# Patient Record
Sex: FEMALE | Race: WHITE | Employment: OTHER | URBAN - METROPOLITAN AREA
[De-identification: names, ages, dates, MRNs, and addresses within clinical notes are randomized per-mention and may not be internally consistent; named-entity substitution may affect disease eponyms.]

---

## 2017-04-01 RX ORDER — BLOOD SUGAR DIAGNOSTIC
STRIP MISCELLANEOUS
Qty: 100 STRIP | Refills: 11 | Status: SHIPPED | OUTPATIENT
Start: 2017-04-01 | End: 2017-11-13 | Stop reason: SDUPTHER

## 2017-04-27 ENCOUNTER — OFFICE VISIT (OUTPATIENT)
Dept: FAMILY MEDICINE CLINIC | Age: 77
End: 2017-04-27

## 2017-04-27 VITALS
SYSTOLIC BLOOD PRESSURE: 146 MMHG | WEIGHT: 169 LBS | TEMPERATURE: 97.7 F | BODY MASS INDEX: 31.1 KG/M2 | HEART RATE: 68 BPM | DIASTOLIC BLOOD PRESSURE: 62 MMHG | RESPIRATION RATE: 18 BRPM | HEIGHT: 62 IN

## 2017-04-27 DIAGNOSIS — Z23 ENCOUNTER FOR IMMUNIZATION: ICD-10-CM

## 2017-04-27 DIAGNOSIS — E11.9 TYPE 2 DIABETES MELLITUS WITHOUT COMPLICATION, WITHOUT LONG-TERM CURRENT USE OF INSULIN (HCC): ICD-10-CM

## 2017-04-27 DIAGNOSIS — Z00.00 MEDICARE ANNUAL WELLNESS VISIT, SUBSEQUENT: ICD-10-CM

## 2017-04-27 DIAGNOSIS — Z00.00 ROUTINE GENERAL MEDICAL EXAMINATION AT A HEALTH CARE FACILITY: ICD-10-CM

## 2017-04-27 DIAGNOSIS — Z13.39 SCREENING FOR ALCOHOLISM: ICD-10-CM

## 2017-04-27 DIAGNOSIS — Z12.31 ENCOUNTER FOR SCREENING MAMMOGRAM FOR MALIGNANT NEOPLASM OF BREAST: Primary | ICD-10-CM

## 2017-04-27 DIAGNOSIS — Z12.11 COLON CANCER SCREENING: ICD-10-CM

## 2017-04-27 PROBLEM — Z90.710 H/O HYSTERECTOMY FOR BENIGN DISEASE: Status: ACTIVE | Noted: 2017-04-27

## 2017-04-27 NOTE — PATIENT INSTRUCTIONS

## 2017-04-27 NOTE — PROGRESS NOTES
This is a Subsequent Medicare Annual Wellness Visit providing Personalized Prevention Plan Services (PPPS) (Performed 12 months after initial AWV and PPPS )    I have reviewed the patient's medical history in detail and updated the computerized patient record. History       Nicole PATRICIA d/w pt to have annual fit test, last colonoscopy 2 yrs ago. Benign polyps per family. Needs another in 3 yrs from now. Declines fit test today. Last HBA1C was 7.1 in 12/2016. She has log of FBS, does occasionally, . DW family goal is 90-130s. No need to repeat HBA1C today. Current labs. Has colporrhaphy 2/2017 for \"NALLELY, vaginal prolapse. Had hyster, still has cervix for pelvic support, with new type of mesh support. Pt declines mammogram order today, she will obtain order next visit. Declines pneumovax, states she is not due till June or July this year.              Past Medical History:   Diagnosis Date    Arthritis     Shoulders, knees    Bell's palsy 1980    Residual remains on right side    Breast cancer ([address] 5/1995    Cholelithiasis 6/23/2011    CKD (chronic kidney disease), stage III 6/6/2012    COPD (chronic obstructive pulmonary disease) ([address] 12/28/2011    Dyslipidemia 6/23/2011    Elevated triglycerides with high cholesterol 8/28/2015    Esophageal reflux 6/23/2011    Essential hypertension 6/23/2011    GERD (gastroesophageal reflux disease)     Hiatal hernia 6/23/2011    History of duodenal ulcer 6/23/2011    HTN (hypertension) 6/23/2011    Hyperlipidemia 1/7/2013    Left ventricular hypertrophy 6/23/2011    Nicotine dependence 9/2/2015    Obstructive chronic bronchitis with acute bronchitis ([address] 6/23/2011    Osteopenia     Osteoporosis 6/23/2011    Other ill-defined conditions     uti found 10/9 on antibiot    Other ill-defined conditions     rightsided eye and cheek numb from surg    Pregnancy 6/23/2011    Steatosis of liver 10/9/2011    Tobacco abuse 9/26/2011    Tubular adenoma of colon 1/24/2016 1/4/16    Type 2 diabetes mellitus without complications ([facility] 3/14/3555    Type II or unspecified type diabetes mellitus without mention of complication, not stated as uncontrolled 6/23/2011      Past Surgical History:   Procedure Laterality Date    ENDOSCOPY, COLON, DIAGNOSTIC  10/09    HX CHOLECYSTECTOMY  10/2011    laparoscopic    HX HEENT      bells palsy surgery,tongue numb    HX MASTECTOMY Right 5/1995    HX OTHER SURGICAL      colonoscopy    HX TONSIL AND ADENOIDECTOMY  1969    HX TONSILLECTOMY  1969    [place] MASTECTOMY BRA  1995    R Breast    REMOVAL GALLBLADDER       Current Outpatient Prescriptions   Medication Sig Dispense Refill    rosuvastatin (CRESTOR) 20 mg tablet Take 1 Tab by mouth nightly. 30 Tab 8    amLODIPine (NORVASC) 5 mg tablet TAKE ONE (1) TABLET(S) DAILY 30 Tab 11    metoprolol succinate (TOPROL-XL) 200 mg XL tablet TAKE ONE TABLET DAILY 30 Tab 5    losartan (COZAAR) 100 mg tablet TAKE ONE TABLET ONCE DAILY 30 Tab 5    omeprazole (PRILOSEC) 40 mg capsule TAKE ONE CAPSULE BY MOUTH D AILY 30 Cap 11    omega-3 fatty acids-vitamin e (FISH OIL) 1,000 mg cap Take 2 Caps by mouth daily (after breakfast).  calcium-vitamin D (CALCIUM 500+D) 500 mg(1,250mg) -200 unit per tablet Take 1 Tab by mouth daily (after breakfast).  aspirin 81 mg tablet Take 81 mg by mouth daily (after breakfast).  multivitamin (ONE A DAY) tablet Take 1 Tab by mouth daily (after breakfast).  ACCU-CHEK GEM PLUS TEST STRP strip USE TO CHECK BLOOD SUGAR DA NELLA 100 Strip 11    FLUZONE HIGH-DOSE 2016-17, PF, syrg injection VACCINATION ADMINISTERED BY PHARMACIST  0    pioglitazone (ACTOS) 15 mg tablet TAKE ONE TABLET ONCE DAILY PRIOR TO BREAKFAST 30 Tab 5    PREMARIN 0.625 mg/gram vaginal cream Insert  into vagina as needed. Use as directed  11    red yeast rice 600 mg Tab Take 600 mg by mouth daily (after breakfast).        Allergies   Allergen Reactions  Nsaids (Non-Steroidal Anti-Inflammatory Drug) Anaphylaxis and Hives    Fenofibrate Other (comments)     Leg cramps    Metformin Diarrhea     Family History   Problem Relation Age of Onset    Asthma Mother     Cancer Mother      Pancreatic CA    Stroke Father     Other Father      Artherosclerosis    Cancer Sister      Rectum    Lung Disease Sister     Diabetes Sister     Coronary Artery Disease Sister     Heart Attack Brother     Diabetes Maternal Grandmother     Diabetes Paternal Grandmother     Cancer Paternal Grandfather     Diabetes Sister     Coronary Artery Disease Sister     Diabetes Brother     Alcohol abuse Brother     Other Brother      killed in car accident     Social History   Substance Use Topics    Smoking status: Current Every Day Smoker     Packs/day: 0.50     Years: 65.00     Types: Cigarettes    Smokeless tobacco: Never Used    Alcohol use Yes      Comment: Rarely-holidays     Patient Active Problem List   Diagnosis Code    Bell's palsy G51.0    Dyslipidemia E78.5    Cholelithiasis K80.20    Breast cancer ([facility] C50.919    History of duodenal ulcer Z87.19    Esophageal reflux K21.9    Hiatal hernia K44.9    Essential hypertension I10    Type 2 diabetes mellitus without complications ([facility] S73.3    Pregnancy Z33.1    Left ventricular hypertrophy I51.7    Tobacco abuse Z72.0    Steatosis of liver K76.0    COPD (chronic obstructive pulmonary disease) ([facility] J44.9    Osteopenia M85.80    CKD (chronic kidney disease), stage III N18.3    Elevated triglycerides with high cholesterol E78.2    Nicotine dependence F17.200    IDALIA (obstructive sleep apnea) G47.33    Tubular adenoma of colon D12.6       Depression Risk Factor Screening:     PHQ 2 / 9, over the last two weeks 4/27/2017   Little interest or pleasure in doing things Not at all   Feeling down, depressed or hopeless Not at all   Total Score PHQ 2 0     Alcohol Risk Factor Screening:   None used      Functional Ability and Level of Safety:     Hearing Loss   Moderate. Has hearing aid. Activities of Daily Living   Self-care. Requires assistance with: no ADLs    Fall Risk     Fall Risk Assessment, last 12 mths 4/27/2017   Able to walk? Yes   Fall in past 12 months? No     Abuse Screen   Patient is not abused    Review of Systems   A comprehensive review of systems was negative except for that written in the HPI. Physical Examination     Evaluation of Cognitive Function:  Mood/affect:  happy  Appearance: age appropriate  Family member/caregiver input: daughter present. Gen: Oriented to person, place and time and well-developed, well-nourished and in no distress. HEENT:    Head: normocephalic and atraumatic. Eyes:  EOM are normal. Pupils equal and round. Ears:  TMs and canals normal, no erythema, bulging or retraction  Mouth:  No posterior pharyngeal erythema or exudates. No tonsillar hypertrophy. Neck:  Normal range of motion. Neck supple. Cardiovascular: normal rate, regular rhythm and normal heart sounds. Pulmonary/Chest:  Effort normal and breath sounds normal.  No respiratory distress. No wheezes, no rales. Abdominal: soft, normal  bowel sounds. Musculoskeletal:  No edema, no tenderness. No calf tenderness or edema. Neurological:  Alert, oriented to person, place and time. Skin: skin is warm and dry. Patient Care Team:  Maximilian Diaz MD as PCP - General (Family Practice)  Pilar Kennedy MD (Ophthalmology)  Richi Hood MD (Gastroenterology)  Isa Corrigan MD as Physician (Cardiology)  Jr Avina MD as Surgeon (General Surgery)    Advice/Referrals/Counseling   Education and counseling provided:  Are appropriate based on today's review and evaluation      Assessment/Plan   Follow-up Disposition:  Return in about 3 months (around 7/27/2017) for follow up DM2. .    1. Medicare annual wellness visit, subsequent       2.  Encounter for screening mammogram for malignant neoplasm of breast       3. Encounter for immunization       4. Colon cancer screening       5. Type 2 diabetes mellitus without complication, without long-term current use of insulin ([facility]       6. Routine general medical examination at a health care facility       7. Screening for alcoholism     I  have discussed the diagnosis with the patient and the intended treatment plan as seen in the above orders. Patient has provided input and agrees with goals. The patient has received an after-visit summary and questions were answered concerning future plans. I have discussed medication side effects and warnings with the patient as well.

## 2017-04-27 NOTE — MR AVS SNAPSHOT
Visit Information Date & Time Provider Department Dept. Phone Encounter #  
 4/27/2017 11:00 AM Oren ColvinLovely [address] [id number] Follow-up Instructions Return in about 3 months (around 7/27/2017) for follow up DM2. Your Appointments 4/27/2017 11:00 AM  
Medicare Physical with Oren Colvin MD  
[address] [facility]) Appt Note: Wellness visit and 4 Month DM check; Wellness visit and 4 Month DM check; Wellness visit and 4 Month DM check [address]  
[phone]  
  
   
 [address] [address] Upcoming Health Maintenance Date Due Pneumococcal 65+ High/Highest Risk (2 of 2 - PPSV23) 8/24/2016 MEDICARE YEARLY EXAM 10/14/2016 EYE EXAM RETINAL OR DILATED Q1 10/30/2016 FOOT EXAM Q1 2/10/2017 HEMOGLOBIN A1C Q6M 6/12/2017 MICROALBUMIN Q1 9/12/2017 LIPID PANEL Q1 9/12/2017 GLAUCOMA SCREENING Q2Y 10/30/2017 COLONOSCOPY 1/4/2021 DTaP/Tdap/Td series (2 - Td) 12/12/2026 Allergies as of 4/27/2017  Review Complete On: 4/27/2017 By: Oren Colvin MD  
  
 Severity Noted Reaction Type Reactions Nsaids (Non-steroidal Anti-inflammatory Drug) High 06/23/2011   Systemic Anaphylaxis, Hives Fenofibrate Medium 08/28/2015   Side Effect Other (comments) Leg cramps Metformin  09/12/2016    Diarrhea Current Immunizations  Reviewed on 12/12/2016 Name Date Influenza High Dose Vaccine PF 10/16/2016, 8/28/2015 Influenza Vaccine 10/14/2014, 10/1/2013 Pneumococcal Conjugate (PCV-13) 6/29/2016 Not reviewed this visit You Were Diagnosed With   
  
 Codes Comments Encounter for screening mammogram for malignant neoplasm of breast    -  Primary ICD-10-CM: Z12.31 
ICD-9-CM: V76.12 Medicare annual wellness visit, subsequent     ICD-10-CM: Z00.00 ICD-9-CM: V70.0 Encounter for immunization     ICD-10-CM: E93 ICD-9-CM: V03.89 Colon cancer screening     ICD-10-CM: Z12.11 ICD-9-CM: V76.51 Type 2 diabetes mellitus without complication, without long-term current use of insulin (HCC)     ICD-10-CM: E11.9 ICD-9-CM: 250.00 Routine general medical examination at a health care facility     ICD-10-CM: Z00.00 ICD-9-CM: V70.0 Screening for alcoholism     ICD-10-CM: Z13.89 ICD-9-CM: V79.1 Vitals BP Pulse Temp Resp Height(growth percentile) Weight(growth percentile) 146/62 (BP 1 Location: Right arm, BP Patient Position: Sitting) 68 97.7 °F (36.5 °C) (Oral) 18 5' 2\" (1.575 m) 169 lb (76.7 kg) BMI OB Status Smoking Status 30.91 kg/m2 Postmenopausal Current Every Day Smoker Vitals History BMI and BSA Data Body Mass Index Body Surface Area 30.91 kg/m 2 1.83 m 2 Preferred Pharmacy Pharmacy Name Phone [facility], [address] [place] [address] Bouchra Faster [phone] Your Updated Medication List  
  
   
This list is accurate as of: 4/27/17  9:35 AM.  Always use your most recent med list.  
  
  
  
  
 ACCU-CHEK GEM PLUS TEST STRP strip Generic drug:  glucose blood VI test strips USE TO CHECK BLOOD SUGAR DA NELLA  
  
 amLODIPine 5 mg tablet Commonly known as:  Phillip Cabrera TAKE ONE (1) TABLET(S) DAILY  
  
 aspirin 81 mg tablet Take 81 mg by mouth daily (after breakfast). CALCIUM 500+D 500 mg(1,250mg) -200 unit per tablet Generic drug:  calcium-vitamin D Take 1 Tab by mouth daily (after breakfast). FISH OIL 1,000 mg Cap Generic drug:  omega-3 fatty acids-vitamin e Take 2 Caps by mouth daily (after breakfast). FLUZONE HIGH-DOSE 2016-17 (PF) Syrg injection Generic drug:  influenza vaccine 2016-17 (65 yr+)(PF)  
VACCINATION ADMINISTERED BY PHARMACIST  
  
 losartan 100 mg tablet Commonly known as:  COZAAR  
TAKE ONE TABLET ONCE DAILY metoprolol succinate 200 mg XL tablet Commonly known as:  TOPROL-XL  
TAKE ONE TABLET DAILY  
  
 multivitamin tablet Commonly known as:  ONE A DAY Take 1 Tab by mouth daily (after breakfast). omeprazole 40 mg capsule Commonly known as:  PRILOSEC  
TAKE ONE CAPSULE BY MOUTH D AILY  
  
 rosuvastatin 20 mg tablet Commonly known as:  CRESTOR Take 1 Tab by mouth nightly. Follow-up Instructions Return in about 3 months (around 7/27/2017) for follow up DM2. Patient Instructions A Healthy Lifestyle: Care Instructions Your Care Instructions A healthy lifestyle can help you feel good, stay at a healthy weight, and have plenty of energy for both work and play. A healthy lifestyle is something you can share with your whole family. A healthy lifestyle also can lower your risk for serious health problems, such as high blood pressure, heart disease, and diabetes. You can follow a few steps listed below to improve your health and the health of your family. Follow-up care is a key part of your treatment and safety. Be sure to make and go to all appointments, and call your doctor if you are having problems. Its also a good idea to know your test results and keep a list of the medicines you take. How can you care for yourself at home? · Do not eat too much sugar, fat, or fast foods. You can still have dessert and treats now and then. The goal is moderation. · Start small to improve your eating habits. Pay attention to portion sizes, drink less juice and soda pop, and eat more fruits and vegetables. ¨ Eat a healthy amount of food. A 3-ounce serving of meat, for example, is about the size of a deck of cards. Fill the rest of your plate with vegetables and whole grains. ¨ Limit the amount of soda and sports drinks you have every day. Drink more water when you are thirsty. ¨ Eat at least 5 servings of fruits and vegetables every day.  It may seem like a lot, but it is not hard to reach this goal. A serving or helping is 1 piece of fruit, 1 cup of vegetables, or 2 cups of leafy, raw vegetables. Have an apple or some carrot sticks as an afternoon snack instead of a candy bar. Try to have fruits and/or vegetables at every meal. 
· Make exercise part of your daily routine. You may want to start with simple activities, such as walking, bicycling, or slow swimming. Try to be active 30 to 60 minutes every day. You do not need to do all 30 to 60 minutes all at once. For example, you can exercise 3 times a day for 10 or 20 minutes. Moderate exercise is safe for most people, but it is always a good idea to talk to your doctor before starting an exercise program. 
· Keep moving. Proctor Milks the lawn, work in the garden, or yetu. Take the stairs instead of the elevator at work. · If you smoke, quit. People who smoke have an increased risk for heart attack, stroke, cancer, and other lung illnesses. Quitting is hard, but there are ways to boost your chance of quitting tobacco for good. ¨ Use nicotine gum, patches, or lozenges. ¨ Ask your doctor about stop-smoking programs and medicines. ¨ Keep trying. In addition to reducing your risk of diseases in the future, you will notice some benefits soon after you stop using tobacco. If you have shortness of breath or asthma symptoms, they will likely get better within a few weeks after you quit. · Limit how much alcohol you drink. Moderate amounts of alcohol (up to 2 drinks a day for men, 1 drink a day for women) are okay. But drinking too much can lead to liver problems, high blood pressure, and other health problems. Family health If you have a family, there are many things you can do together to improve your health. · Eat meals together as a family as often as possible. · Eat healthy foods. This includes fruits, vegetables, lean meats and dairy, and whole grains. · Include your family in your fitness plan. Most people think of activities such as jogging or tennis as the way to fitness, but there are many ways you and your family can be more active. Anything that makes you breathe hard and gets your heart pumping is exercise. Here are some tips: 
¨ Walk to do errands or to take your child to school or the bus. ¨ Go for a family bike ride after dinner instead of watching TV. Where can you learn more? Go to http://tima-crystal.info/. Enter L211 in the search box to learn more about \"A Healthy Lifestyle: Care Instructions. \" Current as of: July 26, 2016 Content Version: 11.2 © 3534-4525 [employer]. Care instructions adapted under license by [employer] (which disclaims liability or warranty for this information). If you have questions about a medical condition or this instruction, always ask your healthcare professional. [address] any warranty or liability for your use of this information. Introducing \[facility]\"" & HEALTH SERVICES! Dear Nadir Singletary: Thank you for requesting a [facility] account. Our records indicate that you already have an active [facility] account. You can access your account anytime at [email or web address] Did you know that you can access your hospital and ER discharge instructions at any time in [facility]? You can also review all of your test results from your hospital stay or ER visit. Additional Information If you have questions, please visit the Frequently Asked Questions section of the [facility] website at [email or web address] Remember, [facility] is NOT to be used for urgent needs. For medical emergencies, dial 911. Now available from your iPhone and Android! Please provide this summary of care documentation to your next provider. Your primary care clinician is listed as Ana M Cope.  If you have any questions after today's visit, please call [phone].

## 2017-05-27 RX ORDER — OMEPRAZOLE 40 MG/1
CAPSULE, DELAYED RELEASE ORAL
Qty: 30 CAP | Refills: 11 | Status: SHIPPED | OUTPATIENT
Start: 2017-05-27 | End: 2018-05-31 | Stop reason: SDUPTHER

## 2017-06-27 NOTE — TELEPHONE ENCOUNTER
Pt requesting refill on her generic actos be sent to [address] [place] [address]. Pt has 1 pill left and wants to  tomorrow if possible.

## 2017-06-28 RX ORDER — PIOGLITAZONEHYDROCHLORIDE 15 MG/1
TABLET ORAL
Qty: 30 TAB | Refills: 5 | Status: SHIPPED | OUTPATIENT
Start: 2017-06-28 | End: 2017-11-27 | Stop reason: SDUPTHER

## 2017-07-01 RX ORDER — LOSARTAN POTASSIUM 100 MG/1
TABLET ORAL
Qty: 90 TAB | Refills: 2 | Status: SHIPPED | OUTPATIENT
Start: 2017-07-01 | End: 2018-04-02 | Stop reason: SDUPTHER

## 2017-08-24 ENCOUNTER — HOSPITAL ENCOUNTER (OUTPATIENT)
Dept: VASCULAR SURGERY | Age: 77
Discharge: HOME | End: 2017-08-24
Attending: FAMILY MEDICINE
Payer: MEDICARE

## 2017-08-24 ENCOUNTER — OFFICE VISIT (OUTPATIENT)
Dept: FAMILY MEDICINE CLINIC | Age: 77
End: 2017-08-24

## 2017-08-24 VITALS
BODY MASS INDEX: 31.69 KG/M2 | TEMPERATURE: 98.2 F | WEIGHT: 172.2 LBS | RESPIRATION RATE: 20 BRPM | HEART RATE: 60 BPM | DIASTOLIC BLOOD PRESSURE: 67 MMHG | SYSTOLIC BLOOD PRESSURE: 167 MMHG | HEIGHT: 62 IN

## 2017-08-24 DIAGNOSIS — I82.401: ICD-10-CM

## 2017-08-24 DIAGNOSIS — I10 ESSENTIAL HYPERTENSION: ICD-10-CM

## 2017-08-24 DIAGNOSIS — E11.9 TYPE 2 DIABETES MELLITUS WITHOUT COMPLICATION, WITHOUT LONG-TERM CURRENT USE OF INSULIN (HCC): Primary | ICD-10-CM

## 2017-08-24 PROCEDURE — 93970 EXTREMITY STUDY: CPT

## 2017-08-24 RX ORDER — CEPHALEXIN 250 MG/1
250 CAPSULE ORAL 4 TIMES DAILY
Qty: 40 CAP | Refills: 0 | Status: SHIPPED | OUTPATIENT
Start: 2017-08-24 | End: 2017-10-19

## 2017-08-24 RX ORDER — CEPHALEXIN 250 MG/1
250 CAPSULE ORAL 4 TIMES DAILY
Qty: 40 CAP | Refills: 0 | Status: SHIPPED | OUTPATIENT
Start: 2017-08-24 | End: 2017-08-24 | Stop reason: SDUPTHER

## 2017-08-24 NOTE — PROGRESS NOTES
Chief Complaint   Patient presents with    Diabetes     follow up    Leg Pain     left leg pain-4/10       1. Have you been to the ER, urgent care clinic since your last visit? No. Hospitalized since your last visit? No    2. Have you seen or consulted any other health care providers outside of the [address] [place] [address] since your last visit? Include any pap smears or colon screening.  No     Health Maintenance Due   Topic Date Due    Pneumococcal Vaccine (2 of 2 - PPSV23) 08/24/2016    Eye Exam  10/30/2016    Diabetic Foot Care  02/10/2017    Hemoglobin A1C    06/12/2017    Flu Vaccine  08/01/2017    Albumin Urine Test  09/12/2017    Cholesterol Test   09/12/2017

## 2017-08-24 NOTE — PROGRESS NOTES
HISTORY OF PRESENT ILLNESS  Hernan Ibarra is a 78 y.o. female. Diabetes   The history is provided by the patient (her FBS have been ). This is a chronic problem. The current episode started more than 1 week ago. The problem occurs constantly. The problem has been gradually improving. Pertinent negatives include no chest pain, no abdominal pain, no headaches and no shortness of breath. Nothing aggravates the symptoms. The symptoms are relieved by medications. Treatments tried: Actos. Leg Pain   The history is provided by the patient (left lower). This is a new problem. Episode onset: about a months ago. The problem occurs daily. The problem has been gradually worsening. Pertinent negatives include no chest pain, no abdominal pain, no headaches and no shortness of breath. Relieved by: partially with elevation. She has tried nothing for the symptoms. Review of Systems   Constitutional: Negative for weight loss. No weight gain   Eyes: Negative for blurred vision. Respiratory: Negative for shortness of breath. Cardiovascular: Positive for leg swelling. Negative for chest pain. Gastrointestinal: Negative for abdominal pain. Genitourinary:        No polyuria   Neurological: Negative for dizziness, sensory change, speech change, focal weakness and headaches. Endo/Heme/Allergies: Negative for polydipsia. Lab Results   Component Value Date/Time    Hemoglobin A1c 7.1 12/12/2016 09:35 AM    Hemoglobin A1c (POC) 6.2 02/09/2015 09:33 AM         Visit Vitals    /67 (BP 1 Location: Left arm, BP Patient Position: Sitting)    Pulse 60    Temp 98.2 °F (36.8 °C) (Oral)    Resp 20    Ht 5' 2\" (1.575 m)    Wt 172 lb 3.2 oz (78.1 kg)    BMI 31.5 kg/m2     BP Readings from Last 3 Encounters:   08/24/17 167/67   04/27/17 146/62   12/12/16 143/70     Physical Exam   Constitutional: She is oriented to person, place, and time. She appears well-developed and well-nourished. No distress. Cardiovascular: Normal rate, regular rhythm, normal heart sounds and intact distal pulses. Exam reveals no gallop and no friction rub. No murmur heard. Pulmonary/Chest: Effort normal and breath sounds normal. No respiratory distress. She has no wheezes. She has no rales. Musculoskeletal: She exhibits edema. 1+ in the RLE. Negative Homans, negative cords. Neurological: She is alert and oriented to person, place, and time. Normal monofilament exam   Skin: Skin is warm and dry. She is not diaphoretic. Feet and nails in good condition. Distal RLE warm and red. Nursing note and vitals reviewed. ASSESSMENT and PLAN    ICD-10-CM ICD-9-CM    1. Type 2 diabetes mellitus without complication, without long-term current use of insulin ([facility]) E11.9 250.00 HEMOGLOBIN A1C WITH EAG      HM DIABETES FOOT EXAM   2. Essential hypertension L69 120.6 METABOLIC PANEL, BASIC   3. Suspected DVT (deep vein thrombosis), right ([facility]) I82.401 453.40 DUPLEX LOWER EXT VENOUS BILAT      cephALEXin (KEFLEX) 250 mg capsule      DISCONTINUED: cephALEXin (KEFLEX) 250 mg capsule        Diabetes likely over controlled, await A1C result  Blood pressure elevated  Need to rule out DVT  Labs per orders. Stat dopplers  Will cover for infection with Keflex  Foot exam  was performed. .  Sensory and motor testing was assessed . Pedal pulse(s) was assessed. Follow-up Disposition:  Return in about 4 days (around 8/28/2017) for check leg, BP. Reviewed plan of care. Patient has provided input and agrees with goals.

## 2017-08-24 NOTE — PROCEDURES
[address]  *** FINAL REPORT ***    Name: Martinez Bullock  MRN: [id number]    Outpatient  : 17 May 1940  HIS Order #: [id number]  [address] [facility] Visit #: [id number]  Date: 24 Aug 2017    TYPE OF TEST: Peripheral Venous Testing    REASON FOR TEST  Pain in limb, Limb swelling    Right Leg:-  Deep venous thrombosis:           No  Superficial venous thrombosis:    No  Deep venous insufficiency:        No  Superficial venous insufficiency: No    Left Leg:-  Deep venous thrombosis:           No  Superficial venous thrombosis:    No  Deep venous insufficiency:        No  Superficial venous insufficiency: No      INTERPRETATION/FINDINGS  PROCEDURE:  BILATERAL LE VENOUS DUPLEX. Evaluation of lower extremity veins with ultrasound (B-mode imaging,  pulsed Doppler, color Doppler). Includes the common femoral, deep  femoral, femoral, popliteal, posterior tibial, peroneal, and great  saphenous veins. FINDINGS:  Argelia Bunting scale and color flow duplex images of the veins in  both lower extremities demonstrate normal compressibility, spontaneous   and augmented flow profiles, and absence of filling defects  throughout the deep and superficial veins in both lower extremities. CONCLUSION:  Bilateral lower extremity venous duplex negative for deep   venous thrombosis or thrombophlebitis. Enlarged lymph noes noted in  th egorin bilaterally, measuring 2.62 x 0.84 cm on the right and 3.87  x 0.96 cm on the left. ADDITIONAL COMMENTS    I have personally reviewed the data relevant to the interpretation of  this  study. TECHNOLOGIST: Tram Patton RDCS  Signed: 2017 04:26 PM    PHYSICIAN: Destiney Tucker.  Wsely Conn MD  Signed: 2017 11:34 AM

## 2017-08-24 NOTE — MR AVS SNAPSHOT
Visit Information Date & Time Provider Department Dept. Phone Encounter #  
 8/24/2017  8:45 AM Myles SimonLovely 34 [id number] Follow-up Instructions Return in about 4 days (around 8/28/2017) for check leg, BP. Upcoming Health Maintenance Date Due Pneumococcal 65+ High/Highest Risk (2 of 2 - PPSV23) 8/24/2016 EYE EXAM RETINAL OR DILATED Q1 10/30/2016 FOOT EXAM Q1 2/10/2017 HEMOGLOBIN A1C Q6M 6/12/2017 INFLUENZA AGE 9 TO ADULT 8/1/2017 MICROALBUMIN Q1 9/12/2017 LIPID PANEL Q1 9/12/2017 GLAUCOMA SCREENING Q2Y 10/30/2017 MEDICARE YEARLY EXAM 4/28/2018 COLONOSCOPY 1/4/2021 DTaP/Tdap/Td series (2 - Td) 12/12/2026 Allergies as of 8/24/2017  Review Complete On: 8/24/2017 By: Myles Simon MD  
  
 Severity Noted Reaction Type Reactions Nsaids (Non-steroidal Anti-inflammatory Drug) High 06/23/2011   Systemic Anaphylaxis, Hives Fenofibrate Medium 08/28/2015   Side Effect Other (comments) Leg cramps Metformin  09/12/2016    Diarrhea Current Immunizations  Reviewed on 8/24/2017 Name Date Influenza High Dose Vaccine PF 10/16/2016, 8/28/2015 Influenza Vaccine 10/14/2014, 10/1/2013 Pneumococcal Conjugate (PCV-13) 6/29/2016 Reviewed by Chloe Brooks LPN on 6/32/7257 at  8:59 AM  
 Reviewed by Chloe Brooks LPN on 3/94/4392 at  8:59 AM  
You Were Diagnosed With   
  
 Codes Comments Type 2 diabetes mellitus without complication, without long-term current use of insulin (HCC)    -  Primary ICD-10-CM: E11.9 ICD-9-CM: 250.00 Essential hypertension     ICD-10-CM: I10 
ICD-9-CM: 401.9 Suspected DVT (deep vein thrombosis), right (HCC)     ICD-10-CM: I82.401 ICD-9-CM: 453.40 Vitals  BP Pulse Temp Resp Height(growth percentile) Weight(growth percentile)  
 167/67 (BP 1 Location: Left arm, BP Patient Position: Sitting) 60 98.2 °F (36.8 °C) (Oral) 20 5' 2\" (1.575 m) 172 lb 3.2 oz (78.1 kg) BMI OB Status Smoking Status 31.5 kg/m2 Postmenopausal Current Every Day Smoker BMI and BSA Data Body Mass Index Body Surface Area 31.5 kg/m 2 1.85 m 2 Preferred Pharmacy Pharmacy Name Phone Viktor [facility], [address] [phone] Your Updated Medication List  
  
   
This list is accurate as of: 8/24/17  9:34 AM.  Always use your most recent med list.  
  
  
  
  
 ACCU-CHEK GEM PLUS TEST STRP strip Generic drug:  glucose blood VI test strips USE TO CHECK BLOOD SUGAR DA NELLA  
  
 amLODIPine 5 mg tablet Commonly known as:  Rafaela Ape TAKE ONE (1) TABLET(S) DAILY  
  
 aspirin 81 mg tablet Take 81 mg by mouth daily (after breakfast). CALCIUM 500+D 500 mg(1,250mg) -200 unit per tablet Generic drug:  calcium-vitamin D Take 1 Tab by mouth daily (after breakfast). cephALEXin 250 mg capsule Commonly known as:  Fani Rumple Take 1 Cap by mouth four (4) times daily. FISH OIL 1,000 mg Cap Generic drug:  omega-3 fatty acids-vitamin e Take 2 Caps by mouth daily (after breakfast). FLUZONE HIGH-DOSE 2016-17 (PF) Syrg injection Generic drug:  influenza vaccine 2016-17 (65 yr+)(PF)  
VACCINATION ADMINISTERED BY PHARMACIST  
  
 losartan 100 mg tablet Commonly known as:  COZAAR  
TAKE ONE TABLET ONCE DAILY  
  
 metoprolol succinate 200 mg XL tablet Commonly known as:  TOPROL-XL  
TAKE ONE TABLET DAILY  
  
 multivitamin tablet Commonly known as:  ONE A DAY Take 1 Tab by mouth daily (after breakfast). omeprazole 40 mg capsule Commonly known as:  PRILOSEC  
TAKE ONE CAPSULE BY MOUTH DAILY (GENERIC FOR PRILOSEC)  
  
 pioglitazone 15 mg tablet Commonly known as:  ACTOS  
TAKE ONE TABLET BY MOUTH DAILY BEFORE BREAKFAST  
  
 rosuvastatin 20 mg tablet Commonly known as:  CRESTOR  
 Take 1 Tab by mouth nightly. Prescriptions Sent to Pharmacy Refills  
 cephALEXin (KEFLEX) 250 mg capsule 0 Sig: Take 1 Cap by mouth four (4) times daily. Class: Normal  
 Pharmacy: [facility] [address], [address]  #: [phone] Route: Oral  
  
We Performed the Following HEMOGLOBIN A1C WITH EAG [27352 CPT(R)] METABOLIC PANEL, BASIC [33086 CPT(R)] Follow-up Instructions Return in about 4 days (around 8/28/2017) for check leg, BP. To-Do List   
 08/24/2017 Imaging:  DUPLEX LOWER EXT VENOUS BILAT   
  
 08/24/2017 4:00 PM  
  Appointment with VAS ROOM 1 [facility] at [facility] ([phone]) No Prep  GENERAL INSTRUCTIONS 1. Bring any [facility] films/reports pertaining to the area being studied with you on the day of appointment. 2. A written order with a valid diagnosis and Physicians signature is required for all scheduled tests. 3. Check in at registration 30 minutes before your appointment time unless you were instructed to do otherwise. Introducing [facility]! Dear Hilton Pappas: Thank you for requesting a [employer] account. Our records indicate that you already have an active [employer] account. You can access your account anytime at [email or web address] Did you know that you can access your hospital and ER discharge instructions at any time in [employer]? You can also review all of your test results from your hospital stay or ER visit. Additional Information If you have questions, please visit the Frequently Asked Questions section of the [employer] website at [email or web address] Remember, [employer] is NOT to be used for urgent needs. For medical emergencies, dial 911. Now available from your iPhone and Android! Please provide this summary of care documentation to your next provider. Your primary care clinician is listed as Tawana Hernandez. If you have any questions after today's visit, please call [phone].

## 2017-08-25 ENCOUNTER — TELEPHONE (OUTPATIENT)
Dept: FAMILY MEDICINE CLINIC | Age: 77
End: 2017-08-25

## 2017-08-25 DIAGNOSIS — R59.9 ADENOPATHY: Primary | ICD-10-CM

## 2017-08-25 LAB
BUN SERPL-MCNC: 22 MG/DL (ref 8–27)
BUN/CREAT SERPL: 22 (ref 12–28)
CALCIUM SERPL-MCNC: 10.3 MG/DL (ref 8.7–10.3)
CHLORIDE SERPL-SCNC: 99 MMOL/L (ref 96–106)
CO2 SERPL-SCNC: 27 MMOL/L (ref 18–29)
CREAT SERPL-MCNC: 0.98 MG/DL (ref 0.57–1)
EST. AVERAGE GLUCOSE BLD GHB EST-MCNC: 151 MG/DL
GLUCOSE SERPL-MCNC: 115 MG/DL (ref 65–99)
HBA1C MFR BLD: 6.9 % (ref 4.8–5.6)
INTERPRETATION: NORMAL
Lab: NORMAL
POTASSIUM SERPL-SCNC: 5.1 MMOL/L (ref 3.5–5.2)
SODIUM SERPL-SCNC: 140 MMOL/L (ref 134–144)

## 2017-08-25 NOTE — TELEPHONE ENCOUNTER
Please call patient and her daughter and and find out how she is doing. Let them know she does not have a clot, but has lymph nodes in the groin on both sides.   I would like for her to see Dr. Delfina Cleveland again and have printed a referral request.

## 2017-08-28 ENCOUNTER — OFFICE VISIT (OUTPATIENT)
Dept: SURGERY | Age: 77
End: 2017-08-28

## 2017-08-28 VITALS
DIASTOLIC BLOOD PRESSURE: 60 MMHG | RESPIRATION RATE: 18 BRPM | HEART RATE: 66 BPM | HEIGHT: 62 IN | OXYGEN SATURATION: 97 % | BODY MASS INDEX: 31.93 KG/M2 | TEMPERATURE: 97.8 F | WEIGHT: 173.5 LBS | SYSTOLIC BLOOD PRESSURE: 144 MMHG

## 2017-08-28 DIAGNOSIS — M60.9 MYOSITIS OF LEFT LOWER EXTREMITY, UNSPECIFIED MYOSITIS TYPE: ICD-10-CM

## 2017-08-28 DIAGNOSIS — M79.89 PAIN AND SWELLING OF LEFT LOWER EXTREMITY: Primary | ICD-10-CM

## 2017-08-28 DIAGNOSIS — M79.605 PAIN AND SWELLING OF LEFT LOWER EXTREMITY: Primary | ICD-10-CM

## 2017-08-28 DIAGNOSIS — R59.0 LYMPHADENOPATHY, INGUINAL: ICD-10-CM

## 2017-08-28 RX ORDER — AMITRIPTYLINE HYDROCHLORIDE 25 MG/1
25 TABLET, FILM COATED ORAL
Qty: 30 TAB | Refills: 0 | Status: SHIPPED | OUTPATIENT
Start: 2017-08-28 | End: 2017-09-27

## 2017-08-28 NOTE — PROGRESS NOTES
1. Have you been to the ER, urgent care clinic since your last visit? Hospitalized since your last visit? No    2. Have you seen or consulted any other health care providers outside of the [address] [place] [address] since your last visit? Include any pap smears or colon screening.  No

## 2017-08-29 NOTE — PROGRESS NOTES
Subjective:     Heladio Maldonado is a 78 y.o.  female who is being seen for leg pain and enlarged lymph nodes. Patient is well know to me from an excision of a calcified area of myositis from the left thigh a year ago. Patient comes in complains of bilateral lower leg burning pain including her feet. She state she has some left knee swelling as well. She was sent by her PCP for a duplex to rule out a DVT which was negative.   She is referred because there were bilateral enlarged nodes on the ultrasound    Past Medical History:   Diagnosis Date    Arthritis     Shoulders, knees    Bell's palsy 1980    Residual remains on right side    Breast cancer ([address] 5/1995    Cholelithiasis 6/23/2011    CKD (chronic kidney disease), stage III 6/6/2012    COPD (chronic obstructive pulmonary disease) ([address] 12/28/2011    Dyslipidemia 6/23/2011    Elevated triglycerides with high cholesterol 8/28/2015    Esophageal reflux 6/23/2011    Essential hypertension 6/23/2011    GERD (gastroesophageal reflux disease)     Hiatal hernia 6/23/2011    History of duodenal ulcer 6/23/2011    HTN (hypertension) 6/23/2011    Hyperlipidemia 1/7/2013    Left ventricular hypertrophy 6/23/2011    Nicotine dependence 9/2/2015    Obstructive chronic bronchitis with acute bronchitis ([address] 6/23/2011    Osteopenia     Osteoporosis 6/23/2011    Other ill-defined conditions     uti found 10/9 on antibiot    Other ill-defined conditions     rightsided eye and cheek numb from surg    Pregnancy 6/23/2011    Steatosis of liver 10/9/2011    Tobacco abuse 9/26/2011    Tubular adenoma of colon 1/24/2016 1/4/16    Type 2 diabetes mellitus without complications ([address] 0/92/8471    Type II or unspecified type diabetes mellitus without mention of complication, not stated as uncontrolled 6/23/2011      Past Surgical History:   Procedure Laterality Date    ENDOSCOPY, COLON, DIAGNOSTIC  10/09    HX CHOLECYSTECTOMY  10/2011 laparoscopic    HX HEENT      bells palsy surgery,tongue numb    HX MASTECTOMY Right 5/1995    HX OTHER SURGICAL      colonoscopy    HX TONSIL AND ADENOIDECTOMY  1969    HX TONSILLECTOMY  1969    LA MASTECTOMY BRA  1995    R Breast    REMOVAL GALLBLADDER       Family History   Problem Relation Age of Onset    Asthma Mother     Cancer Mother      Pancreatic CA    Stroke Father     Other Father      Artherosclerosis    Cancer Sister      Rectum    Lung Disease Sister     Diabetes Sister     Coronary Artery Disease Sister     Heart Attack Brother     Diabetes Maternal Grandmother     Diabetes Paternal Grandmother     Cancer Paternal Grandfather     Diabetes Sister     Coronary Artery Disease Sister     Diabetes Brother     Alcohol abuse Brother     Other Brother      killed in car accident      Social History   Substance Use Topics    Smoking status: Current Every Day Smoker     Packs/day: 0.50     Years: 65.00     Types: Cigarettes    Smokeless tobacco: Never Used    Alcohol use Yes      Comment: Rarely-holidays       Current Outpatient Prescriptions   Medication Sig Dispense Refill    amitriptyline (ELAVIL) 25 mg tablet Take 1 Tab by mouth nightly for 30 days. 30 Tab 0    cephALEXin (KEFLEX) 250 mg capsule Take 1 Cap by mouth four (4) times daily. 40 Cap 0    losartan (COZAAR) 100 mg tablet TAKE ONE TABLET ONCE DAILY 90 Tab 2    pioglitazone (ACTOS) 15 mg tablet TAKE ONE TABLET BY MOUTH DAILY BEFORE BREAKFAST 30 Tab 5    omeprazole (PRILOSEC) 40 mg capsule TAKE ONE CAPSULE BY MOUTH DAILY (GENERIC FOR PRILOSEC) 30 Cap 11    ACCU-CHEK GEM PLUS TEST STRP strip USE TO CHECK BLOOD SUGAR DA NELLA 100 Strip 11    FLUZONE HIGH-DOSE 2016-17, PF, syrg injection VACCINATION ADMINISTERED BY PHARMACIST  0    rosuvastatin (CRESTOR) 20 mg tablet Take 1 Tab by mouth nightly.  30 Tab 8    amLODIPine (NORVASC) 5 mg tablet TAKE ONE (1) TABLET(S) DAILY 30 Tab 11    metoprolol succinate (TOPROL-XL) 200 mg XL tablet TAKE ONE TABLET DAILY 30 Tab 5    omega-3 fatty acids-vitamin e (FISH OIL) 1,000 mg cap Take 2 Caps by mouth daily (after breakfast).  calcium-vitamin D (CALCIUM 500+D) 500 mg(1,250mg) -200 unit per tablet Take 1 Tab by mouth daily (after breakfast).  aspirin 81 mg tablet Take 81 mg by mouth daily (after breakfast).  multivitamin (ONE A DAY) tablet Take 1 Tab by mouth daily (after breakfast). Allergies   Allergen Reactions    Nsaids (Non-Steroidal Anti-Inflammatory Drug) Anaphylaxis and Hives    Fenofibrate Other (comments)     Leg cramps    Metformin Diarrhea        Review of Systems:  A comprehensive review of systems was negative except for that written in the History of Present Illness. Objective: Intake and Output:    [unfilled]  [unfilled]    Physical Exam:   Visit Vitals    /60 (BP 1 Location: Left arm, BP Patient Position: Sitting)    Pulse 66    Temp 97.8 °F (36.6 °C) (Oral)    Resp 18    Ht 5' 2\" (1.575 m)    Wt 173 lb 8 oz (78.7 kg)    SpO2 97%    BMI 31.73 kg/m2     General appearance: alert, cooperative, no distress, appears stated age  Head: Normocephalic, without obvious abnormality, atraumatic  Eyes: conjunctivae/corneas clear. PERRL, EOM's intact. Fundi benign  Skin: Skin color, texture, turgor normal. No rashes or lesions  Lymph nodes: no palpable adenopathy on either side  Extremities: no swelling of the left leg. Leg is tender without discrete lesion    Hgb A1C 7.2 (8/24/2017)      Assessment/plan:     1) lymphadenopathy -  Seen on duplex. Not palpable. Will get CT to assess. Likely reactive to her chronic myositis and not a malignancy    2) burning in lower lege and feet -  Much more likely diabetic neuropathy then any general surgery cause.   Will add amitriptyline    Signed By: Binu Dickey MD     August 29, 2017

## 2017-08-30 ENCOUNTER — HOSPITAL ENCOUNTER (OUTPATIENT)
Dept: CT IMAGING | Age: 77
Discharge: HOME | End: 2017-08-30
Attending: SURGERY
Payer: MEDICARE

## 2017-08-30 DIAGNOSIS — M79.605 PAIN AND SWELLING OF LEFT LOWER EXTREMITY: ICD-10-CM

## 2017-08-30 DIAGNOSIS — R59.0 LYMPHADENOPATHY, INGUINAL: ICD-10-CM

## 2017-08-30 DIAGNOSIS — M79.89 PAIN AND SWELLING OF LEFT LOWER EXTREMITY: ICD-10-CM

## 2017-08-30 DIAGNOSIS — M60.9 MYOSITIS OF LEFT LOWER EXTREMITY, UNSPECIFIED MYOSITIS TYPE: ICD-10-CM

## 2017-08-30 PROCEDURE — 72193 CT PELVIS W/DYE: CPT

## 2017-08-30 PROCEDURE — 74011636320 HC RX REV CODE- 636/320: Performed by: RADIOLOGY

## 2017-08-30 PROCEDURE — 73701 CT LOWER EXTREMITY W/DYE: CPT

## 2017-08-30 RX ADMIN — IOPAMIDOL 100 ML: 755 INJECTION, SOLUTION INTRAVENOUS at 11:56

## 2017-09-01 ENCOUNTER — TELEPHONE (OUTPATIENT)
Dept: SURGERY | Age: 77
End: 2017-09-01

## 2017-09-01 ENCOUNTER — TELEPHONE (OUTPATIENT)
Dept: FAMILY MEDICINE CLINIC | Age: 77
End: 2017-09-01

## 2017-09-01 ENCOUNTER — OFFICE VISIT (OUTPATIENT)
Dept: SURGERY | Age: 77
End: 2017-09-01

## 2017-09-01 DIAGNOSIS — M79.605 PAIN OF LEFT LOWER EXTREMITY: Primary | ICD-10-CM

## 2017-09-01 DIAGNOSIS — I82.402 SUSPECTED DVT (DEEP VEIN THROMBOSIS), LEFT: Primary | ICD-10-CM

## 2017-09-01 RX ORDER — METOPROLOL SUCCINATE 200 MG/1
TABLET, EXTENDED RELEASE ORAL
Qty: 30 TAB | Refills: 11 | Status: SHIPPED | OUTPATIENT
Start: 2017-09-01 | End: 2018-09-01 | Stop reason: SDUPTHER

## 2017-09-01 NOTE — TELEPHONE ENCOUNTER
Please call her daughter and let her know she needs to follow up with me. I was supposed to see her Monday. A repeat doppler has been ordered. She can do it next week if she wants to.

## 2017-09-01 NOTE — TELEPHONE ENCOUNTER
Pt's daughter called to advise pt is now completely out of metoprolol and pharmacy requested refill 2 days ago. Please send to pharmacy.  Enm

## 2017-09-01 NOTE — PROGRESS NOTES
Called patient's daughter Farida Toney and explained the CT shows no enlarged nodes, no masses, no sign of infection. Explained there is nothing a general surgeon can due for her mothers bilateral feet burning and left knee subjective swelling. Recommend follow up with PCP for symptomatic treatment.

## 2017-09-01 NOTE — TELEPHONE ENCOUNTER
Pt's daughter called stating pt is still having severe left leg pain, although advised no blood clot, pt does have swollen lymph nodes in both legs, in groin area, but no masses were detected and insurance would not approve CT of right leg for further evaluation. Pt's daughter says Dr. Milo Lloyd advised to contact PCP but wants to know if Dr. Tod Cruz needs to see pt again or if she needs to be referred to a different type of specialist since Dr. Tod Cruz had also evaluated pt for same symptoms. Please call [phone].  Ldm

## 2017-09-07 ENCOUNTER — HOSPITAL ENCOUNTER (OUTPATIENT)
Dept: GENERAL RADIOLOGY | Age: 77
Discharge: HOME | End: 2017-09-07
Payer: MEDICARE

## 2017-09-07 ENCOUNTER — OFFICE VISIT (OUTPATIENT)
Dept: FAMILY MEDICINE CLINIC | Age: 77
End: 2017-09-07

## 2017-09-07 VITALS
TEMPERATURE: 97.7 F | WEIGHT: 170.4 LBS | HEART RATE: 66 BPM | SYSTOLIC BLOOD PRESSURE: 165 MMHG | RESPIRATION RATE: 16 BRPM | HEIGHT: 62 IN | BODY MASS INDEX: 31.36 KG/M2 | DIASTOLIC BLOOD PRESSURE: 71 MMHG

## 2017-09-07 DIAGNOSIS — M79.605 LEFT LEG PAIN: ICD-10-CM

## 2017-09-07 DIAGNOSIS — M25.562 LEFT KNEE PAIN, UNSPECIFIED CHRONICITY: ICD-10-CM

## 2017-09-07 DIAGNOSIS — M79.89 LEFT LEG SWELLING: Primary | ICD-10-CM

## 2017-09-07 DIAGNOSIS — I10 ESSENTIAL HYPERTENSION: ICD-10-CM

## 2017-09-07 PROCEDURE — 73562 X-RAY EXAM OF KNEE 3: CPT

## 2017-09-07 NOTE — PROGRESS NOTES
Chief Complaint   Patient presents with    Leg Swelling     LLE follow up    Leg Pain     LLE follow up     1. Have you been to the ER, urgent care clinic since your last visit? No. Hospitalized since your last visit? No    2. Have you seen or consulted any other health care providers outside of the [facility] since your last visit? Include any pap smears or colon screening.  No.

## 2017-09-07 NOTE — PROGRESS NOTES
HISTORY OF PRESENT ILLNESS  Ady Jasso is a 78 y.o. female. Leg Swelling   The history is provided by the patient (left). This is a new problem. The current episode started more than 1 week ago. The problem occurs constantly. The problem has been gradually improving. Associated symptoms comments: Pain in the left distal leg and left knee. Nothing aggravates the symptoms. The symptoms are relieved by medications and rest. Treatments tried: Keflex. The treatment provided mild relief. Review of Systems   Constitutional: Negative for chills and fever. Cardiovascular: Positive for leg swelling. Musculoskeletal: Positive for joint pain. No knee locking or giving way   Skin:        LLE redness or warmth       Visit Vitals    /71 (BP 1 Location: Left arm, BP Patient Position: Sitting)    Pulse 66    Temp 97.7 °F (36.5 °C) (Oral)    Resp 16    Ht 5' 2\" (1.575 m)    Wt 170 lb 6.4 oz (77.3 kg)    BMI 31.17 kg/m2     BP Readings from Last 3 Encounters:   09/07/17 165/71   08/28/17 144/60   08/24/17 167/67     Physical Exam   Constitutional: She is oriented to person, place, and time. She appears well-developed and well-nourished. No distress. Musculoskeletal: She exhibits edema. Left knee: She exhibits decreased range of motion, swelling and bony tenderness. She exhibits no deformity, no erythema, no LCL laxity, normal meniscus and no MCL laxity. Tenderness found. Medial joint line tenderness noted. Trace edema over the distal left lower extremity   Neurological: She is alert and oriented to person, place, and time. Skin: She is not diaphoretic. Distal left leg red and warm       ASSESSMENT and PLAN    ICD-10-CM ICD-9-CM    1. Left leg swelling M79.89 729.81    2. Left leg pain M79.605 729.5    3. Left knee pain, unspecified chronicity M25.562 719.46 CANCELED: XR KNEE LT MIN 4 V   4.  Essential hypertension I10 401.9         Leg swelling and pain, improved, likely osteoarthritis in the knee which could be contributing to this  Elevated blood pressure today, patient says they are better at home  Knee x-ray  Tylenol, heat prn  Regular low impact exercise  She has a repeat doppler ordered, recommended that she have this done    Follow-up Disposition:  Return in about 6 weeks (around 10/19/2017) for blood pressures - bring home blood pressures. Reviewed plan of care. Patient has provided input and agrees with goals.

## 2017-09-07 NOTE — MR AVS SNAPSHOT
Visit Information Date & Time Provider Department Dept. Phone Encounter #  
 9/7/2017  9:30 AM Lovely Telles 34 [id number] Follow-up Instructions Return in about 6 weeks (around 10/19/2017) for blood pressures - bring home blood pressures. Upcoming Health Maintenance Date Due Pneumococcal 65+ High/Highest Risk (2 of 2 - PPSV23) 8/24/2016 EYE EXAM RETINAL OR DILATED Q1 10/30/2016 INFLUENZA AGE 9 TO ADULT 8/1/2017 MICROALBUMIN Q1 9/12/2017 LIPID PANEL Q1 9/12/2017 GLAUCOMA SCREENING Q2Y 10/30/2017 HEMOGLOBIN A1C Q6M 2/24/2018 MEDICARE YEARLY EXAM 4/28/2018 FOOT EXAM Q1 8/24/2018 COLONOSCOPY 1/4/2021 DTaP/Tdap/Td series (2 - Td) 12/12/2026 Allergies as of 9/7/2017  Review Complete On: 9/7/2017 By: Bell Mccurdy MD  
  
 Severity Noted Reaction Type Reactions Nsaids (Non-steroidal Anti-inflammatory Drug) High 06/23/2011   Systemic Anaphylaxis, Hives Fenofibrate Medium 08/28/2015   Side Effect Other (comments) Leg cramps Metformin  09/12/2016    Diarrhea Current Immunizations  Reviewed on 8/24/2017 Name Date Influenza High Dose Vaccine PF 10/16/2016, 8/28/2015 Influenza Vaccine 10/14/2014, 10/1/2013 Pneumococcal Conjugate (PCV-13) 6/29/2016 Not reviewed this visit You Were Diagnosed With   
  
 Codes Comments Left leg swelling    -  Primary ICD-10-CM: M79.89 ICD-9-CM: 729.81 Left leg pain     ICD-10-CM: M79.605 ICD-9-CM: 729.5 Left knee pain, unspecified chronicity     ICD-10-CM: Z16.982 ICD-9-CM: 719.46 Essential hypertension     ICD-10-CM: I10 
ICD-9-CM: 401.9 Vitals BP Pulse Temp Resp Height(growth percentile) Weight(growth percentile) 165/71 (BP 1 Location: Left arm, BP Patient Position: Sitting) 66 97.7 °F (36.5 °C) (Oral) 16 5' 2\" (1.575 m) 170 lb 6.4 oz (77.3 kg) BMI OB Status Smoking Status 31.17 kg/m2 Postmenopausal Current Every Day Smoker BMI and BSA Data Body Mass Index Body Surface Area  
 31.17 kg/m 2 1.84 m 2 Preferred Pharmacy Pharmacy Name Phone Kavin Mendoza [facility], [address] [phone] Your Updated Medication List  
  
   
This list is accurate as of: 9/7/17 10:41 AM.  Always use your most recent med list.  
  
  
  
  
 ACCU-CHEK GEM PLUS TEST STRP strip Generic drug:  glucose blood VI test strips USE TO CHECK BLOOD SUGAR DA NELLA  
  
 amitriptyline 25 mg tablet Commonly known as:  ELAVIL Take 1 Tab by mouth nightly for 30 days. amLODIPine 5 mg tablet Commonly known as:  [place] TAKE ONE (1) TABLET(S) DAILY  
  
 aspirin 81 mg tablet Take 81 mg by mouth daily (after breakfast). CALCIUM 500+D 500 mg(1,250mg) -200 unit per tablet Generic drug:  calcium-vitamin D Take 1 Tab by mouth daily (after breakfast). cephALEXin 250 mg capsule Commonly known as:  Junius Alpers Take 1 Cap by mouth four (4) times daily. FISH OIL 1,000 mg Cap Generic drug:  omega-3 fatty acids-vitamin e Take 2 Caps by mouth daily (after breakfast). FLUZONE HIGH-DOSE 2016-17 (PF) Syrg injection Generic drug:  influenza vaccine 2016-17 (65 yr+)(PF)  
VACCINATION ADMINISTERED BY PHARMACIST  
  
 losartan 100 mg tablet Commonly known as:  COZAAR  
TAKE ONE TABLET ONCE DAILY  
  
 metoprolol succinate 200 mg XL tablet Commonly known as:  TOPROL-XL  
TAKE ONE TABLET BY MOUTH DAILY  
  
 multivitamin tablet Commonly known as:  ONE A DAY Take 1 Tab by mouth daily (after breakfast). omeprazole 40 mg capsule Commonly known as:  PRILOSEC  
TAKE ONE CAPSULE BY MOUTH DAILY (GENERIC FOR PRILOSEC)  
  
 pioglitazone 15 mg tablet Commonly known as:  ACTOS  
TAKE ONE TABLET BY MOUTH DAILY BEFORE BREAKFAST  
  
 rosuvastatin 20 mg tablet Commonly known as:  CRESTOR  
 Take 1 Tab by mouth nightly. Follow-up Instructions Return in about 6 weeks (around 10/19/2017) for blood pressures - bring home blood pressures. To-Do List   
 09/07/2017 Imaging:  XR KNEE LT MIN 4 V Introducing [facility]! Dear Sawyer Germain: Thank you for requesting a [employer] account. Our records indicate that you already have an active [employer] account. You can access your account anytime at [email or web address] Did you know that you can access your hospital and ER discharge instructions at any time in [employer]? You can also review all of your test results from your hospital stay or ER visit. Additional Information If you have questions, please visit the Frequently Asked Questions section of the [employer] website at [email or web address] Remember, [employer] is NOT to be used for urgent needs. For medical emergencies, dial 911. Now available from your iPhone and Android! Please provide this summary of care documentation to your next provider. Your primary care clinician is listed as Corwin Driscoll. If you have any questions after today's visit, please call [phone].

## 2017-09-09 ENCOUNTER — HOSPITAL ENCOUNTER (OUTPATIENT)
Dept: VASCULAR SURGERY | Age: 77
Discharge: HOME | End: 2017-09-09
Attending: FAMILY MEDICINE
Payer: MEDICARE

## 2017-09-09 DIAGNOSIS — I82.402 SUSPECTED DVT (DEEP VEIN THROMBOSIS), LEFT: ICD-10-CM

## 2017-09-09 PROCEDURE — 93970 EXTREMITY STUDY: CPT

## 2017-09-09 NOTE — PROCEDURES
[address]  *** FINAL REPORT ***    Name: Sharda Cuenca  MRN: [id number]    Outpatient  : 17 May 1940  HIS Order #: [id number]  [address] [facility] Visit #: [id number]  Date: 09 Sep 2017    TYPE OF TEST: Peripheral Venous Testing    REASON FOR TEST  Limb swelling    Right Leg:-  Deep venous thrombosis:           No  Superficial venous thrombosis:    No  Deep venous insufficiency:        No  Superficial venous insufficiency: No    Left Leg:-  Deep venous thrombosis:           No  Superficial venous thrombosis:    No  Deep venous insufficiency:        No  Superficial venous insufficiency: No      INTERPRETATION/FINDINGS  PROCEDURE:  BILATERAL LE VENOUS DUPLEX. Evaluation of lower extremity veins with ultrasound (B-mode imaging,  pulsed Doppler, color Doppler). Includes the common femoral, deep  femoral, femoral, popliteal, posterior tibial, peroneal, and great  saphenous veins. FINDINGS:  Dionna Connie scale and color flow duplex images of the veins in  both lower extremities demonstrate normal compressibility, spontaneous   and augmented flow profiles, and absence of filling defects  throughout the deep and superficial veins in both lower extremities. CONCLUSION:  Bilateral lower extremity venous duplex negative for deep   venous thrombosis or thrombophlebitis. NOTE:  Prominent lyphnodes  noted bilateral groin. ADDITIONAL COMMENTS    I have personally reviewed the data relevant to the interpretation of  this  study. TECHNOLOGIST: SHANELLE Riggins  Signed: 2017 01:07 PM    PHYSICIAN: Christiano Patel MD  Signed: 2017 09:12 AM

## 2017-09-11 ENCOUNTER — TELEPHONE (OUTPATIENT)
Dept: FAMILY MEDICINE CLINIC | Age: 77
End: 2017-09-11

## 2017-09-12 ENCOUNTER — TELEPHONE (OUTPATIENT)
Dept: FAMILY MEDICINE CLINIC | Age: 77
End: 2017-09-12

## 2017-09-12 NOTE — TELEPHONE ENCOUNTER
Called and spoke with pt's daughter and she has been advised and states understanding of pt's results.

## 2017-09-13 RX ORDER — AMLODIPINE BESYLATE 5 MG/1
TABLET ORAL
Qty: 30 TAB | Refills: 1 | Status: SHIPPED | OUTPATIENT
Start: 2017-09-13 | End: 2017-11-12 | Stop reason: SDUPTHER

## 2017-10-16 DIAGNOSIS — E78.2 ELEVATED TRIGLYCERIDES WITH HIGH CHOLESTEROL: ICD-10-CM

## 2017-10-16 RX ORDER — ROSUVASTATIN CALCIUM 20 MG/1
20 TABLET, COATED ORAL
Qty: 90 TAB | Refills: 1 | Status: CANCELLED | OUTPATIENT
Start: 2017-10-16

## 2017-10-19 ENCOUNTER — OFFICE VISIT (OUTPATIENT)
Dept: FAMILY MEDICINE CLINIC | Age: 77
End: 2017-10-19

## 2017-10-19 VITALS
WEIGHT: 172 LBS | TEMPERATURE: 98.2 F | SYSTOLIC BLOOD PRESSURE: 145 MMHG | RESPIRATION RATE: 18 BRPM | DIASTOLIC BLOOD PRESSURE: 66 MMHG | HEIGHT: 62 IN | HEART RATE: 66 BPM | BODY MASS INDEX: 31.65 KG/M2

## 2017-10-19 DIAGNOSIS — I10 ESSENTIAL HYPERTENSION: Primary | ICD-10-CM

## 2017-10-19 DIAGNOSIS — Z23 ENCOUNTER FOR IMMUNIZATION: ICD-10-CM

## 2017-10-19 DIAGNOSIS — E11.9 TYPE 2 DIABETES MELLITUS WITHOUT COMPLICATION, WITHOUT LONG-TERM CURRENT USE OF INSULIN (HCC): ICD-10-CM

## 2017-10-19 NOTE — MR AVS SNAPSHOT
Visit Information Date & Time Provider Department Dept. Phone Encounter #  
 10/19/2017  9:00 AM Lovely Dumont 34 [id number] Follow-up Instructions Return in about 2 months (around 12/19/2017) for diabetes. Upcoming Health Maintenance Date Due Pneumococcal 65+ High/Highest Risk (2 of 2 - PPSV23) 8/24/2016 MICROALBUMIN Q1 9/12/2017 LIPID PANEL Q1 9/12/2017 HEMOGLOBIN A1C Q6M 2/24/2018 MEDICARE YEARLY EXAM 4/28/2018 EYE EXAM RETINAL OR DILATED Q1 8/8/2018 FOOT EXAM Q1 8/24/2018 GLAUCOMA SCREENING Q2Y 8/8/2019 COLONOSCOPY 1/4/2021 DTaP/Tdap/Td series (2 - Td) 12/12/2026 Allergies as of 10/19/2017  Review Complete On: 10/19/2017 By: Venus Gates MD  
  
 Severity Noted Reaction Type Reactions Nsaids (Non-steroidal Anti-inflammatory Drug) High 06/23/2011   Systemic Anaphylaxis, Hives Fenofibrate Medium 08/28/2015   Side Effect Other (comments) Leg cramps Metformin  09/12/2016    Diarrhea Current Immunizations  Reviewed on 8/24/2017 Name Date Influenza High Dose Vaccine PF  Incomplete, 10/16/2016, 8/28/2015 Influenza Vaccine 10/14/2014, 10/1/2013 Pneumococcal Conjugate (PCV-13) 6/29/2016 Not reviewed this visit You Were Diagnosed With   
  
 Codes Comments Essential hypertension    -  Primary ICD-10-CM: I10 
ICD-9-CM: 401.9 Type 2 diabetes mellitus without complication, without long-term current use of insulin (HCC)     ICD-10-CM: E11.9 ICD-9-CM: 250.00 Encounter for immunization     ICD-10-CM: Z61 ICD-9-CM: V03.89 Vitals BP Pulse Temp Resp Height(growth percentile) Weight(growth percentile) 145/66 66 98.2 °F (36.8 °C) (Oral) 18 5' 2\" (1.575 m) 172 lb (78 kg) BMI OB Status Smoking Status 31.46 kg/m2 Postmenopausal Current Every Day Smoker BMI and BSA Data  Body Mass Index Body Surface Area  
 31.46 kg/m 2 1.85 m 2  
  
  
 Preferred Pharmacy Pharmacy Name Phone [facility], [address] [phone] Your Updated Medication List  
  
   
This list is accurate as of: 10/19/17  9:55 AM.  Always use your most recent med list.  
  
  
  
  
 ACCU-CHEK GEM PLUS TEST STRP strip Generic drug:  glucose blood VI test strips USE TO CHECK BLOOD SUGAR DA NELLA  
  
 amLODIPine 5 mg tablet Commonly known as:  Marco Luciano TAKE ONE TABLET BY MOUTH DAILY  
  
 aspirin 81 mg tablet Take 81 mg by mouth daily (after breakfast). CALCIUM 500+D 500 mg(1,250mg) -200 unit per tablet Generic drug:  calcium-vitamin D Take 1 Tab by mouth daily (after breakfast). FISH OIL 1,000 mg Cap Generic drug:  omega-3 fatty acids-vitamin e Take 2 Caps by mouth daily (after breakfast). FLUZONE HIGH-DOSE  (PF) Syrg injection Generic drug:  influenza vaccine  (65 yr+)(PF)  
VACCINATION ADMINISTERED BY PHARMACIST  
  
 losartan 100 mg tablet Commonly known as:  COZAAR  
TAKE ONE TABLET ONCE DAILY  
  
 metoprolol succinate 200 mg XL tablet Commonly known as:  TOPROL-XL  
TAKE ONE TABLET BY MOUTH DAILY  
  
 multivitamin tablet Commonly known as:  ONE A DAY Take 1 Tab by mouth daily (after breakfast). omeprazole 40 mg capsule Commonly known as:  PRILOSEC  
TAKE ONE CAPSULE BY MOUTH DAILY (GENERIC FOR PRILOSEC)  
  
 pioglitazone 15 mg tablet Commonly known as:  ACTOS  
TAKE ONE TABLET BY MOUTH DAILY BEFORE BREAKFAST pneumococcal 23-valent 25 mcg/0.5 mL injection Commonly known as:  PNEUMOVAX 23  
0.5 mL by IntraMUSCular route once for 1 dose. rosuvastatin 20 mg tablet Commonly known as:  CRESTOR Take 1 Tab by mouth nightly. Prescriptions Sent to Pharmacy Refills  
 pneumococcal 23-valent (PNEUMOVAX 23) 25 mcg/0.5 mL injection 0 Si.5 mL by IntraMUSCular route once for 1 dose. Class: Normal  
 Pharmacy: [facility] [address], [address] Ph #: [phone] Route: IntraMUSCular We Performed the Following ADMIN INFLUENZA VIRUS VAC [ [facility]] INFLUENZA VIRUS VACCINE, HIGH DOSE SEASONAL, PRESERVATIVE FREE [63589 CPT(R)] MICROALBUMIN, UR, RAND W/ MICROALBUMIN/CREA RATIO [id number] CPT(R)] Follow-up Instructions Return in about 2 months (around 12/19/2017) for diabetes. Patient Instructions Vaccine Information Statement Influenza (Flu) Vaccine (Inactivated or Recombinant): What you need to know Many Vaccine Information Statements are available in Polish and other languages. See www.immunize.org/vis Hojas de Información Sobre Vacunas están disponibles en Español y en muchos otros idiomas. Visite www.immunize.org/vis 1. Why get vaccinated? Influenza (flu) is a contagious disease that spreads around the [facility] every year, usually between October and May. Flu is caused by influenza viruses, and is spread mainly by coughing, sneezing, and close contact. Anyone can get flu. Flu strikes suddenly and can last several days. Symptoms vary by age, but can include: 
 fever/chills  sore throat  muscle aches  fatigue  cough  headache  runny or stuffy nose Flu can also lead to pneumonia and blood infections, and cause diarrhea and seizures in children. If you have a medical condition, such as heart or lung disease, flu can make it worse. Flu is more dangerous for some people. Infants and young children, people 72years of age and older, pregnant women, and people with certain health conditions or a weakened immune system are at greatest risk. Each year thousands of people in the [facility] die from flu, and many more are hospitalized.   
 
Flu vaccine can: 
 keep you from getting flu, 
 make flu less severe if you do get it, and 
  keep you from spreading flu to your family and other people. 2. Inactivated and recombinant flu vaccines A dose of flu vaccine is recommended every flu season. Children 6 months through 6years of age may need two doses during the same flu season. Everyone else needs only one dose each flu season. Some inactivated flu vaccines contain a very small amount of a mercury-based preservative called thimerosal. Studies have not shown thimerosal in vaccines to be harmful, but flu vaccines that do not contain thimerosal are available. There is no live flu virus in flu shots. They cannot cause the flu. There are many flu viruses, and they are always changing. Each year a new flu vaccine is made to protect against three or four viruses that are likely to cause disease in the upcoming flu season. But even when the vaccine doesnt exactly match these viruses, it may still provide some protection Flu vaccine cannot prevent: 
 flu that is caused by a virus not covered by the vaccine, or 
 illnesses that look like flu but are not. It takes about 2 weeks for protection to develop after vaccination, and protection lasts through the flu season. 3. Some people should not get this vaccine Tell the person who is giving you the vaccine:  If you have any severe, life-threatening allergies. If you ever had a life-threatening allergic reaction after a dose of flu vaccine, or have a severe allergy to any part of this vaccine, you may be advised not to get vaccinated. Most, but not all, types of flu vaccine contain a small amount of egg protein.  If you ever had Guillain-Barré Syndrome (also called GBS). Some people with a history of GBS should not get this vaccine. This should be discussed with your doctor.  If you are not feeling well. It is usually okay to get flu vaccine when you have a mild illness, but you might be asked to come back when you feel better. 4. Risks of a vaccine reaction With any medicine, including vaccines, there is a chance of reactions. These are usually mild and go away on their own, but serious reactions are also possible. Most people who get a flu shot do not have any problems with it. Minor problems following a flu shot include:  
 soreness, redness, or swelling where the shot was given  hoarseness  sore, red or itchy eyes  cough  fever  aches  headache  itching  fatigue If these problems occur, they usually begin soon after the shot and last 1 or 2 days. More serious problems following a flu shot can include the following:  There may be a small increased risk of Guillain-Barré Syndrome (GBS) after inactivated flu vaccine. This risk has been estimated at 1 or 2 additional cases per million people vaccinated. This is much lower than the risk of severe complications from flu, which can be prevented by flu vaccine.  Young children who get the flu shot along with pneumococcal vaccine (PCV13) and/or DTaP vaccine at the same time might be slightly more likely to have a seizure caused by fever. Ask your doctor for more information. Tell your doctor if a child who is getting flu vaccine has ever had a seizure. Problems that could happen after any injected vaccine:  People sometimes faint after a medical procedure, including vaccination. Sitting or lying down for about 15 minutes can help prevent fainting, and injuries caused by a fall. Tell your doctor if you feel dizzy, or have vision changes or ringing in the ears.  Some people get severe pain in the shoulder and have difficulty moving the arm where a shot was given. This happens very rarely.  Any medication can cause a severe allergic reaction. Such reactions from a vaccine are very rare, estimated at about 1 in a million doses, and would happen within a few minutes to a few hours after the vaccination. As with any medicine, there is a very remote chance of a vaccine causing a serious injury or death. The safety of vaccines is always being monitored. For more information, visit: www.cdc.gov/vaccinesafety/ 
 
5. What if there is a serious reaction? What should I look for?  Look for anything that concerns you, such as signs of a severe allergic reaction, very high fever, or unusual behavior. Signs of a severe allergic reaction can include hives, swelling of the face and throat, difficulty breathing, a fast heartbeat, dizziness, and weakness  usually within a few minutes to a few hours after the vaccination. What should I do?  If you think it is a severe allergic reaction or other emergency that cant wait, call 9-1-1 and get the person to the nearest hospital. Otherwise, call your doctor.  Reactions should be reported to the Vaccine Adverse Event Reporting System (VAERS). Your doctor should file this report, or you can do it yourself through  the VAERS web site at www.vaers. [facility].gov, or by calling 5-497.466.3492. VAERS does not give medical advice. 6. The National Vaccine Injury Compensation Program 
 
The [facility] Vaccine Injury Compensation Program (VICP) is a federal program that was created to compensate people who may have been injured by certain vaccines. Persons who believe they may have been injured by a vaccine can learn about the program and about filing a claim by calling 1-878.829.2675 or visiting the [employer] website at www.[facility].gov/vaccinecompensation. There is a time limit to file a claim for compensation. 7. How can I learn more?  Ask your healthcare provider. He or she can give you the vaccine package insert or suggest other sources of information.  Call your local or state health department.  Contact the Centers for Disease Control and Prevention (CDC): 
- Call 9-520.361.2068 (1-800-CDC-INFO) or 
- Visit CDCs website at www.cdc.gov/flu Vaccine Information Statement Inactivated Influenza Vaccine 8/7/2015 
42 ADRY Guallpa [id number] Department of [facility] and [place] Centers for Disease Control and Prevention Office Use Only Introducing [facility] SERVICES! Dear Stephanie Infante: Thank you for requesting a "[employer]" account. Our records indicate that you already have an active "[employer]" account. You can access your account anytime at [email or web address] Did you know that you can access your hospital and ER discharge instructions at any time in "[employer]"? You can also review all of your test results from your hospital stay or ER visit. Additional Information If you have questions, please visit the Frequently Asked Questions section of the "[employer]" website at [email or web address] Remember, "[employer]" is NOT to be used for urgent needs. For medical emergencies, dial 911. Now available from your iPhone and Android! Please provide this summary of care documentation to your next provider. Your primary care clinician is listed as Krystle Hanson. If you have any questions after today's visit, please call [phone].

## 2017-10-19 NOTE — PROGRESS NOTES
1. Have you been to the ER, urgent care clinic since your last visit? No  Hospitalized since your last visit? No    2. Have you seen or consulted any other health care providers outside of the [address] [place] [address] since your last visit? Include any pap smears or colon screening.  No    Health Maintenance Due   Topic Date Due    Pneumococcal Vaccine (2 of 2 - PPSV23) 08/24/2016    Flu Vaccine  08/01/2017    Albumin Urine Test  09/12/2017    Cholesterol Test   09/12/2017     Chief Complaint   Patient presents with    Blood Pressure Check     6 wk f/u

## 2017-10-19 NOTE — PROGRESS NOTES
Given verbal order by Dr. Okeefe Barrier to order & give Influenza vaccine, it was given without any difficulty.

## 2017-10-19 NOTE — PROGRESS NOTES
HISTORY OF PRESENT ILLNESS  Ayana Garcia is a 78 y.o. female. Blood Pressure Check   The history is provided by the patient (she brings in her home blood pressures today and they have been 115-154/58-77. All SBPs under 150 except twice. ). This is a chronic problem. The current episode started more than 1 week ago. The problem occurs constantly. The problem has been gradually improving. Pertinent negatives include no chest pain, no abdominal pain, no headaches and no shortness of breath. Nothing aggravates the symptoms. The symptoms are relieved by medications. Treatments tried: Norvasc, losartan, Toprol XL. The treatment provided significant relief. Review of Systems   Constitutional: Negative for weight loss. No weight gain   Eyes: Negative for blurred vision. Respiratory: Negative for shortness of breath. Cardiovascular: Positive for leg swelling. Negative for chest pain. Gastrointestinal: Negative for abdominal pain. Neurological: Negative for dizziness, sensory change, speech change, focal weakness and headaches. Visit Vitals    /66    Pulse 66    Temp 98.2 °F (36.8 °C) (Oral)    Resp 18    Ht 5' 2\" (1.575 m)    Wt 172 lb (78 kg)    BMI 31.46 kg/m2     BP Readings from Last 3 Encounters:   10/19/17 145/66   09/07/17 165/71   08/28/17 144/60     Physical Exam   Constitutional: She is oriented to person, place, and time. She appears well-developed and well-nourished. No distress. Cardiovascular: Normal rate, regular rhythm and normal heart sounds. Exam reveals no gallop and no friction rub. No murmur heard. Pulmonary/Chest: Effort normal and breath sounds normal. No respiratory distress. She has no wheezes. She has no rales. Musculoskeletal: She exhibits edema. Trace edema in the LLE, none on the right   Neurological: She is alert and oriented to person, place, and time. Skin: Skin is warm and dry. She is not diaphoretic.    Nursing note and vitals reviewed. ASSESSMENT and PLAN    ICD-10-CM ICD-9-CM    1. Essential hypertension I10 401.9    2. Type 2 diabetes mellitus without complication, without long-term current use of insulin (HCC) E11.9 250.00 MICROALBUMIN, UR, RAND W/ MICROALBUMIN/CREA RATIO   3. Encounter for immunization Z23 V03.89 pneumococcal 23-valent (PNEUMOVAX 23) 25 mcg/0.5 mL injection      INFLUENZA VIRUS VACCINE, HIGH DOSE SEASONAL, PRESERVATIVE FREE      ADMIN INFLUENZA VIRUS VAC        Blood pressure adequately controlled when considering her age  Due for microalbumin  Labs per orders. Continue current plans. Pneumovax at pharmacy  Flu shot today    Follow-up Disposition:  Return in about 2 months (around 12/19/2017) for diabetes. Reviewed plan of care. Patient has provided input and agrees with goals.

## 2017-10-19 NOTE — PATIENT INSTRUCTIONS
Vaccine Information Statement    Influenza (Flu) Vaccine (Inactivated or Recombinant): What you need to know    Many Vaccine Information Statements are available in Kiswahili and other languages. See www.immunize.org/vis  Hojas de Información Sobre Vacunas están disponibles en Español y en muchos otros idiomas. Visite www.immunize.org/vis    1. Why get vaccinated? Influenza (flu) is a contagious disease that spreads around the [place] every year, usually between October and May. Flu is caused by influenza viruses, and is spread mainly by coughing, sneezing, and close contact. Anyone can get flu. Flu strikes suddenly and can last several days. Symptoms vary by age, but can include:   fever/chills   sore throat   muscle aches   fatigue   cough   headache    runny or stuffy nose    Flu can also lead to pneumonia and blood infections, and cause diarrhea and seizures in children. If you have a medical condition, such as heart or lung disease, flu can make it worse. Flu is more dangerous for some people. Infants and young children, people 72years of age and older, pregnant women, and people with certain health conditions or a weakened immune system are at greatest risk. Each year thousands of people in the [facility] die from flu, and many more are hospitalized. Flu vaccine can:   keep you from getting flu,   make flu less severe if you do get it, and   keep you from spreading flu to your family and other people. 2. Inactivated and recombinant flu vaccines    A dose of flu vaccine is recommended every flu season. Children 6 months through 6years of age may need two doses during the same flu season. Everyone else needs only one dose each flu season.        Some inactivated flu vaccines contain a very small amount of a mercury-based preservative called thimerosal. Studies have not shown thimerosal in vaccines to be harmful, but flu vaccines that do not contain thimerosal are available. There is no live flu virus in flu shots. They cannot cause the flu. There are many flu viruses, and they are always changing. Each year a new flu vaccine is made to protect against three or four viruses that are likely to cause disease in the upcoming flu season. But even when the vaccine doesnt exactly match these viruses, it may still provide some protection    Flu vaccine cannot prevent:   flu that is caused by a virus not covered by the vaccine, or   illnesses that look like flu but are not. It takes about 2 weeks for protection to develop after vaccination, and protection lasts through the flu season. 3. Some people should not get this vaccine    Tell the person who is giving you the vaccine:     If you have any severe, life-threatening allergies. If you ever had a life-threatening allergic reaction after a dose of flu vaccine, or have a severe allergy to any part of this vaccine, you may be advised not to get vaccinated. Most, but not all, types of flu vaccine contain a small amount of egg protein.  If you ever had Guillain-Barré Syndrome (also called GBS). Some people with a history of GBS should not get this vaccine. This should be discussed with your doctor.  If you are not feeling well. It is usually okay to get flu vaccine when you have a mild illness, but you might be asked to come back when you feel better. 4. Risks of a vaccine reaction    With any medicine, including vaccines, there is a chance of reactions. These are usually mild and go away on their own, but serious reactions are also possible. Most people who get a flu shot do not have any problems with it.      Minor problems following a flu shot include:    soreness, redness, or swelling where the shot was given     hoarseness   sore, red or itchy eyes   cough   fever   aches   headache   itching   fatigue  If these problems occur, they usually begin soon after the shot and last 1 or 2 days. More serious problems following a flu shot can include the following:     There may be a small increased risk of Guillain-Barré Syndrome (GBS) after inactivated flu vaccine. This risk has been estimated at 1 or 2 additional cases per million people vaccinated. This is much lower than the risk of severe complications from flu, which can be prevented by flu vaccine.  Young children who get the flu shot along with pneumococcal vaccine (PCV13) and/or DTaP vaccine at the same time might be slightly more likely to have a seizure caused by fever. Ask your doctor for more information. Tell your doctor if a child who is getting flu vaccine has ever had a seizure. Problems that could happen after any injected vaccine:      People sometimes faint after a medical procedure, including vaccination. Sitting or lying down for about 15 minutes can help prevent fainting, and injuries caused by a fall. Tell your doctor if you feel dizzy, or have vision changes or ringing in the ears.  Some people get severe pain in the shoulder and have difficulty moving the arm where a shot was given. This happens very rarely.  Any medication can cause a severe allergic reaction. Such reactions from a vaccine are very rare, estimated at about 1 in a million doses, and would happen within a few minutes to a few hours after the vaccination. As with any medicine, there is a very remote chance of a vaccine causing a serious injury or death. The safety of vaccines is always being monitored. For more information, visit: www.cdc.gov/vaccinesafety/    5. What if there is a serious reaction? What should I look for?  Look for anything that concerns you, such as signs of a severe allergic reaction, very high fever, or unusual behavior.     Signs of a severe allergic reaction can include hives, swelling of the face and throat, difficulty breathing, a fast heartbeat, dizziness, and weakness  usually within a few minutes to a few hours after the vaccination. What should I do?  If you think it is a severe allergic reaction or other emergency that cant wait, call 9-1-1 and get the person to the nearest hospital. Otherwise, call your doctor.  Reactions should be reported to the Vaccine Adverse Event Reporting System (VAERS). Your doctor should file this report, or you can do it yourself through  the VAERS web site at www.vaers. [facility].gov, or by calling 8-145.148.9769. VAERS does not give medical advice. 6. The National Vaccine Injury Compensation Program    The [facility] Vaccine Injury Compensation Program (VICP) is a federal program that was created to compensate people who may have been injured by certain vaccines. Persons who believe they may have been injured by a vaccine can learn about the program and about filing a claim by calling 4-621.326.1435 or visiting the [employer] website at www.[facility].gov/vaccinecompensation. There is a time limit to file a claim for compensation. 7. How can I learn more?  Ask your healthcare provider. He or she can give you the vaccine package insert or suggest other sources of information.  Call your local or state health department.  Contact the Centers for Disease Control and Prevention (CDC):  - Call 0-729.949.6192 (1-800-CDC-INFO) or  - Visit CDCs website at www.cdc.gov/flu    Vaccine Information Statement   Inactivated Influenza Vaccine   8/7/2015  42 ADRY Barber [id number]    Department of Health and Human Services  Centers for Disease Control and Prevention    Office Use Only

## 2017-10-20 LAB
ALBUMIN/CREAT UR: 497.3 MG/G CREAT (ref 0–30)
CREAT UR-MCNC: 37.3 MG/DL
MICROALBUMIN UR-MCNC: 185.5 UG/ML

## 2017-11-13 NOTE — TELEPHONE ENCOUNTER
Refill request from [address] [place] [address] for pt's diabetic test strips and lancets with instructions to please include diagnosis codes on each prescription 11/13/17.  Heidi

## 2017-11-15 RX ORDER — AMLODIPINE BESYLATE 5 MG/1
TABLET ORAL
Qty: 30 TAB | Refills: 11 | Status: SHIPPED | OUTPATIENT
Start: 2017-11-15 | End: 2018-10-30 | Stop reason: SDUPTHER

## 2017-11-15 RX ORDER — LANCETS
EACH MISCELLANEOUS
Qty: 100 EACH | Refills: 3 | Status: ON HOLD | OUTPATIENT
Start: 2017-11-15 | End: 2020-06-30

## 2017-11-29 RX ORDER — PIOGLITAZONEHYDROCHLORIDE 15 MG/1
TABLET ORAL
Qty: 30 TAB | Refills: 5 | Status: SHIPPED | OUTPATIENT
Start: 2017-11-29 | End: 2018-04-21

## 2017-12-11 ENCOUNTER — TELEPHONE (OUTPATIENT)
Dept: FAMILY MEDICINE CLINIC | Age: 77
End: 2017-12-11

## 2017-12-11 NOTE — TELEPHONE ENCOUNTER
Pt called stating she lost lab orders given by Dr. Paula Denver awhile ago, is going to the lab tomorrow and wants to  lab orders at our office around 9 am.  Please advise when orders are ready for  at [phone].  Ldm

## 2017-12-11 NOTE — TELEPHONE ENCOUNTER
Patient informed,  that Dr. Iwona Lee last ordered labs on her on 10/19/17, and she had the lab work already done at [facility] was resulted. She has appointment with Dr. Iwona Lee on 12/18/17, and she will discuss with her then if she needs any lab work done.

## 2017-12-18 ENCOUNTER — OFFICE VISIT (OUTPATIENT)
Dept: FAMILY MEDICINE CLINIC | Age: 77
End: 2017-12-18

## 2017-12-18 VITALS
RESPIRATION RATE: 18 BRPM | TEMPERATURE: 98.2 F | SYSTOLIC BLOOD PRESSURE: 152 MMHG | HEIGHT: 62 IN | BODY MASS INDEX: 30 KG/M2 | HEART RATE: 65 BPM | DIASTOLIC BLOOD PRESSURE: 61 MMHG | WEIGHT: 163 LBS

## 2017-12-18 DIAGNOSIS — E11.9 TYPE 2 DIABETES MELLITUS WITHOUT COMPLICATION, WITHOUT LONG-TERM CURRENT USE OF INSULIN (HCC): Primary | ICD-10-CM

## 2017-12-18 DIAGNOSIS — E78.5 DYSLIPIDEMIA: ICD-10-CM

## 2017-12-18 NOTE — PROGRESS NOTES
HISTORY OF PRESENT ILLNESS  Matthew Monahan is a 78 y.o. female. Diabetes   The history is provided by the patient (her FBS have been around 113, 98, up to 120). This is a chronic problem. The current episode started more than 1 week ago. The problem occurs constantly. Pertinent negatives include no chest pain, no abdominal pain, no headaches and no shortness of breath. Nothing aggravates the symptoms. The symptoms are relieved by medications. Treatments tried: Actos. Review of Systems   Constitutional: Positive for weight loss. Eyes: Negative for blurred vision. Respiratory: Negative for shortness of breath. Cardiovascular: Positive for leg swelling. Negative for chest pain. Gastrointestinal: Negative for abdominal pain. Genitourinary:        No polyuria   Neurological: Negative for dizziness, sensory change, speech change, focal weakness and headaches. Endo/Heme/Allergies: Negative for polydipsia. Lab Results   Component Value Date/Time    Hemoglobin A1c 6.9 08/24/2017 09:39 AM    Hemoglobin A1c (POC) 6.2 02/09/2015 09:33 AM         Visit Vitals    /61    Pulse 65    Temp 98.2 °F (36.8 °C) (Oral)    Resp 18    Ht 5' 2\" (1.575 m)    Wt 163 lb (73.9 kg)    BMI 29.81 kg/m2     BP Readings from Last 3 Encounters:   12/18/17 152/61   10/19/17 145/66   09/07/17 165/71     Physical Exam   Constitutional: She is oriented to person, place, and time. She appears well-developed and well-nourished. No distress. Cardiovascular: Normal rate, regular rhythm and normal heart sounds. Exam reveals no gallop and no friction rub. No murmur heard. Pulmonary/Chest: Effort normal and breath sounds normal. No respiratory distress. She has no wheezes. She has no rales. Musculoskeletal: She exhibits edema. Trace LE edema   Neurological: She is alert and oriented to person, place, and time. Skin: Skin is warm and dry. She is not diaphoretic.    Nursing note and vitals reviewed. ASSESSMENT and PLAN    ICD-10-CM ICD-9-CM    1. Type 2 diabetes mellitus without complication, without long-term current use of insulin (HCC) E11.9 250.00 HEMOGLOBIN A1C WITH EAG   2. Dyslipidemia E78.5 272.4 NMR LIPOPROFILE      HEPATIC FUNCTION PANEL        Blood sugars seem to be controlled  Due for lipids  Labs per orders. Continue current plans. Follow-up Disposition:  Return in about 4 months (around 4/18/2018) for diabetes. Reviewed plan of care. Patient has provided input and agrees with goals.

## 2017-12-18 NOTE — PROGRESS NOTES
Chief Complaint   Patient presents with    Diabetes     2 mo f/u     1. Have you been to the ER, urgent care clinic since your last visit? No   Hospitalized since your last visit? No    2. Have you seen or consulted any other health care providers outside of the [address] [place] [address] since your last visit? Include any pap smears or colon screening.  No

## 2017-12-18 NOTE — MR AVS SNAPSHOT
Visit Information Date & Time Provider Department Dept. Phone Encounter #  
 12/18/2017  9:30 AM Lovely Zepeda 34 [id number] Upcoming Health Maintenance Date Due Pneumococcal 65+ High/Highest Risk (2 of 2 - PPSV23) 8/24/2016 LIPID PANEL Q1 9/12/2017 HEMOGLOBIN A1C Q6M 2/24/2018 MEDICARE YEARLY EXAM 4/28/2018 EYE EXAM RETINAL OR DILATED Q1 8/8/2018 FOOT EXAM Q1 8/24/2018 MICROALBUMIN Q1 10/19/2018 GLAUCOMA SCREENING Q2Y 8/8/2019 COLONOSCOPY 1/4/2021 DTaP/Tdap/Td series (2 - Td) 12/12/2026 Allergies as of 12/18/2017  Review Complete On: 12/18/2017 By: Sawyer Macario MD  
  
 Severity Noted Reaction Type Reactions Nsaids (Non-steroidal Anti-inflammatory Drug) High 06/23/2011   Systemic Anaphylaxis, Hives Fenofibrate Medium 08/28/2015   Side Effect Other (comments) Leg cramps Metformin  09/12/2016    Diarrhea Current Immunizations  Reviewed on 10/19/2017 Name Date Influenza High Dose Vaccine PF 10/19/2017  9:51 AM, 10/16/2016, 8/28/2015 Influenza Vaccine 10/14/2014, 10/1/2013 Pneumococcal Conjugate (PCV-13) 6/29/2016 Not reviewed this visit You Were Diagnosed With   
  
 Codes Comments Type 2 diabetes mellitus without complication, without long-term current use of insulin (HCC)    -  Primary ICD-10-CM: E11.9 ICD-9-CM: 250.00 Dyslipidemia     ICD-10-CM: E78.5 ICD-9-CM: 272.4 Vitals BP Pulse Temp Resp Height(growth percentile) Weight(growth percentile) 152/61 65 98.2 °F (36.8 °C) (Oral) 18 5' 2\" (1.575 m) 163 lb (73.9 kg) BMI OB Status Smoking Status 29.81 kg/m2 Postmenopausal Current Every Day Smoker Vitals History BMI and BSA Data Body Mass Index Body Surface Area  
 29.81 kg/m 2 1.8 m 2 Preferred Pharmacy Pharmacy Name Phone  [facility] [address], [address] [facility] Aaliyah Pencil [phone] Your Updated Medication List  
  
   
This list is accurate as of: 12/18/17 10:40 AM.  Always use your most recent med list. amLODIPine 5 mg tablet Commonly known as:  Gladsi Darting TAKE ONE TABLET BY MOUTH DAILY  
  
 aspirin 81 mg tablet Take 81 mg by mouth daily (after breakfast). CALCIUM 500+D 500 mg(1,250mg) -200 unit per tablet Generic drug:  calcium-vitamin D Take 1 Tab by mouth daily (after breakfast). FISH OIL 1,000 mg Cap Generic drug:  omega-3 fatty acids-vitamin e Take 2 Caps by mouth daily (after breakfast). FLUZONE HIGH-DOSE 2016-17 (PF) Syrg injection Generic drug:  influenza vaccine 2016-17 (65 yr+)(PF)  
VACCINATION ADMINISTERED BY PHARMACIST  
  
 glucose blood VI test strips strip Commonly known as:  ACCU-CHEK GEM PLUS TEST STRP  
USE TO CHECK BLOOD SUGAR DAILY OR AS DIRECTED Lancets Misc Commonly known as:  ACCU-CHEK SOFTCLIX LANCETS  
USE TO CHECK BLOOD SUGAR DAILY OR AS DIRECTED  
  
 losartan 100 mg tablet Commonly known as:  COZAAR  
TAKE ONE TABLET ONCE DAILY  
  
 metoprolol succinate 200 mg XL tablet Commonly known as:  TOPROL-XL  
TAKE ONE TABLET BY MOUTH DAILY  
  
 multivitamin tablet Commonly known as:  ONE A DAY Take 1 Tab by mouth daily (after breakfast). omeprazole 40 mg capsule Commonly known as:  PRILOSEC  
TAKE ONE CAPSULE BY MOUTH DAILY (GENERIC FOR PRILOSEC)  
  
 pioglitazone 15 mg tablet Commonly known as:  ACTOS  
TAKE ONE TABLET BY MOUTH DAILY BEFORE BREAKFAST  
  
 rosuvastatin 20 mg tablet Commonly known as:  CRESTOR Take 1 Tab by mouth nightly. We Performed the Following HEMOGLOBIN A1C WITH EAG [70223 CPT(R)] HEPATIC FUNCTION PANEL [92361 CPT(R)] NMR LIPOPROFILE U9798302 CPT(R)] Introducing [address]! Dear Sarah Arvizu: Thank you for requesting a Private Outlet account.   Our records indicate that you already have an active LaunchHear account. You can access your account anytime at [email or web address] Did you know that you can access your hospital and ER discharge instructions at any time in LaunchHear? You can also review all of your test results from your hospital stay or ER visit. Additional Information If you have questions, please visit the Frequently Asked Questions section of the LaunchHear website at [email or web address] Remember, LaunchHear is NOT to be used for urgent needs. For medical emergencies, dial 911. Now available from your iPhone and Android! Please provide this summary of care documentation to your next provider. Your primary care clinician is listed as Charlette Campo. If you have any questions after today's visit, please call [phone].

## 2017-12-19 DIAGNOSIS — E78.2 ELEVATED TRIGLYCERIDES WITH HIGH CHOLESTEROL: ICD-10-CM

## 2017-12-19 LAB
ALBUMIN SERPL-MCNC: 4.2 G/DL (ref 3.5–4.8)
ALP SERPL-CCNC: 39 IU/L (ref 39–117)
ALT SERPL-CCNC: 21 IU/L (ref 0–32)
AST SERPL-CCNC: 25 IU/L (ref 0–40)
BILIRUB DIRECT SERPL-MCNC: 0.12 MG/DL (ref 0–0.4)
BILIRUB SERPL-MCNC: 0.4 MG/DL (ref 0–1.2)
CHOLEST SERPL-MCNC: 204 MG/DL (ref 100–199)
EST. AVERAGE GLUCOSE BLD GHB EST-MCNC: 143 MG/DL
HBA1C MFR BLD: 6.6 % (ref 4.8–5.6)
HDL SERPL-SCNC: 34.8 UMOL/L
HDLC SERPL-MCNC: 45 MG/DL
INTERPRETATION, 910389: NORMAL
LDL SERPL QN: 20.9 NM
LDL SERPL-SCNC: 1268 NMOL/L
LDL SMALL SERPL-SCNC: 681 NMOL/L
LDLC SERPL CALC-MCNC: 103 MG/DL (ref 0–99)
LP-IR SCORE SERPL: 69
Lab: NORMAL
PROT SERPL-MCNC: 7.1 G/DL (ref 6–8.5)
TRIGL SERPL-MCNC: 278 MG/DL (ref 0–149)

## 2017-12-20 RX ORDER — ROSUVASTATIN CALCIUM 20 MG/1
20 TABLET, COATED ORAL
Qty: 90 TAB | Refills: 3 | Status: SHIPPED | OUTPATIENT
Start: 2017-12-20 | End: 2018-01-06 | Stop reason: SDUPTHER

## 2018-01-06 DIAGNOSIS — E78.2 ELEVATED TRIGLYCERIDES WITH HIGH CHOLESTEROL: ICD-10-CM

## 2018-01-06 RX ORDER — ROSUVASTATIN CALCIUM 40 MG/1
40 TABLET, COATED ORAL
Qty: 30 TAB | Refills: 3 | Status: SHIPPED | OUTPATIENT
Start: 2018-01-06 | End: 2018-12-31 | Stop reason: SDUPTHER

## 2018-02-06 ENCOUNTER — TELEPHONE (OUTPATIENT)
Dept: FAMILY MEDICINE CLINIC | Age: 78
End: 2018-02-06

## 2018-02-06 NOTE — TELEPHONE ENCOUNTER
Patient called requesting an appointment for today. Thinks she has a stye on her eye. No appointments were available and now she is wondering if Dr Johnny Fields could suggest or prescribe something for it?   Please call [phone]

## 2018-02-07 NOTE — TELEPHONE ENCOUNTER
Called pt, and offered and appointment to come in and be seen today, however, pt declined stating she is baby sitting. Pt states she purchased a medication OTC and will apply warm compresses, however, does not want to schedule an appointment for this week at this time.

## 2018-03-20 LAB
ALBUMIN SERPL-MCNC: 3.9 G/DL (ref 3.5–4.8)
ALP SERPL-CCNC: 36 IU/L (ref 39–117)
ALT SERPL-CCNC: 17 IU/L (ref 0–32)
AST SERPL-CCNC: 20 IU/L (ref 0–40)
BILIRUB DIRECT SERPL-MCNC: 0.09 MG/DL (ref 0–0.4)
BILIRUB SERPL-MCNC: 0.2 MG/DL (ref 0–1.2)
CHOLEST SERPL-MCNC: 167 MG/DL (ref 100–199)
HDL SERPL QN: 8.5 NM
HDL SERPL-SCNC: 30.5 UMOL/L
HDLC SERPL-MCNC: 38 MG/DL
HLD.LARGE SERPL-SCNC: 2.1 UMOL/L
INTERPRETATION, 910389: NORMAL
LDL SERPL QN: 20.9 NM
LDL SERPL QN: 20.9 NM
LDL SERPL-SCNC: 879 NMOL/L
LDL SMALL SERPL-SCNC: 370 NMOL/L
LDL SMALL SERPL-SCNC: 370 NMOL/L
LDLC SERPL CALC-MCNC: 80 MG/DL (ref 0–99)
LP-IR SCORE SERPL: 69
PROT SERPL-MCNC: 6.5 G/DL (ref 6–8.5)
TRIGL SERPL-MCNC: 246 MG/DL (ref 0–149)
VLDL LARGE SERPL-SCNC: 4.1 NMOL/L
VLDL SERPL QN: 44.4 NM

## 2018-04-04 RX ORDER — LOSARTAN POTASSIUM 100 MG/1
TABLET ORAL
Qty: 90 TAB | Refills: 0 | Status: SHIPPED | OUTPATIENT
Start: 2018-04-04 | End: 2018-07-02 | Stop reason: SDUPTHER

## 2018-04-06 DIAGNOSIS — E78.2 ELEVATED TRIGLYCERIDES WITH HIGH CHOLESTEROL: ICD-10-CM

## 2018-04-16 ENCOUNTER — OFFICE VISIT (OUTPATIENT)
Dept: FAMILY MEDICINE CLINIC | Age: 78
End: 2018-04-16

## 2018-04-16 VITALS
HEART RATE: 68 BPM | HEIGHT: 62 IN | BODY MASS INDEX: 30 KG/M2 | RESPIRATION RATE: 20 BRPM | WEIGHT: 163 LBS | DIASTOLIC BLOOD PRESSURE: 59 MMHG | TEMPERATURE: 97 F | SYSTOLIC BLOOD PRESSURE: 153 MMHG

## 2018-04-16 DIAGNOSIS — J44.9 CHRONIC OBSTRUCTIVE PULMONARY DISEASE, UNSPECIFIED COPD TYPE (HCC): ICD-10-CM

## 2018-04-16 DIAGNOSIS — Z72.0 TOBACCO ABUSE: ICD-10-CM

## 2018-04-16 DIAGNOSIS — I10 ESSENTIAL HYPERTENSION: ICD-10-CM

## 2018-04-16 DIAGNOSIS — L72.3 SEBACEOUS CYST: ICD-10-CM

## 2018-04-16 DIAGNOSIS — E11.9 TYPE 2 DIABETES MELLITUS WITHOUT COMPLICATION, WITHOUT LONG-TERM CURRENT USE OF INSULIN (HCC): Primary | ICD-10-CM

## 2018-04-16 NOTE — MR AVS SNAPSHOT
[address] [facility] 
[phone] Patient: Emeka Boland MRN: [id number] LKI:[birth year] Visit Information Date & Time Provider Department Dept. Phone Encounter #  
 4/16/2018  9:30 AM Lovely Sevilla 34 [id number] Follow-up Instructions Return in about 6 weeks (around 5/28/2018) for review PFTs. Upcoming Health Maintenance Date Due Pneumococcal 65+ High/Highest Risk (2 of 2 - PPSV23) 8/24/2016 MEDICARE YEARLY EXAM 4/28/2018 HEMOGLOBIN A1C Q6M 6/18/2018 EYE EXAM RETINAL OR DILATED Q1 8/8/2018 FOOT EXAM Q1 8/24/2018 MICROALBUMIN Q1 10/19/2018 LIPID PANEL Q1 3/19/2019 GLAUCOMA SCREENING Q2Y 8/8/2019 COLONOSCOPY 1/4/2021 DTaP/Tdap/Td series (2 - Td) 12/12/2026 Allergies as of 4/16/2018  Review Complete On: 4/16/2018 By: Bartolome Root MD  
  
 Severity Noted Reaction Type Reactions Nsaids (Non-steroidal Anti-inflammatory Drug) High 06/23/2011   Systemic Anaphylaxis, Hives Fenofibrate Medium 08/28/2015   Side Effect Other (comments) Leg cramps Metformin  09/12/2016    Diarrhea Current Immunizations  Reviewed on 10/19/2017 Name Date Influenza High Dose Vaccine PF 10/19/2017  9:51 AM, 10/16/2016, 8/28/2015 Influenza Vaccine 10/14/2014, 10/1/2013 Pneumococcal Conjugate (PCV-13) 6/29/2016 Not reviewed this visit You Were Diagnosed With   
  
 Codes Comments Type 2 diabetes mellitus without complication, without long-term current use of insulin (HCC)    -  Primary ICD-10-CM: E11.9 ICD-9-CM: 250.00 Sebaceous cyst     ICD-10-CM: L72.3 ICD-9-CM: 706.2 Essential hypertension     ICD-10-CM: I10 
ICD-9-CM: 401.9 Chronic obstructive pulmonary disease, unspecified COPD type ([facility]     ICD-10-CM: J44.9 ICD-9-CM: 082 Tobacco abuse     ICD-10-CM: Z72.0 ICD-9-CM: 305.1 Vitals BP Pulse Temp Resp Height(growth percentile) Weight(growth percentile) 153/59 68 97 °F (36.1 °C) (Oral) 20 5' 2\" (1.575 m) 163 lb (73.9 kg) BMI OB Status Smoking Status 29.81 kg/m2 Postmenopausal Current Every Day Smoker Vitals History BMI and BSA Data Body Mass Index Body Surface Area  
 29.81 kg/m 2 1.8 m 2 Preferred Pharmacy Pharmacy Name Phone Luciano Model [facility] [address], [address] [phone] Your Updated Medication List  
  
   
This list is accurate as of 4/16/18 10:12 AM.  Always use your most recent med list. amLODIPine 5 mg tablet Commonly known as:  Hank German TAKE ONE TABLET BY MOUTH DAILY  
  
 aspirin 81 mg tablet Take 81 mg by mouth daily (after breakfast). CALCIUM 500+D 500 mg(1,250mg) -200 unit per tablet Generic drug:  calcium-vitamin D Take 1 Tab by mouth daily (after breakfast). FISH OIL 1,000 mg Cap Generic drug:  omega-3 fatty acids-vitamin e Take 2 Caps by mouth daily (after breakfast). FLUZONE HIGH-DOSE 2016-17 (PF) Syrg injection Generic drug:  influenza vaccine 2016-17 (65 yr+)(PF)  
VACCINATION ADMINISTERED BY PHARMACIST  
  
 glucose blood VI test strips strip Commonly known as:  ACCU-CHEK GEM PLUS TEST STRP  
USE TO CHECK BLOOD SUGAR DAILY OR AS DIRECTED Lancets Misc Commonly known as:  ACCU-CHEK SOFTCLIX LANCETS  
USE TO CHECK BLOOD SUGAR DAILY OR AS DIRECTED  
  
 losartan 100 mg tablet Commonly known as:  COZAAR  
TAKE ONE TABLET BY MOUTH DAILY  
  
 metoprolol succinate 200 mg XL tablet Commonly known as:  TOPROL-XL  
TAKE ONE TABLET BY MOUTH DAILY  
  
 multivitamin tablet Commonly known as:  ONE A DAY Take 1 Tab by mouth daily (after breakfast). omeprazole 40 mg capsule Commonly known as:  PRILOSEC  
TAKE ONE CAPSULE BY MOUTH DAILY (GENERIC FOR PRILOSEC)  
  
 pioglitazone 15 mg tablet Commonly known as:  ACTOS  
TAKE ONE TABLET BY MOUTH DAILY BEFORE BREAKFAST  
  
 rosuvastatin 40 mg tablet Commonly known as:  CRESTOR Take 1 Tab by mouth nightly. We Performed the Following HEMOGLOBIN A1C WITH EAG [90891 CPT(R)] REFERRAL TO PLASTIC SURGERY [REF89 Custom] Comments:  
 Enlarging sebaceous cyst, left cheek Follow-up Instructions Return in about 6 weeks (around 5/28/2018) for review PFTs. To-Do List   
 Around 04/18/2018 PFT:  PULMONARY FUNCTION TEST Referral Information Referral ID Referred By Referred To  
  
 [id number] [address] [place], [address] [facility] Visits Status Start Date End Date 1 New Request 4/16/18 4/16/19 If your referral has a status of pending review or denied, additional information will be sent to support the outcome of this decision. Introducing [facility] & HEALTH SERVICES! Dear Terrell Marino: Thank you for requesting a Sherpaa account. Our records indicate that you already have an active Sherpaa account. You can access your account anytime at [email or web address] Digital/Olympia Media Group Did you know that you can access your hospital and ER discharge instructions at any time in Sherpaa? You can also review all of your test results from your hospital stay or ER visit. Additional Information If you have questions, please visit the Frequently Asked Questions section of the Sherpaa website at [email or web address] Remember, Sherpaa is NOT to be used for urgent needs. For medical emergencies, dial 911. Now available from your iPhone and Android! Please provide this summary of care documentation to your next provider. Your primary care clinician is listed as Aliya Quach. If you have any questions after today's visit, please call [phone].

## 2018-04-16 NOTE — PROGRESS NOTES
Chief Complaint   Patient presents with    Diabetes     follow up     Cyst     left side of face      Health Maintenance   Topic Date Due    Pneumococcal 65+ High/Highest Risk (2 of 2 - PPSV23) 08/24/2016    MEDICARE YEARLY EXAM  04/28/2018    HEMOGLOBIN A1C Q6M  06/18/2018    EYE EXAM RETINAL OR DILATED Q1  08/08/2018    FOOT EXAM Q1  08/24/2018    MICROALBUMIN Q1  10/19/2018    LIPID PANEL Q1  03/19/2019    GLAUCOMA SCREENING Q2Y  08/08/2019    COLONOSCOPY  01/04/2021    DTaP/Tdap/Td series (2 - Td) 12/12/2026    Bone Densitometry (Dexa) Screening  Completed    ZOSTER VACCINE AGE 60>  Completed    Influenza Age 5 to Adult  Completed

## 2018-04-16 NOTE — PROGRESS NOTES
HISTORY OF PRESENT ILLNESS  Sumit Obrien is a 78 y.o. female. HPI Comments: Sumit Obrien is here for diabetic follow up. Their  FBS have been around 103, 110. This is a chronic problem which has not changed. Denies chest pain, abdominal pain, headaches, or shortness of breath. It is worsened by nothing, and improved by Actos, which is working well. She has had a cyst on her left check for about 3 months. It is getting bigger. No signs of infection. Review of Systems   Constitutional: Positive for weight loss. Eyes: Negative for blurred vision. Respiratory: Negative for shortness of breath. Cardiovascular: Positive for leg swelling. Negative for chest pain. Gastrointestinal: Negative for abdominal pain. Genitourinary: Negative for frequency. Neurological: Negative for dizziness, sensory change, speech change and focal weakness. Endo/Heme/Allergies: Negative for polydipsia. Lab Results   Component Value Date/Time    Hemoglobin A1c 6.6 (H) 12/18/2017 10:49 AM    Hemoglobin A1c (POC) 6.2 02/09/2015 09:33 AM         Visit Vitals    /59    Pulse 68    Temp 97 °F (36.1 °C) (Oral)    Resp 20    Ht 5' 2\" (1.575 m)    Wt 163 lb (73.9 kg)    BMI 29.81 kg/m2     BP Readings from Last 3 Encounters:   04/16/18 153/59   12/18/17 152/61   10/19/17 145/66     Physical Exam   Constitutional: She is oriented to person, place, and time. She appears well-developed and well-nourished. No distress. Cardiovascular: Normal rate, regular rhythm and normal heart sounds. Exam reveals no gallop and no friction rub. No murmur heard. Pulmonary/Chest: Effort normal and breath sounds normal. No respiratory distress. She has no wheezes. She has no rales. Musculoskeletal: She exhibits no edema. Neurological: She is alert and oriented to person, place, and time. Skin: Skin is warm and dry. She is not diaphoretic. Nursing note and vitals reviewed.       ASSESSMENT and PLAN ICD-10-CM ICD-9-CM    1. Type 2 diabetes mellitus without complication, without long-term current use of insulin ([facility]) E11.9 250.00 HEMOGLOBIN A1C WITH EAG   2. Sebaceous cyst L72.3 706.2 REFERRAL TO PLASTIC SURGERY   3. Essential hypertension I10 401.9    4. Chronic obstructive pulmonary disease, unspecified COPD type ([facility]) J44.9 496 PULMONARY FUNCTION TEST   5. Tobacco abuse Z72.0 305.1         Diabetes seems to be controlled  Enlarging, facial, sebaceous cyst  Blood pressure elevated today  COPD, continues to smoke, not interested in quitting  Labs per orders. Plastic surgery referral  pulmonary function tests  Strongly advised to quit smoking    Follow-up Disposition:  Return in about 6 weeks (around 5/28/2018) for review PFTs. Reviewed plan of care. Patient has provided input and agrees with goals.

## 2018-04-17 LAB
EST. AVERAGE GLUCOSE BLD GHB EST-MCNC: 143 MG/DL
HBA1C MFR BLD: 6.6 % (ref 4.8–5.6)

## 2018-04-21 ENCOUNTER — TELEPHONE (OUTPATIENT)
Dept: FAMILY MEDICINE CLINIC | Age: 78
End: 2018-04-21

## 2018-04-21 NOTE — LETTER
4/25/2018 10:30 AM 
 
Ms. Yuni Navarro [phone] Dear Ms. Brenda Dent, We have been unable to reach you by phone to notify you of your test results. Please call our office at [phone] and ask to speak with my nurse in order to explain these results to you and advise you of any recommendations.  
 
 
 
Sincerely, 
 
 
Byron Klein MD

## 2018-04-22 NOTE — TELEPHONE ENCOUNTER
Please call patient and let her know her A1C is too low and I would like for her to stop her Actos. I will repeat this in 4 months to see how she is doing off of it.

## 2018-04-26 NOTE — TELEPHONE ENCOUNTER
Pt's daughter, Mamta Bruno (listed on pt's HIPPA) returned call, was advised of Dr. Rodrigues Gather instructions, will inform pt to discontinue actos and repeat Hgb A1c in 4 months.  Heidi

## 2018-05-04 ENCOUNTER — HOSPITAL ENCOUNTER (OUTPATIENT)
Dept: PULMONOLOGY | Age: 78
Discharge: HOME | End: 2018-05-04
Attending: FAMILY MEDICINE
Payer: MEDICARE

## 2018-05-04 VITALS — HEART RATE: 70 BPM

## 2018-05-04 DIAGNOSIS — J44.9 CHRONIC OBSTRUCTIVE PULMONARY DISEASE, UNSPECIFIED COPD TYPE (HCC): ICD-10-CM

## 2018-05-04 PROCEDURE — 74011000250 HC RX REV CODE- 250

## 2018-05-04 PROCEDURE — 94060 EVALUATION OF WHEEZING: CPT

## 2018-05-04 RX ORDER — ALBUTEROL SULFATE 0.83 MG/ML
SOLUTION RESPIRATORY (INHALATION)
Status: COMPLETED
Start: 2018-05-04 | End: 2018-05-04

## 2018-05-04 RX ORDER — ALBUTEROL SULFATE 0.83 MG/ML
2.5 SOLUTION RESPIRATORY (INHALATION)
Status: DISPENSED | OUTPATIENT
Start: 2018-05-04 | End: 2018-05-04

## 2018-05-04 RX ADMIN — ALBUTEROL SULFATE 2.5 MG: 2.5 SOLUTION RESPIRATORY (INHALATION) at 11:32

## 2018-05-09 NOTE — PROCEDURES
Shamir Cohen [facility] [place]  PULMONARY FUNCTION    Bernardo   MR#: [id number]  : 1940  ACCOUNT #: [de-identified]   DATE OF SERVICE: 2018    FEV1 to FVC ratio 56 with no change with bronchodilators and an FEV1 of 114%. The patient meeting criteria for obstructive disease; however, is mild obstructive disease based on the SUV value that is more than 100% of predicted. Flow volume loop consistent with obstructive disease.       Tj Quinonez MD       CCB / AN  D: 2018 12:39     T: 2018 13:44  JOB #: [id number]

## 2018-06-02 RX ORDER — OMEPRAZOLE 40 MG/1
CAPSULE, DELAYED RELEASE ORAL
Qty: 30 CAP | Refills: 10 | Status: SHIPPED | OUTPATIENT
Start: 2018-06-02 | End: 2019-04-29 | Stop reason: SDUPTHER

## 2018-06-05 ENCOUNTER — OFFICE VISIT (OUTPATIENT)
Dept: FAMILY MEDICINE CLINIC | Age: 78
End: 2018-06-05

## 2018-06-05 VITALS
OXYGEN SATURATION: 95 % | HEART RATE: 62 BPM | RESPIRATION RATE: 18 BRPM | HEIGHT: 62 IN | DIASTOLIC BLOOD PRESSURE: 55 MMHG | WEIGHT: 170 LBS | BODY MASS INDEX: 31.28 KG/M2 | TEMPERATURE: 97.8 F | SYSTOLIC BLOOD PRESSURE: 145 MMHG

## 2018-06-05 DIAGNOSIS — Z23 ENCOUNTER FOR IMMUNIZATION: ICD-10-CM

## 2018-06-05 DIAGNOSIS — Z72.0 TOBACCO ABUSE: ICD-10-CM

## 2018-06-05 DIAGNOSIS — J44.9 CHRONIC OBSTRUCTIVE PULMONARY DISEASE, UNSPECIFIED COPD TYPE (HCC): Primary | ICD-10-CM

## 2018-06-05 NOTE — MR AVS SNAPSHOT
[address] 
[phone] Patient: Kemal Michelle MRN: [id number] DILEEP:[birth year]58[birth year] Visit Information Date & Time Provider Department Dept. Phone Encounter #  
 6/5/2018  9:30 AM Gus Altman, [address] [id number] Follow-up Instructions Return in about 3 months (around 9/5/2018) for diabetes. Upcoming Health Maintenance Date Due Pneumococcal 65+ High/Highest Risk (2 of 2 - PPSV23) 8/24/2016 MEDICARE YEARLY EXAM 4/28/2018 Influenza Age 5 to Adult 8/1/2018 EYE EXAM RETINAL OR DILATED Q1 8/8/2018 FOOT EXAM Q1 8/24/2018 HEMOGLOBIN A1C Q6M 10/16/2018 MICROALBUMIN Q1 10/19/2018 LIPID PANEL Q1 3/19/2019 GLAUCOMA SCREENING Q2Y 8/8/2019 COLONOSCOPY 1/4/2021 DTaP/Tdap/Td series (2 - Td) 12/12/2026 Allergies as of 6/5/2018  Review Complete On: 6/5/2018 By: Gus Altman MD  
  
 Severity Noted Reaction Type Reactions Nsaids (Non-steroidal Anti-inflammatory Drug) High 06/23/2011   Systemic Anaphylaxis, Hives Fenofibrate Medium 08/28/2015   Side Effect Other (comments) Leg cramps Metformin  09/12/2016    Diarrhea Current Immunizations  Reviewed on 10/19/2017 Name Date Influenza High Dose Vaccine PF 10/19/2017  9:51 AM, 10/16/2016, 8/28/2015 Influenza Vaccine 10/14/2014, 10/1/2013 Pneumococcal Conjugate (PCV-13) 6/29/2016 Not reviewed this visit You Were Diagnosed With   
  
 Codes Comments Chronic obstructive pulmonary disease, unspecified COPD type ([facility]    -  Primary ICD-10-CM: J44.9 ICD-9-CM: 896 Tobacco abuse     ICD-10-CM: Z72.0 ICD-9-CM: 305.1 Encounter for immunization     ICD-10-CM: F75 ICD-9-CM: V03.89 Vitals BP Pulse Temp Resp Height(growth percentile) Weight(growth percentile) 145/55 62 97.8 °F (36.6 °C) (Oral) 18 5' 2\" (1.575 m) 170 lb (77.1 kg) SpO2 PF BMI OB Status Smoking Status 95% 260 L/min 31.09 kg/m2 Postmenopausal Current Every Day Smoker Vitals History BMI and BSA Data Body Mass Index Body Surface Area 31.09 kg/m 2 1.84 m 2 Preferred Pharmacy Pharmacy Name Phone Judie Mendoza [address], [address] [phone] Your Updated Medication List  
  
   
This list is accurate as of 6/5/18 10:36 AM.  Always use your most recent med list. amLODIPine 5 mg tablet Commonly known as:  Titus Farrar TAKE ONE TABLET BY MOUTH DAILY  
  
 aspirin 81 mg tablet Take 81 mg by mouth daily (after breakfast). CALCIUM 500+D 500 mg(1,250mg) -200 unit per tablet Generic drug:  calcium-vitamin D Take 1 Tab by mouth daily (after breakfast). FISH OIL 1,000 mg Cap Generic drug:  omega-3 fatty acids-vitamin e Take 2 Caps by mouth daily (after breakfast). FLUZONE HIGH-DOSE 2016-17 (PF) Syrg injection Generic drug:  influenza vaccine 2016-17 (65 yr+)(PF)  
VACCINATION ADMINISTERED BY PHARMACIST  
  
 glucose blood VI test strips strip Commonly known as:  ACCU-CHEK GEM PLUS TEST STRP  
USE TO CHECK BLOOD SUGAR DAILY OR AS DIRECTED Lancets Misc Commonly known as:  ACCU-CHEK SOFTCLIX LANCETS  
USE TO CHECK BLOOD SUGAR DAILY OR AS DIRECTED  
  
 losartan 100 mg tablet Commonly known as:  COZAAR  
TAKE ONE TABLET BY MOUTH DAILY  
  
 metoprolol succinate 200 mg XL tablet Commonly known as:  TOPROL-XL  
TAKE ONE TABLET BY MOUTH DAILY  
  
 multivitamin tablet Commonly known as:  ONE A DAY Take 1 Tab by mouth daily (after breakfast). omeprazole 40 mg capsule Commonly known as:  PRILOSEC  
TAKE ONE CAPSULE BY MOUTH DAILY pneumococcal 23-valent 25 mcg/0.5 mL injection Commonly known as:  PNEUMOVAX 23  
0.5 mL by IntraMUSCular route once for 1 dose. rosuvastatin 40 mg tablet Commonly known as:  CRESTOR  
 Take 1 Tab by mouth nightly. Prescriptions Sent to Pharmacy Refills  
 pneumococcal 23-valent (PNEUMOVAX 23) 25 mcg/0.5 mL injection 0 Si.5 mL by IntraMUSCular route once for 1 dose. Class: Normal  
 Pharmacy: Nancy[facility] Primrose [address], [address]  #: [phone] Route: IntraMUSCular Follow-up Instructions Return in about 3 months (around 2018) for diabetes. Introducing [facility] & HEALTH SERVICES! Dear Michele Dominguez: Thank you for requesting a Teamer.net account. Our records indicate that you already have an active [email or web address] account. You can access your account anytime at [email or web address] Did you know that you can access your hospital and ER discharge instructions at any time in Teamer.net? You can also review all of your test results from your hospital stay or ER visit. Additional Information If you have questions, please visit the Frequently Asked Questions section of the [email or web address] website at [email or web address] Remember, Teamer.net is NOT to be used for urgent needs. For medical emergencies, dial 911. Now available from your iPhone and Android! Please provide this summary of care documentation to your next provider. Your primary care clinician is listed as Adeline Emmanuel. If you have any questions after today's visit, please call [phone].

## 2018-06-05 NOTE — PROGRESS NOTES
Chief Complaint   Patient presents with    Other     6 wk f/u to review PFT's     1. Have you been to the ER, urgent care clinic since your last visit? No Hospitalized since your last visit? No     2. Have you seen or consulted any other health care providers outside of the [facility] since your last visit? Include any pap smears or colon screening.  No

## 2018-06-05 NOTE — PROGRESS NOTES
Subjective:  Robert Reed is 70y.o. year old female who presents today for follow up on her pulmonary function tests. They read:    FEV1 to FVC ratio 56 with no change with bronchodilators and an FEV1 of 114%. The patient meeting criteria for obstructive disease; however, is mild obstructive disease based on the SUV value that is more than 100% of predicted. Flow volume loop consistent with obstructive disease. She continues to smoke and is not interested in quitting, but will think about it. Denies wheezing or shortness of breath, however, she has a daily cough. She has 1/4 mile dyspnea on exertion. Denies PND or orthopnea.       Patient Active Problem List   Diagnosis Code    Bell's palsy G51.0    Dyslipidemia E78.5    Cholelithiasis K80.20    History of breast cancer Z85.3    History of duodenal ulcer Z87.19    Esophageal reflux K21.9    Hiatal hernia K44.9    Essential hypertension I10    Type 2 diabetes mellitus without complications ([address] L14.1    Left ventricular hypertrophy I51.7    Tobacco abuse Z72.0    Steatosis of liver K76.0    COPD (chronic obstructive pulmonary disease) ([address] J44.9    Osteopenia M85.80    CKD (chronic kidney disease), stage III N18.3    Elevated triglycerides with high cholesterol E78.2    Nicotine dependence F17.200    IDALIA (obstructive sleep apnea) G47.33    Tubular adenoma of colon D12.6    H/O hysterectomy for benign disease Z90.710     Past Medical History:   Diagnosis Date    Arthritis     Shoulders, knees    Bell's palsy 1980    Residual remains on right side    Breast cancer ([address] 5/1995    Cholelithiasis 6/23/2011    CKD (chronic kidney disease), stage III 6/6/2012    COPD (chronic obstructive pulmonary disease) ([address] 12/28/2011    Dyslipidemia 6/23/2011    Elevated triglycerides with high cholesterol 8/28/2015    Esophageal reflux 6/23/2011    Essential hypertension 6/23/2011    GERD (gastroesophageal reflux disease)     Hiatal hernia 6/23/2011    History of breast cancer 6/23/2011    Rt Breast    History of duodenal ulcer 6/23/2011    HTN (hypertension) 6/23/2011    Hyperlipidemia 1/7/2013    Left ventricular hypertrophy 6/23/2011    Nicotine dependence 9/2/2015    Obstructive chronic bronchitis with acute bronchitis ([facility] 6/23/2011    Osteopenia     Osteoporosis 6/23/2011    Other ill-defined conditions(799.89)     uti found 10/9 on antibiot    Other ill-defined conditions(799.89)     rightsided eye and cheek numb from surg    Pregnancy 6/23/2011    Steatosis of liver 10/9/2011    Tobacco abuse 9/26/2011    Tubular adenoma of colon 1/24/2016 1/4/16    Type 2 diabetes mellitus without complications ([facility] 6/24/6089    Type II or unspecified type diabetes mellitus without mention of complication, not stated as uncontrolled 6/23/2011     Past Surgical History:   Procedure Laterality Date    ENDOSCOPY, COLON, DIAGNOSTIC  10/09    HX CHOLECYSTECTOMY  10/2011    laparoscopic    HX HEENT      bells palsy surgery,tongue numb    HX MASTECTOMY Right 5/1995    HX OTHER SURGICAL      colonoscopy    HX TONSIL AND ADENOIDECTOMY  1969    HX TONSILLECTOMY  1969    SD MASTECTOMY BRA  1995    R Breast    REMOVAL GALLBLADDER        Family History   Problem Relation Age of Onset    Asthma Mother     Cancer Mother      Pancreatic CA    Stroke Father     Other Father      Artherosclerosis    Cancer Sister      Rectum    Lung Disease Sister     Diabetes Sister     Coronary Artery Disease Sister     Heart Attack Brother     Diabetes Maternal Grandmother     Diabetes Paternal Grandmother     Cancer Paternal Grandfather     Diabetes Sister     Coronary Artery Disease Sister     Diabetes Brother     Alcohol abuse Brother     Other Brother      killed in car accident     Social History   Substance Use Topics    Smoking status: Current Every Day Smoker     Packs/day: 0.50     Years: 65.00     Types: Cigarettes    Smokeless tobacco: Never Used    Alcohol use Yes      Comment: Rarely-holidays     Allergies   Allergen Reactions    Nsaids (Non-Steroidal Anti-Inflammatory Drug) Anaphylaxis and Hives    Fenofibrate Other (comments)     Leg cramps    Metformin Diarrhea     Current Outpatient Prescriptions   Medication Sig Dispense Refill    omeprazole (PRILOSEC) 40 mg capsule TAKE ONE CAPSULE BY MOUTH DAILY 30 Cap 10    losartan (COZAAR) 100 mg tablet TAKE ONE TABLET BY MOUTH DAILY 90 Tab 0    rosuvastatin (CRESTOR) 40 mg tablet Take 1 Tab by mouth nightly. 30 Tab 3    amLODIPine (NORVASC) 5 mg tablet TAKE ONE TABLET BY MOUTH DAILY 30 Tab 11    metoprolol succinate (TOPROL-XL) 200 mg XL tablet TAKE ONE TABLET BY MOUTH DAILY 30 Tab 11    omega-3 fatty acids-vitamin e (FISH OIL) 1,000 mg cap Take 2 Caps by mouth daily (after breakfast).  calcium-vitamin D (CALCIUM 500+D) 500 mg(1,250mg) -200 unit per tablet Take 1 Tab by mouth daily (after breakfast).  aspirin 81 mg tablet Take 81 mg by mouth daily (after breakfast).  multivitamin (ONE A DAY) tablet Take 1 Tab by mouth daily (after breakfast).       glucose blood VI test strips (ACCU-CHEK GEM PLUS TEST STRP) strip USE TO CHECK BLOOD SUGAR DAILY OR AS DIRECTED 100 Strip 3    Lancets (ACCU-CHEK SOFTCLIX LANCETS) misc USE TO CHECK BLOOD SUGAR DAILY OR AS DIRECTED 100 Each 3    FLUZONE HIGH-DOSE 2016-17, PF, syrg injection VACCINATION ADMINISTERED BY PHARMACIST  0        Objective:  Visit Vitals    /55    Pulse 62    Temp 97.8 °F (36.6 °C) (Oral)    Resp 18    Ht 5' 2\" (1.575 m)    Wt 170 lb (77.1 kg)    SpO2 95%     L/min    BMI 31.09 kg/m2     General appearance - alert, well appearing, and in no distress and oriented to person, place, and time  Chest - clear to auscultation, no wheezes, rales or rhonchi, symmetric air entry   Heart - normal rate, regular rhythm, normal S1, S2, no murmurs, rubs, clicks or gallops   Ext-no pedal edema noted      Assessment/Plan:    ICD-10-CM ICD-9-CM    1. Chronic obstructive pulmonary disease, unspecified COPD type ([facility] J44.9 496 pneumococcal 23-valent (PNEUMOVAX 23) 25 mcg/0.5 mL injection   2. Tobacco abuse Z72.0 305.1    3. Encounter for immunization Z23 V03.89 pneumococcal 23-valent (PNEUMOVAX 23) 25 mcg/0.5 mL injection       Strongly advised to stop smoking  Pneumovax at pharmacy    Follow-up Disposition:  Return in about 3 months (around 9/5/2018) for diabetes. Reviewed plan of care. Patient has provided input and agrees with goals.

## 2018-07-05 RX ORDER — LOSARTAN POTASSIUM 100 MG/1
TABLET ORAL
Qty: 90 TAB | Refills: 3 | Status: SHIPPED | OUTPATIENT
Start: 2018-07-05 | End: 2019-06-28 | Stop reason: SDUPTHER

## 2018-08-29 ENCOUNTER — OFFICE VISIT (OUTPATIENT)
Dept: FAMILY MEDICINE CLINIC | Age: 78
End: 2018-08-29

## 2018-08-29 VITALS
HEIGHT: 62 IN | WEIGHT: 170 LBS | DIASTOLIC BLOOD PRESSURE: 59 MMHG | HEART RATE: 74 BPM | SYSTOLIC BLOOD PRESSURE: 146 MMHG | RESPIRATION RATE: 18 BRPM | BODY MASS INDEX: 31.28 KG/M2 | TEMPERATURE: 98.5 F

## 2018-08-29 DIAGNOSIS — E11.9 TYPE 2 DIABETES MELLITUS WITHOUT COMPLICATION, WITHOUT LONG-TERM CURRENT USE OF INSULIN (HCC): ICD-10-CM

## 2018-08-29 DIAGNOSIS — Z13.31 SCREENING FOR DEPRESSION: ICD-10-CM

## 2018-08-29 DIAGNOSIS — Z12.31 ENCOUNTER FOR SCREENING MAMMOGRAM FOR MALIGNANT NEOPLASM OF BREAST: ICD-10-CM

## 2018-08-29 DIAGNOSIS — Z71.89 ACP (ADVANCE CARE PLANNING): ICD-10-CM

## 2018-08-29 DIAGNOSIS — Z00.00 MEDICARE ANNUAL WELLNESS VISIT, SUBSEQUENT: Primary | ICD-10-CM

## 2018-08-29 DIAGNOSIS — Z85.3 HISTORY OF BREAST CANCER: ICD-10-CM

## 2018-08-29 NOTE — PROGRESS NOTES
This is the Subsequent Medicare Annual Wellness Exam, performed 12 months or more after the Initial AWV or the last Subsequent AWV I have reviewed the patient's medical history in detail and updated the computerized patient record. History Past Medical History:  
Diagnosis Date  Arthritis Shoulders, knees  Bell's palsy 1980 Residual remains on right side  Breast cancer ([facility] 5/1995  Cholelithiasis 6/23/2011  CKD (chronic kidney disease), stage III 6/6/2012  COPD (chronic obstructive pulmonary disease) ([address] 12/28/2011  Dyslipidemia 6/23/2011  Elevated triglycerides with high cholesterol 8/28/2015  Esophageal reflux 6/23/2011  Essential hypertension 6/23/2011  GERD (gastroesophageal reflux disease)  Hiatal hernia 6/23/2011  History of breast cancer 6/23/2011 Rt Breast  
 History of duodenal ulcer 6/23/2011  
 HTN (hypertension) 6/23/2011  Hyperlipidemia 1/7/2013  Left ventricular hypertrophy 6/23/2011  Nicotine dependence 9/2/2015  Obstructive chronic bronchitis with acute bronchitis ([address] 6/23/2011  Osteopenia  Osteoporosis 6/23/2011  Other ill-defined conditions(799.89)   
 uti found 10/9 on antibiot  Other ill-defined conditions(799.89) rightsided eye and cheek numb from surg  Pregnancy 6/23/2011  Steatosis of liver 10/9/2011  Tobacco abuse 9/26/2011  Tubular adenoma of colon 1/24/2016 1/4/16  Type 2 diabetes mellitus without complications ([address] 9/08/4869  Type II or unspecified type diabetes mellitus without mention of complication, not stated as uncontrolled 6/23/2011 Past Surgical History:  
Procedure Laterality Date  ENDOSCOPY, COLON, DIAGNOSTIC  10/09  HX CHOLECYSTECTOMY  10/2011  
 laparoscopic  HX HEENT    
 bells palsy surgery,tongue numb  HX MASTECTOMY Right 5/1995  HX OTHER SURGICAL    
 colonoscopy [address] [facility] R Breast  
 REMOVAL GALLBLADDER Current Outpatient Prescriptions Medication Sig Dispense Refill  losartan (COZAAR) 100 mg tablet TAKE ONE TABLET BY MOUTH DAILY 90 Tab 3  
 omeprazole (PRILOSEC) 40 mg capsule TAKE ONE CAPSULE BY MOUTH DAILY 30 Cap 10  
 rosuvastatin (CRESTOR) 40 mg tablet Take 1 Tab by mouth nightly. 30 Tab 3  
 amLODIPine (NORVASC) 5 mg tablet TAKE ONE TABLET BY MOUTH DAILY 30 Tab 11  
 metoprolol succinate (TOPROL-XL) 200 mg XL tablet TAKE ONE TABLET BY MOUTH DAILY 30 Tab 11  
 omega-3 fatty acids-vitamin e (FISH OIL) 1,000 mg cap Take 2 Caps by mouth daily (after breakfast).  calcium-vitamin D (CALCIUM 500+D) 500 mg(1,250mg) -200 unit per tablet Take 1 Tab by mouth daily (after breakfast).  aspirin 81 mg tablet Take 81 mg by mouth daily (after breakfast).  multivitamin (ONE A DAY) tablet Take 1 Tab by mouth daily (after breakfast).  glucose blood VI test strips (ACCU-CHEK GEM PLUS TEST STRP) strip USE TO CHECK BLOOD SUGAR DAILY OR AS DIRECTED 100 Strip 3  Lancets (ACCU-CHEK SOFTCLIX LANCETS) misc USE TO CHECK BLOOD SUGAR DAILY OR AS DIRECTED 100 Each 3  
 FLUZONE HIGH-DOSE 2016-17, PF, syrg injection VACCINATION ADMINISTERED BY PHARMACIST  0 Allergies Allergen Reactions  Nsaids (Non-Steroidal Anti-Inflammatory Drug) Anaphylaxis and Hives  Fenofibrate Other (comments) Leg cramps  Metformin Diarrhea Family History Problem Relation Age of Onset  Asthma Mother  Cancer Mother Pancreatic CA  Stroke Father  Other Father Artherosclerosis  Cancer Sister Rectum  Lung Disease Sister  Diabetes Sister  Coronary Artery Disease Sister  Heart Attack Brother  Diabetes Maternal Grandmother  Diabetes Paternal Grandmother  Cancer Paternal Grandfather  Diabetes Sister  Coronary Artery Disease Sister  Diabetes Brother  Alcohol abuse Brother  Other Brother   
  killed in car accident Social History Substance Use Topics  Smoking status: Current Every Day Smoker Packs/day: 0.50 Years: 65.00 Types: Cigarettes  Smokeless tobacco: Never Used  Alcohol use Yes Comment: Rarely-holidays Patient Active Problem List  
Diagnosis Code  Bell's palsy G51.0  Dyslipidemia E78.5  Cholelithiasis K80.20  
 History of breast cancer Z85.3  History of duodenal ulcer Z87.19  
 Esophageal reflux K21.9  Hiatal hernia K44.9  Essential hypertension I10  Type 2 diabetes mellitus without complications (HCC) W39.6  Left ventricular hypertrophy I51.7  Tobacco abuse Z72.0  Steatosis of liver K76.0  
 COPD (chronic obstructive pulmonary disease) (HCC) J44.9  Osteopenia M85.80  CKD (chronic kidney disease), stage III N18.3  Elevated triglycerides with high cholesterol E78.2  Nicotine dependence F17.200  IDALIA (obstructive sleep apnea) G47.33  
 Tubular adenoma of colon D12.6  
 H/O hysterectomy for benign disease Z90.710 Depression Risk Factor Screening: PHQ over the last two weeks 8/29/2018 Little interest or pleasure in doing things Not at all Feeling down, depressed, irritable, or hopeless Not at all Total Score PHQ 2 0 Alcohol Risk Factor Screening: You do not drink alcohol or very rarely. Functional Ability and Level of Safety:  
Hearing Loss The patient wears hearing aids. Activities of Daily Living The home contains: no safety equipment. Patient does total self care Fall Risk Fall Risk Assessment, last 12 mths 8/29/2018 Able to walk? Yes Fall in past 12 months? No  
 
 
Abuse Screen Patient is not abused Cognitive Screening Evaluation of Cognitive Function: 
Has your family/caregiver stated any concerns about your memory: no 
Normal, Clock Drawing test 
 
Patient Care Team  
Patient Care Team: 
García Ashley MD as PCP - [facility]) Becka Baum MD (Ophthalmology) Visit Vitals  /59  Pulse 74  Temp 98.5 °F (36.9 °C) (Oral)  Resp 18  Ht 5' 2\" (1.575 m)  Wt 170 lb (77.1 kg)  BMI 31.09 kg/m2 General appearance - alert, well appearing, and in no distress Chest - clear to auscultation, no wheezes, rales or rhonchi, symmetric air entry Heart - normal rate, regular rhythm, normal S1, S2, no murmurs, rubs, clicks or gallops Ext-no pedal edema noted 
 feet: warm, good capillary refill, nail exam normal nails without lesions, no trophic changes or ulcerative lesions, normal DP and PT pulses and normal monofilament exam 
 
 
Assessment/Plan Education and counseling provided: 
Are appropriate based on today's review and evaluation Advance Care Plan or Surrogate Decision Maker documented in medical record. Diagnoses and all orders for this visit: 
 
1. Medicare annual wellness visit, subsequent 2. Screening for depression -     Depression Screen Annual 
 
3. ACP (advance care planning) 4. History of breast cancer -     [facility] MAMMO BI SCREENING INCL CAD; Future 5. Encounter for screening mammogram for malignant neoplasm of breast  
-     [facility] MAMMO BI SCREENING INCL CAD; Future 6. Type 2 diabetes mellitus without complication, without long-term current use of insulin ([facility]) 
-      DIABETES FOOT EXAM 
 
 
 
Health Maintenance Due Topic Date Due  Pneumococcal 65+ Low/Medium Risk (2 of 2 - PPSV23) 06/29/2017  MEDICARE YEARLY EXAM  04/28/2018  Influenza Age 5 to Adult  08/01/2018  
 EYE EXAM RETINAL OR DILATED Q1  08/08/2018  
 FOOT EXAM Q1  08/24/2018 Follow-up Disposition: 
Return in about 2 months (around 10/29/2018) for diabetes. Reviewed plan of care. Patient has provided input and agrees with goals.

## 2018-08-29 NOTE — PROGRESS NOTES
Chief Complaint Patient presents with Bibiana Mathis Annual Wellness Visit 1. Have you been to the ER, urgent care clinic since your last visit? No Hospitalized since your last visit? No  
 
2. Have you seen or consulted any other health care providers outside of the [facility] since your last visit? Include any pap smears or colon screening.  No

## 2018-08-29 NOTE — MR AVS SNAPSHOT
[address] 
[phone] Patient: Zana Winkler MRN: [id number] MCH:[id number]/73/[id number] Visit Information Date & Time Provider Department Dept. Phone Encounter #  
 8/29/2018 11:15 AM Lovely Bellamy 34 [id number] Upcoming Health Maintenance Date Due Pneumococcal 65+ Low/Medium Risk (2 of 2 - PPSV23) 6/29/2017 MEDICARE YEARLY EXAM 4/28/2018 Influenza Age 5 to Adult 8/1/2018 EYE EXAM RETINAL OR DILATED Q1 8/8/2018 FOOT EXAM Q1 8/24/2018 HEMOGLOBIN A1C Q6M 10/16/2018 MICROALBUMIN Q1 10/19/2018 LIPID PANEL Q1 3/19/2019 GLAUCOMA SCREENING Q2Y 8/8/2019 COLONOSCOPY 1/4/2021 DTaP/Tdap/Td series (2 - Td) 12/12/2026 Allergies as of 8/29/2018  Review Complete On: 8/29/2018 By: Rodger Rehman MD  
  
 Severity Noted Reaction Type Reactions Nsaids (Non-steroidal Anti-inflammatory Drug) High 06/23/2011   Systemic Anaphylaxis, Hives Fenofibrate Medium 08/28/2015   Side Effect Other (comments) Leg cramps Metformin  09/12/2016    Diarrhea Current Immunizations  Reviewed on 8/29/2018 Name Date Influenza High Dose Vaccine PF 10/19/2017  9:51 AM, 10/16/2016, 8/28/2015 Influenza Vaccine 10/14/2014, 10/1/2013 Pneumococcal Conjugate (PCV-13) 6/29/2016 Pneumococcal Vaccine (Unspecified Type) 10/21/2017 Reviewed by Pramod Queen LPN on 4/75/6807 at 42:47 AM  
You Were Diagnosed With   
  
 Codes Comments Medicare annual wellness visit, subsequent    -  Primary ICD-10-CM: Z00.00 ICD-9-CM: V70.0 Type 2 diabetes mellitus without complication, without long-term current use of insulin (HCC)     ICD-10-CM: E11.9 ICD-9-CM: 250.00 Screening for depression     ICD-10-CM: Z13.89 ICD-9-CM: V79.0 ACP (advance care planning)     ICD-10-CM: Z71.89 ICD-9-CM: V65.49 History of breast cancer     ICD-10-CM: Z85.3 ICD-9-CM: V10.3 Encounter for screening mammogram for malignant neoplasm of breast     ICD-10-CM: Z12.31 
ICD-9-CM: V76.12 Vitals BP Pulse Temp Resp Height(growth percentile) Weight(growth percentile) 146/59 74 98.5 °F (36.9 °C) (Oral) 18 5' 2\" (1.575 m) 170 lb (77.1 kg) BMI OB Status Smoking Status 31.09 kg/m2 Postmenopausal Current Every Day Smoker Vitals History BMI and BSA Data Body Mass Index Body Surface Area 31.09 kg/m 2 1.84 m 2 [phone] [phone] Your Updated Medication List  
  
   
This list is accurate as of 8/29/18 12:18 PM.  Always use your most recent med list. amLODIPine 5 mg tablet Commonly known as:  [place] TAKE ONE TABLET BY MOUTH DAILY  
  
 aspirin 81 mg tablet Take 81 mg by mouth daily (after breakfast). CALCIUM 500+D 500 mg(1,250mg) -200 unit per tablet Generic drug:  calcium-vitamin D Take 1 Tab by mouth daily (after breakfast). FISH OIL 1,000 mg Cap Generic drug:  omega-3 fatty acids-vitamin e Take 2 Caps by mouth daily (after breakfast). FLUZONE HIGH-DOSE 2016-17 (PF) Syrg injection Generic drug:  influenza vaccine 2016-17 (65 yr+)(PF)  
VACCINATION ADMINISTERED BY PHARMACIST  
  
 glucose blood VI test strips strip Commonly known as:  ACCU-CHEK GEM PLUS TEST STRP  
USE TO CHECK BLOOD SUGAR DAILY OR AS DIRECTED Lancets Misc Commonly known as:  ACCU-CHEK SOFTCLIX LANCETS  
USE TO CHECK BLOOD SUGAR DAILY OR AS DIRECTED  
  
 losartan 100 mg tablet Commonly known as:  COZAAR  
TAKE ONE TABLET BY MOUTH DAILY  
  
 metoprolol succinate 200 mg XL tablet Commonly known as:  TOPROL-XL  
TAKE ONE TABLET BY MOUTH DAILY  
  
 multivitamin tablet Commonly known as:  ONE A DAY Take 1 Tab by mouth daily (after breakfast). omeprazole 40 mg capsule Commonly known as:  PRILOSEC  
TAKE ONE CAPSULE BY MOUTH DAILY  
  
 rosuvastatin 40 mg tablet Commonly known as:  CRESTOR Take 1 Tab by mouth nightly. We Performed the Following Rob 68 [[id number]]  DIABETES FOOT EXAM [7 Custom] To-Do List   
 Around 09/04/2018 Imaging:  [facility] MAMMO BI SCREENING INCL CAD Patient Instructions Medicare Wellness Visit, Female The best way to live healthy is to have a lifestyle where you eat a well-balanced diet, exercise regularly, limit alcohol use, and quit all forms of tobacco/nicotine, if applicable. Regular preventive services are another way to keep healthy. Preventive services (vaccines, screening tests, monitoring & exams) can help personalize your care plan, which helps you manage your own care. Screening tests can find health problems at the earliest stages, when they are easiest to treat. [facility] follows the current, evidence-based guidelines published by the [facility] Catrachito Rich ([facility]) when recommending preventive services for our patients. Because we follow these guidelines, sometimes recommendations change over time as research supports it. (For example, mammograms used to be recommended annually. Even though Medicare will still pay for an annual mammogram, the newer guidelines recommend a mammogram every two years for women of average risk.) Of course, you and your doctor may decide to screen more often for some diseases, based on your risk and your health status. Preventive services for you include: - Medicare offers their members a free annual wellness visit, which is time for you and your primary care provider to discuss and plan for your preventive service needs.  Take advantage of this benefit every year! 
-All adults over the age of 72 should receive the recommended pneumonia vaccines. Current USPSTF guidelines recommend a series of two vaccines for the best pneumonia protection.  
-All adults should have a flu vaccine yearly and a tetanus vaccine every 10 years. All adults age 61 and older should receive a shingles vaccine once in their lifetime.   
-A bone mass density test is recommended when a woman turns 65 to screen for osteoporosis. This test is only recommended one time, as a screening. Some providers will use this same test as a disease monitoring tool if you already have osteoporosis. -All adults age 38-68 who are overweight should have a diabetes screening test once every three years.  
-Other screening tests and preventive services for persons with diabetes include: an eye exam to screen for diabetic retinopathy, a kidney function test, a foot exam, and stricter control over your cholesterol.  
-Cardiovascular screening for adults with routine risk involves an electrocardiogram (ECG) at intervals determined by your doctor.  
-Colorectal cancer screenings should be done for adults age 54-65 with no increased risk factors for colorectal cancer. There are a number of acceptable methods of screening for this type of cancer. Each test has its own benefits and drawbacks. Discuss with your doctor what is most appropriate for you during your annual wellness visit. The different tests include: colonoscopy (considered the best screening method), a fecal occult blood test, a fecal DNA test, and sigmoidoscopy. -Breast cancer screenings are recommended every other year for women of normal risk, age 54-69. 
-Cervical cancer screenings for women over age 72 are only recommended with certain risk factors.  
-All adults born between [facility] should be screened once for Hepatitis C. Here is a list of your current Health Maintenance items (your personalized list of preventive services) with a due date: 
Health Maintenance Due Topic Date Due  
  Pneumococcal Vaccine (2 of 2 - PPSV23) 06/29/2017 [facility] Annual Well Visit  04/28/2018  Flu Vaccine  08/01/2018 [facility] Eye Exam  08/08/2018 [address] [place] [address] Diabetic Foot Care  08/24/2018 [facility] SERVICES! Dear Neal Evans: Thank you for requesting a Me[employer] account. Our records indicate that you already have an active Meu[employer]ame account. You can access your account anytime at [email or web address] Did you know that you can access your hospital and ER discharge instructions at any time in Meuugame? You can also review all of your test results from your hospital stay or ER visit. Additional Information If you have questions, please visit the Frequently Asked Questions section of the Meu[employer]ame website at [email or web address] Remember, Meuugame is NOT to be used for urgent needs. For medical emergencies, dial 911. Now available from your iPhone and Android! Please provide this summary of care documentation to your next provider. Your primary care clinician is listed as Charlette Campo. If you have any questions after today's visit, please call [phone].

## 2018-08-29 NOTE — PATIENT INSTRUCTIONS
Medicare Wellness Visit, Female The best way to live healthy is to have a lifestyle where you eat a well-balanced diet, exercise regularly, limit alcohol use, and quit all forms of tobacco/nicotine, if applicable. Regular preventive services are another way to keep healthy. Preventive services (vaccines, screening tests, monitoring & exams) can help personalize your care plan, which helps you manage your own care. Screening tests can find health problems at the earliest stages, when they are easiest to treat. [facility] follows the current, evidence-based guidelines published by the [facility] Rich ([facility]) when recommending preventive services for our patients. Because we follow these guidelines, sometimes recommendations change over time as research supports it. (For example, mammograms used to be recommended annually. Even though Medicare will still pay for an annual mammogram, the newer guidelines recommend a mammogram every two years for women of average risk.) Of course, you and your doctor may decide to screen more often for some diseases, based on your risk and your health status. Preventive services for you include: - Medicare offers their members a free annual wellness visit, which is time for you and your primary care provider to discuss and plan for your preventive service needs. Take advantage of this benefit every year! 
-All adults over the age of 72 should receive the recommended pneumonia vaccines. Current USPSTF guidelines recommend a series of two vaccines for the best pneumonia protection.  
-All adults should have a flu vaccine yearly and a tetanus vaccine every 10 years. All adults age 61 and older should receive a shingles vaccine once in their lifetime.   
-A bone mass density test is recommended when a woman turns 65 to screen for osteoporosis. This test is only recommended one time, as a screening. Some providers will use this same test as a disease monitoring tool if you already have osteoporosis. -All adults age 38-68 who are overweight should have a diabetes screening test once every three years.  
-Other screening tests and preventive services for persons with diabetes include: an eye exam to screen for diabetic retinopathy, a kidney function test, a foot exam, and stricter control over your cholesterol.  
-Cardiovascular screening for adults with routine risk involves an electrocardiogram (ECG) at intervals determined by your doctor.  
-Colorectal cancer screenings should be done for adults age 54-65 with no increased risk factors for colorectal cancer. There are a number of acceptable methods of screening for this type of cancer. Each test has its own benefits and drawbacks. Discuss with your doctor what is most appropriate for you during your annual wellness visit. The different tests include: colonoscopy (considered the best screening method), a fecal occult blood test, a fecal DNA test, and sigmoidoscopy. -Breast cancer screenings are recommended every other year for women of normal risk, age 54-69. 
-Cervical cancer screenings for women over age 72 are only recommended with certain risk factors.  
-All adults born between [facility] should be screened once for Hepatitis C. Here is a list of your current Health Maintenance items (your personalized list of preventive services) with a due date: 
Health Maintenance Due Topic Date Due  Pneumococcal Vaccine (2 of 2 - PPSV23) 06/29/2017 [employer] Annual Well Visit  04/28/2018  Flu Vaccine  08/01/2018 [employer] Eye Exam  08/08/2018 [employer] Diabetic Foot Care  08/24/2018

## 2018-09-03 RX ORDER — METOPROLOL SUCCINATE 200 MG/1
TABLET, EXTENDED RELEASE ORAL
Qty: 30 TAB | Refills: 11 | Status: SHIPPED | OUTPATIENT
Start: 2018-09-03 | End: 2019-08-27 | Stop reason: SDUPTHER

## 2018-10-30 ENCOUNTER — OFFICE VISIT (OUTPATIENT)
Dept: FAMILY MEDICINE CLINIC | Age: 78
End: 2018-10-30

## 2018-10-30 VITALS
SYSTOLIC BLOOD PRESSURE: 147 MMHG | HEIGHT: 62 IN | DIASTOLIC BLOOD PRESSURE: 65 MMHG | HEART RATE: 68 BPM | TEMPERATURE: 97.6 F | WEIGHT: 268 LBS | RESPIRATION RATE: 18 BRPM | BODY MASS INDEX: 49.32 KG/M2

## 2018-10-30 DIAGNOSIS — I10 ESSENTIAL HYPERTENSION: ICD-10-CM

## 2018-10-30 DIAGNOSIS — N18.30 CKD (CHRONIC KIDNEY DISEASE), STAGE III (HCC): ICD-10-CM

## 2018-10-30 DIAGNOSIS — E11.9 TYPE 2 DIABETES MELLITUS WITHOUT COMPLICATION, WITHOUT LONG-TERM CURRENT USE OF INSULIN (HCC): Primary | ICD-10-CM

## 2018-10-30 DIAGNOSIS — Z72.0 TOBACCO ABUSE: ICD-10-CM

## 2018-10-30 RX ORDER — AMLODIPINE BESYLATE 5 MG/1
TABLET ORAL
Qty: 30 TAB | Refills: 11 | Status: SHIPPED | OUTPATIENT
Start: 2018-10-30 | End: 2018-10-31 | Stop reason: SDUPTHER

## 2018-10-30 NOTE — LETTER
11/18/2018 7:26 PM 
 
Ms. Isaias Bobo [phone] Dear Isaias Bobo: 
 
Please find your most recent results below. Resulted Orders METABOLIC PANEL, BASIC Result Value Ref Range Glucose 126 (H) 65 - 99 mg/dL BUN 24 8 - 27 mg/dL Creatinine 1.20 (H) 0.57 - 1.00 mg/dL GFR est non-AA 44 (L) >59 mL/min/1.73 GFR est AA 51 (L) >59 mL/min/1.73  
 BUN/Creatinine ratio 20 12 - 28 Sodium 141 134 - 144 mmol/L Potassium 4.9 3.5 - 5.2 mmol/L Chloride 100 96 - 106 mmol/L  
 CO2 26 20 - 29 mmol/L Calcium 10.0 8.7 - 10.3 mg/dL Narrative Performed at:  [address]  [id number] : Jeannine Kulkarni MD, Phone:  [phone] HEMOGLOBIN A1C WITH EAG Result Value Ref Range Hemoglobin A1c 7.3 (H) 4.8 - 5.6 % Comment:  
            Prediabetes: 5.7 - 6.4 Diabetes: >6.4 Glycemic control for adults with diabetes: <7.0 Estimated average glucose 163 mg/dL Narrative Performed at:  [address]  [id number] : Jeannine Kulkarni MD, Phone:  [phone] MICROALBUMIN, UR, RAND W/ MICROALB/CREAT RATIO Result Value Ref Range Creatinine, urine 135.2 Not Estab. mg/dL Microalbumin, urine 320.6 Not Estab. ug/mL Microalb/Creat ratio (ug/mg creat.) 237.1 (H) 0.0 - 30.0 mg/g creat Comment:  
                        Normal:                0.0 -  30.0 Albuminuria:          31.0 - 300.0 Clinical albuminuria:       >300.0 Narrative Performed at:  [address]  [id number] : Jeannine Kulkarni MD, Phone:  [phone] CKD REPORT Result Value Ref Range Interpretation Note Comment:  
   Supplemental report is available. Narrative Performed at:  [facility] [id number] [place] [address]  [id number] : Ross Mishra MD, Phone:  [phone] DIABETES PATIENT EDUCATION Result Value Ref Range PDF Image Not applicable Narrative Performed at:  [address] [place] [address]  [id number] : Ross Mishra MD, Phone:  [phone] RECOMMENDATIONS: 
Your kidney function (GFR) has dropped again. Please avoid NSAIDS (things like Motrin, ibuprofen, Advil, Aleve, naprosyn, BC's, Goodies, etc.)  If you are taking a daily aspirin, please continue this. Also, if you should ever need IV contrast, please let them know you have kidney issues. You may takeTylenol or acetaminophen for pain. I would like for you to have this lab repeated in 3 months and have enclosed a lab slip. Your microalbumin (a type of protein found in the urine in early kidney disease) is high. Please keep your diabetes and blood pressure under good control to prevent this from becoming a problem. Take care! Please call me if you have any questions: [phone] Sincerely, 
 
 
Zheng Rees MD

## 2018-10-30 NOTE — PROGRESS NOTES
Chief Complaint   Patient presents with    Diabetes     2 mo f/u     1. Have you been to the ER, urgent care clinic since your last visit? Hospitalized since your last visit? No     2. Have you seen or consulted any other health care providers outside of the [address] [place] [address] since your last visit? Include any pap smears or colon screening.  No

## 2018-10-30 NOTE — PROGRESS NOTES
HISTORY OF PRESENT ILLNESS  Carlos Kumar is a 70 y.o. female. Carlos Kumar is here for diabetic follow up. Their  FBS have been around 117, 120. This is a chronic problem which has not changed. Denies chest pain, abdominal pain, headaches, or shortness of breath. It is worsened by nothing, and improved by lifestyle modifications, which are working well. Review of Systems   Constitutional: Negative for weight loss. No weight gain   Eyes: Negative for blurred vision. Respiratory: Negative for shortness of breath. Cardiovascular: Positive for leg swelling. Negative for chest pain. Gastrointestinal: Negative for abdominal pain. Genitourinary:        No polyuria   Neurological: Negative for dizziness, sensory change, speech change and focal weakness. Endo/Heme/Allergies: Negative for polydipsia. Lab Results   Component Value Date/Time    Hemoglobin A1c 6.6 (H) 04/16/2018 10:23 AM    Hemoglobin A1c (POC) 6.2 02/09/2015 09:33 AM         Visit Vitals  /65   Pulse 68   Temp 97.6 °F (36.4 °C) (Oral)   Resp 18   Ht 5' 2\" (1.575 m)   Wt 268 lb (121.6 kg)   BMI 49.02 kg/m²     BP Readings from Last 3 Encounters:   10/30/18 147/65   08/29/18 146/59   06/05/18 145/55     Physical Exam   Constitutional: She is oriented to person, place, and time. She appears well-developed and well-nourished. No distress. Cardiovascular: Normal rate, regular rhythm and normal heart sounds. Exam reveals no gallop and no friction rub. No murmur heard. Pulmonary/Chest: Effort normal and breath sounds normal. No respiratory distress. She has no wheezes. She has no rales. Musculoskeletal: She exhibits edema. Trace LLE edema, none on the right   Neurological: She is alert and oriented to person, place, and time. Skin: Skin is warm and dry. She is not diaphoretic. Nursing note and vitals reviewed. ASSESSMENT and PLAN    ICD-10-CM ICD-9-CM    1.  Type 2 diabetes mellitus without complication, without long-term current use of insulin ([facility]) X13.7 858.74 METABOLIC PANEL, BASIC      HEMOGLOBIN A1C WITH EAG      MICROALBUMIN, UR, RAND W/ MICROALB/CREAT RATIO   2. Essential hypertension E37 888.0 METABOLIC PANEL, BASIC      amLODIPine (NORVASC) 5 mg tablet   3. CKD (chronic kidney disease), stage III ([facility]) X81.0 836.1 METABOLIC PANEL, BASIC   4. Tobacco abuse Z72.0 305.1         Diabetes seems to be controlled  Blood pressure adequately controlled, DBP low, so cannot increase medication  Not interested in stopping smoking  Labs per orders. Refills per orders  Counseled to stop smoking    Follow-up Disposition:  Return in about 4 months (around 2/28/2019) for diabetes. Reviewed plan of care. Patient has provided input and agrees with goals.

## 2018-10-31 DIAGNOSIS — I10 ESSENTIAL HYPERTENSION: ICD-10-CM

## 2018-10-31 LAB
ALBUMIN/CREAT UR: 237.1 MG/G CREAT (ref 0–30)
BUN SERPL-MCNC: 24 MG/DL (ref 8–27)
BUN/CREAT SERPL: 20 (ref 12–28)
CALCIUM SERPL-MCNC: 10 MG/DL (ref 8.7–10.3)
CHLORIDE SERPL-SCNC: 100 MMOL/L (ref 96–106)
CO2 SERPL-SCNC: 26 MMOL/L (ref 20–29)
CREAT SERPL-MCNC: 1.2 MG/DL (ref 0.57–1)
CREAT UR-MCNC: 135.2 MG/DL
EST. AVERAGE GLUCOSE BLD GHB EST-MCNC: 163 MG/DL
GLUCOSE SERPL-MCNC: 126 MG/DL (ref 65–99)
HBA1C MFR BLD: 7.3 % (ref 4.8–5.6)
INTERPRETATION: NORMAL
Lab: NORMAL
MICROALBUMIN UR-MCNC: 320.6 UG/ML
POTASSIUM SERPL-SCNC: 4.9 MMOL/L (ref 3.5–5.2)
SODIUM SERPL-SCNC: 141 MMOL/L (ref 134–144)

## 2018-11-02 RX ORDER — AMLODIPINE BESYLATE 5 MG/1
TABLET ORAL
Qty: 30 TAB | Refills: 11 | Status: SHIPPED | OUTPATIENT
Start: 2018-11-02 | End: 2019-11-26 | Stop reason: SDUPTHER

## 2018-11-18 DIAGNOSIS — R94.4 DECREASED GFR: Primary | ICD-10-CM

## 2018-12-03 DIAGNOSIS — E78.2 ELEVATED TRIGLYCERIDES WITH HIGH CHOLESTEROL: ICD-10-CM

## 2018-12-05 RX ORDER — ROSUVASTATIN CALCIUM 20 MG/1
TABLET, COATED ORAL
Qty: 90 TAB | Refills: 0 | OUTPATIENT
Start: 2018-12-05

## 2018-12-30 DIAGNOSIS — E78.2 ELEVATED TRIGLYCERIDES WITH HIGH CHOLESTEROL: ICD-10-CM

## 2018-12-31 RX ORDER — ROSUVASTATIN CALCIUM 20 MG/1
20 TABLET, COATED ORAL
Qty: 90 TAB | Refills: 3 | Status: SHIPPED | OUTPATIENT
Start: 2018-12-31 | End: 2019-12-27

## 2018-12-31 RX ORDER — ROSUVASTATIN CALCIUM 20 MG/1
TABLET, COATED ORAL
Qty: 90 TAB | Refills: 2 | OUTPATIENT
Start: 2018-12-31

## 2018-12-31 NOTE — TELEPHONE ENCOUNTER
Please find out from patient if she is taking Crestor 40 or 20mg? Its seems she's overdue for refill but last I see is 40 and request I got was for 20. We will be happy to fill, just want to make sure correct med is sent!

## 2018-12-31 NOTE — TELEPHONE ENCOUNTER
Called pt, and left a voice message, asking that she call the office back in regards to her medications.

## 2019-01-08 NOTE — TELEPHONE ENCOUNTER
Done.
[employer] checking on status of pended order for 90 refill for pt's generic Crestor 20 mg. Insurance requires 90 day refills. Pt seen for follow up appointment yesterday by Dr. Christina Coleman.  Heidi
done

## 2019-02-18 DIAGNOSIS — R94.4 DECREASED GFR: ICD-10-CM

## 2019-02-26 ENCOUNTER — OFFICE VISIT (OUTPATIENT)
Dept: FAMILY MEDICINE CLINIC | Age: 79
End: 2019-02-26

## 2019-02-26 VITALS
BODY MASS INDEX: 30 KG/M2 | SYSTOLIC BLOOD PRESSURE: 159 MMHG | TEMPERATURE: 97.5 F | RESPIRATION RATE: 18 BRPM | DIASTOLIC BLOOD PRESSURE: 65 MMHG | WEIGHT: 163 LBS | HEART RATE: 63 BPM | HEIGHT: 62 IN

## 2019-02-26 DIAGNOSIS — E11.9 TYPE 2 DIABETES MELLITUS WITHOUT COMPLICATION, WITHOUT LONG-TERM CURRENT USE OF INSULIN (HCC): Primary | ICD-10-CM

## 2019-02-26 DIAGNOSIS — E78.5 DYSLIPIDEMIA: ICD-10-CM

## 2019-02-26 DIAGNOSIS — I10 ESSENTIAL HYPERTENSION: ICD-10-CM

## 2019-02-26 NOTE — PROGRESS NOTES
Chief Complaint   Patient presents with    Diabetes     4 mo f/u     1. Have you been to the ER, urgent care clinic since your last visit? Hospitalized since your last visit? No     2. Have you seen or consulted any other health care providers outside of the [address] [place] [address] since your last visit? Include any pap smears or colon screening. Yes podiatrist    Pt undecided re: shingles vaccine.

## 2019-02-26 NOTE — PROGRESS NOTES
HISTORY OF PRESENT ILLNESS  Beatriz Deluna is a 70 y.o. female. Beatriz Deluna is here for diabetic and HTN follow up. Their  FBS have been in the low 100's, up to 110. This is a chronic problem which has not changed. Denies chest pain, abdominal pain, headaches. She has chronic, stable dyspnea. It is worsened by nothing, and improved by lifestyle modifications, which are working well. Review of Systems   Constitutional: Positive for weight loss. No weight gain   Eyes: Negative for blurred vision. Respiratory: Positive for shortness of breath. Cardiovascular: Positive for leg swelling. Negative for chest pain. Gastrointestinal: Negative for abdominal pain. Genitourinary:        No polyuria   Neurological: Negative for dizziness, sensory change, speech change and focal weakness. Endo/Heme/Allergies: Negative for polydipsia. Lab Results   Component Value Date/Time    Hemoglobin A1c 7.3 (H) 10/30/2018 10:41 AM    Hemoglobin A1c (POC) 6.2 02/09/2015 09:33 AM         Visit Vitals  /65   Pulse 63   Temp 97.5 °F (36.4 °C) (Oral)   Resp 18   Ht 5' 2\" (1.575 m)   Wt 163 lb (73.9 kg)   BMI 29.81 kg/m²     BP Readings from Last 3 Encounters:   02/26/19 159/65   10/30/18 147/65   08/29/18 146/59     Physical Exam   Constitutional: She is oriented to person, place, and time. She appears well-developed and well-nourished. No distress. Cardiovascular: Normal rate, regular rhythm, normal heart sounds and intact distal pulses. Exam reveals no gallop and no friction rub. No murmur heard. Pulmonary/Chest: Effort normal and breath sounds normal. No respiratory distress. She has no wheezes. She has no rales. Musculoskeletal: She exhibits edema. 1+ LLE edema, none on the left   Neurological: She is alert and oriented to person, place, and time. Normal monofilament exam   Skin: Skin is warm and dry. She is not diaphoretic. Feet and nails in good condition.    Nursing note and vitals reviewed. ASSESSMENT and PLAN    ICD-10-CM ICD-9-CM    1. Type 2 diabetes mellitus without complication, without long-term current use of insulin ([facility]) E11.9 250.00 HEMOGLOBIN A1C WITH EAG   2. Essential hypertension I10 401.9    3. Dyslipidemia E78.5 272.4 NMR LIPOPROFILE      HEPATIC FUNCTION PANEL        Diabetes seems to be controlled  SBP elevated, but DBP low, cannot adjust medications  Due for lipids  Labs per orders. Continue current plans. Follow-up Disposition:  Return in about 4 months (around 6/26/2019) for diabetes, blood pressure. Reviewed plan of care. Patient has provided input and agrees with goals.

## 2019-02-28 LAB
ALBUMIN SERPL-MCNC: 4.1 G/DL (ref 3.5–4.8)
ALP SERPL-CCNC: 40 IU/L (ref 39–117)
ALT SERPL-CCNC: 21 IU/L (ref 0–32)
AST SERPL-CCNC: 26 IU/L (ref 0–40)
BILIRUB DIRECT SERPL-MCNC: 0.12 MG/DL (ref 0–0.4)
BILIRUB SERPL-MCNC: 0.3 MG/DL (ref 0–1.2)
CHOLEST SERPL-MCNC: 158 MG/DL (ref 100–199)
EST. AVERAGE GLUCOSE BLD GHB EST-MCNC: 157 MG/DL
HBA1C MFR BLD: 7.1 % (ref 4.8–5.6)
HDL SERPL-SCNC: 32.1 UMOL/L
HDLC SERPL-MCNC: 41 MG/DL
INTERPRETATION, 910389: NORMAL
LDL SERPL QN: 20.5 NM
LDL SERPL-SCNC: 800 NMOL/L
LDL SMALL SERPL-SCNC: 364 NMOL/L
LDLC SERPL CALC-MCNC: 73 MG/DL (ref 0–99)
LP-IR SCORE SERPL: 65
Lab: NORMAL
PROT SERPL-MCNC: 7 G/DL (ref 6–8.5)
TRIGL SERPL-MCNC: 220 MG/DL (ref 0–149)

## 2019-04-30 RX ORDER — OMEPRAZOLE 40 MG/1
CAPSULE, DELAYED RELEASE ORAL
Qty: 30 CAP | Refills: 11 | Status: SHIPPED | OUTPATIENT
Start: 2019-04-30 | End: 2020-05-08

## 2019-06-23 NOTE — PROGRESS NOTES
HISTORY OF PRESENT ILLNESS  Dave Dunn is a 70 y.o. female. Dave Dunn is here with her daughter for diabetic follow up. Their  FBS have been around 105, 102, 110. This is a chronic problem which has not changed. Denies chest pain, abdominal pain, or shortness of breath. Increasing sinus headaches. It is worsened by nothing, and improved by lifestyle modifications, which are working well. Review of Systems   Constitutional: Negative for weight loss. No weight gain   Eyes: Negative for blurred vision. Respiratory: Negative for shortness of breath. Cardiovascular: Negative for chest pain and leg swelling. Gastrointestinal: Negative for abdominal pain. Genitourinary:        No polyuria   Neurological: Positive for dizziness. Negative for sensory change, speech change and focal weakness. Occasion dizziness lasting a few seconds. Endo/Heme/Allergies: Negative for polydipsia. Lab Results   Component Value Date/Time    Hemoglobin A1c 7.1 (H) 02/26/2019 09:51 AM    Hemoglobin A1c (POC) 6.2 02/09/2015 09:33 AM         Visit Vitals  /58   Pulse (!) 49   Temp 96.9 °F (36.1 °C) (Oral)   Resp 18   Ht 5' 2\" (1.575 m)   Wt 159 lb (72.1 kg)   BMI 29.08 kg/m²     Physical Exam   Constitutional: She is oriented to person, place, and time. She appears well-developed and well-nourished. No distress. Cardiovascular: Normal rate, regular rhythm and normal heart sounds. Exam reveals no gallop and no friction rub. No murmur heard. Pulmonary/Chest: Effort normal and breath sounds normal. No respiratory distress. She has no wheezes. She has no rales. Musculoskeletal: She exhibits no edema. Neurological: She is alert and oriented to person, place, and time. Skin: Skin is warm and dry. She is not diaphoretic. Nursing note and vitals reviewed. ASSESSMENT and PLAN    ICD-10-CM ICD-9-CM    1.  Type 2 diabetes mellitus without complication, without long-term current use of insulin ([facility]) V36.4 128.61 METABOLIC PANEL, BASIC      HEMOGLOBIN A1C WITH EAG   2. Essential hypertension R71 041.0 METABOLIC PANEL, BASIC   3. CKD (chronic kidney disease), stage III ([facility]) K36.8 630.4 METABOLIC PANEL, BASIC   4. Encounter for immunization Z23 V03.89 varicella-zoster recombinant, PF, (SHINGRIX, PF,) 50 mcg/0.5 mL susr injection        Diabetes controlled  Elevated SBP, low DBP, cannot increase medication  Labs per orders. Continue current plans. Headaches discussed with patient and her daughter; her daughter feels they are due to eye strain and she has an eye appointment in August - she is going to call and see if she can be seen soon, otherwise they will follow up with me right away  Shingrix at pharmacy  Her daughter will send me her mother's home blood pressures    Follow-up and Dispositions    · Return in about 4 months (around 10/25/2019) for diabetes, bring home blood pressures. Reviewed plan of care. Patient has provided input and agrees with goals.

## 2019-06-25 ENCOUNTER — OFFICE VISIT (OUTPATIENT)
Dept: FAMILY MEDICINE CLINIC | Age: 79
End: 2019-06-25

## 2019-06-25 VITALS
DIASTOLIC BLOOD PRESSURE: 58 MMHG | BODY MASS INDEX: 29.26 KG/M2 | RESPIRATION RATE: 18 BRPM | SYSTOLIC BLOOD PRESSURE: 148 MMHG | HEART RATE: 49 BPM | WEIGHT: 159 LBS | TEMPERATURE: 96.9 F | HEIGHT: 62 IN

## 2019-06-25 DIAGNOSIS — Z23 ENCOUNTER FOR IMMUNIZATION: ICD-10-CM

## 2019-06-25 DIAGNOSIS — N18.30 CKD (CHRONIC KIDNEY DISEASE), STAGE III (HCC): ICD-10-CM

## 2019-06-25 DIAGNOSIS — I10 ESSENTIAL HYPERTENSION: ICD-10-CM

## 2019-06-25 DIAGNOSIS — E11.9 TYPE 2 DIABETES MELLITUS WITHOUT COMPLICATION, WITHOUT LONG-TERM CURRENT USE OF INSULIN (HCC): Primary | ICD-10-CM

## 2019-06-25 NOTE — PROGRESS NOTES
Chief Complaint   Patient presents with    Diabetes     4 mo f/u     1. Have you been to the ER, urgent care clinic since your last visit? Hospitalized since your last visit? No     2. Have you seen or consulted any other health care providers outside of the [address] [place] [address] since your last visit? Include any pap smears or colon screening.  No

## 2019-06-26 LAB
BUN SERPL-MCNC: 22 MG/DL (ref 8–27)
BUN/CREAT SERPL: 21 (ref 12–28)
CALCIUM SERPL-MCNC: 10.2 MG/DL (ref 8.7–10.3)
CHLORIDE SERPL-SCNC: 102 MMOL/L (ref 96–106)
CO2 SERPL-SCNC: 25 MMOL/L (ref 20–29)
CREAT SERPL-MCNC: 1.07 MG/DL (ref 0.57–1)
EST. AVERAGE GLUCOSE BLD GHB EST-MCNC: 154 MG/DL
GLUCOSE SERPL-MCNC: 123 MG/DL (ref 65–99)
HBA1C MFR BLD: 7 % (ref 4.8–5.6)
INTERPRETATION: NORMAL
Lab: NORMAL
POTASSIUM SERPL-SCNC: 5.5 MMOL/L (ref 3.5–5.2)
SODIUM SERPL-SCNC: 141 MMOL/L (ref 134–144)

## 2019-06-29 RX ORDER — LOSARTAN POTASSIUM 100 MG/1
TABLET ORAL
Qty: 90 TAB | Refills: 3 | Status: SHIPPED | OUTPATIENT
Start: 2019-06-29 | End: 2020-06-25

## 2019-07-07 DIAGNOSIS — E87.5 HYPERKALEMIA: Primary | ICD-10-CM

## 2019-07-23 LAB — POTASSIUM SERPL-SCNC: 4.7 MMOL/L (ref 3.5–5.2)

## 2019-08-29 RX ORDER — METOPROLOL SUCCINATE 200 MG/1
TABLET, EXTENDED RELEASE ORAL
Qty: 30 TAB | Refills: 11 | Status: SHIPPED | OUTPATIENT
Start: 2019-08-29 | End: 2020-06-25

## 2019-10-23 ENCOUNTER — PATIENT OUTREACH (OUTPATIENT)
Dept: FAMILY MEDICINE CLINIC | Age: 79
End: 2019-10-23

## 2019-10-23 NOTE — PROGRESS NOTES
Ambulatory Care Management Note      Date/Time:  10/23/2019 1:56 PM    This patient was received as a referral from [address] [place] [address] reviewed with the provider   Diabetes/ High cholesterol             Ambulatory  contacted patient/family  for discussion and case managements of Diabetes and  High Cholesterol  Summary of patients top problems:   Diabetes-patient last A1C 7.0 in June, patient currently not taking any diabetic medication  High Cholesterol- patient last lipid levels were abnormal, patient currently taking crestor  Patient's challenges to self management identified:   lack of knowledge about disease      Medication Management:  good adherence, poor understanding    Advance Care Planning:   Does patient have an Advance Directive:  reviewed and current    [employer], Referrals, and Durable Medical Equipment: NA    PCP/Specialist follow up:   Future Appointments   Date Time Provider Yari Morrow   10/29/2019  8:00 AM Juwan West MD [address] [place] [address]      Goals        Patient Stated     To achieve and maintain patients'  cholesterol level (pt-stated)      10/23/19- Spoke with patient on diet plans and exercise regimen     How to lower triglycerides    Lose Some Weight  Limit Your Sugar Intake  Follow a Low-Carb Diet. Eat More Fiber  Exercise Regularly  Avoid Trans Fats  Eat Fatty Fish Twice Weekly  Increase Your Intake of Unsaturated Fats           Other     Understand Diabetic action plan. (Ie. when to seek medical care,  what to do with high and low blood glucose, how and when to adjust diabetes treatment. 10/23/19-     Spoke with patient about plan of care for diabetes. Goal:  Self management goals of living with diabetes. 1.  Understands effects of food on blood sugar   2. Knowledge of medication action, side effects, missed dose. 3.  Reduce risks of diabetes complications.    4.  Understands effects of exercise on blood sugar.        1. Reviewed exercise and its relationship to lowering blood sugar (provide patient       with pedometer if needed)     2. Reviewed proper foot care (cleansing, proper fitting shoes, checking feet for              breaks in skin, clipping toenails, etc.) and the importance of yearly foot   exams. 3.  Reviewed with patient the importance of notifying provider for changes in   visual  status and yearly eye exam.     4.  Instructed patient on doing daily blood sugar checks . 5. Instructed patient to record food intake at least 3 days a week and bring into         the office for review. 6.  Educated patient on medication action and side effects. 7.  Instructed patient on the importance of follow up appointments with PCP and           specialty appointments. Patient/family verbalized understanding of all information discussed. Patient/family  has this Nurse Navigators contact information for any further questions, concerns, or needs.

## 2019-10-29 ENCOUNTER — OFFICE VISIT (OUTPATIENT)
Dept: FAMILY MEDICINE CLINIC | Age: 79
End: 2019-10-29

## 2019-10-29 VITALS
RESPIRATION RATE: 18 BRPM | HEART RATE: 70 BPM | SYSTOLIC BLOOD PRESSURE: 166 MMHG | DIASTOLIC BLOOD PRESSURE: 67 MMHG | HEIGHT: 62 IN | OXYGEN SATURATION: 99 % | BODY MASS INDEX: 29.44 KG/M2 | TEMPERATURE: 98 F | WEIGHT: 160 LBS

## 2019-10-29 DIAGNOSIS — K21.9 GASTROESOPHAGEAL REFLUX DISEASE, ESOPHAGITIS PRESENCE NOT SPECIFIED: ICD-10-CM

## 2019-10-29 DIAGNOSIS — E11.9 TYPE 2 DIABETES MELLITUS WITHOUT COMPLICATION, WITHOUT LONG-TERM CURRENT USE OF INSULIN (HCC): Primary | ICD-10-CM

## 2019-10-29 DIAGNOSIS — J44.9 CHRONIC OBSTRUCTIVE PULMONARY DISEASE, UNSPECIFIED COPD TYPE (HCC): ICD-10-CM

## 2019-10-29 DIAGNOSIS — I10 ESSENTIAL HYPERTENSION: ICD-10-CM

## 2019-10-29 DIAGNOSIS — Z72.0 TOBACCO ABUSE: ICD-10-CM

## 2019-10-29 DIAGNOSIS — N18.30 CKD (CHRONIC KIDNEY DISEASE), STAGE III (HCC): ICD-10-CM

## 2019-10-29 DIAGNOSIS — E11.9 TYPE 2 DIABETES MELLITUS WITHOUT COMPLICATION, WITHOUT LONG-TERM CURRENT USE OF INSULIN (HCC): ICD-10-CM

## 2019-10-29 DIAGNOSIS — Z01.818 PRE-OP EXAM: Primary | ICD-10-CM

## 2019-10-29 NOTE — PROGRESS NOTES
Preoperative Evaluation    Date of Exam: 10/29/2019    Eliceo Cantu is a 70 y.o. female ([phone]) who presents for preoperative evaluation. She will be having surgery for bilateral lid ptosis 11/6/19. Latex Allergy: no    Problem List:     Patient Active Problem List    Diagnosis Date Noted    ACP (advance care planning) 08/29/2018    H/O hysterectomy for benign disease 04/27/2017    Tubular adenoma of colon 01/24/2016    IDALIA (obstructive sleep apnea) 09/02/2015    Elevated triglycerides with high cholesterol 08/28/2015    CKD (chronic kidney disease), stage III ([address] 06/06/2012    Osteopenia     COPD (chronic obstructive pulmonary disease) ([address] 12/28/2011    Steatosis of liver 10/09/2011    Tobacco abuse 09/26/2011    Bell's palsy 06/23/2011    Cholelithiasis 06/23/2011    History of breast cancer 06/23/2011    History of duodenal ulcer 06/23/2011    Esophageal reflux 06/23/2011    Hiatal hernia 06/23/2011    Essential hypertension 06/23/2011    Type 2 diabetes mellitus without complications ([address] 98/14/1947    Left ventricular hypertrophy 06/23/2011     Medical History:     Past Medical History:   Diagnosis Date    ACP (advance care planning) 8/29/2018    Advance Care Plan or Surrogate Decision Maker documented in medical record.     Arthritis     Shoulders, knees    Bell's palsy 1980    Residual remains on right side    Breast cancer ([address] 5/1995    Cholelithiasis 6/23/2011    CKD (chronic kidney disease), stage III ([address] 6/6/2012    COPD (chronic obstructive pulmonary disease) ([address] 12/28/2011    Dyslipidemia 6/23/2011    Elevated triglycerides with high cholesterol 8/28/2015    Esophageal reflux 6/23/2011    Essential hypertension 6/23/2011    GERD (gastroesophageal reflux disease)     Hiatal hernia 6/23/2011    History of breast cancer 6/23/2011    Rt Breast    History of duodenal ulcer 6/23/2011    HTN (hypertension) 6/23/2011    Hyperlipidemia 1/7/2013    Left ventricular hypertrophy 6/23/2011    Nicotine dependence 9/2/2015    Obstructive chronic bronchitis with acute bronchitis ([facility] 6/23/2011    Osteopenia     Osteoporosis 6/23/2011    Other ill-defined conditions(799.89)     uti found 10/9 on antibiot    Other ill-defined conditions(799.89)     rightsided eye and cheek numb from surg    Pregnancy 6/23/2011    Steatosis of liver 10/9/2011    Tobacco abuse 9/26/2011    Tubular adenoma of colon 1/24/2016 1/4/16    Type 2 diabetes mellitus without complications ([facility] 6/90/7034    Type II or unspecified type diabetes mellitus without mention of complication, not stated as uncontrolled 6/23/2011     Allergies: Allergies   Allergen Reactions    Nsaids (Non-Steroidal Anti-Inflammatory Drug) Anaphylaxis and Hives    Fenofibrate Other (comments)     Leg cramps    Metformin Diarrhea      Medications:     Current Outpatient Medications   Medication Sig    metoprolol succinate (TOPROL-XL) 200 mg XL tablet TAKE ONE TABLET BY MOUTH DAILY    losartan (COZAAR) 100 mg tablet TAKE ONE TABLET BY MOUTH DAILY    glucose blood VI test strips (ACCU-CHEK GEM PLUS TEST STRP) strip USE TO TEST BLOOD SUGAR ONCE DAILY. Diagnoses code:E11.9    omeprazole (PRILOSEC) 40 mg capsule TAKE ONE CAPSULE BY MOUTH DAILY    rosuvastatin (CRESTOR) 20 mg tablet Take 1 Tab by mouth nightly.  amLODIPine (NORVASC) 5 mg tablet TAKE ONE TABLET BY MOUTH DAILY    Lancets (ACCU-CHEK SOFTCLIX LANCETS) misc USE TO CHECK BLOOD SUGAR DAILY OR AS DIRECTED    omega-3 fatty acids-vitamin e (FISH OIL) 1,000 mg cap Take 2 Caps by mouth daily (after breakfast).  calcium-vitamin D (CALCIUM 500+D) 500 mg(1,250mg) -200 unit per tablet Take 1 Tab by mouth daily (after breakfast).  aspirin 81 mg tablet Take 81 mg by mouth daily (after breakfast).  multivitamin (ONE A DAY) tablet Take 1 Tab by mouth daily (after breakfast).     FLUZONE HIGH-DOSE 2016-17, PF, syrg injection VACCINATION ADMINISTERED BY PHARMACIST     No current facility-administered medications for this visit. Surgical History:     Past Surgical History:   Procedure Laterality Date    ENDOSCOPY, COLON, DIAGNOSTIC  10/09    HX CHOLECYSTECTOMY  10/2011    laparoscopic    HX HEENT      bells palsy surgery,tongue numb    HX MASTECTOMY Right 5/1995    HX OTHER SURGICAL      colonoscopy    HX TONSIL AND ADENOIDECTOMY  1969    HX TONSILLECTOMY  1969    [place] MASTECTOMY BRA  1995    R Breast    REMOVAL GALLBLADDER       Social History:     Social History     Socioeconomic History    Marital status:      Spouse name: Not on file    Number of children: Not on file    Years of education: Not on file    Highest education level: Not on file   Tobacco Use    Smoking status: Current Every Day Smoker     Packs/day: 0.50     Years: 65.00     Pack years: 32.50     Types: Cigarettes    Smokeless tobacco: Never Used   Substance and Sexual Activity    Alcohol use: Yes     Comment: Rarely-holidays    Drug use: No    Sexual activity: Never       Anesthesia Complications: None  History of abnormal bleeding : None  History of Blood Transfusions: no  Health Care Directive or Living Will: yes    Objective:     ROS:   Feeling well. No dyspnea or chest pain on exertion. No abdominal pain, change in bowel habits, black or bloody stools. No urinary tract symptoms. No neurological complaints. OBJECTIVE:   The patient appears well, alert, oriented x 3, in no distress. Visit Vitals  /67   Pulse 70   Temp 98 °F (36.7 °C) (Oral)   Resp 18   Ht 5' 2\" (1.575 m)   Wt 160 lb (72.6 kg)   SpO2 99%    L/min   BMI 29.26 kg/m²     HEENT:Neck supple. No adenopathy or thyromegaly. Bilateral lid ptosis. Chest: Lungs are clear, good air entry, no wheezes, rhonchi or rales. Cardiovascular: S1 and S2 normal, no murmurs, regular rate and rhythm. Extremities: show no edema.       DIAGNOSTICS:   EKG - NSR with RSR' in V1, otherwise no STT wave changes, normal intervals and axis, no hypertrophy     Lab Results   Component Value Date/Time    Sodium 141 10/29/2019 09:15 AM    Potassium 4.9 10/29/2019 09:15 AM    Chloride 100 10/29/2019 09:15 AM    CO2 25 10/29/2019 09:15 AM    Anion gap 12 07/25/2016 02:32 PM    Glucose 133 (H) 10/29/2019 09:15 AM    BUN 23 10/29/2019 09:15 AM    Creatinine 1.19 (H) 10/29/2019 09:15 AM    BUN/Creatinine ratio 19 10/29/2019 09:15 AM    GFR est AA 51 (L) 10/29/2019 09:15 AM    GFR est non-AA 44 (L) 10/29/2019 09:15 AM    Calcium 10.0 10/29/2019 09:15 AM             IMPRESSION:     ICD-10-CM ICD-9-CM    1. Pre-op exam Z01.818 V72.84 AMB POC EKG ROUTINE W/ 12 LEADS, INTER & REP   2. Type 2 diabetes mellitus without complication, without long-term current use of insulin ([facility]) E11.9 250.00    3. Essential hypertension I10 401.9    4. CKD (chronic kidney disease), stage III ([facility]) N18.3 585.3    5. Chronic obstructive pulmonary disease, unspecified COPD type ([facility] J44.9 496    6. Gastroesophageal reflux disease, esophagitis presence not specified K21.9 530.81    7. Tobacco abuse Z72.0 305.1         Stable chronic medical problems    Follow-up and Dispositions    · Return in about 4 weeks (around 11/26/2019) for diabetes, blood pressure. No contraindications to planned surgery      Reviewed plan of care. Patient has provided input and agrees with goals.     Coleman Martinez MD   10/29/2019

## 2019-10-30 LAB
BUN SERPL-MCNC: 23 MG/DL (ref 8–27)
BUN/CREAT SERPL: 19 (ref 12–28)
CALCIUM SERPL-MCNC: 10 MG/DL (ref 8.7–10.3)
CHLORIDE SERPL-SCNC: 100 MMOL/L (ref 96–106)
CO2 SERPL-SCNC: 25 MMOL/L (ref 20–29)
CREAT SERPL-MCNC: 1.19 MG/DL (ref 0.57–1)
EST. AVERAGE GLUCOSE BLD GHB EST-MCNC: 148 MG/DL
GLUCOSE SERPL-MCNC: 133 MG/DL (ref 65–99)
HBA1C MFR BLD: 6.8 % (ref 4.8–5.6)
INTERPRETATION: NORMAL
Lab: NORMAL
POTASSIUM SERPL-SCNC: 4.9 MMOL/L (ref 3.5–5.2)
SODIUM SERPL-SCNC: 141 MMOL/L (ref 134–144)

## 2019-11-07 ENCOUNTER — PATIENT OUTREACH (OUTPATIENT)
Dept: FAMILY MEDICINE CLINIC | Age: 79
End: 2019-11-07

## 2019-11-07 NOTE — PROGRESS NOTES
Ambulatory  contacted patient/family  for discussion and case managements of Diabetes and  High Cholesterol. No answer, message left requesting return call.

## 2019-11-21 ENCOUNTER — PATIENT OUTREACH (OUTPATIENT)
Dept: FAMILY MEDICINE CLINIC | Age: 79
End: 2019-11-21

## 2019-11-21 NOTE — PROGRESS NOTES
Ambulatory  contacted patient/family  for discussion and case managements of Diabetes and  High Cholesterol. Goals        Patient Stated    [facility] To achieve and maintain patients'  cholesterol level (pt-stated)      10/23/19- Spoke with patient on diet plans and exercise regimen     How to lower triglycerides    Lose Some Weight  Limit Your Sugar Intake  Follow a Low-Carb Diet. Eat More Fiber  Exercise Regularly  Avoid Trans Fats  Eat Fatty Fish Twice Weekly  Increase Your Intake of Unsaturated Fats      11/21/19- reviewed cholesterol diet and exercise regimen with patient. How to lower triglycerides    Reviewed diet and exercise regimen. Patient will follow exercise regimen appropriate to him/her  Patient will continue to take all medication as prescribed   Patient will follow up with physician about Dx as recommended            Other     Understand Diabetic action plan. (Ie. when to seek medical care,  what to do with high and low blood glucose, how and when to adjust diabetes treatment. 10/23/19-     Spoke with patient about plan of care for diabetes. Goal:  Self management goals of living with diabetes. 1.  Understands effects of food on blood sugar   2. Knowledge of medication action, side effects, missed dose. 3.  Reduce risks of diabetes complications. 4.  Understands effects of exercise on blood sugar. 1. Reviewed exercise and its relationship to lowering blood sugar (provide patient       with pedometer if needed)     2. Reviewed proper foot care (cleansing, proper fitting shoes, checking feet for              breaks in skin, clipping toenails, etc.) and the importance of yearly foot   exams. 3.  Reviewed with patient the importance of notifying provider for changes in   visual  status and yearly eye exam.     4.  Instructed patient on doing daily blood sugar checks . 5.   Instructed patient to record food intake at least 3 days a week and bring into the office for review. 6.  Educated patient on medication action and side effects. 7.  Instructed patient on the importance of follow up appointments with PCP and           specialty appointments. 11/21/19-Reviewed plan of care and diet for diabetes with patient. Per PCP ok with A1C at this time due to patient age, as long as it does not go over 8.0.

## 2019-11-25 DIAGNOSIS — I10 ESSENTIAL HYPERTENSION: ICD-10-CM

## 2019-11-26 ENCOUNTER — OFFICE VISIT (OUTPATIENT)
Dept: FAMILY MEDICINE CLINIC | Age: 79
End: 2019-11-26

## 2019-11-26 VITALS
DIASTOLIC BLOOD PRESSURE: 75 MMHG | SYSTOLIC BLOOD PRESSURE: 165 MMHG | HEIGHT: 62 IN | RESPIRATION RATE: 18 BRPM | WEIGHT: 159 LBS | HEART RATE: 65 BPM | BODY MASS INDEX: 29.26 KG/M2 | TEMPERATURE: 96.1 F

## 2019-11-26 DIAGNOSIS — Z13.31 SCREENING FOR DEPRESSION: ICD-10-CM

## 2019-11-26 DIAGNOSIS — Z13.39 SCREENING FOR ALCOHOLISM: ICD-10-CM

## 2019-11-26 DIAGNOSIS — I10 ESSENTIAL HYPERTENSION: ICD-10-CM

## 2019-11-26 DIAGNOSIS — Z00.00 MEDICARE ANNUAL WELLNESS VISIT, SUBSEQUENT: ICD-10-CM

## 2019-11-26 RX ORDER — AMLODIPINE BESYLATE 5 MG/1
TABLET ORAL
Qty: 30 TAB | Refills: 10 | OUTPATIENT
Start: 2019-11-26

## 2019-11-26 RX ORDER — AMLODIPINE BESYLATE 10 MG/1
10 TABLET ORAL DAILY
Qty: 30 TAB | Refills: 1 | Status: SHIPPED | OUTPATIENT
Start: 2019-11-26 | End: 2020-02-04

## 2019-11-26 NOTE — PROGRESS NOTES
Chief Complaint   Patient presents with   Lucille Strickland Annual Wellness Visit     1. Have you been to the ER, urgent care clinic since your last visit? Hospitalized since your last visit? No     2. Have you seen or consulted any other health care providers outside of the [address] [place] [address] since your last visit? Include any pap smears or colon screening.  No

## 2019-11-26 NOTE — PATIENT INSTRUCTIONS
Medicare Wellness Visit, Female The best way to live healthy is to have a lifestyle where you eat a well-balanced diet, exercise regularly, limit alcohol use, and quit all forms of tobacco/nicotine, if applicable. Regular preventive services are another way to keep healthy. Preventive services (vaccines, screening tests, monitoring & exams) can help personalize your care plan, which helps you manage your own care. Screening tests can find health problems at the earliest stages, when they are easiest to treat. [facility] follows the current, evidence-based guidelines published by the [facility] ([facility]) when recommending preventive services for our patients. Because we follow these guidelines, sometimes recommendations change over time as research supports it. (For example, mammograms used to be recommended annually. Even though Medicare will still pay for an annual mammogram, the newer guidelines recommend a mammogram every two years for women of average risk). Of course, you and your doctor may decide to screen more often for some diseases, based on your risk and your co-morbidities (chronic disease you are already diagnosed with). Preventive services for you include: - Medicare offers their members a free annual wellness visit, which is time for you and your primary care provider to discuss and plan for your preventive service needs. Take advantage of this benefit every year! 
-All adults over the age of 72 should receive the recommended pneumonia vaccines. Current USPSTF guidelines recommend a series of two vaccines for the best pneumonia protection.  
-All adults should have a flu vaccine yearly and a tetanus vaccine every 10 years.  
-All adults age 48 and older should receive the shingles vaccines (series of two vaccines). -All adults age 38-68 who are overweight should have a diabetes screening test once every three years. -All adults born between [birth year] and 1965 should be screened once for Hepatitis C. 
-Other screening tests and preventive services for persons with diabetes include: an eye exam to screen for diabetic retinopathy, a kidney function test, a foot exam, and stricter control over your cholesterol.  
-Cardiovascular screening for adults with routine risk involves an electrocardiogram (ECG) at intervals determined by your doctor.  
-Colorectal cancer screenings should be done for adults age 54-65 with no increased risk factors for colorectal cancer. There are a number of acceptable methods of screening for this type of cancer. Each test has its own benefits and drawbacks. Discuss with your doctor what is most appropriate for you during your annual wellness visit. The different tests include: colonoscopy (considered the best screening method), a fecal occult blood test, a fecal DNA test, and sigmoidoscopy. 
 
-A bone mass density test is recommended when a woman turns 65 to screen for osteoporosis. This test is only recommended one time, as a screening. Some providers will use this same test as a disease monitoring tool if you already have osteoporosis. -Breast cancer screenings are recommended every other year for women of normal risk, age 54-69. 
-Cervical cancer screenings for women over age 72 are only recommended with certain risk factors. Here is a list of your current Health Maintenance items (your personalized list of preventive services) with a due date: 
Health Maintenance Due Topic Date Due  Shingles Vaccine (1 of 2) 05/17/1992 [address] [place] [address] Diabetic Foot Care  08/29/2019 [address] [place] [address] Annual Well Visit  08/30/2019  Albumin Urine Test  10/30/2019

## 2019-11-26 NOTE — PROGRESS NOTES
This is the Subsequent Medicare Annual Wellness Exam, performed 12 months or more after the Initial AWV or the last Subsequent AWV    I have reviewed the patient's medical history in detail and updated the computerized patient record. She has stopped smoking! History     Patient Active Problem List   Diagnosis Code    Bell's palsy G51.0    Cholelithiasis K80.20    History of breast cancer Z85.3    History of duodenal ulcer Z87.19    Esophageal reflux K21.9    Hiatal hernia K44.9    Essential hypertension I10    Type 2 diabetes mellitus without complications ([address] X99.1    Left ventricular hypertrophy I51.7    History of tobacco abuse Z87.891    Steatosis of liver K76.0    COPD (chronic obstructive pulmonary disease) ([address] J44.9    Osteopenia M85.80    CKD (chronic kidney disease), stage III ([address] N18.3    Elevated triglycerides with high cholesterol E78.2    IDALIA (obstructive sleep apnea) G47.33    Tubular adenoma of colon D12.6    H/O hysterectomy for benign disease Z90.710    ACP (advance care planning) Z71.89     Past Medical History:   Diagnosis Date    ACP (advance care planning) 8/29/2018    Advance Care Plan or Surrogate Decision Maker documented in medical record.     Arthritis     Shoulders, knees    Bell's palsy 1980    Residual remains on right side    Breast cancer ([address] 5/1995    Cholelithiasis 6/23/2011    CKD (chronic kidney disease), stage III ([address] 6/6/2012    COPD (chronic obstructive pulmonary disease) ([address] 12/28/2011    Dyslipidemia 6/23/2011    Elevated triglycerides with high cholesterol 8/28/2015    Esophageal reflux 6/23/2011    Essential hypertension 6/23/2011    GERD (gastroesophageal reflux disease)     Hiatal hernia 6/23/2011    History of breast cancer 6/23/2011    Rt Breast    History of duodenal ulcer 6/23/2011    History of tobacco abuse 9/26/2011    HTN (hypertension) 6/23/2011    Hyperlipidemia 1/7/2013    Left ventricular hypertrophy 6/23/2011    Nicotine dependence 9/2/2015    Obstructive chronic bronchitis with acute [facility]itis [facility] 6/23/2011    Osteopenia     Osteoporosis 6/23/2011    Other ill-defined conditions(799.89)     rightsided eye and cheek numb from surg    Pregnancy 6/23/2011    Steatosis of liver 10/9/2011    Tubular adenoma of colon 1/24/2016 1/4/16    Type 2 diabetes mellitus without complications ([facility] 9/48/0588    Type II or unspecified type diabetes mellitus without mention of complication, not stated as uncontrolled 6/23/2011      Past Surgical History:   Procedure Laterality Date    ENDOSCOPY, COLON, DIAGNOSTIC  10/09    HX CHOLECYSTECTOMY  10/2011    laparoscopic    HX HEENT      bells palsy surgery,tongue numb    HX MASTECTOMY Right 5/1995    HX OTHER SURGICAL      colonoscopy    HX TONSIL AND ADENOIDECTOMY  1969    HX TONSILLECTOMY  1969    [place] MASTECTOMY BRA  1995    R Breast    REMOVAL GALLBLADDER       Current Outpatient Medications   Medication Sig Dispense Refill    metoprolol succinate (TOPROL-XL) 200 mg XL tablet TAKE ONE TABLET BY MOUTH DAILY 30 Tab 11    losartan (COZAAR) 100 mg tablet TAKE ONE TABLET BY MOUTH DAILY 90 Tab 3    omeprazole (PRILOSEC) 40 mg capsule TAKE ONE CAPSULE BY MOUTH DAILY 30 Cap 11    rosuvastatin (CRESTOR) 20 mg tablet Take 1 Tab by mouth nightly. 90 Tab 3    amLODIPine (NORVASC) 5 mg tablet TAKE ONE TABLET BY MOUTH DAILY 30 Tab 11    omega-3 fatty acids-vitamin e (FISH OIL) 1,000 mg cap Take 2 Caps by mouth daily (after breakfast).  calcium-vitamin D (CALCIUM 500+D) 500 mg(1,250mg) -200 unit per tablet Take 1 Tab by mouth daily (after breakfast).  aspirin 81 mg tablet Take 81 mg by mouth daily (after breakfast).  multivitamin (ONE A DAY) tablet Take 1 Tab by mouth daily (after breakfast).  glucose blood VI test strips (ACCU-CHEK GEM PLUS TEST STRP) strip USE TO TEST BLOOD SUGAR ONCE DAILY.  Diagnoses code:E11.9 100 Strip 3    Lancets (ACCU-CHEK SOFTCLIX LANCETS) misc USE TO CHECK BLOOD SUGAR DAILY OR AS DIRECTED 100 Each 3    FLUZONE HIGH-DOSE -, PF, syrg injection VACCINATION ADMINISTERED BY PHARMACIST  0     Allergies   Allergen Reactions    Nsaids (Non-Steroidal Anti-Inflammatory Drug) Anaphylaxis and Hives    Fenofibrate Other (comments)     Leg cramps    Metformin Diarrhea       Family History   Problem Relation Age of Onset    Asthma Mother     Cancer Mother         Pancreatic CA    Stroke Father     Other Father         Artherosclerosis    Cancer Sister         Rectum    Lung Disease Sister     Diabetes Sister     Coronary Artery Disease Sister     Heart Attack Brother     Diabetes Maternal Grandmother     Diabetes Paternal Grandmother     Cancer Paternal Grandfather     Diabetes Sister     Coronary Artery Disease Sister     Diabetes Brother     Alcohol abuse Brother     Other Brother         killed in car accident     Social History     Tobacco Use    Smoking status: Former Smoker     Packs/day: 0.50     Years: 65.00     Pack years: 32.50     Types: Cigarettes     Last attempt to quit: 2019     Years since quittin.0    Smokeless tobacco: Current User   Substance Use Topics    Alcohol use: Yes     Comment: Rarely-holidays       Depression Risk Factor Screening:     3 most recent PHQ Screens 2019   Little interest or pleasure in doing things Not at all   Feeling down, depressed, irritable, or hopeless Not at all   Total Score PHQ 2 0       Alcohol Risk Factor Screening:   AUDIT Screen Score: AUDIT Score: 2      Document Brief Intervention (corresponds directly with the 5 A's, Ask, Advise, Assess, Assist, and Arrange):  NA    At- Risk Patients (Score 7-15 for women; 8-15 for men)  Discuss concern patient is drinking at unhealthy levels known to increase risk of alcohol-related health problems. Is Patient ready to commit to change?  NA    If No:   Encourage reflection   Discuss short term and long term health risks of consuming alcohol   Barriers to change   Reaffirm willingness to help / Educational materials provided  If Yes:   Set goal  [employer] provided    Harmful use or Dependence (Score 16 or greater)   Discuss short term and long term health risks of consuming alcohol   Recommendations   Negotiate drinking goal   Recommend addiction specialist/center   Arrange follow-up appointments. Functional Ability and Level of Safety:   Hearing: The patient wears hearing aids. Activities of Daily Living:  ADL Assessment 11/26/2019   Feeding yourself No Help Needed   Getting from bed to chair No Help Needed   Getting dressed No Help Needed   Bathing or showering No Help Needed   Walk across the room (includes cane/walker) No Help Needed   Using the telphone No Help Needed   Taking your medications No Help Needed   Preparing meals No Help Needed   Managing money (expenses/bills) No Help Needed   Moderately strenuous housework (laundry) No Help Needed   Shopping for personal items (toiletries/medicines) No Help Needed   Shopping for groceries No Help Needed   Driving No Help Needed   Climbing a flight of stairs No Help Needed   Getting to places beyond walking distances No Help Needed         Ambulation: with mild difficulty    Fall Risk:  Fall Risk Assessment, last 12 mths 11/26/2019   Able to walk? Yes   Fall in past 12 months? No   Fall with injury? -   Number of falls in past 12 months -   Fall Risk Score -       Abuse Screen:  Abuse Screening Questionnaire 11/26/2019   Do you ever feel afraid of your partner? N   Are you in a relationship with someone who physically or mentally threatens you? N   Is it safe for you to go home?  Y       Cognitive Screening   Has your family/caregiver stated any concerns about your memory: no  Cognitive Screening: Normal - MMSE (Mini Mental Status Exam)    Patient Care Team   Patient Care Team:  Jesús Myrick MD as PCP - General (Family Practice)  Citlali Ruggiero MD as PCP - [facility] Empaneled Provider  Bruno Estrella MD (Ophthalmology)  Cathy Zeng LPN as Ambulatory Care Manager      Visit Vitals  /75   Pulse 65   Temp 96.1 °F (35.6 °C) (Oral)   Resp 18   Ht 5' 2\" (1.575 m)   Wt 159 lb (72.1 kg)   BMI 29.08 kg/m²     BP Readings from Last 3 Encounters:   11/26/19 165/75   10/29/19 166/67   06/25/19 148/58     General appearance - alert, well appearing, and in no distress  Heart - normal rate, regular rhythm, normal S1, S2, no murmurs, rubs, clicks or gallops   Chest - clear to auscultation, no wheezes, rales or rhonchi, symmetric air entry   Ext-1+ LE on the left, trace on the right      Assessment/Plan   Education and counseling provided:  Are appropriate based on today's review and evaluation  Advance Care Plan or Surrogate Decision Maker documented in medical record. Diagnoses and all orders for this visit:    1. Medicare annual wellness visit, subsequent    2. Screening for depression  -     DEPRESSION SCREEN ANNUAL    3. Screening for alcoholism  -     CO ANNUAL ALCOHOL SCREEN 15 MIN        Health Maintenance Due   Topic Date Due    Shingrix Vaccine Age 50> (1 of 2) 05/17/1992    FOOT EXAM Q1  08/29/2019    MEDICARE YEARLY EXAM  08/30/2019    MICROALBUMIN Q1  10/30/2019         Blood pressure elevated, will increase Norvasc    Follow-up and Dispositions    · Return in about 6 weeks (around 1/7/2020) for blood pressure. Reviewed plan of care. Patient has provided input and agrees with goals.

## 2019-12-06 NOTE — TELEPHONE ENCOUNTER
----- Message from Renae Hernández DO sent at 12/6/2019  7:32 AM CST -----  Labs are nomral  Will have her see GI and see what their thoughts are.     Pt's Migel Guerrero (listed as all access on [employer]) returned call, states she will call pt to confirm which dose of crestor she's on and call back to advise.  Heidi

## 2019-12-26 RX ORDER — AMLODIPINE BESYLATE 5 MG/1
TABLET ORAL
Qty: 30 TAB | Refills: 1 | OUTPATIENT
Start: 2019-12-26

## 2020-01-25 RX ORDER — AMLODIPINE BESYLATE 5 MG/1
TABLET ORAL
Qty: 90 TAB | Refills: 0 | OUTPATIENT
Start: 2020-01-25

## 2020-01-29 NOTE — PROGRESS NOTES
HISTORY OF PRESENT ILLNESS  Vadim Nguyen is a 70 y.o. female. Vadim Nguyen is here to follow up on their HTN. This is a chronic problem. The problem occurs constantly and isstable . The symptoms are relieved by Norvasc, metoprolol, losartan which is/are working moderately. She also needs follow up on her CKD and needs a diabetic foot exam.        Review of Systems   Constitutional: Negative for weight loss. No weight gain   Eyes: Negative for blurred vision. Respiratory: Negative for shortness of breath. Cardiovascular: Positive for leg swelling. Negative for chest pain. Genitourinary:        No polyuria   Neurological: Negative for dizziness, sensory change, speech change, focal weakness and headaches. Endo/Heme/Allergies: Negative for polydipsia. Visit Vitals  /68   Pulse 67   Temp 97.7 °F (36.5 °C) (Oral)   Resp 18   Ht 5' 2\" (1.575 m)   Wt 162 lb (73.5 kg)   SpO2 99%   BMI 29.63 kg/m²     BP Readings from Last 3 Encounters:   11/26/19 165/75   10/29/19 166/67   06/25/19 148/58     Physical Exam  Vitals signs and nursing note reviewed. Constitutional:       General: She is not in acute distress. Appearance: She is well-developed. She is not diaphoretic. Cardiovascular:      Rate and Rhythm: Normal rate and regular rhythm. Heart sounds: Normal heart sounds. No murmur. No friction rub. No gallop. Pulmonary:      Effort: Pulmonary effort is normal. No respiratory distress. Breath sounds: Normal breath sounds. No wheezing or rales. Skin:     General: Skin is warm and dry. Comments: Feet and nails in good condition. Neurological:      Mental Status: She is alert and oriented to person, place, and time. Comments: Normal monofilament exam         ASSESSMENT and PLAN    ICD-10-CM ICD-9-CM    1. Essential hypertension D52 363.2 METABOLIC PANEL, BASIC   2. CKD (chronic kidney disease), stage III ([facility]) P61.6 949.7 METABOLIC PANEL, BASIC   3.  Type 2 diabetes mellitus without complication, without long-term current use of insulin ([facility]) U42.9 808.25 METABOLIC PANEL, BASIC      MICROALBUMIN, UR, RAND W/ MICROALB/CREAT RATIO       DIABETES FOOT EXAM      HEMOGLOBIN A1C WITH EAG   4. Encounter for immunization Z23 V03.89 varicella-zoster recombinant, PF, (SHINGRIX, PF,) 50 mcg/0.5 mL susr injection        SBP elevated, DBP on the low side, do not want to change BP meds  Labs per orders. Continue current plans. Shingrix at pharmacy  Foot exam  was performed. .  Sensory and motor testing was assessed . Pedal pulse(s) was assessed. Follow-up and Dispositions    · Return in about 3 months (around 4/30/2020) for diabetes. Reviewed plan of care. Patient has provided input and agrees with goals.

## 2020-01-31 ENCOUNTER — OFFICE VISIT (OUTPATIENT)
Dept: FAMILY MEDICINE CLINIC | Age: 80
End: 2020-01-31

## 2020-01-31 VITALS
HEART RATE: 67 BPM | WEIGHT: 162 LBS | SYSTOLIC BLOOD PRESSURE: 160 MMHG | HEIGHT: 62 IN | BODY MASS INDEX: 29.81 KG/M2 | TEMPERATURE: 97.7 F | DIASTOLIC BLOOD PRESSURE: 68 MMHG | RESPIRATION RATE: 18 BRPM | OXYGEN SATURATION: 99 %

## 2020-01-31 DIAGNOSIS — Z23 ENCOUNTER FOR IMMUNIZATION: ICD-10-CM

## 2020-01-31 DIAGNOSIS — N18.30 CKD (CHRONIC KIDNEY DISEASE), STAGE III (HCC): ICD-10-CM

## 2020-01-31 DIAGNOSIS — E11.9 TYPE 2 DIABETES MELLITUS WITHOUT COMPLICATION, WITHOUT LONG-TERM CURRENT USE OF INSULIN (HCC): ICD-10-CM

## 2020-01-31 DIAGNOSIS — I10 ESSENTIAL HYPERTENSION: Primary | ICD-10-CM

## 2020-01-31 NOTE — PROGRESS NOTES
Chief Complaint   Patient presents with    Hypertension     f/u    Sinus Pain     nasal drainage, cough, x 4 days      1. Have you been to the ER, urgent care clinic since your last visit? Hospitalized since your last visit? No     2. Have you seen or consulted any other health care providers outside of the [address] [place] [address] since your last visit? Include any pap smears or colon screening.  No

## 2020-02-01 LAB
ALBUMIN/CREAT UR: 495 MG/G CREAT (ref 0–29)
BUN SERPL-MCNC: 26 MG/DL (ref 8–27)
BUN/CREAT SERPL: 20 (ref 12–28)
CALCIUM SERPL-MCNC: 10.3 MG/DL (ref 8.7–10.3)
CHLORIDE SERPL-SCNC: 99 MMOL/L (ref 96–106)
CO2 SERPL-SCNC: 25 MMOL/L (ref 20–29)
CREAT SERPL-MCNC: 1.27 MG/DL (ref 0.57–1)
CREAT UR-MCNC: 134.5 MG/DL
EST. AVERAGE GLUCOSE BLD GHB EST-MCNC: 160 MG/DL
GLUCOSE SERPL-MCNC: 129 MG/DL (ref 65–99)
HBA1C MFR BLD: 7.2 % (ref 4.8–5.6)
INTERPRETATION: NORMAL
Lab: NORMAL
MICROALBUMIN UR-MCNC: 666.4 UG/ML
POTASSIUM SERPL-SCNC: 5.1 MMOL/L (ref 3.5–5.2)
SODIUM SERPL-SCNC: 141 MMOL/L (ref 134–144)

## 2020-02-03 DIAGNOSIS — I10 ESSENTIAL HYPERTENSION: ICD-10-CM

## 2020-02-04 RX ORDER — AMLODIPINE BESYLATE 10 MG/1
TABLET ORAL
Qty: 90 TAB | Refills: 0 | Status: SHIPPED | OUTPATIENT
Start: 2020-02-04 | End: 2020-05-08

## 2020-03-25 DIAGNOSIS — E78.2 ELEVATED TRIGLYCERIDES WITH HIGH CHOLESTEROL: ICD-10-CM

## 2020-03-25 RX ORDER — OMEPRAZOLE 40 MG/1
CAPSULE, DELAYED RELEASE ORAL
Qty: 60 CAP | Refills: 10 | OUTPATIENT
Start: 2020-03-25

## 2020-03-25 RX ORDER — ROSUVASTATIN CALCIUM 20 MG/1
TABLET, COATED ORAL
Qty: 90 TAB | Refills: 3 | Status: SHIPPED | OUTPATIENT
Start: 2020-03-25 | End: 2021-04-06

## 2020-04-24 ENCOUNTER — VIRTUAL VISIT (OUTPATIENT)
Dept: FAMILY MEDICINE CLINIC | Age: 80
End: 2020-04-24

## 2020-04-24 VITALS
HEART RATE: 66 BPM | TEMPERATURE: 96.4 F | DIASTOLIC BLOOD PRESSURE: 69 MMHG | BODY MASS INDEX: 30.36 KG/M2 | WEIGHT: 165 LBS | SYSTOLIC BLOOD PRESSURE: 130 MMHG | RESPIRATION RATE: 20 BRPM | HEIGHT: 62 IN

## 2020-04-24 DIAGNOSIS — E78.2 ELEVATED TRIGLYCERIDES WITH HIGH CHOLESTEROL: ICD-10-CM

## 2020-04-24 DIAGNOSIS — I10 ESSENTIAL HYPERTENSION: ICD-10-CM

## 2020-04-24 DIAGNOSIS — E11.9 TYPE 2 DIABETES MELLITUS WITHOUT COMPLICATION, WITHOUT LONG-TERM CURRENT USE OF INSULIN (HCC): Primary | ICD-10-CM

## 2020-04-24 NOTE — PROGRESS NOTES
Maggi Narvaez is a 70 y.o. female who was seen by synchronous (real-time) audio-video technology on 4/24/2020. Assessment & Plan:   Diagnoses and all orders for this visit:    1. Type 2 diabetes mellitus without complication, without long-term current use of insulin (HCC)  -     METABOLIC PANEL, BASIC  -     HEMOGLOBIN A1C WITH EAG    2. Essential hypertension  -     METABOLIC PANEL, BASIC    3. Elevated triglycerides with high cholesterol  -     LIPID PANEL  -     HEPATIC FUNCTION PANEL        Diabetes seems to be controlled  Blood pressure controlled  Labs per orders. Continue current plans. Follow-up and Dispositions    · Return in about 4 months (around 8/24/2020) for diabetes. Reviewed plan of care. Patient has provided input and agrees with goals. CPT Codes 64501-21037 for Established Patients may apply to this Telehealth Visit      Subjective:   Maggi Narvaez was seen for Diabetes (follow up )      Maggi Narvaez is here for diabetic follow up. Their FBS have been around 108 to the 120's. This is a chronic problem which has worsened. It is improved by lifestyle modifications, which is/are working well. She also needs follow up on her HTN and elevated lipids. Prior to Admission medications    Medication Sig Start Date End Date Taking? Authorizing Provider   rosuvastatin (CRESTOR) 20 mg tablet TAKE ONE TABLET BY MOUTH EVERY EVENING 3/25/20  Yes Tatyana Dubon MD   amLODIPine (NORVASC) 10 mg tablet TAKE ONE TABLET BY MOUTH DAILY 2/4/20  Yes Tatyana Dubon MD   metoprolol succinate (TOPROL-XL) 200 mg XL tablet TAKE ONE TABLET BY MOUTH DAILY 8/29/19  Yes Tatyana Dubon MD   losartan (COZAAR) 100 mg tablet TAKE ONE TABLET BY MOUTH DAILY 6/29/19  Yes Tatyana Dubon MD   glucose blood VI test strips (ACCU-CHEK GEM PLUS TEST STRP) strip USE TO TEST BLOOD SUGAR ONCE DAILY.  Diagnoses code:E11.9 5/17/19  Yes Tatyana Dubon MD   omeprazole (PRILOSEC) 40 mg capsule TAKE ONE CAPSULE BY MOUTH DAILY 4/30/19  Yes Karan Ellsworth MD   Lancets (ACCU-CHEK SOFTCLIX LANCETS) misc USE TO CHECK BLOOD SUGAR DAILY OR AS DIRECTED 11/15/17  Yes Karan Ellsworth MD   FLUZONE HIGH-DOSE 1944-76, PF, syrg injection VACCINATION ADMINISTERED BY PHARMACIST 10/16/16  Yes Provider, Historical   omega-3 fatty acids-vitamin e (FISH OIL) 1,000 mg cap Take 2 Caps by mouth daily (after breakfast). Yes Provider, Historical   calcium-vitamin D (CALCIUM 500+D) 500 mg(1,250mg) -200 unit per tablet Take 1 Tab by mouth daily (after breakfast). Yes Provider, Historical   aspirin 81 mg tablet Take 81 mg by mouth daily (after breakfast). Yes Provider, Historical   multivitamin (ONE A DAY) tablet Take 1 Tab by mouth daily (after breakfast). Yes Provider, Historical     Allergies   Allergen Reactions    Nsaids (Non-Steroidal Anti-Inflammatory Drug) Anaphylaxis and Hives    Fenofibrate Other (comments)     Leg cramps    Metformin Diarrhea         Review of Systems   Constitutional: Negative for weight loss. Weight gain   Eyes: Negative for blurred vision. Respiratory: Positive for shortness of breath. Cardiovascular: Negative for chest pain and leg swelling. Gastrointestinal: Negative for abdominal pain. Genitourinary:        No polyuria   Neurological: Negative for dizziness, sensory change, speech change, focal weakness and headaches. Endo/Heme/Allergies: Negative for polydipsia. Objective:     Visit Vitals  /69 Comment: pt has taken medications   Pulse 66   Temp 96.4 °F (35.8 °C) (Oral)   Resp 20   Ht 5' 2\" (1.575 m)   Wt 165 lb (74.8 kg)   BMI 30.18 kg/m²       Physical Exam  Constitutional:       General: She is not in acute distress. Appearance: Normal appearance. Neurological:      Mental Status: She is alert and oriented to person, place, and time.          Due to this being a TeleHealth evaluation, many elements of the physical examination are unable to be assessed. We discussed the expected course, resolution and complications of the diagnosis(es) in detail. Medication risks, benefits, costs, interactions, and alternatives were discussed as indicated. I advised her to contact the office if her condition worsens, changes or fails to improve as anticipated. She expressed understanding with the diagnosis(es) and plan. Pursuant to the emergency declaration under the [address] [place] [address] waiver authority and the [employer] Act, this Virtual  Visit was conducted, with patient's consent, to reduce the patient's risk of exposure to COVID-19 and provide continuity of care for an established patient. Services were provided through a video synchronous discussion virtually to substitute for in-person clinic visit.     Divya Moe MD

## 2020-04-24 NOTE — PROGRESS NOTES
Chief Complaint   Patient presents with    Diabetes     follow up      Pt is having virtual appointment at home in [place], [place]. Pt's blood sugars are ranging between 108-128 fasting in the morning.

## 2020-05-07 DIAGNOSIS — I10 ESSENTIAL HYPERTENSION: ICD-10-CM

## 2020-05-08 RX ORDER — OMEPRAZOLE 40 MG/1
CAPSULE, DELAYED RELEASE ORAL
Qty: 90 CAP | Refills: 3 | Status: SHIPPED | OUTPATIENT
Start: 2020-05-08 | End: 2021-03-05

## 2020-05-08 RX ORDER — AMLODIPINE BESYLATE 10 MG/1
TABLET ORAL
Qty: 90 TAB | Refills: 4 | Status: SHIPPED | OUTPATIENT
Start: 2020-05-08 | End: 2020-11-18 | Stop reason: SDUPTHER

## 2020-06-23 LAB
ALBUMIN SERPL-MCNC: 4 G/DL (ref 3.7–4.7)
ALP SERPL-CCNC: 31 IU/L (ref 39–117)
ALT SERPL-CCNC: 22 IU/L (ref 0–32)
AST SERPL-CCNC: 26 IU/L (ref 0–40)
BILIRUB DIRECT SERPL-MCNC: 0.09 MG/DL (ref 0–0.4)
BILIRUB SERPL-MCNC: 0.4 MG/DL (ref 0–1.2)
BUN SERPL-MCNC: 22 MG/DL (ref 8–27)
BUN/CREAT SERPL: 18 (ref 12–28)
CALCIUM SERPL-MCNC: 9.7 MG/DL (ref 8.7–10.3)
CHLORIDE SERPL-SCNC: 104 MMOL/L (ref 96–106)
CHOLEST SERPL-MCNC: 149 MG/DL (ref 100–199)
CO2 SERPL-SCNC: 20 MMOL/L (ref 20–29)
CREAT SERPL-MCNC: 1.22 MG/DL (ref 0.57–1)
EST. AVERAGE GLUCOSE BLD GHB EST-MCNC: 163 MG/DL
GLUCOSE SERPL-MCNC: 121 MG/DL (ref 65–99)
HBA1C MFR BLD: 7.3 % (ref 4.8–5.6)
HDLC SERPL-MCNC: 43 MG/DL
INTERPRETATION, 910389: NORMAL
INTERPRETATION: NORMAL
LDLC SERPL CALC-MCNC: 58 MG/DL (ref 0–99)
Lab: NORMAL
PDF IMAGE, 910387: NORMAL
POTASSIUM SERPL-SCNC: 5 MMOL/L (ref 3.5–5.2)
PROT SERPL-MCNC: 6.6 G/DL (ref 6–8.5)
SODIUM SERPL-SCNC: 137 MMOL/L (ref 134–144)
TRIGL SERPL-MCNC: 238 MG/DL (ref 0–149)
VLDLC SERPL CALC-MCNC: 48 MG/DL (ref 5–40)

## 2020-06-25 RX ORDER — METOPROLOL SUCCINATE 200 MG/1
TABLET, EXTENDED RELEASE ORAL
Qty: 90 TAB | Refills: 3 | Status: SHIPPED | OUTPATIENT
Start: 2020-06-25 | End: 2020-11-18 | Stop reason: SDUPTHER

## 2020-06-25 RX ORDER — LOSARTAN POTASSIUM 100 MG/1
TABLET ORAL
Qty: 90 TAB | Refills: 3 | Status: SHIPPED | OUTPATIENT
Start: 2020-06-25 | End: 2020-11-18 | Stop reason: SDUPTHER

## 2020-06-29 ENCOUNTER — VIRTUAL VISIT (OUTPATIENT)
Dept: FAMILY MEDICINE CLINIC | Age: 80
End: 2020-06-29

## 2020-06-29 ENCOUNTER — HOSPITAL ENCOUNTER (INPATIENT)
Age: 80
LOS: 2 days | Discharge: HOME | DRG: 190 | End: 2020-07-01
Attending: STUDENT IN AN ORGANIZED HEALTH CARE EDUCATION/TRAINING PROGRAM | Admitting: FAMILY MEDICINE
Payer: MEDICARE

## 2020-06-29 ENCOUNTER — APPOINTMENT (OUTPATIENT)
Dept: CT IMAGING | Age: 80
DRG: 190 | End: 2020-06-29
Attending: STUDENT IN AN ORGANIZED HEALTH CARE EDUCATION/TRAINING PROGRAM
Payer: MEDICARE

## 2020-06-29 ENCOUNTER — APPOINTMENT (OUTPATIENT)
Dept: VASCULAR SURGERY | Age: 80
DRG: 190 | End: 2020-06-29
Attending: STUDENT IN AN ORGANIZED HEALTH CARE EDUCATION/TRAINING PROGRAM
Payer: MEDICARE

## 2020-06-29 ENCOUNTER — APPOINTMENT (OUTPATIENT)
Dept: GENERAL RADIOLOGY | Age: 80
DRG: 190 | End: 2020-06-29
Attending: STUDENT IN AN ORGANIZED HEALTH CARE EDUCATION/TRAINING PROGRAM
Payer: MEDICARE

## 2020-06-29 DIAGNOSIS — M79.89 LEG SWELLING: Primary | ICD-10-CM

## 2020-06-29 DIAGNOSIS — R60.0 EDEMA OF LEFT LOWER EXTREMITY: ICD-10-CM

## 2020-06-29 DIAGNOSIS — J81.0 ACUTE PULMONARY EDEMA (HCC): Primary | ICD-10-CM

## 2020-06-29 DIAGNOSIS — M79.605 PAIN OF LEFT LOWER EXTREMITY: ICD-10-CM

## 2020-06-29 DIAGNOSIS — J44.1 ACUTE EXACERBATION OF CHRONIC OBSTRUCTIVE PULMONARY DISEASE (COPD) (HCC): ICD-10-CM

## 2020-06-29 PROBLEM — R06.02 SHORTNESS OF BREATH: Status: ACTIVE | Noted: 2020-06-29

## 2020-06-29 LAB
ALBUMIN SERPL-MCNC: 3.3 G/DL (ref 3.5–5)
ALBUMIN/GLOB SERPL: 0.8 {RATIO} (ref 1.1–2.2)
ALP SERPL-CCNC: 35 U/L (ref 45–117)
ALT SERPL-CCNC: 27 U/L (ref 12–78)
ANION GAP SERPL CALC-SCNC: 8 MMOL/L (ref 5–15)
AST SERPL-CCNC: 23 U/L (ref 15–37)
BASOPHILS # BLD: 0.1 K/UL (ref 0–0.1)
BASOPHILS NFR BLD: 1 % (ref 0–1)
BILIRUB SERPL-MCNC: 0.2 MG/DL (ref 0.2–1)
BNP SERPL-MCNC: 271 PG/ML
BUN SERPL-MCNC: 26 MG/DL (ref 6–20)
BUN/CREAT SERPL: 18 (ref 12–20)
CALCIUM SERPL-MCNC: 9.3 MG/DL (ref 8.5–10.1)
CHLORIDE SERPL-SCNC: 108 MMOL/L (ref 97–108)
CO2 SERPL-SCNC: 26 MMOL/L (ref 21–32)
COMMENT, HOLDF: NORMAL
CREAT SERPL-MCNC: 1.42 MG/DL (ref 0.55–1.02)
CRP SERPL-MCNC: 1.3 MG/DL (ref 0–0.6)
D DIMER PPP FEU-MCNC: 0.97 MG/L FEU (ref 0–0.65)
DIFFERENTIAL METHOD BLD: ABNORMAL
EOSINOPHIL # BLD: 0.2 K/UL (ref 0–0.4)
EOSINOPHIL NFR BLD: 2 % (ref 0–7)
ERYTHROCYTE [DISTWIDTH] IN BLOOD BY AUTOMATED COUNT: 13.2 % (ref 11.5–14.5)
GLOBULIN SER CALC-MCNC: 4.1 G/DL (ref 2–4)
GLUCOSE SERPL-MCNC: 99 MG/DL (ref 65–100)
HCT VFR BLD AUTO: 38.3 % (ref 35–47)
HGB BLD-MCNC: 12.3 G/DL (ref 11.5–16)
IMM GRANULOCYTES # BLD AUTO: 0.1 K/UL (ref 0–0.04)
IMM GRANULOCYTES NFR BLD AUTO: 1 % (ref 0–0.5)
INR PPP: 1 (ref 0.9–1.1)
LDH SERPL L TO P-CCNC: 228 U/L (ref 81–246)
LYMPHOCYTES # BLD: 4.4 K/UL (ref 0.8–3.5)
LYMPHOCYTES NFR BLD: 34 % (ref 12–49)
MAGNESIUM SERPL-MCNC: 1.7 MG/DL (ref 1.6–2.4)
MCH RBC QN AUTO: 29.8 PG (ref 26–34)
MCHC RBC AUTO-ENTMCNC: 32.1 G/DL (ref 30–36.5)
MCV RBC AUTO: 92.7 FL (ref 80–99)
MONOCYTES # BLD: 1.2 K/UL (ref 0–1)
MONOCYTES NFR BLD: 9 % (ref 5–13)
NEUTS SEG # BLD: 7.3 K/UL (ref 1.8–8)
NEUTS SEG NFR BLD: 53 % (ref 32–75)
NRBC # BLD: 0 K/UL (ref 0–0.01)
NRBC BLD-RTO: 0 PER 100 WBC
PLATELET # BLD AUTO: 287 K/UL (ref 150–400)
PMV BLD AUTO: 9.2 FL (ref 8.9–12.9)
POTASSIUM SERPL-SCNC: 4.6 MMOL/L (ref 3.5–5.1)
PROT SERPL-MCNC: 7.4 G/DL (ref 6.4–8.2)
PROTHROMBIN TIME: 10.5 SEC (ref 9–11.1)
RBC # BLD AUTO: 4.13 M/UL (ref 3.8–5.2)
SAMPLES BEING HELD,HOLD: NORMAL
SODIUM SERPL-SCNC: 142 MMOL/L (ref 136–145)
TROPONIN I SERPL-MCNC: <0.05 NG/ML
WBC # BLD AUTO: 13.2 K/UL (ref 3.6–11)

## 2020-06-29 PROCEDURE — 99285 EMERGENCY DEPT VISIT HI MDM: CPT

## 2020-06-29 PROCEDURE — 83520 IMMUNOASSAY QUANT NOS NONAB: CPT

## 2020-06-29 PROCEDURE — 83615 LACTATE (LD) (LDH) ENZYME: CPT

## 2020-06-29 PROCEDURE — 96375 TX/PRO/DX INJ NEW DRUG ADDON: CPT

## 2020-06-29 PROCEDURE — 85025 COMPLETE CBC W/AUTO DIFF WBC: CPT

## 2020-06-29 PROCEDURE — 93005 ELECTROCARDIOGRAM TRACING: CPT

## 2020-06-29 PROCEDURE — 87040 BLOOD CULTURE FOR BACTERIA: CPT

## 2020-06-29 PROCEDURE — 65660000000 HC RM CCU STEPDOWN

## 2020-06-29 PROCEDURE — 84484 ASSAY OF TROPONIN QUANT: CPT

## 2020-06-29 PROCEDURE — 74011250637 HC RX REV CODE- 250/637: Performed by: STUDENT IN AN ORGANIZED HEALTH CARE EDUCATION/TRAINING PROGRAM

## 2020-06-29 PROCEDURE — 74011250637 HC RX REV CODE- 250/637: Performed by: FAMILY MEDICINE

## 2020-06-29 PROCEDURE — 83735 ASSAY OF MAGNESIUM: CPT

## 2020-06-29 PROCEDURE — 85610 PROTHROMBIN TIME: CPT

## 2020-06-29 PROCEDURE — 83880 ASSAY OF NATRIURETIC PEPTIDE: CPT

## 2020-06-29 PROCEDURE — 82728 ASSAY OF FERRITIN: CPT

## 2020-06-29 PROCEDURE — 93971 EXTREMITY STUDY: CPT

## 2020-06-29 PROCEDURE — 86140 C-REACTIVE PROTEIN: CPT

## 2020-06-29 PROCEDURE — 71045 X-RAY EXAM CHEST 1 VIEW: CPT

## 2020-06-29 PROCEDURE — 36415 COLL VENOUS BLD VENIPUNCTURE: CPT

## 2020-06-29 PROCEDURE — 96374 THER/PROPH/DIAG INJ IV PUSH: CPT

## 2020-06-29 PROCEDURE — 87635 SARS-COV-2 COVID-19 AMP PRB: CPT

## 2020-06-29 PROCEDURE — 74011250636 HC RX REV CODE- 250/636: Performed by: FAMILY MEDICINE

## 2020-06-29 PROCEDURE — 74011250636 HC RX REV CODE- 250/636: Performed by: STUDENT IN AN ORGANIZED HEALTH CARE EDUCATION/TRAINING PROGRAM

## 2020-06-29 PROCEDURE — 80053 COMPREHEN METABOLIC PANEL: CPT

## 2020-06-29 PROCEDURE — 85379 FIBRIN DEGRADATION QUANT: CPT

## 2020-06-29 RX ORDER — METOPROLOL SUCCINATE 50 MG/1
200 TABLET, EXTENDED RELEASE ORAL
Status: DISCONTINUED | OUTPATIENT
Start: 2020-06-29 | End: 2020-06-29

## 2020-06-29 RX ORDER — SODIUM CHLORIDE 0.9 % (FLUSH) 0.9 %
5-40 SYRINGE (ML) INJECTION EVERY 8 HOURS
Status: DISCONTINUED | OUTPATIENT
Start: 2020-06-29 | End: 2020-07-01 | Stop reason: HOSPADM

## 2020-06-29 RX ORDER — HEPARIN SODIUM 5000 [USP'U]/ML
5000 INJECTION, SOLUTION INTRAVENOUS; SUBCUTANEOUS EVERY 8 HOURS
Status: DISCONTINUED | OUTPATIENT
Start: 2020-06-29 | End: 2020-07-01 | Stop reason: HOSPADM

## 2020-06-29 RX ORDER — AMLODIPINE BESYLATE 5 MG/1
10 TABLET ORAL
Status: DISCONTINUED | OUTPATIENT
Start: 2020-06-29 | End: 2020-06-29

## 2020-06-29 RX ORDER — FUROSEMIDE 10 MG/ML
40 INJECTION INTRAMUSCULAR; INTRAVENOUS ONCE
Status: COMPLETED | OUTPATIENT
Start: 2020-06-29 | End: 2020-06-29

## 2020-06-29 RX ORDER — ALBUTEROL SULFATE 90 UG/1
4 AEROSOL, METERED RESPIRATORY (INHALATION)
Status: COMPLETED | OUTPATIENT
Start: 2020-06-29 | End: 2020-06-29

## 2020-06-29 RX ORDER — INSULIN LISPRO 100 [IU]/ML
INJECTION, SOLUTION INTRAVENOUS; SUBCUTANEOUS
Status: DISCONTINUED | OUTPATIENT
Start: 2020-06-30 | End: 2020-07-01 | Stop reason: HOSPADM

## 2020-06-29 RX ORDER — SODIUM CHLORIDE 0.9 % (FLUSH) 0.9 %
5-40 SYRINGE (ML) INJECTION AS NEEDED
Status: DISCONTINUED | OUTPATIENT
Start: 2020-06-29 | End: 2020-07-01 | Stop reason: HOSPADM

## 2020-06-29 RX ORDER — METOPROLOL SUCCINATE 50 MG/1
200 TABLET, EXTENDED RELEASE ORAL DAILY
Status: DISCONTINUED | OUTPATIENT
Start: 2020-06-30 | End: 2020-07-01 | Stop reason: HOSPADM

## 2020-06-29 RX ORDER — ASCORBIC ACID 500 MG
500 TABLET ORAL 2 TIMES DAILY
Status: DISCONTINUED | OUTPATIENT
Start: 2020-06-29 | End: 2020-07-01

## 2020-06-29 RX ORDER — DOXYCYCLINE HYCLATE 100 MG
100 TABLET ORAL EVERY 12 HOURS
Status: DISCONTINUED | OUTPATIENT
Start: 2020-06-29 | End: 2020-07-01 | Stop reason: HOSPADM

## 2020-06-29 RX ORDER — ZINC SULFATE 50(220)MG
1 CAPSULE ORAL DAILY
Status: DISCONTINUED | OUTPATIENT
Start: 2020-06-30 | End: 2020-07-01

## 2020-06-29 RX ORDER — ROSUVASTATIN CALCIUM 10 MG/1
20 TABLET, COATED ORAL
Status: DISCONTINUED | OUTPATIENT
Start: 2020-06-29 | End: 2020-07-01 | Stop reason: HOSPADM

## 2020-06-29 RX ORDER — MAGNESIUM SULFATE 100 %
4 CRYSTALS MISCELLANEOUS AS NEEDED
Status: DISCONTINUED | OUTPATIENT
Start: 2020-06-29 | End: 2020-07-01 | Stop reason: HOSPADM

## 2020-06-29 RX ORDER — AMLODIPINE BESYLATE 5 MG/1
10 TABLET ORAL DAILY
Status: DISCONTINUED | OUTPATIENT
Start: 2020-07-01 | End: 2020-07-01 | Stop reason: HOSPADM

## 2020-06-29 RX ORDER — PANTOPRAZOLE SODIUM 40 MG/1
40 TABLET, DELAYED RELEASE ORAL
Status: DISCONTINUED | OUTPATIENT
Start: 2020-06-30 | End: 2020-07-01 | Stop reason: HOSPADM

## 2020-06-29 RX ORDER — DEXTROSE 50 % IN WATER (D50W) INTRAVENOUS SYRINGE
25-50 AS NEEDED
Status: DISCONTINUED | OUTPATIENT
Start: 2020-06-29 | End: 2020-07-01 | Stop reason: HOSPADM

## 2020-06-29 RX ORDER — LOSARTAN POTASSIUM 50 MG/1
100 TABLET ORAL DAILY
Status: DISCONTINUED | OUTPATIENT
Start: 2020-06-30 | End: 2020-07-01 | Stop reason: HOSPADM

## 2020-06-29 RX ADMIN — ROSUVASTATIN CALCIUM 20 MG: 10 TABLET, COATED ORAL at 23:02

## 2020-06-29 RX ADMIN — METHYLPREDNISOLONE SODIUM SUCCINATE 125 MG: 125 INJECTION, POWDER, FOR SOLUTION INTRAMUSCULAR; INTRAVENOUS at 21:49

## 2020-06-29 RX ADMIN — DOXYCYCLINE HYCLATE 100 MG: 100 TABLET, COATED ORAL at 23:38

## 2020-06-29 RX ADMIN — Medication 500 MG: at 23:03

## 2020-06-29 RX ADMIN — HEPARIN SODIUM 5000 UNITS: 5000 INJECTION INTRAVENOUS; SUBCUTANEOUS at 23:03

## 2020-06-29 RX ADMIN — ALBUTEROL SULFATE 4 PUFF: 108 INHALANT RESPIRATORY (INHALATION) at 22:51

## 2020-06-29 RX ADMIN — FUROSEMIDE 40 MG: 10 INJECTION, SOLUTION INTRAMUSCULAR; INTRAVENOUS at 21:47

## 2020-06-29 NOTE — ED PROVIDER NOTES
Chief Complaint   Patient presents with    Leg Swelling     This is a 74-year-old female with non-oxygen dependent COPD, hypertension, remote history of breast cancer, chronic kidney disease, brought in by her daughter for left lower extremity swelling that has been ongoing for the last 3 months now with increasing pain in the calf and the popliteal fossa. She has been able to walk without any difficulty, no issues bearing weight, no fevers, she does have chronic shortness of breath and a chronic cough she attributes to her COPD however her daughter feels like she has been more short of breath than usual, has had increasing exertional dyspnea and reporting fatigue. Denies any fevers, chest pain, abdominal pain, vomiting or diarrhea. No history of DVT/PE to her knowledge. No other systemic complaints. Symptoms are moderate in nature without any alleviating or exacerbating factors. Past Medical History:   Diagnosis Date    ACP (advance care planning) 8/29/2018    Advance Care Plan or Surrogate Decision Maker documented in medical record.     Arthritis     Shoulders, knees    Bell's palsy 1980    Residual remains on right side    Breast cancer ([address] 5/1995    Cholelithiasis 6/23/2011    CKD (chronic kidney disease), stage III ([address] 6/6/2012    COPD (chronic obstructive pulmonary disease) ([address] 12/28/2011    Dyslipidemia 6/23/2011    Elevated triglycerides with high cholesterol 8/28/2015    Esophageal reflux 6/23/2011    Essential hypertension 6/23/2011    GERD (gastroesophageal reflux disease)     Hiatal hernia 6/23/2011    History of breast cancer 6/23/2011    Rt Breast    History of duodenal ulcer 6/23/2011    History of tobacco abuse 9/26/2011    HTN (hypertension) 6/23/2011    Hyperlipidemia 1/7/2013    Left ventricular hypertrophy 6/23/2011    Nicotine dependence 9/2/2015    Obstructive chronic bronchitis with acute bronchitis ([address] 6/23/2011    Osteopenia     Osteoporosis 6/23/2011    Other ill-defined conditions(799.89)     rightsided eye and cheek numb from surg    Pregnancy 6/23/2011    Steatosis of liver 10/9/2011    Tubular adenoma of colon 1/24/2016 1/4/16    Type 2 diabetes mellitus without complications ([facility] 2/18/8602    Type II or unspecified type diabetes mellitus without mention of complication, not stated as uncontrolled 6/23/2011       Past Surgical History:   Procedure Laterality Date    ENDOSCOPY, COLON, DIAGNOSTIC  10/09    HX CHOLECYSTECTOMY  10/2011    laparoscopic    HX HEENT      bells palsy surgery,tongue numb    HX MASTECTOMY Right 5/1995    HX OTHER SURGICAL      colonoscopy    HX TONSIL AND ADENOIDECTOMY  1969    HX TONSILLECTOMY  1969    IA MASTECTOMY BRA  1995    R Breast    REMOVAL GALLBLADDER           Family History:   Problem Relation Age of Onset    Asthma Mother     Cancer Mother         Pancreatic CA    Stroke Father     Other Father         Artherosclerosis    Cancer Sister         Rectum    Lung Disease Sister     Diabetes Sister     Coronary Artery Disease Sister     Heart Attack Brother     Diabetes Maternal Grandmother     Diabetes Paternal Grandmother     Cancer Paternal Grandfather     Diabetes Sister     Coronary Artery Disease Sister     Diabetes Brother     Alcohol abuse Brother     Other Brother         killed in car accident       Social History     Socioeconomic History    Marital status:      Spouse name: Not on file    Number of children: Not on file    Years of education: Not on file    Highest education level: Not on file   Occupational History    Not on file   Social Needs    Financial resource strain: Not on file    Food insecurity     Worry: Not on file     Inability: Not on file    Transportation needs     Medical: Not on file     Non-medical: Not on file   Tobacco Use    Smoking status: Former Smoker     Packs/day: 0.50     Years: 65.00     Pack years: 32.50     Types: Cigarettes     Last attempt to quit: 2019     Years since quittin.6    Smokeless tobacco: Current User   Substance and Sexual Activity    Alcohol use: Yes     Comment: Rarely-holidays    Drug use: No    Sexual activity: Not Currently   Lifestyle    Physical activity     Days per week: Not on file     Minutes per session: Not on file    Stress: Not on file   Relationships    Social connections     Talks on phone: Not on file     Gets together: Not on file     Attends [employer] service: Not on file     Active member of club or organization: Not on file     Attends meetings of clubs or organizations: Not on file     Relationship status: Not on file    Intimate partner violence     Fear of current or ex partner: Not on file     Emotionally abused: Not on file     Physically abused: Not on file     Forced sexual activity: Not on file   Other Topics Concern    Not on file   Social History Narrative    Not on file         ALLERGIES: Nsaids (non-steroidal anti-inflammatory drug); Fenofibrate; and Metformin    Review of Systems   Constitutional: Negative for fever. Eyes: Negative for redness. Respiratory: Positive for cough and shortness of breath. Cardiovascular: Positive for leg swelling. Negative for chest pain. Gastrointestinal: Negative for abdominal pain and vomiting. Skin: Negative for wound. Neurological: Negative for headaches. Psychiatric/Behavioral: Negative for confusion. All other systems reviewed and are negative.       Vitals:    20 1700   BP: 162/76   Pulse: 77   Resp: 16   Temp: 98.1 °F (36.7 °C)   SpO2: 98%   Weight: 74.8 kg (165 lb)   Height: 5' 2\" (1.575 m)            Physical Exam  General:  Awake and alert, NAD  HEENT:  NC/AT, equal pupils, moist mucous membranes  Neck:   Normal inspection, full range of motion  Cardiac:  RRR, no murmurs  Respiratory:  Coarse breath sounds throughout with scattered rhonchi, mild expiratory wheezing, normal effort  Abdomen:  Soft and nontender, nondistended  Extremities: Warm and well perfused, left leg is edematous with tenderness to palpation of the calf and popliteal fossa, strong DP pulses bilaterally  Neuro:  Moving all extremities symmetrically without gross motor deficit  Skin:   No rashes or pallor    RESULTS  Recent Results (from the past 12 hour(s))   SAMPLES BEING HELD    Collection Time: 06/29/20  7:11 PM   Result Value Ref Range    SAMPLES BEING HELD SST     COMMENT        Add-on orders for these samples will be processed based on acceptable specimen integrity and analyte stability, which may vary by analyte. CBC WITH AUTOMATED DIFF    Collection Time: 06/29/20  7:11 PM   Result Value Ref Range    WBC 13.2 (H) 3.6 - 11.0 K/uL    RBC 4.13 3.80 - 5.20 M/uL    HGB 12.3 11.5 - 16.0 g/dL    HCT 38.3 35.0 - 47.0 %    MCV 92.7 80.0 - 99.0 FL    MCH 29.8 26.0 - 34.0 PG    MCHC 32.1 30.0 - 36.5 g/dL    RDW 13.2 11.5 - 14.5 %    PLATELET 643 754 - 853 K/uL    MPV 9.2 8.9 - 12.9 FL    NRBC 0.0 0  WBC    ABSOLUTE NRBC 0.00 0.00 - 0.01 K/uL    NEUTROPHILS 53 32 - 75 %    LYMPHOCYTES 34 12 - 49 %    MONOCYTES 9 5 - 13 %    EOSINOPHILS 2 0 - 7 %    BASOPHILS 1 0 - 1 %    IMMATURE GRANULOCYTES 1 (H) 0.0 - 0.5 %    ABS. NEUTROPHILS 7.3 1.8 - 8.0 K/UL    ABS. LYMPHOCYTES 4.4 (H) 0.8 - 3.5 K/UL    ABS. MONOCYTES 1.2 (H) 0.0 - 1.0 K/UL    ABS. EOSINOPHILS 0.2 0.0 - 0.4 K/UL    ABS. BASOPHILS 0.1 0.0 - 0.1 K/UL    ABS. IMM.  GRANS. 0.1 (H) 0.00 - 0.04 K/UL    DF AUTOMATED     METABOLIC PANEL, COMPREHENSIVE    Collection Time: 06/29/20  7:11 PM   Result Value Ref Range    Sodium 142 136 - 145 mmol/L    Potassium 4.6 3.5 - 5.1 mmol/L    Chloride 108 97 - 108 mmol/L    CO2 26 21 - 32 mmol/L    Anion gap 8 5 - 15 mmol/L    Glucose 99 65 - 100 mg/dL    BUN 26 (H) 6 - 20 MG/DL    Creatinine 1.42 (H) 0.55 - 1.02 MG/DL    BUN/Creatinine ratio 18 12 - 20      GFR est AA 43 (L) >60 ml/min/1.73m2    GFR est non-AA 36 (L) >60 ml/min/1.73m2    Calcium 9.3 8.5 - 10.1 MG/DL    Bilirubin, total 0.2 0.2 - 1.0 MG/DL    ALT (SGPT) 27 12 - 78 U/L    AST (SGOT) 23 15 - 37 U/L    Alk. phosphatase 35 (L) 45 - 117 U/L    Protein, total 7.4 6.4 - 8.2 g/dL    Albumin 3.3 (L) 3.5 - 5.0 g/dL    Globulin 4.1 (H) 2.0 - 4.0 g/dL    A-G Ratio 0.8 (L) 1.1 - 2.2     MAGNESIUM    Collection Time: 06/29/20  7:11 PM   Result Value Ref Range    Magnesium 1.7 1.6 - 2.4 mg/dL   TROPONIN I    Collection Time: 06/29/20  7:11 PM   Result Value Ref Range    Troponin-I, Qt. <0.05 <0.05 ng/mL   NT-PRO BNP    Collection Time: 06/29/20  7:11 PM   Result Value Ref Range    NT pro- <450 PG/ML   PROTHROMBIN TIME + INR    Collection Time: 06/29/20  7:11 PM   Result Value Ref Range    INR 1.0 0.9 - 1.1      Prothrombin time 10.5 9.0 - 11.1 sec        IMAGING  Xr Chest Port    Result Date: 6/29/2020  IMPRESSION: Suspect interstitial edema pattern. Correlate with clinical symptoms. .  . Procedures - none unless documented below  EKG as interpreted by me:  Normal sinus rhythm at a rate of 75, left axis deviation, normal intervals, no ST or T-wave changes suggesting acute ischemia. ED course: Labs, EKG and imaging reviewed. Interstitial edema on chest film, no DVT on venous duplex of the left lower extremity. Normal BNP and troponin. Needs echocardiogram and cardiology evaluation, ordered IV Lasix for diuresis in the setting of peripheral edema. Viral pneumonia also on the differential.  Treating for COPD exacerbation with albuterol MDI and IV Solumedrol. Requiring 2 liters NC. Will admit for continued management, discussed with the family medicine service. Gela Hancock Serve for Admission  9:18 PM    ED Room Number:   ER15/15  Patient Name and age:  Navya Catalan 68 y.o.  female  Working Diagnosis:     1. Acute pulmonary edema (HCC)    2. Edema of left lower extremity    3.  Acute exacerbation of chronic obstructive pulmonary disease (COPD) ([address]      COVID suspicion:   Other(low suspicion but deserves screening)  Code Status:    Full Code  Readmission:    no  Isolation Requirements:  yes  Recommended Level of Care: telemetry  Department:    [address] [place] [address] ED - [phone]  Other:     Interstitial edema on chest film, no DVT on venous duplex of the left lower extremity. Normal BNP and troponin. Needs echocardiogram and cardiology evaluation, ordered IV Lasix. Also treating for COPD exacerbation with albuterol MDI and steroids.   Requiring 2 liters NC.

## 2020-06-29 NOTE — PROGRESS NOTES
Maggi Narvaez is a 68 y.o. female who was seen by synchronous (real-time) audio-video technology on 6/29/2020. Assessment & Plan:   Diagnoses and all orders for this visit:    1. Leg swelling    2. Pain of left lower extremity        Infectious vs DVT  Advised to go to the ER this afternoon    Follow-up and Dispositions    · Return per ER disposition. Reviewed plan of care. Patient and her daughter have provided input and agree with goals. CPT Codes 44134-74869 for Established Patients may apply to this Telehealth Visit      Subjective:   Maggi Narvaez was seen for Leg Swelling (left leg - 05/10 on pain scale- pt's daughter states leg area is hard and red to touch )      Patient presents with:  Leg Swelling: left leg - 05/10 on pain scale- pt's daughter states leg area is hard and red to touch     Her symptoms have been present for several months, worse today. Sitting with her leg down makes it worse. Also, it hurts when she stands too long. Mild relief with elevation. No \"knots\". Her daughter is with her. Review of Systems   Constitutional: Negative for chills and fever. Respiratory: Positive for cough and shortness of breath. Negative for wheezing. She has her usual cough and mild shortness of breath     Cardiovascular: Positive for leg swelling. Negative for chest pain. Skin:        Leg redness and warmth         Objective:     Physical Exam  Constitutional:       General: She is not in acute distress. Appearance: Normal appearance. She is not diaphoretic. Pulmonary:      Effort: Pulmonary effort is normal.   Musculoskeletal:      Left lower leg: Edema present. Skin:     Findings: Erythema present. Neurological:      Mental Status: She is alert and oriented to person, place, and time. Due to this being a TeleHealth evaluation, many elements of the physical examination are unable to be assessed.          We discussed the expected course, resolution and complications of the diagnosis(es) in detail. Medication risks, benefits, costs, interactions, and alternatives were discussed as indicated. I advised her to contact the office if her condition worsens, changes or fails to improve as anticipated. She expressed understanding with the diagnosis(es) and plan. Pursuant to the emergency declaration under the [address] [place] [address] [facility] authority and the [employer] Act, this Virtual  Visit was conducted, with patient's consent, to reduce the patient's risk of exposure to COVID-19 and provide continuity of care for an established patient. Services were provided through a video synchronous discussion virtually to substitute for in-person clinic visit.     Evelin Curry MD

## 2020-06-29 NOTE — PROGRESS NOTES
Chief Complaint   Patient presents with    Leg Swelling     left leg - 05/10 on pain scale- pt's daughter states leg area is hard and red to touch      Pt is having virtual appointment at home in [place], [place].

## 2020-06-30 ENCOUNTER — TELEPHONE (OUTPATIENT)
Dept: FAMILY MEDICINE CLINIC | Age: 80
End: 2020-06-30

## 2020-06-30 ENCOUNTER — APPOINTMENT (OUTPATIENT)
Dept: NON INVASIVE DIAGNOSTICS | Age: 80
DRG: 190 | End: 2020-06-30
Attending: FAMILY MEDICINE
Payer: MEDICARE

## 2020-06-30 ENCOUNTER — APPOINTMENT (OUTPATIENT)
Dept: GENERAL RADIOLOGY | Age: 80
DRG: 190 | End: 2020-06-30
Attending: FAMILY MEDICINE
Payer: MEDICARE

## 2020-06-30 LAB
ANION GAP SERPL CALC-SCNC: 7 MMOL/L (ref 5–15)
ATRIAL RATE: 75 BPM
BASOPHILS # BLD: 0 K/UL (ref 0–0.1)
BASOPHILS NFR BLD: 0 % (ref 0–1)
BUN SERPL-MCNC: 28 MG/DL (ref 6–20)
BUN/CREAT SERPL: 20 (ref 12–20)
CALCIUM SERPL-MCNC: 9.4 MG/DL (ref 8.5–10.1)
CALCULATED P AXIS, ECG09: 93 DEGREES
CALCULATED R AXIS, ECG10: -36 DEGREES
CALCULATED T AXIS, ECG11: 56 DEGREES
CHLORIDE SERPL-SCNC: 103 MMOL/L (ref 97–108)
CO2 SERPL-SCNC: 26 MMOL/L (ref 21–32)
COMMENT, HOLDF: NORMAL
CREAT SERPL-MCNC: 1.42 MG/DL (ref 0.55–1.02)
DIAGNOSIS, 93000: NORMAL
DIFFERENTIAL METHOD BLD: ABNORMAL
ECHO AO ROOT DIAM: 2.9 CM
ECHO AR MAX VEL PISA: 60.94 CM/S
ECHO AV AREA PEAK VELOCITY: 2.09 CM2
ECHO AV AREA/BSA PEAK VELOCITY: 1.2 CM2/M2
ECHO AV PEAK GRADIENT: 9.56 MMHG
ECHO LA AREA 4C: 17.92 CM2
ECHO LA MAJOR AXIS: 3 CM
ECHO LA MINOR AXIS: 1.7 CM
ECHO LA VOL 2C: 48.79 ML (ref 22–52)
ECHO LA VOL 4C: 46.71 ML (ref 22–52)
ECHO LA VOL BP: 48.71 ML (ref 22–52)
ECHO LA VOL/BSA BIPLANE: 27.65 ML/M2 (ref 16–28)
ECHO LA VOLUME INDEX A2C: 27.7 ML/M2 (ref 16–28)
ECHO LA VOLUME INDEX A4C: 26.52 ML/M2 (ref 16–28)
ECHO LV E' LATERAL VELOCITY: 6.15 CENTIMETER/SECOND
ECHO LV E' SEPTAL VELOCITY: 7.94 CENTIMETER/SECOND
ECHO LV INTERNAL DIMENSION DIASTOLIC: 4.1 CM (ref 3.9–5.3)
ECHO LV INTERNAL DIMENSION SYSTOLIC: 2.61 CM
ECHO LV IVSD: 1.25 CM (ref 0.6–0.9)
ECHO LV MASS 2D: 143.5 G (ref 67–162)
ECHO LV MASS INDEX 2D: 81.5 G/M2 (ref 43–95)
ECHO LV POSTERIOR WALL DIASTOLIC: 0.87 CM (ref 0.6–0.9)
ECHO LVOT DIAM: 1.86 CM
ECHO LVOT PEAK GRADIENT: 5.74 MMHG
ECHO MV A VELOCITY: 146.39 CENTIMETER/SECOND
ECHO MV AREA PHT: 3.5 CM2
ECHO MV E DECELERATION TIME (DT): 0.22 S
ECHO MV E VELOCITY: 95.66 CENTIMETER/SECOND
ECHO MV PRESSURE HALF TIME (PHT): 0.06 S
ECHO PV PEAK INSTANTANEOUS GRADIENT SYSTOLIC: 1.49 MMHG
ECHO RV INTERNAL DIMENSION: 2.84 CM
ECHO RV TAPSE: 1.96 CM (ref 1.5–2)
ECHO TRICUSPID ANNULAR PEAK SYSTOLIC VELOCITY: 17.3 CM/S
ECHO TV REGURGITANT PEAK GRADIENT: 19.63 MMHG
EOSINOPHIL # BLD: 0 K/UL (ref 0–0.4)
EOSINOPHIL NFR BLD: 0 % (ref 0–7)
ERYTHROCYTE [DISTWIDTH] IN BLOOD BY AUTOMATED COUNT: 13.2 % (ref 11.5–14.5)
FERRITIN SERPL-MCNC: 30 NG/ML (ref 26–388)
GLUCOSE BLD STRIP.AUTO-MCNC: 210 MG/DL (ref 65–100)
GLUCOSE BLD STRIP.AUTO-MCNC: 224 MG/DL (ref 65–100)
GLUCOSE BLD STRIP.AUTO-MCNC: 262 MG/DL (ref 65–100)
GLUCOSE BLD STRIP.AUTO-MCNC: 275 MG/DL (ref 65–100)
GLUCOSE SERPL-MCNC: 272 MG/DL (ref 65–100)
HCT VFR BLD AUTO: 39.6 % (ref 35–47)
HGB BLD-MCNC: 13 G/DL (ref 11.5–16)
IMM GRANULOCYTES # BLD AUTO: 0 K/UL (ref 0–0.04)
IMM GRANULOCYTES NFR BLD AUTO: 0 % (ref 0–0.5)
LYMPHOCYTES # BLD: 1.6 K/UL (ref 0.8–3.5)
LYMPHOCYTES NFR BLD: 13 % (ref 12–49)
MAGNESIUM SERPL-MCNC: 1.6 MG/DL (ref 1.6–2.4)
MCH RBC QN AUTO: 30.1 PG (ref 26–34)
MCHC RBC AUTO-ENTMCNC: 32.8 G/DL (ref 30–36.5)
MCV RBC AUTO: 91.7 FL (ref 80–99)
MONOCYTES # BLD: 0.1 K/UL (ref 0–1)
MONOCYTES NFR BLD: 1 % (ref 5–13)
NEUTS SEG # BLD: 10.3 K/UL (ref 1.8–8)
NEUTS SEG NFR BLD: 86 % (ref 32–75)
NRBC # BLD: 0 K/UL (ref 0–0.01)
NRBC BLD-RTO: 0 PER 100 WBC
P-R INTERVAL, ECG05: 192 MS
PHOSPHATE SERPL-MCNC: 3.9 MG/DL (ref 2.6–4.7)
PLATELET # BLD AUTO: 295 K/UL (ref 150–400)
PMV BLD AUTO: 9.6 FL (ref 8.9–12.9)
POTASSIUM SERPL-SCNC: 4.3 MMOL/L (ref 3.5–5.1)
PROCALCITONIN SERPL-MCNC: <0.05 NG/ML
Q-T INTERVAL, ECG07: 368 MS
QRS DURATION, ECG06: 76 MS
QTC CALCULATION (BEZET), ECG08: 410 MS
RBC # BLD AUTO: 4.32 M/UL (ref 3.8–5.2)
SAMPLES BEING HELD,HOLD: NORMAL
SARS-COV-2, COV2: NOT DETECTED
SERVICE CMNT-IMP: ABNORMAL
SODIUM SERPL-SCNC: 136 MMOL/L (ref 136–145)
SOURCE, COVRS: NORMAL
SPECIMEN SOURCE, FCOV2M: NORMAL
TROPONIN I SERPL-MCNC: <0.05 NG/ML
VENTRICULAR RATE, ECG03: 75 BPM
WBC # BLD AUTO: 12 K/UL (ref 3.6–11)

## 2020-06-30 PROCEDURE — 93306 TTE W/DOPPLER COMPLETE: CPT

## 2020-06-30 PROCEDURE — 80048 BASIC METABOLIC PNL TOTAL CA: CPT

## 2020-06-30 PROCEDURE — 84100 ASSAY OF PHOSPHORUS: CPT

## 2020-06-30 PROCEDURE — 71045 X-RAY EXAM CHEST 1 VIEW: CPT

## 2020-06-30 PROCEDURE — 87449 NOS EACH ORGANISM AG IA: CPT

## 2020-06-30 PROCEDURE — 84484 ASSAY OF TROPONIN QUANT: CPT

## 2020-06-30 PROCEDURE — 87899 AGENT NOS ASSAY W/OPTIC: CPT

## 2020-06-30 PROCEDURE — 82962 GLUCOSE BLOOD TEST: CPT

## 2020-06-30 PROCEDURE — 36415 COLL VENOUS BLD VENIPUNCTURE: CPT

## 2020-06-30 PROCEDURE — 74011250637 HC RX REV CODE- 250/637: Performed by: FAMILY MEDICINE

## 2020-06-30 PROCEDURE — 77030038269 HC DRN EXT URIN PURWCK BARD -A

## 2020-06-30 PROCEDURE — 97535 SELF CARE MNGMENT TRAINING: CPT

## 2020-06-30 PROCEDURE — 84145 PROCALCITONIN (PCT): CPT

## 2020-06-30 PROCEDURE — 74011636637 HC RX REV CODE- 636/637: Performed by: FAMILY MEDICINE

## 2020-06-30 PROCEDURE — 74011250636 HC RX REV CODE- 250/636: Performed by: FAMILY MEDICINE

## 2020-06-30 PROCEDURE — 83735 ASSAY OF MAGNESIUM: CPT

## 2020-06-30 PROCEDURE — 97161 PT EVAL LOW COMPLEX 20 MIN: CPT

## 2020-06-30 PROCEDURE — 97116 GAIT TRAINING THERAPY: CPT

## 2020-06-30 PROCEDURE — 85025 COMPLETE CBC W/AUTO DIFF WBC: CPT

## 2020-06-30 PROCEDURE — 65660000000 HC RM CCU STEPDOWN

## 2020-06-30 PROCEDURE — 97165 OT EVAL LOW COMPLEX 30 MIN: CPT

## 2020-06-30 RX ORDER — GUAIFENESIN 600 MG/1
600 TABLET, EXTENDED RELEASE ORAL EVERY 12 HOURS
Status: DISCONTINUED | OUTPATIENT
Start: 2020-06-30 | End: 2020-07-01 | Stop reason: HOSPADM

## 2020-06-30 RX ORDER — PREDNISONE 20 MG/1
60 TABLET ORAL
Status: DISCONTINUED | OUTPATIENT
Start: 2020-06-30 | End: 2020-06-30

## 2020-06-30 RX ORDER — IPRATROPIUM BROMIDE AND ALBUTEROL SULFATE 2.5; .5 MG/3ML; MG/3ML
3 SOLUTION RESPIRATORY (INHALATION)
Status: DISCONTINUED | OUTPATIENT
Start: 2020-06-30 | End: 2020-07-01 | Stop reason: HOSPADM

## 2020-06-30 RX ORDER — PREDNISONE 20 MG/1
40 TABLET ORAL
Status: DISCONTINUED | OUTPATIENT
Start: 2020-07-01 | End: 2020-07-01 | Stop reason: HOSPADM

## 2020-06-30 RX ADMIN — GUAIFENESIN 600 MG: 600 TABLET ORAL at 22:10

## 2020-06-30 RX ADMIN — Medication 10 ML: at 09:42

## 2020-06-30 RX ADMIN — GUAIFENESIN 600 MG: 600 TABLET ORAL at 09:38

## 2020-06-30 RX ADMIN — Medication 500 MG: at 09:37

## 2020-06-30 RX ADMIN — Medication 10 ML: at 00:02

## 2020-06-30 RX ADMIN — ROSUVASTATIN CALCIUM 20 MG: 10 TABLET, COATED ORAL at 22:10

## 2020-06-30 RX ADMIN — HEPARIN SODIUM 5000 UNITS: 5000 INJECTION INTRAVENOUS; SUBCUTANEOUS at 16:49

## 2020-06-30 RX ADMIN — ZINC SULFATE 220 MG (50 MG) CAPSULE 1 CAPSULE: CAPSULE at 09:38

## 2020-06-30 RX ADMIN — DOXYCYCLINE HYCLATE 100 MG: 100 TABLET, COATED ORAL at 09:42

## 2020-06-30 RX ADMIN — INSULIN LISPRO 2 UNITS: 100 INJECTION, SOLUTION INTRAVENOUS; SUBCUTANEOUS at 22:10

## 2020-06-30 RX ADMIN — HEPARIN SODIUM 5000 UNITS: 5000 INJECTION INTRAVENOUS; SUBCUTANEOUS at 06:03

## 2020-06-30 RX ADMIN — METOPROLOL SUCCINATE 200 MG: 50 TABLET, EXTENDED RELEASE ORAL at 09:38

## 2020-06-30 RX ADMIN — PREDNISONE 60 MG: 20 TABLET ORAL at 09:37

## 2020-06-30 RX ADMIN — GUAIFENESIN 600 MG: 600 TABLET ORAL at 06:03

## 2020-06-30 RX ADMIN — Medication 10 ML: at 22:10

## 2020-06-30 RX ADMIN — PANTOPRAZOLE SODIUM 40 MG: 40 TABLET, DELAYED RELEASE ORAL at 06:03

## 2020-06-30 RX ADMIN — INSULIN LISPRO 3 UNITS: 100 INJECTION, SOLUTION INTRAVENOUS; SUBCUTANEOUS at 16:49

## 2020-06-30 RX ADMIN — DOXYCYCLINE HYCLATE 100 MG: 100 TABLET, COATED ORAL at 22:10

## 2020-06-30 RX ADMIN — Medication 10 ML: at 06:04

## 2020-06-30 RX ADMIN — Medication 500 MG: at 16:52

## 2020-06-30 RX ADMIN — LOSARTAN POTASSIUM 100 MG: 50 TABLET ORAL at 09:38

## 2020-06-30 NOTE — PROGRESS NOTES
Patient's daughter, Henry Ramirez, would like to be added as a visitor. Her mom is a covid rule out. Pt's daughter, Henry Ramirez, will call in the morning.

## 2020-06-30 NOTE — PROGRESS NOTES
[id number]   Senior Resident Admission Note    CC: Leg swelling and pain    HPI:  Marcel Burkitt is a 68 y.o. female COPD, HTN, T2DM, GERD, CKD, Dyslipidemia, IDALIA who presents to the ER complaining of LLE swelling and pain. Patient has been complaining of LE edema for about 3 months now and in the past few weeks/days it has been increasingly painful to the touch. She is able to alleviate some of her swelling by leg elevation, but not recently. Denies any history of falls or DVT. Patient also complains of increasing cough and shortness of breath. She has COPD not on home oxygen and she recently quit tobacco. Patient notes that in the past 2 weeks, she has been coughing more, same amount of sputum, but has increasing SOB. Does not follow pulmonologist and she does not use any home inhalers and never had PFTs. In the ED, VSS and patient was treated with albuterol inhaler, 40mg IV lasxix and 125mg solumedrol. A/P: 80yoF COPD, HTN, T2DM, GERD, CKD, Dyslipidemia, IDALIA who is admitted for SOB and     1. SOB likely 2/2 COPD exacerbation vs. CAP vs. CHF exacerbation. CXR with interstitial edema with possible opacification of LLL concerning for CAP. ProBNP and 1st trop neg. With increasing SOB and cough, will test for covid. No oxygen requirement currently. - Covid labs (CRP, D-dimer, Ferritinm LD, IL-6) and meds. RVP, legionella, and S.pneumo  - Covid precautions  - Follow up on BCx and UCx  - PO doxycycline  - s/p 125mg dexamethasone in the ED. Will evaluate in the AM, likely can transition to Pred 60mg x5 days  - Albuterol inhaler  - Check procalcitonin,   - Trend trops, obtain echo  - Strict I&Os    2. LE edema and pain. Patient reports improvement with  IV lasix in the ED. No warmth or edema on exam, less likely cellulitis. Doppler prelim was negative for DVT butReflux noted on popliteal vein.  Last ECHO 2010 with EF 55-60% and Normal nuclear stress test 09/14/2015 with LVEF 61%. Consider lasix prn. Obtain echo. Trend trops q6h x3.    3. T2DM. Last A1c 7.3 (6/22/2020). Currently diet controlled. ISS+POC glucose checks. 4. HTN. Continue home losartan, norvasc and metoprolol      Patient seen, examined, and discussed with Dr. Cheli Cloud (PGY-1). For the remaining assessment and plan of other medical problems please refer to Dr. Cheli Cloud 's H&P for more details.     Pt discussed with on-call attending physician    Mikey Gandhi MD  Family Medicine Resident

## 2020-06-30 NOTE — ACP (ADVANCE CARE PLANNING)
Advance Care Planning     Advance Care Planning Activator (Inpatient)  Conversation Note      Date of ACP Conversation: 06/30/20     Conversation Conducted with:   Patient with Decision Making Capacity    ACP Activator: Gabriela Gonzáles Decision Maker:    Current Designated Health Care Decision Maker:   Primary Decision Maker: Franci Jp - Child - [phone]    Secondary Decision Maker: Soila Rosales - Daughter - [phone]    Care Preferences    Ventilation:  NO, confirmed DNR order with pt. Resuscitation  NO, confirmed DNR order with pt.       [] Yes  [x] No   Educated Patient / Decision Maker regarding differences between Advance Directives and portable DNR orders.     Length of ACP Conversation in minutes:  3    Conversation Outcomes:  [x] ACP discussion completed

## 2020-06-30 NOTE — TELEPHONE ENCOUNTER
Phone call with patient's daughter. The patient has now agreed to take insulin. Hospital course, labs and echocardiogram discussed, questions answered. She was most appreciative of this.

## 2020-06-30 NOTE — PROGRESS NOTES
Pt refuses insulin. Pt monitor blood glucose at home. MD will be notified. Notified Family Practice. Per [facility] they will see pt. Informed Daughter, that the pt has been refusing Insulin. Daughter would like call back - Mikel Starr, cell - [phone] from MD. Per daughter pt is nervous about injection. Pt is very [place]. Per daughter Metformin was d/c, d/t leg cramping.

## 2020-06-30 NOTE — PROGRESS NOTES
Bedside and Verbal shift change report given to Lavon (oncoming nurse) by Jean Ramos (offgoing nurse). Report included the following information SBAR, Kardex and Recent Results.

## 2020-06-30 NOTE — PROGRESS NOTES
[address] [place] [address]   Office [phone]  Fax [phone]          Assessment and Plan     Lynna Alpers is a 68 y.o. female t past medical history of  COPD, HTN, T2DM, GERD, CKD, Dyslipidemia, IDALIA who is admitted for SOB 2/2 CAP vs COPD exacerbation vs CHF exacerbation. 24 Hour Events: No acute events.       SOB 2/2 CAP vs COVID vs COPD exacerbation vs CHF  - CXR with interstitial edema and possible consolidation on left, mildly elevated WBC count, productive cough, worsening SOB. Her BNP was low and trop was neg x1, there is no documented ECHO on chart, but no known CHF per patient. Her LLE edema got better after 1 dose of lasix in ED. No wheezing on my exam, but pt was S/p 125 of Solumedrol already.   - f/u PNA labs, procalc   - f/u COVID labs and meds. Prelim SARS-COV-2 neg   - f/u Blood and urine Cx  - Doxy 100 BID PO   - Consider prednisone burst vs IV steroids   - Duo-neb as needed  - Mucinex   - Trend trops and get ECHO  - Strict I&Os  - Get CXR PA/LAT     LE pain and edema - no redness, appropriately tender, +1 edema after lasix in ED. Also patient reports pain os better after diuretics. Last ECHO 2010 with EF 55-60% and Normal nuclear stress test 09/14/2015 with LVEF 61%. Normal cardiac tests in 2019 per patient. - consider lasix PRN or scheduled based on exam findings   - follow up ECHO     Elevated Cr; admission Cr 1.42. Baseline 1.2. Etiology may be secondary to IVVD. Expect to improve with IV hydration. Avoid nephrotoxic agents.  -Encourage PO fluids     Diabetes Mellitus: Last HgA1C 7.3 on 6/20 - appropriate for her age group. Diet controlled. Allergic to metformin.   - Monitor BG  - Hypoglycemia protocols ordered.      Hypertension: On admission BP was 162/76. Normally taking Cozaar 100, Norvasc 10 and Toprol 200. Good compliance per med rec verified with pharmacy.   - Continue home meds   - Will continue to monitor at this time and readjust as BP's trend.     Hyperlipidemia: Last lipid   . On Crestor 20 at home   - Continue home meds     IDALIA: never was on c-pap d/t claustrophobia   - follow up outpatient      GERD: stable  - continue home prilosec substitute  per hospital formulary      Obesity: Body mass index is 30.18 kg/m². - encourage lifestyle modifications         FEN/GI - diabetic diet. Activity - with assiatnce   DVT prophylaxis - Heparin  GI prophylaxis -  Protonix  Disposition - Plan to d/c tbd.    Dtr Roman Mis: [id number]     CODE STATUS:  DNR    I appreciate the opportunity to participate in the care of this patient,  Dev Nance MD  Family Medicine Resident         Subjective / Objective     Subjective Patient is asleep, awakens briefly for exam, reports she feels fine. Denies pain, n/v/d. Temp (24hrs), Av.2 °F (36.8 °C), Min:97.9 °F (36.6 °C), Max:98.4 °F (36.9 °C)     Objective        Physical Exam  Constitutional:       Appearance: Normal appearance. Cardiovascular:      Rate and Rhythm: Normal rate and regular rhythm. Pulses: Normal pulses. Heart sounds: Normal heart sounds. No murmur. No friction rub. Pulmonary:      Effort: Pulmonary effort is normal.      Comments: Coarse breath sounds b/l, no wheezing   Abdominal:      General: Bowel sounds are normal. There is no distension. Palpations: Abdomen is soft. Tenderness: There is no abdominal tenderness. Musculoskeletal: Normal range of motion. General: Swelling present. No tenderness. Right lower leg: Edema present. Left lower leg: Edema present. Skin:     General: Skin is warm. Capillary Refill: Capillary refill takes less than 2 seconds. Coloration: Skin is not jaundiced or pale. Neurological:      General: No focal deficit present.    Psychiatric:         Mood and Affect: Mood normal.        Respiratory:   O2 Device: Room air     I/O:  Date 20 07 - 20 0659 20 07 - 20 8852 Shift [id number] 6468-9118 24 Hour Total 5666-5122 9844-4112 24 Hour Total   INTAKE   Shift Total(mL/kg)         OUTPUT   Urine(mL/kg/hr)  1200 1200        Urine Voided  700 700        Urine Output (mL) (External Female Catheter 06/30/20)  500 500      Shift Total(mL/kg)  1200(16) 1200(16)      NET  -1200 -1200      Weight (kg) 74.8 74.8 74.8 74.8 74.8 74.8       Inpatient Medications  Current Facility-Administered Medications   Medication Dose Route Frequency    albuterol-ipratropium (DUO-NEB) 2.5 MG-0.5 MG/3 ML  3 mL Nebulization Q4H PRN    guaiFENesin ER (MUCINEX) tablet 600 mg  600 mg Oral Q12H    iopamidoL (ISOVUE-370) 76 % injection 100 mL  100 mL IntraVENous RAD ONCE    sodium chloride (NS) flush 5-40 mL  5-40 mL IntraVENous Q8H    sodium chloride (NS) flush 5-40 mL  5-40 mL IntraVENous PRN    heparin (porcine) injection 5,000 Units  5,000 Units SubCUTAneous Q8H    ascorbic acid (vitamin C) (VITAMIN C) tablet 500 mg  500 mg Oral BID    zinc sulfate (ZINCATE) 220 (50) mg capsule 1 Cap  1 Cap Oral DAILY    glucose chewable tablet 16 g  4 Tab Oral PRN    dextrose (D50W) injection syrg 12.5-25 g  25-50 mL IntraVENous PRN    glucagon (GLUCAGEN) injection 1 mg  1 mg IntraMUSCular PRN    insulin lispro (HUMALOG) injection   SubCUTAneous AC&HS    pantoprazole (PROTONIX) tablet 40 mg  40 mg Oral ACB    rosuvastatin (CRESTOR) tablet 20 mg  20 mg Oral QHS    losartan (COZAAR) tablet 100 mg  100 mg Oral DAILY    metoprolol succinate (TOPROL-XL) XL tablet 200 mg  200 mg Oral DAILY    [START ON 7/1/2020] amLODIPine (NORVASC) tablet 10 mg  10 mg Oral DAILY    doxycycline (VIBRA-TABS) tablet 100 mg  100 mg Oral Q12H         Allergies  Allergies   Allergen Reactions    Nsaids (Non-Steroidal Anti-Inflammatory Drug) Anaphylaxis and Hives    Fenofibrate Other (comments)     Leg cramps    Metformin Diarrhea         CBC:  Recent Labs     06/29/20  1911   WBC 13.2*   HGB 12.3   HCT 38.3    Metabolic Panel:  Recent Labs     06/29/20  1911      K 4.6      CO2 26   BUN 26*   CREA 1.42*   GLU 99   CA 9.3   MG 1.7   ALB 3.3*   ALT 27   INR 1.0              For Billing    Chief Complaint   Patient presents with   [facility] Leg Swelling       Hospital Problems  Date Reviewed: 6/29/2020          Codes Class Noted POA    Shortness of breath ICD-10-CM: R06.02  ICD-9-CM: 786.05  6/29/2020 Unknown        IDALIA (obstructive sleep apnea) ICD-10-CM: G47.33  ICD-9-CM: 327.23  9/2/2015 Yes        COPD (chronic obstructive pulmonary disease) (HCC) ICD-10-CM: J44.9  ICD-9-CM: 496  12/28/2011 Yes        Esophageal reflux ICD-10-CM: K21.9  ICD-9-CM: 530.81  6/23/2011 Yes        Essential hypertension ICD-10-CM: I10  ICD-9-CM: 401.9  6/23/2011 Yes

## 2020-06-30 NOTE — ED NOTES
TRANSFER - OUT REPORT:    Verbal report given to Chad Jackson RN(name) on Caitlin Durán  being transferred to 4th floor(unit) for routine progression of care       Report consisted of patients Situation, Background, Assessment and   Recommendations(SBAR). Information from the following report(s) SBAR, ED Summary, Intake/Output, MAR, Recent Results and Cardiac Rhythm NSR was reviewed with the receiving nurse. Lines:   Peripheral IV 06/29/20 Right Antecubital (Active)   Site Assessment Clean, dry, & intact 6/29/2020  7:12 PM   Phlebitis Assessment 0 6/29/2020  7:12 PM   Infiltration Assessment 0 6/29/2020  7:12 PM   Dressing Status Clean, dry, & intact 6/29/2020  7:12 PM   Hub Color/Line Status Pink 6/29/2020  7:12 PM   Alcohol Cap Used Yes 6/29/2020  7:12 PM        Opportunity for questions and clarification was provided.       Patient transported with:   Monitor  Registered Nurse

## 2020-06-30 NOTE — H&P
[facility]   Admission H&P    Date of admission: 6/29/2020    Patient name: Tiera Arteaga  MRN: [id number]  YOB: 1940  Age: 68 y.o. Primary care provider:  Yifan Stewart MD     Source of Information: patient, medical records    Chief complaint:  Leg swelling and pain    History of Present Illness  Tiera Arteaga is a 68 y.o. female with pertinent past medical history of  COPD, HTN, T2DM, GERD, CKD, Dyslipidemia, IDALIA who presents to the ER complaining of LLE swelling and pain. Patient endorses LE edema for about 3 months now and in the past few days it has been increasingly painful to the touch. Patient reports that previously LE edema would get better after elevation overnight, but not in the past 2 days. Patient denies history of blood clots. Other symptoms include: worsening SOB and productive cough for 2 days. Patient is not on O2 at home, she quit smoking in 2019. She does not follow with pulmonology, and is not on home inhalers. Off note: patient follows with Dr Erwin Jiménez (cardiology). She reports, she had stress test done and it was unremarkable, she was told she does not have heart failure. Pt denies fever, chills, chest pain, shortness of breath, numbness, increased headaches, sore throat, eat pain, cough, sick contacts, n/v, abd pain, dysuria, hematuria, slurred speech, facial droop, confusion or unilateral weakness. In the ER,   vital signs were remarkable for T 98.1 HR 77 /76 RR 16 SpO2 98%. Labs were remarkable for WBC 13.2, Cr 1.42 (BL 1.2) , trop neg x1. CXR showed suspect interstitial edema. Duplex LLE and RLE no thrombus, Reflux in L popliteal vein   Pt was treated with albuterol, lasix 40 x1, solumedrol 125 x1. Rena SUTHERLAND Questions:   Experiencing any of the following symptoms: - fever, - chills, +cough, + SOB, -URI symptoms. Denies any Sick contacts with fever, cough, diarrhea, SOB, URI symptoms.    Denies travel out of state or out of country. Home Medications   Prior to Admission medications    Medication Sig Start Date End Date Taking? Authorizing Provider   losartan (COZAAR) 100 mg tablet TAKE ONE TABLET BY MOUTH DAILY 6/25/20  Yes Matt Muñiz MD   metoprolol succinate (TOPROL-XL) 200 mg XL tablet TAKE ONE TABLET BY MOUTH DAILY 6/25/20  Yes Matt Muñiz MD   omeprazole (PRILOSEC) 40 mg capsule TAKE ONE CAPSULE BY MOUTH DAILY 5/8/20  Yes Matt Muñiz MD   amLODIPine (NORVASC) 10 mg tablet TAKE ONE TABLET BY MOUTH DAILY 5/8/20  Yes Matt Muñiz MD   rosuvastatin (CRESTOR) 20 mg tablet TAKE ONE TABLET BY MOUTH EVERY EVENING 3/25/20  Yes Matt Muñiz MD   omega-3 fatty acids-vitamin e (FISH OIL) 1,000 mg cap Take 2 Caps by mouth daily (after breakfast). Yes Provider, Historical   calcium-vitamin D (CALCIUM 500+D) 500 mg(1,250mg) -200 unit per tablet Take 1 Tab by mouth daily (after breakfast). Yes Provider, Historical   aspirin 81 mg tablet Take 81 mg by mouth daily (after breakfast). Yes Provider, Historical   multivitamin (ONE A DAY) tablet Take 1 Tab by mouth daily (after breakfast). Yes Provider, Historical   glucose blood VI test strips (ACCU-CHEK GEM PLUS TEST STRP) strip USE TO TEST BLOOD SUGAR ONCE DAILY. Diagnoses code:E11.9 5/17/19   Matt Muñiz MD   Lancets (ACCU-CHEK SOFTCLIX LANCETS) misc USE TO CHECK BLOOD SUGAR DAILY OR AS DIRECTED 11/15/17   Matt Muñiz MD   FLUZONE HIGH-DOSE 4852-53, PF, syrg injection VACCINATION ADMINISTERED BY PHARMACIST 10/16/16   Provider, Historical         Allergies   Allergies   Allergen Reactions    Nsaids (Non-Steroidal Anti-Inflammatory Drug) Anaphylaxis and Hives    Fenofibrate Other (comments)     Leg cramps    Metformin Diarrhea         Past Medical History:   Diagnosis Date    ACP (advance care planning) 8/29/2018    Advance Care Plan or Surrogate Decision Maker documented in medical record.     Arthritis     Shoulders, knees    Bell's palsy 1980    Residual remains on right side    Breast cancer ([address] 5/1995    Cholelithiasis 6/23/2011    CKD (chronic kidney disease), stage III ([address] 6/6/2012    COPD (chronic obstructive pulmonary disease) ([address] 12/28/2011    Dyslipidemia 6/23/2011    Elevated triglycerides with high cholesterol 8/28/2015    Esophageal reflux 6/23/2011    Essential hypertension 6/23/2011    GERD (gastroesophageal reflux disease)     Hiatal hernia 6/23/2011    History of breast cancer 6/23/2011    Rt Breast    History of duodenal ulcer 6/23/2011    History of tobacco abuse 9/26/2011    HTN (hypertension) 6/23/2011    Hyperlipidemia 1/7/2013    Left ventricular hypertrophy 6/23/2011    Nicotine dependence 9/2/2015    Obstructive chronic bronchitis with acute bronchitis ([address] 6/23/2011    Osteopenia     Osteoporosis 6/23/2011    Other ill-defined conditions(799.89)     rightsided eye and cheek numb from surg    Pregnancy 6/23/2011    Steatosis of liver 10/9/2011    Tubular adenoma of colon 1/24/2016 1/4/16    Type 2 diabetes mellitus without complications ([address] 3/71/2558    Type II or unspecified type diabetes mellitus without mention of complication, not stated as uncontrolled 6/23/2011         Past Surgical History:   Procedure Laterality Date    ENDOSCOPY, COLON, DIAGNOSTIC  10/09    HX CHOLECYSTECTOMY  10/2011    laparoscopic    HX HEENT      bells palsy surgery,tongue numb    HX MASTECTOMY Right 5/1995    HX OTHER SURGICAL      colonoscopy    HX TONSIL AND ADENOIDECTOMY  1969    HX TONSILLECTOMY  1969    CO MASTECTOMY BRA  1995    R Breast    REMOVAL GALLBLADDER           Family History   Problem Relation Age of Onset    Asthma Mother     Cancer Mother         Pancreatic CA    Stroke Father     Other Father         Artherosclerosis    Cancer Sister         Rectum    Lung Disease Sister     Diabetes Sister     Coronary Artery Disease Sister     Heart Attack Brother    Ernesto Bose Diabetes Maternal Grandmother     Diabetes Paternal Grandmother     Cancer Paternal Grandfather     Diabetes Sister     Coronary Artery Disease Sister     Diabetes Brother     Alcohol abuse Brother     Other Brother         killed in car accident         Social History   Patient resides    Independently    x  With family care      Assisted living      SNF      Ambulates    Independently      With cane       Assisted walker           Alcohol history   x  None     Social     Chronic     Smoking history    None   x  Former smoker     Current smoker     Social History     Tobacco Use   Smoking Status Former Smoker    Packs/day: 0.50    Years: 65.00    Pack years: 32.50    Types: Cigarettes    Last attempt to quit: 2019    Years since quittin.6   Smokeless Tobacco Current User           Drug history  x  None     Former drug user     Current drug user     Sexual history    Sexually active      Not sexually active     Code status    Full code   x  DNR/DNI     Partial    Code status discussed with the patient/caregivers. Review of Systems  Review of Systems   Constitutional: Negative for diaphoresis, fever and weight loss. HENT: Negative for congestion, ear pain, hearing loss, sinus pain and sore throat. Eyes: Negative for photophobia. Respiratory: Positive for shortness of breath. Cardiovascular: Positive for leg swelling. Negative for chest pain, orthopnea and claudication. Gastrointestinal: Negative for abdominal pain, blood in stool, constipation, diarrhea, nausea and vomiting. Genitourinary: Negative for dysuria and frequency. Musculoskeletal: Negative for myalgias. Neurological: Negative for dizziness, loss of consciousness and headaches. Psychiatric/Behavioral: Negative for depression. The patient does not have insomnia.          Physical Exam  Visit Vitals  /58   Pulse 85   Temp 98.3 °F (36.8 °C)   Resp 20   Ht 5' 2\" (1.575 m)   Wt 165 lb (74.8 kg)   SpO2 93%   BMI 30.18 kg/m²      Physical Exam  Constitutional:       Appearance: Normal appearance. HENT:      Head: Normocephalic and atraumatic. Nose: No congestion or rhinorrhea. Eyes:      General:         Right eye: No discharge. Left eye: No discharge. Conjunctiva/sclera: Conjunctivae normal.   Neck:      Musculoskeletal: Normal range of motion. No neck rigidity. Cardiovascular:      Rate and Rhythm: Normal rate and regular rhythm. Pulses: Normal pulses. Heart sounds: Normal heart sounds. No murmur. No friction rub. Pulmonary:      Effort: Pulmonary effort is normal.      Comments: Coarse breath sounds b/l, no wheezing   Abdominal:      General: Bowel sounds are normal. There is no distension. Palpations: Abdomen is soft. Tenderness: There is no abdominal tenderness. Musculoskeletal: Normal range of motion. General: Swelling present. No tenderness. Right lower leg: Edema present. Left lower leg: Edema present. Skin:     General: Skin is warm. Capillary Refill: Capillary refill takes less than 2 seconds. Coloration: Skin is not jaundiced or pale. Neurological:      General: No focal deficit present. Mental Status: She is alert and oriented to person, place, and time. Motor: No weakness. Psychiatric:         Mood and Affect: Mood normal.         Behavior: Behavior normal.         Judgment: Judgment normal.       Laboratory Data  Recent Results (from the past 24 hour(s))   SAMPLES BEING HELD    Collection Time: 06/29/20  7:11 PM   Result Value Ref Range    SAMPLES BEING HELD SST     COMMENT        Add-on orders for these samples will be processed based on acceptable specimen integrity and analyte stability, which may vary by analyte.    CBC WITH AUTOMATED DIFF    Collection Time: 06/29/20  7:11 PM   Result Value Ref Range    WBC 13.2 (H) 3.6 - 11.0 K/uL    RBC 4.13 3.80 - 5.20 M/uL    HGB 12.3 11.5 - 16.0 g/dL    HCT 38.3 35.0 - 47.0 % MCV 92.7 80.0 - 99.0 FL    MCH 29.8 26.0 - 34.0 PG    MCHC 32.1 30.0 - 36.5 g/dL    RDW 13.2 11.5 - 14.5 %    PLATELET 721 975 - 955 K/uL    MPV 9.2 8.9 - 12.9 FL    NRBC 0.0 0  WBC    ABSOLUTE NRBC 0.00 0.00 - 0.01 K/uL    NEUTROPHILS 53 32 - 75 %    LYMPHOCYTES 34 12 - 49 %    MONOCYTES 9 5 - 13 %    EOSINOPHILS 2 0 - 7 %    BASOPHILS 1 0 - 1 %    IMMATURE GRANULOCYTES 1 (H) 0.0 - 0.5 %    ABS. NEUTROPHILS 7.3 1.8 - 8.0 K/UL    ABS. LYMPHOCYTES 4.4 (H) 0.8 - 3.5 K/UL    ABS. MONOCYTES 1.2 (H) 0.0 - 1.0 K/UL    ABS. EOSINOPHILS 0.2 0.0 - 0.4 K/UL    ABS. BASOPHILS 0.1 0.0 - 0.1 K/UL    ABS. IMM. GRANS. 0.1 (H) 0.00 - 0.04 K/UL    DF AUTOMATED     METABOLIC PANEL, COMPREHENSIVE    Collection Time: 06/29/20  7:11 PM   Result Value Ref Range    Sodium 142 136 - 145 mmol/L    Potassium 4.6 3.5 - 5.1 mmol/L    Chloride 108 97 - 108 mmol/L    CO2 26 21 - 32 mmol/L    Anion gap 8 5 - 15 mmol/L    Glucose 99 65 - 100 mg/dL    BUN 26 (H) 6 - 20 MG/DL    Creatinine 1.42 (H) 0.55 - 1.02 MG/DL    BUN/Creatinine ratio 18 12 - 20      GFR est AA 43 (L) >60 ml/min/1.73m2    GFR est non-AA 36 (L) >60 ml/min/1.73m2    Calcium 9.3 8.5 - 10.1 MG/DL    Bilirubin, total 0.2 0.2 - 1.0 MG/DL    ALT (SGPT) 27 12 - 78 U/L    AST (SGOT) 23 15 - 37 U/L    Alk.  phosphatase 35 (L) 45 - 117 U/L    Protein, total 7.4 6.4 - 8.2 g/dL    Albumin 3.3 (L) 3.5 - 5.0 g/dL    Globulin 4.1 (H) 2.0 - 4.0 g/dL    A-G Ratio 0.8 (L) 1.1 - 2.2     MAGNESIUM    Collection Time: 06/29/20  7:11 PM   Result Value Ref Range    Magnesium 1.7 1.6 - 2.4 mg/dL   TROPONIN I    Collection Time: 06/29/20  7:11 PM   Result Value Ref Range    Troponin-I, Qt. <0.05 <0.05 ng/mL   NT-PRO BNP    Collection Time: 06/29/20  7:11 PM   Result Value Ref Range    NT pro- <450 PG/ML   PROTHROMBIN TIME + INR    Collection Time: 06/29/20  7:11 PM   Result Value Ref Range    INR 1.0 0.9 - 1.1      Prothrombin time 10.5 9.0 - 11.1 sec   D DIMER    Collection Time: 06/29/20 7:11 PM   Result Value Ref Range    D-dimer 0.97 (H) 0.00 - 0.65 mg/L FEU   C REACTIVE PROTEIN, QT    Collection Time: 06/29/20  7:11 PM   Result Value Ref Range    C-Reactive protein 1.30 (H) 0.00 - 0.60 mg/dL   LD    Collection Time: 06/29/20  7:11 PM   Result Value Ref Range     81 - 246 U/L   EKG, 12 LEAD, INITIAL    Collection Time: 06/29/20  7:21 PM   Result Value Ref Range    Ventricular Rate 75 BPM    Atrial Rate 75 BPM    P-R Interval 192 ms    QRS Duration 76 ms    Q-T Interval 368 ms    QTC Calculation (Bezet) 410 ms    Calculated P Axis 93 degrees    Calculated R Axis -36 degrees    Calculated T Axis 56 degrees    Diagnosis       Normal sinus rhythm  Left axis deviation  Abnormal ECG  When compared with ECG of 12-JUL-2016 19:59,  No significant change was found     CULTURE, BLOOD    Collection Time: 06/29/20 11:11 PM   Result Value Ref Range    Special Requests: NO SPECIAL REQUESTS      Culture result: NO GROWTH AFTER 1 HOUR     SARS-COV-2    Collection Time: 06/29/20 11:38 PM   Result Value Ref Range    Specimen source Nasopharyngeal      SARS-CoV-2 PENDING     SARS-CoV-2 PENDING     Specimen source Nasopharyngeal      COVID-19 rapid test PENDING     COVID-19 PENDING NEG         Imaging  CXR showed suspect interstitial edema. Duplex LLE and RLE no thrombus, Reflux in L popliteal vein     EKG:  normal EKG, normal sinus rhythm, unchanged from previous tracings, . Assessment and Plan     Monalisa Grubbs is a 68 y.o. female t past medical history of  COPD, HTN, T2DM, GERD, CKD, Dyslipidemia, IDALIA who is admitted for SOB 2/2 CAP vs COPD exacerbation vs CHF. exacerbation. SOB 2/2 CAP vs COVID vs COPD exacerbation vs CHF  - CXR with interstitial edema and possible consolidation on left, mildly elevated WBC count, productive cough, worsening SOB. Her BNP was low and trop was neg x1, there is no documented ECHO on chart, but no known CHF per patient.  Her LLE edema got better after 1 dose of lasix in ED. No wheezing on my exam, but pt was S/p 125 of Solumedrol already.   - admit to remote tele  - PNA labs, procalc   - COVID labs and meds  - Blood and urine Cx  - Doxy 100 BID PO   - Consider prednisone burst starting tomorrow vs IV steroids based on physical exam findings in the morning   - Duo-neb as needed  - Mucinex   - Trend trops and get ECHO  - Strict I&Os    LE pain and edema - no redness, appropriately tender, +1 edema after lasix in ED. Also patient reports pain os better after diuretics. Last ECHO 2010 with EF 55-60% and Normal nuclear stress test 09/14/2015 with LVEF 61%. Normal cardiac tests in 2019 per patient. - consider lasix PRN or scheduled based on exam findings   - follow up ECHO    Elevated Cr; admission Cr 1.42. Baseline 1.2. Etiology may be secondary to IVVD. Expect to improve with IV hydration. Avoid nephrotoxic agents.  -Encourage PO fluids    Diabetes Mellitus: Last HgA1C as below and appropriate for her age group. Diet controlled. Allergic to metformin. Lab Results   Component Value Date/Time    Hemoglobin A1c 7.3 (H) 06/22/2020 09:26 AM    Hemoglobin A1c (POC) 6.2 02/09/2015 09:33 AM   - HgbA1C  - Monitor BG  - Hypoglycemia protocols ordered. Hypertension: On admission BP was 162/76. Normally taking Cozaar 100, Norvasc 10 and Toprol 200. Good compliance per med rec verified with pharmacy. - Continue home meds   - Will continue to monitor at this time and readjust as BP's trend. Hyperlipidemia: Last lipid profile as below.  On Crestor 20 at home   Lab Results   Component Value Date/Time    Cholesterol, total 149 06/22/2020 09:26 AM    HDL Cholesterol 43 06/22/2020 09:26 AM    LDL, calculated 58 06/22/2020 09:26 AM    VLDL, calculated 48 (H) 06/22/2020 09:26 AM    Triglyceride 238 (H) 06/22/2020 09:26 AM   - Continue home meds    IDALIA: never was on c-pap d/t claustrophobia   - follow up outpatient     GERD: stable  - continue home prilosec substitute  per hospital formulary     Obesity: Body mass index is 30.18 kg/m². - encourage lifestyle modifications       FEN/GI - diabetic diet. Activity - with assiatnce   DVT prophylaxis - Heparin  GI prophylaxis -  Protonix  Disposition - Plan to d/c tbd.     Dtr Heidi Diaz: [phone]    CODE STATUS:  DNR       Patient will be seen with Dr. Malcolm Cabrera (attending physician)       Yulissa Patel MD  [address] [place] [address] Problems  Date Reviewed: 6/29/2020          Codes Class Noted POA    Shortness of breath ICD-10-CM: R06.02  ICD-9-CM: 786.05  6/29/2020 Unknown        IDALIA (obstructive sleep apnea) ICD-10-CM: G47.33  ICD-9-CM: 327.23  9/2/2015 Yes        COPD (chronic obstructive pulmonary disease) ([facility] ICD-10-CM: J44.9  ICD-9-CM: 496  12/28/2011 Yes        Esophageal reflux ICD-10-CM: K21.9  ICD-9-CM: 530.81  6/23/2011 Yes        Essential hypertension ICD-10-CM: I10  ICD-9-CM: 401.9  6/23/2011 Yes

## 2020-06-30 NOTE — PROGRESS NOTES
Problem: Mobility Impaired (Adult and Pediatric)  Goal: *Acute Goals and Plan of Care (Insert Text)  Description: FUNCTIONAL STATUS PRIOR TO ADMISSION: Patient was independent and active without use of DME.    HOME SUPPORT PRIOR TO ADMISSION: The patient lived with her son but did not require assist.    Physical Therapy Goals  Initiated 6/30/2020  1. Patient will move from supine to sit and sit to supine  in bed with modified independence within 7 day(s). 2.  Patient will transfer from bed to chair and chair to bed with modified independence using the least restrictive device within 7 day(s). 3.  Patient will perform sit to stand with modified independence within 7 day(s). 4.  Patient will ambulate with modified independence for 150 feet with the least restrictive device within 7 day(s). 5.  Patient will ascend/descend 4 stairs with 1 handrail(s) with modified independence within 7 day(s). Outcome: Progressing Towards Goal     PHYSICAL THERAPY EVALUATION  Patient: Caitlin Durán (56 y.o. female)  Date: 6/30/2020  Primary Diagnosis: Shortness of breath [R06.02]        Precautions:          ASSESSMENT  Based on the objective data described below, the patient presents with mild DAVID, c/o bilateral LE cramping, and  following admission for SOB. The cause of her SOB is still being determined. She was received on RA and both SpO2 and HR 89 while at rest. SpO2 improved to 92% with activity and remained there during exertion. Gait 3x20' with  and CGA but no DME. Unable to gait train in the hallway d/t COVID r/o but suspect that she is near her baseline. Will follow up x1-2 visits to further assess her endurance. Current Level of Function Impacting Discharge (mobility/balance): CGA for mobility       Patient will benefit from skilled therapy intervention to address the above noted impairments.        PLAN :  Recommendations and Planned Interventions: bed mobility training, transfer training, gait training, therapeutic exercises, and neuromuscular re-education      Frequency/Duration: Patient will be followed by physical therapy:  5 times a week to address goals. Recommendation for discharge: (in order for the patient to meet his/her long term goals)  No skilled physical therapy/ follow up rehabilitation needs identified at this time. This discharge recommendation:  Has not yet been discussed the attending provider and/or case management    IF patient discharges home will need the following DME: none         SUBJECTIVE:   Patient stated Chloe ledezma for your help.   \"I am pretty hard headed so I think that I will be okay. \" (referring to mobility post discharge)    OBJECTIVE DATA SUMMARY:   HISTORY:    Past Medical History:   Diagnosis Date    ACP (advance care planning) 8/29/2018    Advance Care Plan or Surrogate Decision Maker documented in medical record.     Arthritis     Shoulders, knees    Bell's palsy 1980    Residual remains on right side    Breast cancer ([address] 5/1995    Cholelithiasis 6/23/2011    CKD (chronic kidney disease), stage III ([address] 6/6/2012    COPD (chronic obstructive pulmonary disease) ([address] 12/28/2011    Dyslipidemia 6/23/2011    Elevated triglycerides with high cholesterol 8/28/2015    Esophageal reflux 6/23/2011    Essential hypertension 6/23/2011    GERD (gastroesophageal reflux disease)     Hiatal hernia 6/23/2011    History of breast cancer 6/23/2011    Rt Breast    History of duodenal ulcer 6/23/2011    History of tobacco abuse 9/26/2011    HTN (hypertension) 6/23/2011    Hyperlipidemia 1/7/2013    Left ventricular hypertrophy 6/23/2011    Nicotine dependence 9/2/2015    Obstructive chronic bronchitis with acute bronchitis ([address] 6/23/2011    Osteopenia     Osteoporosis 6/23/2011    Other ill-defined conditions(799.89)     rightsided eye and cheek numb from surg    Pregnancy 6/23/2011    Steatosis of liver 10/9/2011    Tubular adenoma of colon 1/24/2016 1/4/16 Type 2 diabetes mellitus without complications ([facility] 0/32/1188    Type II or unspecified type diabetes mellitus without mention of complication, not stated as uncontrolled 6/23/2011     Past Surgical History:   Procedure Laterality Date    ENDOSCOPY, COLON, DIAGNOSTIC  10/09    HX CHOLECYSTECTOMY  10/2011    laparoscopic    HX HEENT      bells palsy surgery,tongue numb    HX MASTECTOMY Right 5/1995    HX OTHER SURGICAL      colonoscopy    HX TONSIL AND ADENOIDECTOMY  1969    HX TONSILLECTOMY  1969    OK MASTECTOMY BRA  1995    R Breast    REMOVAL GALLBLADDER         Personal factors and/or comorbidities impacting plan of care:     Home Situation  Home Environment: Apartment  # Steps to Enter: 0  One/Two Story Residence: One story  Living Alone: No  Support Systems: Child(olga)(son is there, dghter is 5 min away)  Patient Expects to be Discharged to[de-identified] Private residence  Current DME Used/Available at Home: None  Tub or Shower Type: Tub/Shower combination    EXAMINATION/PRESENTATION/DECISION MAKING:   Critical Behavior:  Neurologic State: Alert  Orientation Level: Oriented X4  Cognition: Appropriate decision making, Follows commands     Hearing: Auditory  Auditory Impairment: Hard of hearing, bilateral, Hearing aid(s)  Hearing Aids/Status: At home  Skin:    Edema:   Range Of Motion:  AROM: Within functional limits                       Strength:    Strength: Within functional limits                    Tone & Sensation:                                  Coordination:  Coordination: Within functional limits  Vision:      Functional Mobility:  Bed Mobility:     Supine to Sit: Supervision     Scooting: Supervision  Transfers:  Sit to Stand: Contact guard assistance  Stand to Sit: Contact guard assistance        Bed to Chair: Contact guard assistance              Balance:   Sitting: Intact  Standing: Intact; With support  Ambulation/Gait Training:  Distance (ft): 60 Feet (ft)(3x20' laps)  Assistive Device: Gait belt  Ambulation - Level of Assistance: Contact guard assistance     Gait Description (WDL): Exceptions to WDL  Gait Abnormalities: Path deviations        Base of Support: Widened                      Physical Therapy Evaluation Charge Determination   History Examination Presentation Decision-Making   MEDIUM  Complexity : 1-2 comorbidities / personal factors will impact the outcome/ POC  LOW Complexity : 1-2 Standardized tests and measures addressing body structure, function, activity limitation and / or participation in recreation  LOW Complexity : Stable, uncomplicated  LOW Complexity : FOTO score of       Based on the above components, the patient evaluation is determined to be of the following complexity level: LOW     Pain Rating:      Activity Tolerance:   Fair and SpO2 stable on RA  Please refer to the flowsheet for vital signs taken during this treatment. After treatment patient left in no apparent distress:   Sitting in chair, Call bell within reach, and Bed / chair alarm activated    COMMUNICATION/EDUCATION:   The patients plan of care was discussed with: Registered nurse. Fall prevention education was provided and the patient/caregiver indicated understanding., Patient/family have participated as able in goal setting and plan of care. , and Patient/family agree to work toward stated goals and plan of care.     Thank you for this referral.  Funmi Lamb, PT, DPT   Time Calculation: 30 mins

## 2020-06-30 NOTE — PROGRESS NOTES
6/30/2020 11:54 AM Case management consult for discharge planning received. Reason for Admission: Emergency admit, SOB 2/2 CAP vs COVID vs COPD exacerbation vs CHF    COVID pending    Assessment:    [x]Via p/c with pt     RUR: 14  Risk Level: [x]Low []Moderate []High  Value-based purchasing: [x] Yes [] No  Bundle patient: [] Yes [x] No   Specify:     Advance Directive: On file and confirmed with pt   Gordo Martinez [phone]   Soila Rosales Daughter   [phone]       Assessment:    Age: 68    Sex: [] Male [x]Female     Residency: []Private residence [x]Apartment []Assisted Living []LTC []Other:   Exterior Steps: 0  Interior Steps: 0    Lives With: []With spouse [x]Other family members   Pt was living with her daughter, Luz Elena Hargrove and Caity Lu family prior to admission. Pt reported she will likely discharge home with her son, Leah Coelho who lives in a 1st floor apartment in [place]     Prior functioning:  [x]Independent []Dependent []Partial dependence   Pt requires assistance with: []Bathing []Dressing []Toileting []Ambulation     Prior DME required:  [x]None []RW []Cane []Crutches []Bedside commode []CPAP []Home O2 (Liter/Provider: ) []Nebulizer   []Shower Chair []Wheelchair []Hospital Bed []Efren []Stair lift []Rollator []Other:    DME available: [x]None []RW []Cane []Crutches []Bedside commode []CPAP []Home O2 (Liter/Provider: ) []Nebulizer   []Shower Chair []Wheelchair []Hospital Bed []Efren []Stair lift []Rollator []Other:    Rehab history: [x]None []Outpatient PT []Home Health (Provider/Date: ) []SNF (Provider/Date: ) []IPR (Provider/Date: ) []LTC (Provider/Date: )    Discharge Concerns: []Yes [x]No []Unknown   Describe:     Insurer: Medicare and [facility]     PCP: Emma Morales    Name of Practice: [facility]   Current patient: [x]Yes []No   Approximate date of last visit: 4/2020   Access to virtual PCP visits: [x]Yes []No    Pharmacy:  [facility] Transport: Pt's daughter, Juliana Lopez       Transition of care plan:    [x]Unable to determine at this time. Awaiting clinical progress, and disposition recommendations. Will follow for final discharge needs.  GEORGINA Hensley    Care Management Interventions  PCP Verified by CM: Vitor Jaramillo )  Transition of Care Consult (CM Consult): Discharge Planning  MyChart Signup: No  Discharge Durable Medical Equipment: No  Physical Therapy Consult: Yes  Occupational Therapy Consult: Yes  Speech Therapy Consult: No  Current Support Network: Relative's Home  Confirm Follow Up Transport: Family  Discharge Location  Discharge Placement: Unable to determine at this time

## 2020-06-30 NOTE — TELEPHONE ENCOUNTER
Spoke with pt's daughter, and she is upset stating she was with her mother until 200 today, was kicked out, after COVID-19 testing. Pt's daughter, states she has not talked to a doctor about mother's care, that no provider is calling her back and that pt will not take insulin unless she speaks with Dr. Temitope Curtis. Please call pt's daughter.

## 2020-06-30 NOTE — TELEPHONE ENCOUNTER
Pt daughter asking to speak with the nurse over concerns that her mother is in the hospital and refusing to take her insulin.    Please call [phone]

## 2020-06-30 NOTE — PROGRESS NOTES
OCCUPATIONAL THERAPY EVALUATION/DISCHARGE  Patient: Salvador Menchaca (71 y.o. female)  Date: 6/30/2020  Primary Diagnosis: Shortness of breath [R06.02]       Precautions: droplet plus       ASSESSMENT  Based on the objective data described below, the patient presents with hospital admission secondary to SOB. Patient today with cough, LE pain/cramping with ambulation and dyspnea on exertion. Patient with work up for Covid 19, results pending. Patient O2 sats decreased to 89% with activity and improve to 92% at highest. Patient demonstrates CGA for ADL tasks and transfers today with HHA. Patient following all commands and eager to accept therapist guidance, education. Patient left in NAD in chair, alarm set. Functional Outcome Measure: The patient scored 65/100 on the Barthel Index outcome measure. Other factors to consider for discharge: none     PLAN :    Recommendation for discharge: (in order for the patient to meet his/her long term goals)  No skilled occupational therapy/ follow up rehabilitation needs identified at this time. This discharge recommendation:  Has not yet been discussed the attending provider and/or case management    IF patient discharges home will need the following DME: none       SUBJECTIVE:   Patient stated the news is depressing.     OBJECTIVE DATA SUMMARY:   HISTORY:   Past Medical History:   Diagnosis Date    ACP (advance care planning) 8/29/2018    Advance Care Plan or Surrogate Decision Maker documented in medical record.     Arthritis     Shoulders, knees    Bell's palsy 1980    Residual remains on right side    Breast cancer ([address] 5/1995    Cholelithiasis 6/23/2011    CKD (chronic kidney disease), stage III ([address] 6/6/2012    COPD (chronic obstructive pulmonary disease) [facility] 12/28/2011    Dyslipidemia 6/23/2011    Elevated triglycerides with high cholesterol 8/28/2015    Esophageal reflux 6/23/2011    Essential hypertension 6/23/2011    GERD (gastroesophageal reflux disease)     Hiatal hernia 6/23/2011    History of breast cancer 6/23/2011    Rt Breast    History of duodenal ulcer 6/23/2011    History of tobacco abuse 9/26/2011    HTN (hypertension) 6/23/2011    Hyperlipidemia 1/7/2013    Left ventricular hypertrophy 6/23/2011    Nicotine dependence 9/2/2015    Obstructive chronic bronchitis with acute bronchitis ([facility] 6/23/2011    Osteopenia     Osteoporosis 6/23/2011    Other ill-defined conditions(799.89)     rightsided eye and cheek numb from surg    Pregnancy 6/23/2011    Steatosis of liver 10/9/2011    Tubular adenoma of colon 1/24/2016 1/4/16    Type 2 diabetes mellitus without complications ([facility] [address] 8/67/2893    Type II or unspecified type diabetes mellitus without mention of complication, not stated as uncontrolled 6/23/2011     Past Surgical History:   Procedure Laterality Date    ENDOSCOPY, COLON, DIAGNOSTIC  10/09    HX CHOLECYSTECTOMY  10/2011    laparoscopic    HX HEENT      bells palsy surgery,tongue numb    HX MASTECTOMY Right 5/1995    HX OTHER SURGICAL      colonoscopy    HX TONSIL AND ADENOIDECTOMY  1969    HX TONSILLECTOMY  1969    NC MASTECTOMY BRA  1995    R Breast    REMOVAL GALLBLADDER         Prior Level of Function/Environment/Context: mod I   Expanded or extensive additional review of patient history:   Home Situation  Home Environment: Apartment  # Steps to Enter: 0  One/Two Story Residence: One story  Living Alone: No  Support Systems: Child(olga)(son is there, dghter is 5 min away)  Patient Expects to be Discharged to[de-identified] Private residence  Current DME Used/Available at Home: None  Tub or Shower Type: Tub/Shower combination    Hand dominance: Right    EXAMINATION OF PERFORMANCE DEFICITS:  Cognitive/Behavioral Status:  Neurologic State: Alert  Orientation Level: Oriented X4  Cognition: Appropriate decision making; Follows commands             Skin: intact as seen    Edema: none noted     Hearing:   Auditory  Auditory Impairment: Hard of hearing, bilateral, Hearing aid(s)  Hearing Aids/Status: At home    Vision/Perceptual:                                     Range of Motion:  AROM: Within functional limits                         Strength:  Strength: Within functional limits                Coordination:  Coordination: Within functional limits  Fine Motor Skills-Upper: Left Intact; Right Intact    Gross Motor Skills-Upper: Left Intact; Right Intact    Tone & Sensation:                            Balance:  Sitting: Intact  Standing: Intact; With support    Functional Mobility and Transfers for ADLs:  Bed Mobility:  Supine to Sit: Supervision  Scooting: Supervision    Transfers:  Sit to Stand: Contact guard assistance  Stand to Sit: Contact guard assistance  Bed to Chair: Contact guard assistance  Bathroom Mobility: Contact guard assistance  Toilet Transfer : Contact guard assistance    ADL Assessment:  Feeding: Independent    Oral Facial Hygiene/Grooming: Contact guard assistance    Bathing: Contact guard assistance    Upper Body Dressing: Contact guard assistance    Lower Body Dressing: Contact guard assistance    Toileting: Contact guard assistance                ADL Intervention and task modifications:                                          Therapeutic Exercise:     Functional Measure:  Barthel Index:    Bathin  Bladder: 5  Bowels: 10  Groomin  Dressing: 10  Feeding: 10  Mobility: 10  Stairs: 0  Toilet Use: 5  Transfer (Bed to Chair and Back): 10  Total: 65/100        The Barthel ADL Index: Guidelines  1. The index should be used as a record of what a patient does, not as a record of what a patient could do. 2. The main aim is to establish degree of independence from any help, physical or verbal, however minor and for whatever reason. 3. The need for supervision renders the patient not independent. 4. A patient's performance should be established using the best available evidence.  Asking the patient, friends/relatives and nurses are the usual sources, but direct observation and common sense are also important. However direct testing is not needed. 5. Usually the patient's performance over the preceding 24-48 hours is important, but occasionally longer periods will be relevant. 6. Middle categories imply that the patient supplies over 50 per cent of the effort. 7. Use of aids to be independent is allowed. Reina Jose., Barthel, D.W. (0153). Functional evaluation: the Barthel Index. [address] [facility] (14)2. BEREKET Dangelo, Delfina Alas., Manuel Villagran., [place], [address] (1999). Measuring the change indisability after inpatient rehabilitation; comparison of the responsiveness of the Barthel Index and Functional [place] Measure. Journal of Neurology, Neurosurgery, and Psychiatry, 66(4), 952-318. ERIC Narayanan, SIA Dye, & Kallie Magallanes M.A. (2004.) Assessment of post-stroke quality of life in cost-effectiveness studies: The usefulness of the Barthel Index and the EuroQoL-5D. Quality of Life Research, 15, 190-00         Occupational Therapy Evaluation Charge Determination   History Examination Decision-Making   LOW Complexity : Brief history review  LOW Complexity : 1-3 performance deficits relating to physical, cognitive , or psychosocial skils that result in activity limitations and / or participation restrictions  LOW Complexity : No comorbidities that affect functional and no verbal or physical assistance needed to complete eval tasks       Based on the above components, the patient evaluation is determined to be of the following complexity level: LOW   Pain Rating:      Activity Tolerance:   Fair and observed SOB with activity  Please refer to the flowsheet for vital signs taken during this treatment.     After treatment patient left in no apparent distress:    Sitting in chair, Call bell within reach and Bed / chair alarm activated    COMMUNICATION/EDUCATION:   The patients plan of care was discussed with: Physical therapist and Registered nurse.      Thank you for this referral.  Gasper Dhillon, OTR/L  Time Calculation: 31 mins

## 2020-06-30 NOTE — DISCHARGE SUMMARY
[facility] [address] [place] [address]   Office [phone]  Fax [phone]       Discharge / Transfer / Off-Service Note     Name: Lynna Alpers MRN: [id number]  Sex: Female   YOB: 1940  Age: 68 y.o. PCP: Florentin Carias MD     Date of admission: 6/29/2020  Date of discharge/transfer: 7/2/2020    Date of admission: 6/29/2020  Date of discharge/transfer: 7/2/2020    Attending physician at admission: Chastity Marie MD  Attending physician at discharge/transfer: Chastity Marie MD  Resident physician at discharge/transfer: Suleman Ledesma MD     Consultants during hospitalization  None     Admission diagnoses   Shortness of breath [R06.02]    Recommended follow-up after discharge  1. PCP     Follow up tests after discharge   -PFT's to formally diagnose COPD   -Sleep study to formally diagnose IDALIA   -BMP to monitor renal function   -A1c in 3 months      Medication Changes:  Pt discharged on the following meds for management of CAP/COPD exacerbation  1. Prednisone  2. Doxycycline        ----------------------------------------------------------------------------------------------------------------------------  The patient was well enough to be discharged from the hospital. However, because they were inpatient in a hospital, they are at greater risk of having been exposed to the coronavirus. PLEASE stay inside and self-quarantine for 14 days to prevent further spread of the coronavirus.  ------------------------------------------------------------------------------------------------------------------    History of Present Illness  Per the admitting physicians H&P Varinder Councilman is a 68 y.o. female with pertinent past medical history of  COPD, HTN, T2DM, GERD, CKD, Dyslipidemia, IDALIA who presents to the ER complaining of LLE swelling and pain. Patient endorses LE edema for about 3 months now and in the past few days it has been increasingly painful to the touch.  Patient reports that previously LE edema would get better after elevation overnight, but not in the past 2 days. Patient denies history of blood clots. Other symptoms include: worsening SOB and productive cough for 2 days. Patient is not on O2 at home, she quit smoking in 2019. She does not follow with pulmonology, and is not on home inhalers.      Off note: patient follows with Dr Yulissa Nayak (cardiology). She reports, she had stress test done and it was unremarkable, she was told she does not have heart failure.      Pt denies fever, chills, chest pain, shortness of breath, numbness, increased headaches, sore throat, eat pain, cough, sick contacts, n/v, abd pain, dysuria, hematuria, slurred speech, facial droop, confusion or unilateral weakness.        In the ER,   vital signs were remarkable for T 98.1 HR 77 /76 RR 16 SpO2 98%. Labs were remarkable for WBC 13.2, Cr 1.42 (BL 1.2) , trop neg x1. CXR showed suspect interstitial edema. Duplex LLE and RLE no thrombus, Reflux in L popliteal vein   Pt was treated with albuterol, lasix 40 x1, solumedrol 125 x1. .      COVID Questions:   Experiencing any of the following symptoms: - fever, - chills, +cough, + SOB, -URI symptoms.   Denies any Sick contacts with fever, cough, diarrhea, SOB, URI symptoms.   Denies travel out of state or out of country. \"    Hospital course  Juan Pablo Licona was admitted into the Family Medicine Service from 6/29/2020 to 7/1/2020 for Shortness of breath [R06.02] 2/2 CAP/COPD exacerbation. During the course of this admission, the following conditions were addressed/managed;    SOB 2/2 COPD exacerbation w/ CAP  - CXR with RUL PNA, elevated WBC count, productive cough, worsening SOB. Her BNP was low and trop was neg x1, ECHO shows mild concentric hypertrophy w/ normal cavity size and systolic function, no known CHF per patient. Procal <0.05. SARS-COV-2 neg. Procal <0.05. Blood cx no growth 24hrs.    - Continue Doxy 100 BID PO for 10 day course (last dose 7/8)  - Prednisone 40mg burst (last dose 7/3)   - Mucinex PRN  - PCP follow-up     LE pain and edema - no redness, appropriately tender, +1 edema after lasix in ED. Also patient reports pain is better after diuretics. [address] with EF 60-65% and Normal nuclear stress test 09/14/2015 with LVEF 61%. Normal cardiac tests in 2019 per patient. No further lasix doses required.      Elevated Cr; RESOLVED  POA Cr 1.42 ;likely 2/2 IVVD. Back at baseline (1.2-1.3) by time of discharge.      Diabetes Mellitus: Last HgA1C 7.3 on 6/20 - appropriate for her age group. Diet controlled. Allergic to metformin.      Hypertension: On admission BP was 162/76.   -Continue home Cozaar 100, Norvasc 10 and Toprol 200.      Hyperlipidemia: Last lipid 6/20  .    - Continue home crestor     IDALIA: never was on c-pap d/t claustrophobia   - follow up outpatient for sleep study     GERD: stable  - continue home prilosec     Obesity: Body mass index is 30.18 kg/m². - encourage lifestyle modifications     Condition at discharge: Stable. Labs  Recent Labs     07/01/20 0443 06/30/20 0430 06/29/20 1911   WBC 16.0* 12.0* 13.2*   HGB 12.4 13.0 12.3   HCT 37.6 39.6 38.3    295 287     Recent Labs     07/01/20  0443 06/30/20 0430 06/29/20 1911    136 142   K 4.5 4.3 4.6    103 108   CO2 29 26 26   BUN 40* 28* 26*   CREA 1.33* 1.42* 1.42*   * 272* 99   CA 9.0 9.4 9.3   MG  --  1.6 1.7   PHOS  --  3.9  --      Recent Labs     06/29/20 1911   ALT 27   AP 35*   TBILI 0.2   TP 7.4   ALB 3.3*   GLOB 4.1*     Recent Labs     07/01/20  1128 07/01/20  0631 06/30/20 2051 06/30/20  1628 06/30/20  1205  06/30/20  0430 06/29/20  1911   TROIQ  --   --   --   --   --   --  <0.05 <0.05   INR  --   --   --   --   --   --   --  1.0   PTP  --   --   --   --   --   --   --  10.5   FERR  --   --   --   --   --   --   --  30   GLUCPOC 182* 147* 210* 224* 262*   < >  --   --     < > = values in this interval not displayed. Cultures  Results     Procedure Component Value Units Date/Time    WENDY Molina URINE [[id number]] Collected:  06/30/20 0430    Order Status:  Completed Specimen:  Urine Updated:  07/01/20 1636     Source URINE        L pneumophila S1 Ag, urine Negative        Comment: (NOTE)  Presumptive negative for L. pneumophila serogroup 1 antigen in urine,  suggesting no recent or current infection. Legionnaires' disease  cannot be ruled out since other serogroups and species may also  cause disease. Performed At: [address] [id number]  Africa Stein MD NQ:[id number]         BLOSSOM Bhakta, UR/CSF [[id number]] Collected:  06/30/20 0430    Order Status:  Completed Specimen:  Miscellaneous sample Updated:  07/01/20 1636     Source URINE        Specimen Urine     Streptococcus pneumoniae Ag Negative        Fluid culture Not indicated. Organism ID Not indicated. Please note Comment        Comment: (NOTE)  College of [employer]ologists standards require a culture to be  performed on CSF specimens submitted for bacterial antigen testing. (CAP [id number]) Urine specimens will not be cultured.   Performed At: [address] [id number]  Africa Stein MD ML:[id number]         [facility] [place] [address] [[id number]]     Order Status:  Sent Specimen:  NASOPHARYNGEAL SWAB     CULTURE, RESPIRATORY/SPUTUM/BRONCH Verlie Nam STAIN [[id number]]     Order Status:  Sent Specimen:  Sputum     LEGIONELLA PNEUMOPHILA AG, URINE [[id number]]     Order Status:  Canceled Specimen:  Urine     CULTURE, URINE [[id number]] Collected:  06/29/20 2330    Order Status:  Canceled Specimen:  Urine from Clean catch     CULTURE, BLOOD [[id number]] Collected:  06/29/20 2311    Order Status:  Completed Specimen:  Blood Updated:  07/02/20 0709     Special Requests: NO SPECIAL REQUESTS        Culture result: NO GROWTH 2 DAYS                   Imaging  Results from Hospital Encounter encounter on 06/29/20   XR CHEST PORT    Narrative EXAM: XR CHEST PORT    INDICATION: Shortness of breath. Evaluation for pneumonia    COMPARISON: Prior day    FINDINGS: A portable AP radiograph of the chest was obtained at 1417 hours. The  patient is on a cardiac monitor. The cardiomediastinal silhouette is normal.  There is a slight accentuation of interstitial markings, asymmetric to the right  upper lobe. Impression IMPRESSION: Right upper lobe pneumonia                            No procedure found. Chronic diagnoses   Problem List as of 7/1/2020 Date Reviewed: 6/29/2020          Codes Class Noted - Resolved    * (Principal) Shortness of breath ICD-10-CM: R06.02  ICD-9-CM: 786.05  6/29/2020 - Present        ACP (advance care planning) ICD-10-CM: Z71.89  ICD-9-CM: V65.49  8/29/2018 - Present    Overview Signed 8/29/2018 12:05 PM by Allison Shafer MD     Advance Care Plan or Surrogate Decision Maker documented in medical record. H/O hysterectomy for benign disease ICD-10-CM: Z90.710  ICD-9-CM: V88.01  4/27/2017 - Present    Overview Signed 4/27/2017  9:26 AM by Lethia Lundborg     Pt still has cervix, had surgery for NALLELY and vaginal prolapse.               Tubular adenoma of colon ICD-10-CM: D12.6  ICD-9-CM: 211.3  1/24/2016 - Present    Overview Signed 1/24/2016  4:11 PM by Allison Shafer MD     1/4/16             IDALIA (obstructive sleep apnea) ICD-10-CM: Q10.21  ICD-9-CM: 327.23  9/2/2015 - Present        Elevated triglycerides with high cholesterol ICD-10-CM: E78.2  ICD-9-CM: 272.2  8/28/2015 - Present        CKD (chronic kidney disease), stage III ([facility] ICD-10-CM: N18.3  ICD-9-CM: 585.3  6/6/2012 - Present        Osteopenia ICD-10-CM: M85.80  ICD-9-CM: 733.90  Unknown - Present        COPD (chronic obstructive pulmonary disease) ([facility] ICD-10-CM: J44.9  ICD-9-CM: 496  12/28/2011 - Present        Steatosis of liver ICD-10-CM: K76.0  ICD-9-CM: 571.8  10/9/2011 - Present    Overview Signed 10/9/2011  3:54 PM by Bereket Valiente MD     On abdominal CT 10/2011             Bell's palsy ICD-10-CM: G51.0  ICD-9-CM: 351.0  6/23/2011 - Present        Cholelithiasis ICD-10-CM: K80.20  ICD-9-CM: 574.20  6/23/2011 - Present        History of breast cancer ICD-10-CM: Z85.3  ICD-9-CM: V10.3  6/23/2011 - Present    Overview Signed 6/23/2011  3:55 PM by Lauryn Welch     Rt Breast             History of duodenal ulcer ICD-10-CM: Z87.19  ICD-9-CM: V12.79  6/23/2011 - Present        Esophageal reflux ICD-10-CM: K21.9  ICD-9-CM: 530.81  6/23/2011 - Present        Hiatal hernia ICD-10-CM: K44.9  ICD-9-CM: 553.3  6/23/2011 - Present        Essential hypertension ICD-10-CM: I10  ICD-9-CM: 401.9  6/23/2011 - Present        Type 2 diabetes mellitus without complications ([facility] HTJ-18-IA: E11.9  ICD-9-CM: 250.00  6/23/2011 - Present        Left ventricular hypertrophy ICD-10-CM: I51.7  ICD-9-CM: 429.3  6/23/2011 - Present        RESOLVED: IDALIA (obstructive sleep apnea) ICD-10-CM: G47.33  ICD-9-CM: 327.23  9/2/2015 - 9/2/2015        RESOLVED: Hyperlipidemia ICD-10-CM: E78.5  ICD-9-CM: 272.4  1/7/2013 - 9/2/2015        RESOLVED: Osteoporosis ICD-10-CM: M81.0  ICD-9-CM: 733.00  6/23/2011 - 5/7/2012        Dermatochalasis ICD-10-CM: R37.492  ICD-9-CM: 374.87  8/13/2018 - Present        After-cataract with vision obscured ICD-10-CM: H26.499  ICD-9-CM: 366.53  2/18/2016 - Present        Pseudophakia of both eyes ICD-10-CM: Z96.1  ICD-9-CM: V43.1  2/18/2016 - Present        Tobacco abuse ICD-10-CM: Z72.0  ICD-9-CM: 305.1  9/26/2011 - Present              Discharge/Transfer Medications  Discharge Medication List as of 7/1/2020  2:56 PM      START taking these medications    Details   guaiFENesin ER (MUCINEX) 600 mg ER tablet Take 1 Tab by mouth every twelve (12) hours. , Normal, Disp-30 Tab, R-0      predniSONE (DELTASONE) 20 mg tablet Take 40 mg by mouth daily (with breakfast) for 3 days. , Normal, Disp-6 Tab, R-0      doxycycline (VIBRA-TABS) 100 mg tablet Take 1 Tab by mouth every twelve (12) hours for 8 days. , Normal, Disp-15 Tab, R-0, DARCI         CONTINUE these medications which have NOT CHANGED    Details   losartan (COZAAR) 100 mg tablet TAKE ONE TABLET BY MOUTH DAILY, Normal, Disp-90 Tab, R-3      metoprolol succinate (TOPROL-XL) 200 mg XL tablet TAKE ONE TABLET BY MOUTH DAILY, Normal, Disp-90 Tab, R-3      omeprazole (PRILOSEC) 40 mg capsule TAKE ONE CAPSULE BY MOUTH DAILY, Normal, Disp-90 Cap, R-3      amLODIPine (NORVASC) 10 mg tablet TAKE ONE TABLET BY MOUTH DAILY, Normal, Disp-90 Tab, R-4      rosuvastatin (CRESTOR) 20 mg tablet TAKE ONE TABLET BY MOUTH EVERY EVENING, Normal, Disp-90 Tab, R-3      omega-3 fatty acids-vitamin e (FISH OIL) 1,000 mg cap Take 2 Caps by mouth daily (after breakfast). , Historical Med      calcium-vitamin D (CALCIUM 500+D) 500 mg(1,250mg) -200 unit per tablet Take 1 Tab by mouth daily (after breakfast). , Historical Med      aspirin 81 mg tablet Take 81 mg by mouth daily (after breakfast). , Historical Med      multivitamin (ONE A DAY) tablet Take 1 Tab by mouth daily (after breakfast). , Historical Med              Diet:  Regular diet.     Activity:  As tolerated    Disposition: Home or Self Care    Discharge instructions to patient/family  Please seek medical attention for any new or worsening symptoms particularly fever, chest pain, shortness of breath, abdominal pain, nausea, vomiting    Follow up plans/appointments  Follow-up Information     Follow up With Specialties Details Why Contact Info    Juwan West MD Family Practice Call on 7/7/2020 transition of care after admission VIRTUAL VISIT at 11:15AM [facility]  [address] MD  Family Medicine Resident     For Billing    Chief Complaint   Patient presents with   Emeka Mushtaq Leg Swelling       Hospital Problems  Date Reviewed: 6/29/2020          Codes Class Noted POA    * (Principal) Shortness of breath ICD-10-CM: R06.02  ICD-9-CM: 786.05  6/29/2020 Unknown        IDALIA (obstructive sleep apnea) ICD-10-CM: G47.33  ICD-9-CM: 327.23  9/2/2015 Yes        COPD (chronic obstructive pulmonary disease) (HCC) ICD-10-CM: J44.9  ICD-9-CM: 496  12/28/2011 Yes        Tobacco abuse ICD-10-CM: Z72.0  ICD-9-CM: 305.1  9/26/2011 Unknown        Esophageal reflux ICD-10-CM: K21.9  ICD-9-CM: 530.81  6/23/2011 Yes        Essential hypertension ICD-10-CM: I10  ICD-9-CM: 401.9  6/23/2011 Yes

## 2020-06-30 NOTE — TELEPHONE ENCOUNTER
Pt's daughter Michael Alcon called again stating pt is still refusing to take insulin injection while in hospital because she's never been on insulin, has not been on any diabetes medications since [facility] had her discontinue them, normal blood sugars no higher than 140 or 150\"s but now 278, and pt is terrified to take insulin. Pt's daughter also upset because pt has been tested for Covid and she is not allowed to see her. Please advise.  Heidi

## 2020-06-30 NOTE — PROGRESS NOTES
Notified By Telemetry that PT had five beats of non sustained V-Tach. PT is undergoing an Echo. No observed s/s of distress or discomfort. Will notify MD. Will continue to monitor. MD Nikhil Causey notified. Lab order to follow.

## 2020-07-01 VITALS
TEMPERATURE: 97.4 F | SYSTOLIC BLOOD PRESSURE: 122 MMHG | RESPIRATION RATE: 18 BRPM | HEART RATE: 65 BPM | OXYGEN SATURATION: 98 % | WEIGHT: 165 LBS | HEIGHT: 62 IN | BODY MASS INDEX: 30.36 KG/M2 | DIASTOLIC BLOOD PRESSURE: 71 MMHG

## 2020-07-01 PROBLEM — H02.839 DERMATOCHALASIS: Status: ACTIVE | Noted: 2018-08-13

## 2020-07-01 LAB
ANION GAP SERPL CALC-SCNC: 5 MMOL/L (ref 5–15)
BASOPHILS # BLD: 0 K/UL (ref 0–0.1)
BASOPHILS NFR BLD: 0 % (ref 0–1)
BUN SERPL-MCNC: 40 MG/DL (ref 6–20)
BUN/CREAT SERPL: 30 (ref 12–20)
CALCIUM SERPL-MCNC: 9 MG/DL (ref 8.5–10.1)
CHLORIDE SERPL-SCNC: 106 MMOL/L (ref 97–108)
CO2 SERPL-SCNC: 29 MMOL/L (ref 21–32)
CREAT SERPL-MCNC: 1.33 MG/DL (ref 0.55–1.02)
DIFFERENTIAL METHOD BLD: ABNORMAL
EOSINOPHIL # BLD: 0 K/UL (ref 0–0.4)
EOSINOPHIL NFR BLD: 0 % (ref 0–7)
ERYTHROCYTE [DISTWIDTH] IN BLOOD BY AUTOMATED COUNT: 13 % (ref 11.5–14.5)
FLUID CULTURE, SPNG2: NORMAL
GLUCOSE BLD STRIP.AUTO-MCNC: 147 MG/DL (ref 65–100)
GLUCOSE BLD STRIP.AUTO-MCNC: 182 MG/DL (ref 65–100)
GLUCOSE SERPL-MCNC: 144 MG/DL (ref 65–100)
HCT VFR BLD AUTO: 37.6 % (ref 35–47)
HGB BLD-MCNC: 12.4 G/DL (ref 11.5–16)
IL6 SERPL-MCNC: 9.4 PG/ML (ref 0–15.5)
IMM GRANULOCYTES # BLD AUTO: 0.1 K/UL (ref 0–0.04)
IMM GRANULOCYTES NFR BLD AUTO: 1 % (ref 0–0.5)
L PNEUMO1 AG UR QL IA: NEGATIVE
LYMPHOCYTES # BLD: 2.4 K/UL (ref 0.8–3.5)
LYMPHOCYTES NFR BLD: 15 % (ref 12–49)
MCH RBC QN AUTO: 30.1 PG (ref 26–34)
MCHC RBC AUTO-ENTMCNC: 33 G/DL (ref 30–36.5)
MCV RBC AUTO: 91.3 FL (ref 80–99)
MONOCYTES # BLD: 1.2 K/UL (ref 0–1)
MONOCYTES NFR BLD: 7 % (ref 5–13)
NEUTS SEG # BLD: 12.2 K/UL (ref 1.8–8)
NEUTS SEG NFR BLD: 77 % (ref 32–75)
NRBC # BLD: 0 K/UL (ref 0–0.01)
NRBC BLD-RTO: 0 PER 100 WBC
ORGANISM ID, SPNG3: NORMAL
PLATELET # BLD AUTO: 312 K/UL (ref 150–400)
PLEASE NOTE, SPNG4: NORMAL
PMV BLD AUTO: 9.1 FL (ref 8.9–12.9)
POTASSIUM SERPL-SCNC: 4.5 MMOL/L (ref 3.5–5.1)
RBC # BLD AUTO: 4.12 M/UL (ref 3.8–5.2)
S PNEUM AG SPEC QL LA: NEGATIVE
SERVICE CMNT-IMP: ABNORMAL
SERVICE CMNT-IMP: ABNORMAL
SODIUM SERPL-SCNC: 140 MMOL/L (ref 136–145)
SPECIMEN SOURCE: NORMAL
SPECIMEN SOURCE: NORMAL
SPECIMEN, SPNG1: NORMAL
WBC # BLD AUTO: 16 K/UL (ref 3.6–11)

## 2020-07-01 PROCEDURE — 74011636637 HC RX REV CODE- 636/637: Performed by: FAMILY MEDICINE

## 2020-07-01 PROCEDURE — 82962 GLUCOSE BLOOD TEST: CPT

## 2020-07-01 PROCEDURE — 74011250637 HC RX REV CODE- 250/637: Performed by: FAMILY MEDICINE

## 2020-07-01 PROCEDURE — 74011636637 HC RX REV CODE- 636/637: Performed by: STUDENT IN AN ORGANIZED HEALTH CARE EDUCATION/TRAINING PROGRAM

## 2020-07-01 PROCEDURE — 85025 COMPLETE CBC W/AUTO DIFF WBC: CPT

## 2020-07-01 PROCEDURE — 74011250636 HC RX REV CODE- 250/636: Performed by: FAMILY MEDICINE

## 2020-07-01 PROCEDURE — 36415 COLL VENOUS BLD VENIPUNCTURE: CPT

## 2020-07-01 PROCEDURE — 80048 BASIC METABOLIC PNL TOTAL CA: CPT

## 2020-07-01 RX ORDER — GUAIFENESIN 600 MG/1
600 TABLET, EXTENDED RELEASE ORAL EVERY 12 HOURS
Qty: 30 TAB | Refills: 0 | Status: SHIPPED | OUTPATIENT
Start: 2020-07-01 | End: 2020-07-16

## 2020-07-01 RX ORDER — DOXYCYCLINE HYCLATE 100 MG
100 TABLET ORAL EVERY 12 HOURS
Qty: 15 TAB | Refills: 0 | Status: SHIPPED | OUTPATIENT
Start: 2020-07-01 | End: 2020-07-09

## 2020-07-01 RX ORDER — PREDNISONE 20 MG/1
40 TABLET ORAL
Qty: 6 TAB | Refills: 0 | Status: SHIPPED | OUTPATIENT
Start: 2020-07-02 | End: 2020-07-05

## 2020-07-01 RX ADMIN — METOPROLOL SUCCINATE 200 MG: 50 TABLET, EXTENDED RELEASE ORAL at 09:06

## 2020-07-01 RX ADMIN — HEPARIN SODIUM 5000 UNITS: 5000 INJECTION INTRAVENOUS; SUBCUTANEOUS at 06:48

## 2020-07-01 RX ADMIN — Medication 10 ML: at 06:48

## 2020-07-01 RX ADMIN — INSULIN LISPRO 2 UNITS: 100 INJECTION, SOLUTION INTRAVENOUS; SUBCUTANEOUS at 09:08

## 2020-07-01 RX ADMIN — GUAIFENESIN 600 MG: 600 TABLET ORAL at 09:07

## 2020-07-01 RX ADMIN — PANTOPRAZOLE SODIUM 40 MG: 40 TABLET, DELAYED RELEASE ORAL at 06:48

## 2020-07-01 RX ADMIN — Medication 10 ML: at 09:11

## 2020-07-01 RX ADMIN — ZINC SULFATE 220 MG (50 MG) CAPSULE 1 CAPSULE: CAPSULE at 09:08

## 2020-07-01 RX ADMIN — DOXYCYCLINE HYCLATE 100 MG: 100 TABLET, COATED ORAL at 09:07

## 2020-07-01 RX ADMIN — LOSARTAN POTASSIUM 100 MG: 50 TABLET ORAL at 09:07

## 2020-07-01 RX ADMIN — PREDNISONE 40 MG: 20 TABLET ORAL at 09:07

## 2020-07-01 RX ADMIN — Medication 500 MG: at 09:07

## 2020-07-01 RX ADMIN — INSULIN LISPRO 182 UNITS: 100 INJECTION, SOLUTION INTRAVENOUS; SUBCUTANEOUS at 12:09

## 2020-07-01 RX ADMIN — AMLODIPINE BESYLATE 10 MG: 5 TABLET ORAL at 09:06

## 2020-07-01 NOTE — PROGRESS NOTES
7/1/2020 1:07 PM Met with pt and pt's daughter at bedside, confirmed discharge. Pt's daughter will transport pt home. [facility] information provided to pt and pt's daughter. Pt has a PCP follow up scheduled for 7/7/2020. No further needs identified.  GEORGINA Quan    Care Management Interventions  PCP Verified by CM: Sandra Castrejon )  Transition of Care Consult (CM Consult): Discharge Planning  MyChart Signup: No  Discharge Durable Medical Equipment: No  Physical Therapy Consult: Yes  Occupational Therapy Consult: Yes  Speech Therapy Consult: No  Current Support Network: Relative's Home  Confirm Follow Up Transport: Family  Discharge Location  Discharge Placement: Home with family assistance

## 2020-07-01 NOTE — DISCHARGE INSTRUCTIONS
HOME DISCHARGE INSTRUCTIONS    Rm Díaz / [id number] : 1940    Admission date: 2020 Discharge date: 2020 2:41 PM     Please bring this form with you to show your care provider at your follow-up appointment. Primary care provider:  Isidro Farrar MD    Discharging provider:  Luma Dailey MD  - Family Medicine Resident  Drew Choi MD - Family Medicine Attending      You have been admitted to the hospital with the following diagnoses:    ACUTE DIAGNOSES:  Shortness of breath [R06.02]    . . . . . . . . . . . . . . . . . . . . . . . . . . . . . . . . . . . . . . . . . . . . . . . . . . . . . . . . . . . . . . . . . . . . . . . .   You are well enough to be discharged from the hospital. However, because you were inpatient in a hospital, you are at greater risk of having been exposed to the coronavirus. PLEASE stay inside and self-quarantine for 14 days to prevent further spread of the coronavirus. . . . . . . . . . . . . . . . . . . . . . . . . . . . . . . . . . . . . . . . . . . . . . . . . . . . . . . . . . . . . . . . . . . . . . . . .     . . . . . . . . . . . . . . . . . . . . . . . . . . . . . . . . . . . . . . . . . . . . . . . . . . . . . . . . . . . . . . . . . . . . . . . Shelton Schulz FOLLOW-UP CARE RECOMMENDATIONS:    Appointments  Follow-up Information     Follow up With Specialties Details Why Contact Info    Isidro Farrar MD Family Practice Call on 2020 transition of care after admission VIRTUAL VISIT at 11:15AM [address] [place] [address]  [phone]               Follow-up tests needed:   - Follow up with pulmonology for pulmonary function testing to evaluate for COPD   -Sleep study to formally diagnose IDALIA   -BMP to monitor renal function   -A1c in 3 months to monitor blood sugars    Pending test results: At the time of your discharge the following test results are still pending: Legionella, S. pneumo, final blood cultures.    Please make sure you review these results with your outpatient follow-up provider(s). DIET/what to eat:  Regular Diet    ACTIVITY:  Activity as tolerated    Wound care: NONE    Specific symptoms to watch for: chest pain, shortness of breath, fever, chills, nausea, vomiting, diarrhea, change in mentation, falling, weakness, bleeding. What to do if new or unexpected symptoms occur? If you experience any of the above symptoms (or should other concerns or questions arise after discharge) please call your primary care physician. Return to the emergency room if you cannot get hold of your doctor. · It is very important that you keep your follow-up appointment(s). · Please bring discharge papers, medication list (and/or medication bottles) to your follow-up appointments for review by your outpatient provider(s). · Please check the list of medications and be sure it includes every medication (even non-prescription medications) that your provider wants you to take. · It is important that you take the medication exactly as they are prescribed. · Keep your medication in the bottles provided by the pharmacist and keep a list of the medication names, dosages, and times to be taken in your wallet. · Do not take other medications without consulting your doctor. · If you have any questions about your medications or other instructions, please talk to your nurse or care provider before you leave the hospital.     Information obtained by:     I understand that if any problems occur once I am at home I am to contact my physician. These instructions were explained to me and I had the opportunity to ask questions. I understand and acknowledge receipt of the instructions indicated above.                                                                                                                                                Physician's or R.N.'s Signature Date/Time                                                                                                                                              Patient or Representative Signature                                                          Date/Time

## 2020-07-01 NOTE — PROGRESS NOTES
I have reviewed discharge instructions with the PT and daughter Jayson Carrington. They both verbalized understanding.

## 2020-07-01 NOTE — PROGRESS NOTES
Pt's daughter Kadi Hidden called and wanted to pass on message that she wants the doctors to call her when they round in the AM. Will pass onto day shift nurse as well.

## 2020-07-01 NOTE — PROGRESS NOTES
[address] [place] [address]   Office [phone]  Fax [phone]          Assessment and Plan     Carmel Joyner is a 68 y.o. female t past medical history of  COPD, HTN, T2DM, GERD, CKD, Dyslipidemia, IDALIA who is admitted for SOB 2/2 CAP vs COPD exacerbation vs CHF exacerbation. 24 Hour Events: No acute events.       SOB 2/2 COPD exacerbation w/ CAP  - CXR with RUL PNA, elevated WBC count, productive cough, worsening SOB. Her BNP was low and trop was neg x1, ECHO shows mild concentric hypertrophy w/ normal cavity size and systolic function, no known CHF per patient. Her LLE edema got better after 1 dose of lasix in ED. Procal <0.05. SARS-COV-2 neg. Pt clinically improved today. Procal <0.05. Blood cx no growth 24hrs. - d/c droplet precautions   - Doxy 100 BID PO for 10 day course (last dose 7/8)  - Prednisone 40mg burst (last dose 7/3)   - Duo-neb Q4hrs PRN  - Mucinex   - Strict I&Os  - d/c zinc, vit c     LE pain and edema - no redness, appropriately tender, +1 edema after lasix in ED. Also patient reports pain is better after diuretics. ECHO with EF 60-65% and Normal nuclear stress test 09/14/2015 with LVEF 61%. Normal cardiac tests in 2019 per patient. - consider lasix PRN or scheduled based on exam findings      Elevated Cr; admission Cr 1.42. Baseline 1.2. Today downtrending at 1.33.  Etiology may be secondary to IVVD. Avoid nephrotoxic agents.  -Encourage PO fluids     Diabetes Mellitus: Last HgA1C 7.3 on 6/20 - appropriate for her age group. Diet controlled. Allergic to metformin.   - Monitor BG  - SSI ordered     Hypertension: On admission BP was 162/76. Normally taking Cozaar 100, Norvasc 10 and Toprol 200. Good compliance per med rec verified with pharmacy. - Continue home meds   - Will continue to monitor at this time and readjust as BP's trend.     Hyperlipidemia: Last lipid 6/20  .  On Crestor 20 at home   - Continue home meds     IDALIA: never was on c-pap d/t claustrophobia   - follow up outpatient      GERD: stable  - continue home prilosec substitute  per hospital formulary      Obesity: Body mass index is 30.18 kg/m². - encourage lifestyle modifications         FEN/GI - diabetic diet. Activity - with assiatnce   DVT prophylaxis - Heparin  GI prophylaxis -  Protonix  Disposition - Plan to home     Dtr Amy De Anda: [id number]     CODE STATUS:  DNR    Toya Valdez MD  Family Medicine Resident         Subjective / Objective     Subjective Pt out of bed and alert. States she feels much better, and like the mucus in her chest has \"broken up. \" Denies fever, cough, SOB, pain, n/v/d. Temp (24hrs), Av °F (36.7 °C), Min:97.4 °F (36.3 °C), Max:98.5 °F (36.9 °C)     Objective:  Vital signs: (most recent): Blood pressure 143/65, pulse 77, temperature 98.5 °F (36.9 °C), resp. rate 18, height 5' 2\" (1.575 m), weight 165 lb (74.8 kg), SpO2 94 %. Physical Exam  Constitutional:       Appearance: Normal appearance. Cardiovascular:      Rate and Rhythm: Normal rate and regular rhythm. Pulses: Normal pulses. Heart sounds: Normal heart sounds. No murmur. No friction rub. Pulmonary:      Effort: Pulmonary effort is normal.      Comments: good air movement, no wheezing  Abdominal:      General: Bowel sounds are normal. There is no distension. Palpations: Abdomen is soft. Tenderness: There is no abdominal tenderness. Musculoskeletal: Normal range of motion. General: No tenderness. Right lower leg: Trace edema present. Left lower leg: Trace edema present. Skin:     General: Skin is warm. Capillary Refill: Capillary refill takes less than 2 seconds. Coloration: Skin is not jaundiced or pale. Neurological:      General: No focal deficit present.    Psychiatric:         Mood and Affect: Mood normal.        Respiratory:   O2 Device: Room air     I/O:      Inpatient Medications  Current Facility-Administered Medications Medication Dose Route Frequency    albuterol-ipratropium (DUO-NEB) 2.5 MG-0.5 MG/3 ML  3 mL Nebulization Q4H PRN    guaiFENesin ER (MUCINEX) tablet 600 mg  600 mg Oral Q12H    predniSONE (DELTASONE) tablet 40 mg  40 mg Oral DAILY WITH BREAKFAST    sodium chloride (NS) flush 5-40 mL  5-40 mL IntraVENous Q8H    sodium chloride (NS) flush 5-40 mL  5-40 mL IntraVENous PRN    heparin (porcine) injection 5,000 Units  5,000 Units SubCUTAneous Q8H    ascorbic acid (vitamin C) (VITAMIN C) tablet 500 mg  500 mg Oral BID    zinc sulfate (ZINCATE) 220 (50) mg capsule 1 Cap  1 Cap Oral DAILY    glucose chewable tablet 16 g  4 Tab Oral PRN    dextrose (D50W) injection syrg 12.5-25 g  25-50 mL IntraVENous PRN    glucagon (GLUCAGEN) injection 1 mg  1 mg IntraMUSCular PRN    insulin lispro (HUMALOG) injection   SubCUTAneous AC&HS    pantoprazole (PROTONIX) tablet 40 mg  40 mg Oral ACB    rosuvastatin (CRESTOR) tablet 20 mg  20 mg Oral QHS    losartan (COZAAR) tablet 100 mg  100 mg Oral DAILY    metoprolol succinate (TOPROL-XL) XL tablet 200 mg  200 mg Oral DAILY    amLODIPine (NORVASC) tablet 10 mg  10 mg Oral DAILY    doxycycline (VIBRA-TABS) tablet 100 mg  100 mg Oral Q12H         Allergies  Allergies   Allergen Reactions    Nsaids (Non-Steroidal Anti-Inflammatory Drug) Anaphylaxis and Hives    Fenofibrate Other (comments)     Leg cramps    Metformin Diarrhea         CBC:  Recent Labs     07/01/20  0443 06/30/20  0430 06/29/20 1911   WBC 16.0* 12.0* 13.2*   HGB 12.4 13.0 12.3   HCT 37.6 39.6 38.3    295 299       Metabolic Panel:  Recent Labs     07/01/20  0443 06/30/20  0430 06/29/20 1911    136 142   K 4.5 4.3 4.6    103 108   CO2 29 26 26   BUN 40* 28* 26*   CREA 1.33* 1.42* 1.42*   * 272* 99   CA 9.0 9.4 9.3   MG  --  1.6 1.7   PHOS  --  3.9  --    ALB  --   --  3.3*   ALT  --   --  27   INR  --   --  1.0              For Billing    Chief Complaint   Patient presents with   Acrpenter Leg Swelling       Hospital Problems  Date Reviewed: 6/29/2020          Codes Class Noted POA    * (Principal) Shortness of breath ICD-10-CM: R06.02  ICD-9-CM: 786.05  6/29/2020 Unknown        IDALIA (obstructive sleep apnea) ICD-10-CM: G47.33  ICD-9-CM: 327.23  9/2/2015 Yes        COPD (chronic obstructive pulmonary disease) (HCC) ICD-10-CM: J44.9  ICD-9-CM: 496  12/28/2011 Yes        Tobacco abuse ICD-10-CM: Z72.0  ICD-9-CM: 305.1  9/26/2011 Unknown        Esophageal reflux ICD-10-CM: K21.9  ICD-9-CM: 530.81  6/23/2011 Yes        Essential hypertension ICD-10-CM: I10  ICD-9-CM: 401.9  6/23/2011 Yes

## 2020-07-02 ENCOUNTER — PATIENT OUTREACH (OUTPATIENT)
Dept: INTERNAL MEDICINE CLINIC | Age: 80
End: 2020-07-02

## 2020-07-02 ENCOUNTER — TELEPHONE (OUTPATIENT)
Dept: FAMILY MEDICINE CLINIC | Age: 80
End: 2020-07-02

## 2020-07-02 NOTE — PROGRESS NOTES
Patient was admitted to [facility] on  and discharged on  for DX. SOB. Patient was contacted within 2 business days of discharge. Top Discharge Challenges to be reviewed by the provider   Additional needs identified to be addressed with provider yes  Component      Latest Ref Rng & Units 2020       BUN      6 - 20 MG/DL 28 (H)   Creatinine      0.55 - 1.02 MG/DL 1.42 (H)     Component      Latest Ref Rng & Units 2020       GFR est AA      >60 ml/min/1.73m2 43 (L)   GFR est non-AA      >60 ml/min/1.73m2 36 (L)     2020     Hemoglobin A1c 7.3      Component      Latest Ref Rng & Units 2020       WBC      3.6 - 11.0 K/uL 16.0 (H)     Discussed COVID-19 related testing which was available at this time. Test results were negative. Patient informed of results, if available? yes   Method of communication with provider : chart routing       Advance Care Planning:   Does patient have an Advance Directive:  reviewed and needs to be updated     Was this a readmission? yes   Patient stated reason for the admission: SOB  Patients top risk factors for readmission: medical condition  Interventions to address risk factors: exercise (amb), take medications as directed, follow d/c instructions, f/u with physician/surg. Care Transition Nurse (CTN) contacted the patient by telephone to perform post hospital discharge assessment. Verified name and  with patient as identifiers. Provided introduction to self, and explanation of the CTN role. CTN reviewed discharge instructions, medical action plan and red flags with patient who verbalized understanding. Patient given an opportunity to ask questions and does not have any further questions or concerns at this time. The patient agrees to contact the PCP office for questions related to their healthcare. Medication reconciliation was performed with patient, who verbalizes understanding of administration of home medications.  Advised obtaining a 90-day supply of all daily and as-needed medications. START  1. Prednisone 20 mg - take 40 mg by mouth daily (with breakfast) for 3 days  2. Doxycycline 100 mg - take 1 tab by mouth every 12 hours for 8 days  3. guaiFENesin ER (MUCINEX) 600 mg 1 tab by mouth every 12 hours  Referral to Pharm D needed: no     Covid Risk Education    Patient has following risk factors of: COPD, diabetes, chronic kidney disease and HTN, advanced age. Education provided regarding infection prevention, and signs and symptoms of COVID-19 and when to seek medical attention with patient who verbalized understanding. Discussed exposure protocols and quarantine From CDC: Are you at higher risk for severe illness?  and given an opportunity for questions and concerns. The patient agrees to contact the PCP office for questions related to COVID-19. For more information on steps you can take to protect yourself, see CDC's How to Protect Yourself     Patient/family/caregiver given information for [facility] and agrees to enroll no  Discussed follow-up appointments. Dr Fabiana Coy 7/7/20 office visit  [facility] follow up appointment(s):   Future Appointments   Date Time Provider Yari Morrow   7/6/2020 11:15 AM Ahmet Lorenzo MD [address] [place] [address]   8/12/2020  9:15 AM Ahmet Lorenzo MD [place] [address] for follow-up call in 7-10 days based on severity of symptoms and risk factors. CTN provided contact information for future needs. Goals      Establish PCP relationships and regularly scheduled appointments. 7/2 Patient will attend  follow-up with PCP and specialist as directed, keep a list of her medications she take and FBS readings to bring to f/u appointments. Pt.reports she has scheduled office visit with PCP, on 7/7/20, pt.will discuss the need for Pulmonary and Sleep Study f/u.  CTN provided pt.with information to [facility] [phone])  explain briefly type of service;  contact information provided. CTN will f/u with pt.in 1-2 weeks. -[address]

## 2020-07-02 NOTE — TELEPHONE ENCOUNTER
Attempted to contact Ruthie Chao at the number on file, no answer, left a message for her to return call to office [phone].

## 2020-07-02 NOTE — TELEPHONE ENCOUNTER
Pt's daughter called again to check on status of request for call back regarding Dr. Gabriella Noe sending insulin to the pharmacy since pt's blood sugars are staying elevated on prednisone. Please advise before office closes today at [phone].  Ldm

## 2020-07-02 NOTE — TELEPHONE ENCOUNTER
Phone call with patient's daughter, Page . Her blood sugar was 257 last night and she wonders if she should start back on insulin like she had in the hospital.  She is on prednisone and finishes in 2 days. Her blood sugars have been good today. Advised that it is normal for blood sugars to go up if someone is on steroids. Due to the limited time she will be on them, I do not think she needs to start insulin. Advised to call if they get over 300.

## 2020-07-02 NOTE — TELEPHONE ENCOUNTER
Pt's daughter Clearance Romberg returned nurse's call stating she missed the call while at the grocery store for her mom, but will keep phone on her now and asks that nurse please call her back. Pt was discharged from hospital without insulin, blood sugar up to 257 on prednisone, and Clearance Romberg states she can give insulin if needed, but pt will need prescription sent to pharmacy.   Heidi

## 2020-07-02 NOTE — TELEPHONE ENCOUNTER
Pt daughter, Juliana Lopez needs to speak with the nurse. Her mother just got out of the hospital yesterday and she is having problems with her blood sugar.    Please call [phone]    Pt has an appt with Dr Fabiana Coy for 7/6/20

## 2020-07-05 NOTE — PROGRESS NOTES
HISTORY OF PRESENT ILLNESS  Lane Ramires is a 68 y.o. female. Lane Ramires is here with her daughter for hospital follow up after being admitted 6/29/20 to 7/2/20 for shortness of breath due to a COPD exacerbation/CAP. Other problems included LE pain and edema, which much improvement after one dose of Lasix in the ER. Her BNP was normal and her echo showed an EF of 60-65% with mild concentric hypertrophy. Additional problems included elevated Cr, which returned to baseline, diabetes with a recent A1C of 7.3, HTN with an elevated blood pressure, but no change in meds and obstructive sleep apnea/never on CPAP. She was discharged home on prednisone and doxycycline. Discharge summary reviewed and summarized. Currently, she feels better, and her energy has improved greatly, but she has LE swelling if she is up for awhile. Since returning home, her home blood pressures have been 111-152/64-73, and her FBS,           Review of Systems   Constitutional: Negative for chills, fever, malaise/fatigue and weight loss. No weight gain   Eyes: Negative for blurred vision. Respiratory: Positive for cough, sputum production and shortness of breath. Negative for hemoptysis and wheezing. Her sputum is white   Cardiovascular: Positive for leg swelling. Negative for chest pain. Gastrointestinal: Negative for abdominal pain. Neurological: Positive for headaches. Negative for dizziness, sensory change, speech change, focal weakness and weakness. She has a headache this am       Visit Vitals  /73   Pulse 71   Temp 97.5 °F (36.4 °C) (Temporal)   Resp 18   Ht 5' 2\" (1.575 m)   Wt 166 lb (75.3 kg)   SpO2 95%   BMI 30.36 kg/m²     BP Readings from Last 3 Encounters:   07/06/20 150/73   07/01/20 122/71   04/24/20 130/69       Physical Exam  Vitals signs and nursing note reviewed. Constitutional:       General: She is not in acute distress. Appearance: She is well-developed.  She is not diaphoretic. Cardiovascular:      Rate and Rhythm: Normal rate and regular rhythm. Heart sounds: Normal heart sounds. No murmur. No friction rub. No gallop. Pulmonary:      Effort: Pulmonary effort is normal. No respiratory distress. Breath sounds: Normal breath sounds. No wheezing or rales. Musculoskeletal:      Right lower leg: Edema present. Left lower leg: Edema present. Comments: 2+ LE edema   Skin:     General: Skin is warm and dry. Neurological:      Mental Status: She is alert and oriented to person, place, and time. ASSESSMENT and PLAN    ICD-10-CM ICD-9-CM    1. Shortness of breath R06.02 786.05    2. Community acquired pneumonia, unspecified laterality J18.9 486 XR CHEST PA LAT   3. Chronic obstructive pulmonary disease with acute exacerbation ([facility]) J44.1 491.21 PULMONARY FUNCTION TEST   4. CKD (chronic kidney disease), stage III ([facility]) V92.1 661.0 METABOLIC PANEL, BASIC   5. Type 2 diabetes mellitus without complication, without long-term current use of insulin ([facility]) E11.9 250.00    6. Essential hypertension I10 401.9    7. IDALIA (obstructive sleep apnea) G47.33 327.23 REFERRAL TO SLEEP STUDIES        Improving  Labs per orders. pulmonary function tests in 6 weeks  Sleep study  chest X-ray in 6 weeks    Follow-up and Dispositions    · Return in about 6 weeks (around 8/17/2020) for blood pressure, diabetes. Reviewed plan of care. Patient has provided input and agrees with goals.

## 2020-07-06 ENCOUNTER — OFFICE VISIT (OUTPATIENT)
Dept: FAMILY MEDICINE CLINIC | Age: 80
End: 2020-07-06

## 2020-07-06 ENCOUNTER — HOSPITAL ENCOUNTER (OUTPATIENT)
Dept: LAB | Age: 80
Discharge: HOME | End: 2020-07-06

## 2020-07-06 VITALS
SYSTOLIC BLOOD PRESSURE: 150 MMHG | WEIGHT: 166 LBS | HEIGHT: 62 IN | HEART RATE: 71 BPM | OXYGEN SATURATION: 95 % | BODY MASS INDEX: 30.55 KG/M2 | TEMPERATURE: 97.5 F | DIASTOLIC BLOOD PRESSURE: 73 MMHG | RESPIRATION RATE: 18 BRPM

## 2020-07-06 DIAGNOSIS — N18.30 CKD (CHRONIC KIDNEY DISEASE), STAGE III (HCC): ICD-10-CM

## 2020-07-06 DIAGNOSIS — J44.1 CHRONIC OBSTRUCTIVE PULMONARY DISEASE WITH ACUTE EXACERBATION (HCC): ICD-10-CM

## 2020-07-06 DIAGNOSIS — I10 ESSENTIAL HYPERTENSION: ICD-10-CM

## 2020-07-06 DIAGNOSIS — E11.9 TYPE 2 DIABETES MELLITUS WITHOUT COMPLICATION, WITHOUT LONG-TERM CURRENT USE OF INSULIN (HCC): ICD-10-CM

## 2020-07-06 DIAGNOSIS — G47.33 OSA (OBSTRUCTIVE SLEEP APNEA): ICD-10-CM

## 2020-07-06 DIAGNOSIS — J18.9 COMMUNITY ACQUIRED PNEUMONIA, UNSPECIFIED LATERALITY: ICD-10-CM

## 2020-07-06 DIAGNOSIS — R06.02 SHORTNESS OF BREATH: Primary | ICD-10-CM

## 2020-07-06 LAB
ANION GAP SERPL CALC-SCNC: 8 MMOL/L (ref 5–15)
BACTERIA SPEC CULT: NORMAL
BUN SERPL-MCNC: 38 MG/DL (ref 6–20)
BUN/CREAT SERPL: 24 (ref 12–20)
CALCIUM SERPL-MCNC: 8.9 MG/DL (ref 8.5–10.1)
CHLORIDE SERPL-SCNC: 104 MMOL/L (ref 97–108)
CO2 SERPL-SCNC: 24 MMOL/L (ref 21–32)
CREAT SERPL-MCNC: 1.61 MG/DL (ref 0.55–1.02)
GLUCOSE SERPL-MCNC: 117 MG/DL (ref 65–100)
POTASSIUM SERPL-SCNC: 4.1 MMOL/L (ref 3.5–5.1)
SERVICE CMNT-IMP: NORMAL
SODIUM SERPL-SCNC: 136 MMOL/L (ref 136–145)

## 2020-07-06 NOTE — PROGRESS NOTES
Chief Complaint   Patient presents with   [facility] Follow Up     Shortness of Breath

## 2020-07-09 ENCOUNTER — APPOINTMENT (OUTPATIENT)
Dept: GENERAL RADIOLOGY | Age: 80
End: 2020-07-09
Attending: EMERGENCY MEDICINE
Payer: MEDICARE

## 2020-07-09 ENCOUNTER — VIRTUAL VISIT (OUTPATIENT)
Dept: FAMILY MEDICINE CLINIC | Age: 80
End: 2020-07-09

## 2020-07-09 ENCOUNTER — HOSPITAL ENCOUNTER (EMERGENCY)
Age: 80
Discharge: HOME | End: 2020-07-09
Attending: EMERGENCY MEDICINE
Payer: MEDICARE

## 2020-07-09 VITALS
TEMPERATURE: 99.1 F | RESPIRATION RATE: 24 BRPM | HEART RATE: 79 BPM | HEIGHT: 62 IN | SYSTOLIC BLOOD PRESSURE: 139 MMHG | BODY MASS INDEX: 30.63 KG/M2 | DIASTOLIC BLOOD PRESSURE: 57 MMHG | WEIGHT: 166.45 LBS | OXYGEN SATURATION: 97 %

## 2020-07-09 DIAGNOSIS — Z87.01 HISTORY OF RECENT PNEUMONIA: ICD-10-CM

## 2020-07-09 DIAGNOSIS — R30.0 DYSURIA: ICD-10-CM

## 2020-07-09 DIAGNOSIS — R05.9 COUGH: ICD-10-CM

## 2020-07-09 DIAGNOSIS — N39.0 URINARY TRACT INFECTION WITHOUT HEMATURIA, SITE UNSPECIFIED: Primary | ICD-10-CM

## 2020-07-09 DIAGNOSIS — R50.9 FEVER, UNSPECIFIED FEVER CAUSE: Primary | ICD-10-CM

## 2020-07-09 LAB
APPEARANCE UR: CLEAR
BACTERIA URNS QL MICRO: ABNORMAL /HPF
BILIRUB UR QL: NEGATIVE
COLOR UR: ABNORMAL
EPITH CASTS URNS QL MICRO: ABNORMAL /LPF
GLUCOSE UR STRIP.AUTO-MCNC: NEGATIVE MG/DL
HGB UR QL STRIP: NEGATIVE
KETONES UR QL STRIP.AUTO: NEGATIVE MG/DL
LEUKOCYTE ESTERASE UR QL STRIP.AUTO: ABNORMAL
NITRITE UR QL STRIP.AUTO: NEGATIVE
PH UR STRIP: 6 [PH] (ref 5–8)
PROT UR STRIP-MCNC: 100 MG/DL
RBC #/AREA URNS HPF: ABNORMAL /HPF (ref 0–5)
SP GR UR REFRACTOMETRY: 1.02 (ref 1–1.03)
UR CULT HOLD, URHOLD: NORMAL
UROBILINOGEN UR QL STRIP.AUTO: 0.2 EU/DL (ref 0.2–1)
WBC URNS QL MICRO: ABNORMAL /HPF (ref 0–4)

## 2020-07-09 PROCEDURE — 71046 X-RAY EXAM CHEST 2 VIEWS: CPT

## 2020-07-09 PROCEDURE — 99283 EMERGENCY DEPT VISIT LOW MDM: CPT

## 2020-07-09 PROCEDURE — 81001 URINALYSIS AUTO W/SCOPE: CPT

## 2020-07-09 RX ORDER — CEPHALEXIN 500 MG/1
500 CAPSULE ORAL 4 TIMES DAILY
Qty: 28 CAP | Refills: 0 | Status: SHIPPED | OUTPATIENT
Start: 2020-07-09 | End: 2020-07-16

## 2020-07-09 NOTE — PROGRESS NOTES
Chief Complaint   Patient presents with    Fever     102.0 this morning     Fatigue     not feeling any better from hospital d/c      Pt is having virtual appointment with daughter at home in [facility].

## 2020-07-09 NOTE — ED TRIAGE NOTES
Per daughter, apparently patient is having burning with urination for several days. She ran out of Tylenol today. \"She had a temperature of 102, then 99 on the virtual visit. \" Just finished an antibiotic last night from a recent bout of \"pneumonia. \" Also c/o left sided back pain. Was coughing \"a lot\" last week. Is [place].  \"She has COPD and gets anxious pretty fast.\"

## 2020-07-09 NOTE — PROGRESS NOTES
Lynna Alpers is a 68 y.o. female who was seen by synchronous (real-time) audio-video technology on 7/9/2020. Assessment & Plan:   Diagnoses and all orders for this visit:    1. Fever, unspecified fever cause        Likely urinary related, recent hospitalization with CAP, however, this seems to be improving  Advised to go to the ER today  for further evaluation    Follow-up and Dispositions    · Return pending ER disposition. Reviewed plan of care. Patient has provided input and agrees with goals. CPT Codes 74842-66035 for Established Patients may apply to this Telehealth Visit      Subjective:   Lynna Alpers was seen for Fever (102.0 this morning ) and Fatigue (not feeling any better from hospital d/c )      Patient presents with:  Fever: 102.0 this morning   Fatigue: not feeling any better from hospital d/c     Her daughter is with her. She was discharged on the 2nd after being admitted for a COPD exacerbation/CAP. Now she is worse. She has suprapubic pressure. Nothing in particular makes her worse or better. A little nausea after eating. Review of Systems   Constitutional: Positive for chills, fever and malaise/fatigue. HENT: Positive for congestion. Negative for sore throat. No change in taste or smell   Respiratory: Negative for cough, shortness of breath and wheezing. Cardiovascular: Negative for chest pain. Gastrointestinal: Positive for nausea. Negative for abdominal pain, diarrhea and vomiting. Genitourinary: Positive for dysuria, frequency and urgency. Negative for flank pain and hematuria. Neurological: Positive for headaches. Objective:   T 99.0 F    Physical Exam  Constitutional:       General: She is not in acute distress. Appearance: Normal appearance. She is not diaphoretic. Pulmonary:      Effort: Pulmonary effort is normal.   Neurological:      Mental Status: She is alert and oriented to person, place, and time. Due to this being a TeleHealth evaluation, many elements of the physical examination are unable to be assessed. We discussed the expected course, resolution and complications of the diagnosis(es) in detail. Medication risks, benefits, costs, interactions, and alternatives were discussed as indicated. I advised her to contact the office if her condition worsens, changes or fails to improve as anticipated. She expressed understanding with the diagnosis(es) and plan. Pursuant to the emergency declaration under the [address] [place] [address], [facility] waiver authority and the [employer] Act, this Virtual  Visit was conducted, with patient's consent, to reduce the patient's risk of exposure to COVID-19 and provide continuity of care for an established patient. Services were provided through a video synchronous discussion virtually to substitute for in-person clinic visit.     Hernando Palafox MD

## 2020-07-09 NOTE — ED PROVIDER NOTES
Date of Service:  7/9/2020    Patient:  Carmel Joyner    Chief Complaint:  Other       HPI:  Carmel Joyner is a 68 y.o.  female who presents for evaluation of dysuria and need for repeat x-ray. Patient recently was in the hospital with pneumonia. She finished antibiotics for that and states that from a respiratory standpoint is doing much better however her PCP wanted to have a repeat chest x-ray performed to check on resolution. Patient also notes that over the last day or so she has had some dysuria. No hematuria. She does states that she has a little bit of burning when she urinates and it feels similar to previous urinary tract infections. Lower pubic abdominal discomfort but otherwise no other abdominal pain. No nausea vomiting diarrhea body aches chest pain shortness of breath or chills. Patient does arrive with a temperature of 100, no Tylenol at home. Otherwise no other acute complaints. Past Medical History:   Diagnosis Date    ACP (advance care planning) 8/29/2018    Advance Care Plan or Surrogate Decision Maker documented in medical record.     Arthritis     Shoulders, knees    Bell's palsy 1980    Residual remains on right side    Breast cancer ([address] 5/1995    Cholelithiasis 6/23/2011    CKD (chronic kidney disease), stage III ([address] 6/6/2012    COPD (chronic obstructive pulmonary disease) ([address] 12/28/2011    Dyslipidemia 6/23/2011    Elevated triglycerides with high cholesterol 8/28/2015    Esophageal reflux 6/23/2011    Essential hypertension 6/23/2011    GERD (gastroesophageal reflux disease)     Hiatal hernia 6/23/2011    History of breast cancer 6/23/2011    Rt Breast    History of duodenal ulcer 6/23/2011    History of tobacco abuse 9/26/2011    HTN (hypertension) 6/23/2011    Hyperlipidemia 1/7/2013    Left ventricular hypertrophy 6/23/2011    Nicotine dependence 9/2/2015    Obstructive chronic bronchitis with acute bronchitis ([address] 6/23/2011    Osteopenia  Osteoporosis 6/23/2011    Other ill-defined conditions(799.89)     rightsided eye and cheek numb from surg    Pregnancy 6/23/2011    Steatosis of liver 10/9/2011    Tubular adenoma of colon 1/24/2016 1/4/16    Type 2 diabetes mellitus without complications ([facility] 3/73/1122    Type II or unspecified type diabetes mellitus without mention of complication, not stated as uncontrolled 6/23/2011       Past Surgical History:   Procedure Laterality Date    ENDOSCOPY, COLON, DIAGNOSTIC  10/09    HX CHOLECYSTECTOMY  10/2011    laparoscopic    HX HEENT      bells palsy surgery,tongue numb    HX MASTECTOMY Right 5/1995    HX OTHER SURGICAL      colonoscopy    HX TONSIL AND ADENOIDECTOMY  1969    HX TONSILLECTOMY  1969    MT MASTECTOMY BRA  1995    R Breast    REMOVAL GALLBLADDER           Family History:   Problem Relation Age of Onset    Asthma Mother     Cancer Mother         Pancreatic CA    Stroke Father     Other Father         Artherosclerosis    Cancer Sister         Rectum    Lung Disease Sister     Diabetes Sister     Coronary Artery Disease Sister     Heart Attack Brother     Diabetes Maternal Grandmother     Diabetes Paternal Grandmother     Cancer Paternal Grandfather     Diabetes Sister     Coronary Artery Disease Sister     Diabetes Brother     Alcohol abuse Brother     Other Brother         killed in car accident       Social History     Socioeconomic History    Marital status:      Spouse name: Not on file    Number of children: Not on file    Years of education: Not on file    Highest education level: Not on file   Occupational History    Not on file   Social Needs    Financial resource strain: Not on file    Food insecurity     Worry: Not on file     Inability: Not on file    Transportation needs     Medical: Not on file     Non-medical: Not on file   Tobacco Use    Smoking status: Former Smoker     Packs/day: 0.50     Years: 65.00     Pack years: 32.50 Types: Cigarettes     Last attempt to quit: 2019     Years since quittin.6    Smokeless tobacco: Current User   Substance and Sexual Activity    Alcohol use: Yes     Comment: Rarely-holidays    Drug use: No    Sexual activity: Not Currently   Lifestyle    Physical activity     Days per week: Not on file     Minutes per session: Not on file    Stress: Not on file   Relationships    Social connections     Talks on phone: Not on file     Gets together: Not on file     Attends Rastafarian service: Not on file     Active member of club or organization: Not on file     Attends meetings of clubs or organizations: Not on file     Relationship status: Not on file    Intimate partner violence     Fear of current or ex partner: Not on file     Emotionally abused: Not on file     Physically abused: Not on file     Forced sexual activity: Not on file   Other Topics Concern    Not on file   Social History Narrative    Not on file         ALLERGIES: Nsaids (non-steroidal anti-inflammatory drug); Fenofibrate; and Metformin    Review of Systems   Constitutional: Positive for fever. HENT: Negative for hearing loss and sore throat. Eyes: Negative for visual disturbance. Respiratory: Negative for cough, chest tightness, shortness of breath and wheezing. Cardiovascular: Negative for chest pain and palpitations. Gastrointestinal: Negative for abdominal pain, nausea and vomiting. Genitourinary: Positive for dysuria. Negative for flank pain. Musculoskeletal: Negative for back pain. Skin: Negative for rash. Neurological: Negative for dizziness, weakness and light-headedness. Psychiatric/Behavioral: Negative for confusion. Vitals:    20 0939   BP: 139/57   Pulse: 79   Resp: 24   Temp: 100 °F (37.8 °C)   SpO2: 97%   Weight: 75.5 kg (166 lb 7.2 oz)   Height: 5' 2\" (1.575 m)            Physical Exam  Vitals signs and nursing note reviewed.    Constitutional:       General: She is not in acute distress. Appearance: Normal appearance. She is well-developed. She is not ill-appearing, toxic-appearing or diaphoretic. HENT:      Head: Normocephalic and atraumatic. Nose: Nose normal.      Mouth/Throat:      Mouth: Mucous membranes are moist.      Pharynx: No oropharyngeal exudate. Eyes:      General: No scleral icterus. Conjunctiva/sclera: Conjunctivae normal.   Neck:      Musculoskeletal: Neck supple. Vascular: No JVD. Trachea: No tracheal deviation. Cardiovascular:      Rate and Rhythm: Normal rate and regular rhythm. Heart sounds: No murmur. Pulmonary:      Effort: Pulmonary effort is normal. No respiratory distress. Breath sounds: Normal breath sounds. No rhonchi. Abdominal:      General: Bowel sounds are normal. There is no distension. Palpations: Abdomen is soft. Tenderness: There is no abdominal tenderness. There is no right CVA tenderness, left CVA tenderness, guarding or rebound. Musculoskeletal: Normal range of motion. General: No tenderness. Right lower leg: Edema present. Left lower leg: Edema present. Skin:     General: Skin is warm and dry. Capillary Refill: Capillary refill takes less than 2 seconds. Findings: No rash. Neurological:      Mental Status: She is alert and oriented to person, place, and time.    Psychiatric:         Mood and Affect: Mood normal.         Behavior: Behavior normal.          MDM  Number of Diagnoses or Management Options  Cough:   Dysuria:   History of recent pneumonia:   Urinary tract infection without hematuria, site unspecified:     ED Course as of Jul 09 1021   Thu Jul 09, 2020   1001 Leukocyte Esterase(!): TRACE [GG]   1001 Bacteria(!): 1+ [GG]   1021 Temp 99.1    [GG]      ED Course User Index  [GG] Becka Chelsiayne, DO     VITAL SIGNS:  Patient Vitals for the past 4 hrs:   Temp Pulse Resp BP SpO2   07/09/20 0939 100 °F (37.8 °C) 79 24 139/57 97 %         LABS:  Recent Results (from the past 6 hour(s))   URINALYSIS W/MICROSCOPIC    Collection Time: 07/09/20  9:42 AM   Result Value Ref Range    Color YELLOW/STRAW      Appearance CLEAR CLEAR      Specific gravity 1.020 1.003 - 1.030      pH (UA) 6.0 5.0 - 8.0      Protein 100 (A) NEG mg/dL    Glucose Negative NEG mg/dL    Ketone Negative NEG mg/dL    Bilirubin Negative NEG      Blood Negative NEG      Urobilinogen 0.2 0.2 - 1.0 EU/dL    Nitrites Negative NEG      Leukocyte Esterase TRACE (A) NEG      WBC 0-4 0 - 4 /hpf    RBC 0-5 0 - 5 /hpf    Epithelial cells FEW FEW /lpf    Bacteria 1+ (A) NEG /hpf   URINE CULTURE HOLD SAMPLE    Collection Time: 07/09/20  9:42 AM    Specimen: Serum   Result Value Ref Range    Urine culture hold        Urine on hold in Microbiology dept for 2 days. If unpreserved urine is submitted, it cannot be used for addtional testing after 24 hours, recollection will be required. IMAGING:  XR CHEST PA LAT   Final Result   IMPRESSION: No acute cardiopulmonary process. Resolution of previously seen   opacity in the right upper lobe. Medications During Visit:  Medications - No data to display      DECISION MAKING:  Rebeca Gardiner is a 68 y.o. female who comes in as above. diagnostics as above. Will treat for UTI given symptoms. Labs from the 6th reviewed. They will follow up with PCP. All questions answered. Temp 99.1, otherwise no symptoms. Safe for DC. IMPRESSION:  1. Urinary tract infection without hematuria, site unspecified    2. Dysuria    3. Cough    4. History of recent pneumonia        DISPOSITION:  Discharged      Current Discharge Medication List      START taking these medications    Details   cephALEXin (Keflex) 500 mg capsule Take 1 Cap by mouth four (4) times daily for 7 days.   Qty: 28 Cap, Refills: 0              Follow-up Information     Follow up With Specialties Details Why Contact Info    Win Petty MD Family Practice Schedule an appointment as soon as possible for a visit   [address]  [phone]              The patient is asked to follow-up with their primary care provider in the next several days. They are to call tomorrow for an appointment. The patient is asked to return promptly for any increased concerns or worsening of symptoms. They can return to this emergency department or any other emergency department.     Procedures

## 2020-07-09 NOTE — ED NOTES
Patient's daughter walked with her to the bathroom to give a voided urine sample.  Collected and sent to the lab

## 2020-07-09 NOTE — ED NOTES
The patient was discharged home by Dr Jony Metz in stable condition. The patient is alert and oriented, in no respiratory distress. The patient's diagnosis, condition and treatment were explained. The patient expressed understanding. A discharge plan has been developed. A  was not involved in the process. Aftercare instructions were given. Pt ambulatory out of the ED with family.

## 2020-07-10 ENCOUNTER — PATIENT OUTREACH (OUTPATIENT)
Dept: CASE MANAGEMENT | Age: 80
End: 2020-07-10

## 2020-07-10 NOTE — PROGRESS NOTES
Patient contacted regarding recent discharge and COVID-19 risk. Discussed COVID-19 related testing which was not done at this time. Test results were not done. Patient informed of results, if available? no    Care Transition Nurse/ Ambulatory Care Manager contacted the family by telephone to perform post discharge assessment. Verified name and  with family as identifiers. Patient has following risk factors of: COPD, diabetes and chronic kidney disease. CTN/ACM reviewed discharge instructions, medical action plan and red flags related to discharge diagnosis. Reviewed and educated them on any new and changed medications related to discharge diagnosis. Advised obtaining a 90-day supply of all daily and as-needed medications. Education provided regarding infection prevention, and signs and symptoms of COVID-19 and when to seek medical attention with family who verbalized understanding. Discussed exposure protocols and quarantine from [address] you at higher risk for severe illness  and given an opportunity for questions and concerns. The family agrees to contact the COVID-19 hotline [phone] or PCP office for questions related to their healthcare. CTN/ACM provided contact information for future reference. From CDC: Are you at higher risk for severe illness?  Wash your hands often.  Avoid close contact (6 feet, which is about two arm lengths) with people who are sick.  Put distance between yourself and other people if COVID-19 is spreading in your community.  Clean and disinfect frequently touched surfaces.  Avoid all cruise travel and non-essential air travel.  Call your healthcare professional if you have concerns about COVID-19 and your underlying condition or if you are sick.     For more information on steps you can take to protect yourself, see CDC's How to Protect Yourself      Patient/family/caregiver given information for Yordy Hood and agrees to enroll no    Patient will be further monitored by CTN. based on severity of symptoms and risk factors. Abx Rx filled, no questions or concerns. Hydrating and eating well. Will contact PCP for follow up.

## 2020-07-10 NOTE — Clinical Note
Linda Lazar, this patient was assigned to me for ED follow up. I spoke with Pete Wolf and she stated the plan was for me to link the ED visit to your episode, perform the call and then route to you for further follow up (still within the 30 day MITA time). Patient is doing well and taking meds as prescribed. Daughter declined further COVID education from me stating you had already educated her well :). Let me know if you have any questions. Thanks!  Yandel

## 2020-07-11 RX ORDER — BLOOD SUGAR DIAGNOSTIC
STRIP MISCELLANEOUS
Qty: 50 STRIP | Refills: 2 | Status: SHIPPED | OUTPATIENT
Start: 2020-07-11 | End: 2020-07-13 | Stop reason: SDUPTHER

## 2020-07-14 RX ORDER — BLOOD SUGAR DIAGNOSTIC
STRIP MISCELLANEOUS
Qty: 50 STRIP | Refills: 2 | Status: SHIPPED | OUTPATIENT
Start: 2020-07-14 | End: 2021-07-20

## 2020-07-16 ENCOUNTER — VIRTUAL VISIT (OUTPATIENT)
Dept: FAMILY MEDICINE CLINIC | Age: 80
End: 2020-07-16

## 2020-07-16 DIAGNOSIS — N30.00 ACUTE CYSTITIS WITHOUT HEMATURIA: Primary | ICD-10-CM

## 2020-07-16 DIAGNOSIS — R80.9 PROTEINURIA, UNSPECIFIED TYPE: ICD-10-CM

## 2020-07-16 DIAGNOSIS — N18.30 CKD (CHRONIC KIDNEY DISEASE), STAGE III (HCC): ICD-10-CM

## 2020-07-16 NOTE — PROGRESS NOTES
Dirk Martinez is a 68 y.o. female who was seen by synchronous (real-time) audio-video technology on 7/16/2020. Assessment & Plan:   Diagnoses and all orders for this visit:    1. Acute cystitis without hematuria    2. CKD (chronic kidney disease), stage III ([facility])  -     REFERRAL TO NEPHROLOGY    3. Proteinuria, unspecified type  -     REFERRAL TO NEPHROLOGY        UTI resolved  Worsening CKD with proteinuria  Nephrology referral  Continue to push fluids  Call if there temp gets to 100.4 F  In office follow up if back pain returns    Follow-up and Dispositions    · Return if symptoms worsen or fail to improve. Reviewed plan of care. Patient has provided input and agrees with goals. CPT Codes 86724-14314 for Established Patients may apply to this Telehealth Visit      Subjective:   Dirk Martinez was seen for Hospital Follow Up      Patient presents with:  Hospital Follow Up    Dirk Martinez is here with her daughter for ER follow up after being seen on the 9th. She is somewhat better, however her temperature does no go below 99 unless she takes Tylenol. However, her temperature has been below 100. Also, she has left low back/buttock pain radiating down to the left ankle and up to the left shoulder. It is gone. She was getting good relief with Tylenol. Her labs showed that her GFR had dropped to 31 and she had 100+ protein in her urine. Review of Systems   Constitutional: Negative for chills, fever and malaise/fatigue. Cardiovascular: Positive for leg swelling. Gastrointestinal: Negative for abdominal pain. Genitourinary: Positive for frequency and urgency. Negative for dysuria, flank pain and hematuria. She has been drinking more water, however. Objective:   /65, P 79, T 99.0 F  Physical Exam  Constitutional:       General: She is not in acute distress. Appearance: Normal appearance. She is not diaphoretic.    Neurological:      Mental Status: She is alert and oriented to person, place, and time. Due to this being a TeleHealth evaluation, many elements of the physical examination are unable to be assessed. We discussed the expected course, resolution and complications of the diagnosis(es) in detail. Medication risks, benefits, costs, interactions, and alternatives were discussed as indicated. I advised her to contact the office if her condition worsens, changes or fails to improve as anticipated. She expressed understanding with the diagnosis(es) and plan. Pursuant to the emergency declaration under the [facility], [facility] waiver authority and the [employer] Act, this Virtual  Visit was conducted, with patient's consent, to reduce the patient's risk of exposure to COVID-19 and provide continuity of care for an established patient. Services were provided through a video synchronous discussion virtually to substitute for in-person clinic visit.     Juan Otto MD

## 2020-07-16 NOTE — PROGRESS NOTES
Chief Complaint   Patient presents with   [facility] Follow Up   1. Have you been to the ER, urgent care clinic since your last visit? Hospitalized since your last visit? Yes, 07/09/2020, [facility], UTI. 2. Have you seen or consulted any other health care providers outside of the [address] [place] [address] since your last visit? Include any pap smears or colon screening.  No

## 2020-07-20 ENCOUNTER — TELEPHONE (OUTPATIENT)
Dept: FAMILY MEDICINE CLINIC | Age: 80
End: 2020-07-20

## 2020-07-22 ENCOUNTER — PATIENT OUTREACH (OUTPATIENT)
Dept: CASE MANAGEMENT | Age: 80
End: 2020-07-22

## 2020-07-22 NOTE — PROGRESS NOTES
Goals      Establish PCP relationships and regularly scheduled appointments. 7/2 Patient will attend  follow-up with PCP and specialist as directed, keep a list of her medications she take and FBS readings to bring to f/u appointments. Pt.reports she has scheduled office visit with PCP, on 7/7/20, pt.will discuss the need for Pulmonary and Sleep Study f/u. CTN provided pt.with information to Tien Morgan [phone])  explain briefly type of service;  contact information provided. CTN will f/u with pt.in 1-2 weeks. -[address]     7/22 Pt.seen by PCP, on 7/6/20, developed fever, c/o pain with voiding on 7/9 virtual visit with PCP, sent to ED Dx.UTI, prescribed abx; followed up with PCP on 7/16; referral to Nephrology. CTN spoke with pt.dtr. reports she is doing well, BG, B/P are normalized, completed abx, no other c/o pain or discomfort while void, has scheduled appt. with Nephrology 9/2/20 at [facility], will follow with Pulmonary in mid. 8/2020. CTN will follow up with in 1-2 weeks. [address]

## 2020-08-04 ENCOUNTER — PATIENT OUTREACH (OUTPATIENT)
Dept: CASE MANAGEMENT | Age: 80
End: 2020-08-04

## 2020-08-04 NOTE — PROGRESS NOTES
Patient has graduated from the Transitions of Care Coordination  program on 8/4/20. Patient's symptoms are stable at this time. Patient/family has the ability to self-manage. Care management goals have been completed at this time. No further nurse navigator follow up scheduled. Goals Addressed                 This Visit's Progress     Establish PCP relationships and regularly scheduled appointments. 7/2 Patient will attend  follow-up with PCP and specialist as directed, keep a list of her medications she take and FBS readings to bring to f/u appointments. Pt.reports she has scheduled office visit with PCP, on 7/7/20, pt.will discuss the need for Pulmonary and Sleep Study f/u. CTN provided pt.with information to Tien Morgan [phone])  explain briefly type of service;  contact information provided. CTN will f/u with pt.in 1-2 weeks. -[facility]     7/22 Pt.seen by PCP, on 7/6/20, developed fever, c/o pain with voiding on 7/9 virtual visit with PCP, sent to ED Dx.UTI, prescribed abx; followed up with PCP on 7/16; referral to Nephrology. CTN spoke with pt.dtr. reports she is doing well, BG, B/P are normalized, completed abx, no other c/o pain or discomfort while void, has scheduled appt. with Nephrology 9/2/20 at [facility], will follow with Pulmonary in mid. 8/2020. CTN will follow up with in 1-2 weeks. -    8/4 CTN attempt to contact pt., unable to reach, LVM. -                          Pt has nurse navigator's contact information for any further questions, concerns, or needs.   Patients upcoming visits:    Future Appointments   Date Time Provider Yari Morrow   8/12/2020  9:15 AM Klaus Mccarthy MD [facility] AMB

## 2020-08-13 ENCOUNTER — DOCUMENTATION ONLY (OUTPATIENT)
Dept: SLEEP MEDICINE | Age: 80
End: 2020-08-13

## 2020-08-13 NOTE — PROGRESS NOTES
Dr Aminah Clark referred patient to Sleep consult. LVM to call back to schedule NP appointment. Letter will be sent out.

## 2020-08-14 ENCOUNTER — HOSPITAL ENCOUNTER (OUTPATIENT)
Dept: LAB | Age: 80
Discharge: HOME | End: 2020-08-14
Payer: MEDICARE

## 2020-08-14 DIAGNOSIS — U07.1 COVID-19: ICD-10-CM

## 2020-08-14 PROCEDURE — 87635 SARS-COV-2 COVID-19 AMP PRB: CPT

## 2020-08-15 LAB — SARS-COV-2, COV2NT: NOT DETECTED

## 2020-08-18 ENCOUNTER — HOSPITAL ENCOUNTER (OUTPATIENT)
Dept: PULMONOLOGY | Age: 80
Discharge: HOME | End: 2020-08-18
Attending: FAMILY MEDICINE
Payer: MEDICARE

## 2020-08-18 DIAGNOSIS — J44.1 CHRONIC OBSTRUCTIVE PULMONARY DISEASE WITH ACUTE EXACERBATION (HCC): ICD-10-CM

## 2020-08-18 PROCEDURE — 94726 PLETHYSMOGRAPHY LUNG VOLUMES: CPT

## 2020-08-18 PROCEDURE — 74011250637 HC RX REV CODE- 250/637: Performed by: FAMILY MEDICINE

## 2020-08-18 PROCEDURE — 94729 DIFFUSING CAPACITY: CPT

## 2020-08-18 PROCEDURE — 94060 EVALUATION OF WHEEZING: CPT

## 2020-08-18 RX ORDER — ALBUTEROL SULFATE 90 UG/1
4 AEROSOL, METERED RESPIRATORY (INHALATION)
Status: COMPLETED | OUTPATIENT
Start: 2020-08-18 | End: 2020-08-18

## 2020-08-18 RX ADMIN — ALBUTEROL SULFATE: 108 INHALANT RESPIRATORY (INHALATION) at 11:46

## 2020-08-22 NOTE — PROCEDURES
FEV1 - 1.69, 103%  FVC - 3.06, 127%  FEV1/FVC - 55%  Following an inhaled bronchodilator, there is a -20ml/-1% change in FEV1 and a -130ml/-4% change in FVC. TLC - 5.71, 131%  RV - 144%  DLCO - 57%  DL/VA - 78%    Spirometry and lung volumes reveal mild airflow obstruction and moderate air trapping. There is no improvement after inhaled bronchodilators. Diffusing capacity is moderately reduced and only partially corrects when considering alveolar volume.

## 2020-08-24 ENCOUNTER — TELEPHONE (OUTPATIENT)
Dept: FAMILY MEDICINE CLINIC | Age: 80
End: 2020-08-24

## 2020-08-24 DIAGNOSIS — R94.2 ABNORMAL PFTS: Primary | ICD-10-CM

## 2020-08-24 NOTE — TELEPHONE ENCOUNTER
Please call patient and let her know her pulmonary function tests are abnormal and I would like for her to see Pulmonary. A referral request has been printed. Ariana Granados

## 2020-08-27 NOTE — TELEPHONE ENCOUNTER
Patient was unavailable, spoke with her daughter Nhung Junior and advised her per Dr Gary Prado her mom's pulmonary function tests are abnormal and I would like for her to see Pulmonary. A referral request has been printed. Matthew Rivas verbalized understanding and states that she will schedule the pulmonary appointment for her mom.

## 2020-09-15 ENCOUNTER — HOSPITAL ENCOUNTER (OUTPATIENT)
Dept: VASCULAR SURGERY | Age: 80
Discharge: HOME | End: 2020-09-15
Attending: INTERNAL MEDICINE
Payer: MEDICARE

## 2020-09-15 DIAGNOSIS — N18.30 CHRONIC KIDNEY DISEASE, STAGE III (MODERATE) (HCC): ICD-10-CM

## 2020-09-15 DIAGNOSIS — Z72.0 TOBACCO ABUSE: ICD-10-CM

## 2020-09-15 DIAGNOSIS — I10 ESSENTIAL HYPERTENSION, MALIGNANT: ICD-10-CM

## 2020-09-15 DIAGNOSIS — E11.9 DIABETES MELLITUS (HCC): ICD-10-CM

## 2020-09-15 DIAGNOSIS — I50.9 HEART FAILURE, UNSPECIFIED (HCC): ICD-10-CM

## 2020-09-15 PROCEDURE — 93975 VASCULAR STUDY: CPT

## 2020-09-16 ENCOUNTER — OFFICE VISIT (OUTPATIENT)
Dept: FAMILY MEDICINE CLINIC | Age: 80
End: 2020-09-16
Payer: MEDICARE

## 2020-09-16 VITALS
DIASTOLIC BLOOD PRESSURE: 59 MMHG | WEIGHT: 162 LBS | RESPIRATION RATE: 18 BRPM | HEIGHT: 62 IN | BODY MASS INDEX: 29.81 KG/M2 | HEART RATE: 64 BPM | TEMPERATURE: 97 F | OXYGEN SATURATION: 97 % | SYSTOLIC BLOOD PRESSURE: 157 MMHG

## 2020-09-16 DIAGNOSIS — N18.30 CKD (CHRONIC KIDNEY DISEASE), STAGE III (HCC): ICD-10-CM

## 2020-09-16 DIAGNOSIS — E11.9 TYPE 2 DIABETES MELLITUS WITHOUT COMPLICATION, WITHOUT LONG-TERM CURRENT USE OF INSULIN (HCC): Primary | ICD-10-CM

## 2020-09-16 DIAGNOSIS — I10 BENIGN ESSENTIAL HTN: ICD-10-CM

## 2020-09-16 LAB
ABDOMINAL PROX AORTA VEL: 67.9 CM/S
LEFT KIDNEY LENGTH: 7.66 CM
LEFT KIDNEY WIDTH: 4.41 CM
LEFT RENAL DIST DIAS: 25.7 CM/S
LEFT RENAL DIST RAR: 1.52
LEFT RENAL DIST RI: 0.75
LEFT RENAL DIST SYS: 103.4 CM/S
LEFT RENAL LOWER PARENCHYMA MAX: 29.7 CM/S
LEFT RENAL LOWER PARENCHYMA MIN: 14.1 CM/S
LEFT RENAL LOWER PARENCHYMA RI: 0.53
LEFT RENAL MID DIAS: 19.6 CM/S
LEFT RENAL MID RAR: 1.6
LEFT RENAL MID RI: 0.82
LEFT RENAL MID SYS: 108.4 CM/S
LEFT RENAL MIDDLE PARENCHYMA MAX: 50.4 CM/S
LEFT RENAL MIDDLE PARENCHYMA MIN: 11.5 CM/S
LEFT RENAL MIDDLE PARENCHYMA RI: 0.77
LEFT RENAL PROX DIAS: 16.3 CM/S
LEFT RENAL PROX RAR: 1.58
LEFT RENAL PROX RI: 0.85
LEFT RENAL PROX SYS: 107.6 CM/S
LEFT RENAL UPPER PARENCHYMA MAX: 45.2 CM/S
LEFT RENAL UPPER PARENCHYMA MIN: 10.2 CM/S
LEFT RENAL UPPER PARENCHYMA RI: 0.77
PROX AORTIC AP: 1.52 CM
PROX AORTIC TRANS: 1.87 CM
PROX SMA PSV: 193.1 CM/S
RIGHT KIDNEY LENGTH: 8.48 CM
RIGHT KIDNEY WIDTH: 4.56 CM
RIGHT RENAL DIST DIAS: 18.6 CM/S
RIGHT RENAL DIST RAR: 1.73
RIGHT RENAL DIST RI: 0.84
RIGHT RENAL DIST SYS: 117.2 CM/S
RIGHT RENAL LOWER PARENCHYMA MAX: 42.5 CM/S
RIGHT RENAL LOWER PARENCHYMA MIN: 9.7 CM/S
RIGHT RENAL LOWER PARENCHYMA RI: 0.77
RIGHT RENAL MID DIAS: 12.8 CM/S
RIGHT RENAL MID RAR: 1.18
RIGHT RENAL MID RI: 0.84
RIGHT RENAL MID SYS: 80.2 CM/S
RIGHT RENAL MIDDLE PARENCHYMA MAX: 56.2 CM/S
RIGHT RENAL MIDDLE PARENCHYMA MIN: 9.7 CM/S
RIGHT RENAL MIDDLE PARENCHYMA RI: 0.83
RIGHT RENAL PROX DIAS: 11.5 CM/S
RIGHT RENAL PROX RAR: 0.9
RIGHT RENAL PROX RI: 0.81
RIGHT RENAL PROX SYS: 60.8 CM/S
RIGHT RENAL UPPER PARENCHYMA MAX: 37.1 CM/S
RIGHT RENAL UPPER PARENCHYMA MIN: 8.8 CM/S
RIGHT RENAL UPPER PARENCHYMA RI: 0.76

## 2020-09-16 PROCEDURE — G8510 SCR DEP NEG, NO PLAN REQD: HCPCS | Performed by: FAMILY MEDICINE

## 2020-09-16 PROCEDURE — 1101F PT FALLS ASSESS-DOCD LE1/YR: CPT | Performed by: FAMILY MEDICINE

## 2020-09-16 PROCEDURE — 1090F PRES/ABSN URINE INCON ASSESS: CPT | Performed by: FAMILY MEDICINE

## 2020-09-16 PROCEDURE — 99214 OFFICE O/P EST MOD 30 MIN: CPT | Performed by: FAMILY MEDICINE

## 2020-09-16 PROCEDURE — G8754 DIAS BP LESS 90: HCPCS | Performed by: FAMILY MEDICINE

## 2020-09-16 PROCEDURE — G8536 NO DOC ELDER MAL SCRN: HCPCS | Performed by: FAMILY MEDICINE

## 2020-09-16 PROCEDURE — 3051F HG A1C>EQUAL 7.0%<8.0%: CPT | Performed by: FAMILY MEDICINE

## 2020-09-16 PROCEDURE — G8427 DOCREV CUR MEDS BY ELIG CLIN: HCPCS | Performed by: FAMILY MEDICINE

## 2020-09-16 PROCEDURE — G8417 CALC BMI ABV UP PARAM F/U: HCPCS | Performed by: FAMILY MEDICINE

## 2020-09-16 PROCEDURE — G8399 PT W/DXA RESULTS DOCUMENT: HCPCS | Performed by: FAMILY MEDICINE

## 2020-09-16 PROCEDURE — G8753 SYS BP > OR = 140: HCPCS | Performed by: FAMILY MEDICINE

## 2020-09-16 RX ORDER — UMECLIDINIUM BROMIDE AND VILANTEROL TRIFENATATE 62.5; 25 UG/1; UG/1
POWDER RESPIRATORY (INHALATION)
COMMUNITY
Start: 2020-09-14 | End: 2021-11-15

## 2020-09-16 NOTE — PROGRESS NOTES
HISTORY OF PRESENT ILLNESS  Cornelius Dickey is a 68 y.o. female. Cornelius Dickey is here for diabetic follow up. Her daughter is with her. Her A1C was 7.3 in June. Their FBS have been around 120. Admits to not eating right. This is a chronic problem which has worsened. It is improved by lifestyle modifications, which is/are working well. She also needs follow up on her HTN. Also, she has seen renal for her BP and CKD. Her blood pressure at home this am 132/62. Review of Systems   Constitutional: Negative for weight loss. No weight gain   Eyes: Negative for blurred vision. Respiratory: Negative for shortness of breath. Cardiovascular: Negative for chest pain and leg swelling. Gastrointestinal: Negative for abdominal pain. Genitourinary:        No polyuria   Neurological: Negative for dizziness, sensory change, speech change, focal weakness and headaches. Endo/Heme/Allergies: Negative for polydipsia. Visit Vitals  BP (!) 157/59   Pulse 64   Temp 97 °F (36.1 °C) (Temporal)   Resp 18   Ht 5' 2\" (1.575 m)   Wt 162 lb (73.5 kg)   SpO2 97%   BMI 29.63 kg/m²       BP Readings from Last 3 Encounters:   07/09/20 139/57   07/06/20 150/73   07/01/20 122/71       Physical Exam  Vitals signs and nursing note reviewed. Constitutional:       General: She is not in acute distress. Appearance: She is well-developed. She is not diaphoretic. Cardiovascular:      Rate and Rhythm: Normal rate and regular rhythm. Heart sounds: Normal heart sounds. No murmur. No friction rub. No gallop. Pulmonary:      Effort: Pulmonary effort is normal. No respiratory distress. Breath sounds: Normal breath sounds. No wheezing or rales. Skin:     General: Skin is warm and dry. Neurological:      Mental Status: She is alert and oriented to person, place, and time. ASSESSMENT and PLAN    ICD-10-CM ICD-9-CM    1.  Type 2 diabetes mellitus without complication, without long-term current use of insulin ([facility]  E11.9 250.00    2. Benign essential HTN  I10 401.1    3. CKD (chronic kidney disease), stage III ([facility])  N18.3 585. 3         Diabetes controlled  BP good at home  Continue current plans. Follow-up and Dispositions    · Return in about 2 months (around 11/16/2020) for diabetes. Reviewed plan of care. Patient has provided input and agrees with goals.

## 2020-09-29 ENCOUNTER — TRANSCRIBE ORDER (OUTPATIENT)
Dept: SCHEDULING | Age: 80
End: 2020-09-29

## 2020-09-29 DIAGNOSIS — J18.9 PNEUMONIA: Primary | ICD-10-CM

## 2020-10-02 ENCOUNTER — HOSPITAL ENCOUNTER (OUTPATIENT)
Dept: CT IMAGING | Age: 80
Discharge: HOME | End: 2020-10-02
Attending: INTERNAL MEDICINE
Payer: MEDICARE

## 2020-10-02 DIAGNOSIS — J18.9 PNEUMONIA: ICD-10-CM

## 2020-10-02 PROCEDURE — 71250 CT THORAX DX C-: CPT

## 2020-11-18 ENCOUNTER — OFFICE VISIT (OUTPATIENT)
Dept: FAMILY MEDICINE CLINIC | Age: 80
End: 2020-11-18
Payer: MEDICARE

## 2020-11-18 VITALS
SYSTOLIC BLOOD PRESSURE: 153 MMHG | WEIGHT: 162 LBS | HEART RATE: 67 BPM | RESPIRATION RATE: 18 BRPM | BODY MASS INDEX: 29.81 KG/M2 | OXYGEN SATURATION: 98 % | TEMPERATURE: 96.8 F | DIASTOLIC BLOOD PRESSURE: 68 MMHG | HEIGHT: 62 IN

## 2020-11-18 DIAGNOSIS — I10 ESSENTIAL HYPERTENSION: ICD-10-CM

## 2020-11-18 DIAGNOSIS — E11.9 TYPE 2 DIABETES MELLITUS WITHOUT COMPLICATION, WITHOUT LONG-TERM CURRENT USE OF INSULIN (HCC): Primary | ICD-10-CM

## 2020-11-18 DIAGNOSIS — N18.30 STAGE 3 CHRONIC KIDNEY DISEASE, UNSPECIFIED WHETHER STAGE 3A OR 3B CKD (HCC): ICD-10-CM

## 2020-11-18 DIAGNOSIS — E11.9 TYPE 2 DIABETES MELLITUS WITHOUT COMPLICATION, WITHOUT LONG-TERM CURRENT USE OF INSULIN (HCC): ICD-10-CM

## 2020-11-18 LAB
ANION GAP SERPL CALC-SCNC: 7 MMOL/L (ref 5–15)
BUN SERPL-MCNC: 30 MG/DL (ref 6–20)
BUN/CREAT SERPL: 26 (ref 12–20)
CALCIUM SERPL-MCNC: 10.2 MG/DL (ref 8.5–10.1)
CHLORIDE SERPL-SCNC: 105 MMOL/L (ref 97–108)
CO2 SERPL-SCNC: 27 MMOL/L (ref 21–32)
CREAT SERPL-MCNC: 1.17 MG/DL (ref 0.55–1.02)
EST. AVERAGE GLUCOSE BLD GHB EST-MCNC: 151 MG/DL
GLUCOSE SERPL-MCNC: 132 MG/DL (ref 65–100)
HBA1C MFR BLD: 6.9 % (ref 4–5.6)
POTASSIUM SERPL-SCNC: 5.2 MMOL/L (ref 3.5–5.1)
SODIUM SERPL-SCNC: 139 MMOL/L (ref 136–145)

## 2020-11-18 PROCEDURE — G8427 DOCREV CUR MEDS BY ELIG CLIN: HCPCS | Performed by: FAMILY MEDICINE

## 2020-11-18 PROCEDURE — 99214 OFFICE O/P EST MOD 30 MIN: CPT | Performed by: FAMILY MEDICINE

## 2020-11-18 PROCEDURE — 3051F HG A1C>EQUAL 7.0%<8.0%: CPT | Performed by: FAMILY MEDICINE

## 2020-11-18 PROCEDURE — G8753 SYS BP > OR = 140: HCPCS | Performed by: FAMILY MEDICINE

## 2020-11-18 PROCEDURE — G8417 CALC BMI ABV UP PARAM F/U: HCPCS | Performed by: FAMILY MEDICINE

## 2020-11-18 PROCEDURE — G8754 DIAS BP LESS 90: HCPCS | Performed by: FAMILY MEDICINE

## 2020-11-18 PROCEDURE — 1090F PRES/ABSN URINE INCON ASSESS: CPT | Performed by: FAMILY MEDICINE

## 2020-11-18 PROCEDURE — G8536 NO DOC ELDER MAL SCRN: HCPCS | Performed by: FAMILY MEDICINE

## 2020-11-18 PROCEDURE — 1101F PT FALLS ASSESS-DOCD LE1/YR: CPT | Performed by: FAMILY MEDICINE

## 2020-11-18 PROCEDURE — G8399 PT W/DXA RESULTS DOCUMENT: HCPCS | Performed by: FAMILY MEDICINE

## 2020-11-18 PROCEDURE — G8510 SCR DEP NEG, NO PLAN REQD: HCPCS | Performed by: FAMILY MEDICINE

## 2020-11-18 RX ORDER — LOSARTAN POTASSIUM 100 MG/1
TABLET ORAL
Qty: 90 TAB | Refills: 4 | Status: SHIPPED | OUTPATIENT
Start: 2020-11-18 | End: 2022-02-05

## 2020-11-18 RX ORDER — METOPROLOL SUCCINATE 200 MG/1
TABLET, EXTENDED RELEASE ORAL
Qty: 90 TAB | Refills: 4 | Status: SHIPPED | OUTPATIENT
Start: 2020-11-18 | End: 2022-02-05

## 2020-11-18 RX ORDER — AMLODIPINE BESYLATE 10 MG/1
TABLET ORAL
Qty: 90 TAB | Refills: 4 | Status: SHIPPED | OUTPATIENT
Start: 2020-11-18 | End: 2022-02-05

## 2020-11-18 NOTE — PROGRESS NOTES
Armando Agee is a 68 y.o. female    Chief Complaint   Patient presents with    Follow-up     1. Have you been to the ER, urgent care clinic since your last visit? Hospitalized since your last visit? No    2. Have you seen or consulted any other health care providers outside of the [address] [place] [address] since your last visit? Include any pap smears or colon screening.   No

## 2020-11-18 NOTE — PROGRESS NOTES
HISTORY OF PRESENT ILLNESS  Sebastian Mello is a 68 y.o. female. Sebastian Mello is here for diabetic follow up. Their FBS have been around 120. This is a chronic problem which has improved. It is improved by lifestyle modifications, which is/are working well. She also needs follow up on her HTN and CKD. Her BP at home was 140/65 yesterday. She has a follow up appointment with renal.      Review of Systems   Constitutional: Negative for weight loss. No weight gain   Eyes: Negative for blurred vision. Respiratory: Negative for shortness of breath. Cardiovascular: Negative for chest pain and leg swelling. Gastrointestinal: Negative for abdominal pain. Genitourinary:        No polyuria   Neurological: Positive for headaches. Negative for dizziness, sensory change, speech change and focal weakness. Stable migraines about every 2 months   Endo/Heme/Allergies: Negative for polydipsia. Visit Vitals  BP (!) 153/68 (BP 1 Location: Left arm, BP Patient Position: Sitting)   Pulse 67   Temp 96.8 °F (36 °C) (Temporal)   Resp 18   Ht 5' 2\" (1.575 m)   Wt 162 lb (73.5 kg)   SpO2 98%   BMI 29.63 kg/m²     BP Readings from Last 3 Encounters:   11/18/20 (!) 153/68   09/16/20 (!) 157/59   07/09/20 139/57       Physical Exam  Vitals signs and nursing note reviewed. Constitutional:       General: She is not in acute distress. Appearance: She is well-developed. She is not diaphoretic. Cardiovascular:      Rate and Rhythm: Normal rate and regular rhythm. Heart sounds: Normal heart sounds. No murmur. No friction rub. No gallop. Pulmonary:      Effort: Pulmonary effort is normal. No respiratory distress. Breath sounds: Normal breath sounds. No wheezing or rales. Skin:     General: Skin is warm and dry. Neurological:      Mental Status: She is alert and oriented to person, place, and time. ASSESSMENT and PLAN    ICD-10-CM ICD-9-CM    1.  Type 2 diabetes mellitus without complication, without long-term current use of insulin ([facility])  L45.3 044.07 METABOLIC PANEL, BASIC      HEMOGLOBIN A1C WITH EAG   2. Essential hypertension  X55 391.4 METABOLIC PANEL, BASIC   3. Stage 3 chronic kidney disease, unspecified whether stage 3a or 3b CKD  Z00.40 973.9 METABOLIC PANEL, BASIC        Diabetes seems to be controlled  Blood pressure have been normal at home  Labs per orders. Continue current plans. Follow up with renal as planned    Follow-up and Dispositions    · Return in about 4 months (around 3/18/2021) for diabetes. Reviewed plan of care. Patient has provided input and agrees with goals.

## 2020-11-18 NOTE — TELEPHONE ENCOUNTER
Pt requesting refill on her blood pressure medications be sent to [facility], noting she forgot to ask Dr. Tena Pabon at her appointment today.  Heidi

## 2021-03-05 RX ORDER — OMEPRAZOLE 40 MG/1
CAPSULE, DELAYED RELEASE ORAL
Qty: 90 CAP | Refills: 2 | Status: SHIPPED | OUTPATIENT
Start: 2021-03-05 | End: 2021-11-11

## 2021-03-16 NOTE — PROGRESS NOTES
HISTORY OF PRESENT ILLNESS  Natividad Jo is a 68 y.o. female. Natividad Jo is here for diabetic follow up. Their FBS have been around a120. This is a chronic problem. It is improved by lifestyle modifications. Review of Systems   Eyes: Negative for blurred vision. Respiratory: Negative for shortness of breath. Cardiovascular: Negative for chest pain. Genitourinary:        No polyuria   Neurological: Negative for dizziness, sensory change, speech change, focal weakness and headaches. Endo/Heme/Allergies: Negative for polydipsia. Visit Vitals  BP (!) 148/56   Pulse 69   Temp 97 °F (36.1 °C) (Temporal)   Resp 20   Ht 5' 2\" (1.575 m)   Wt 163 lb (73.9 kg)   SpO2 98%   BMI 29.81 kg/m²     Physical Exam  Vitals signs and nursing note reviewed. Constitutional:       General: She is not in acute distress. Appearance: She is well-developed. She is not diaphoretic. Cardiovascular:      Rate and Rhythm: Normal rate and regular rhythm. Heart sounds: Normal heart sounds. No murmur. No friction rub. No gallop. Pulmonary:      Effort: Pulmonary effort is normal. No respiratory distress. Breath sounds: Normal breath sounds. No wheezing or rales. Skin:     General: Skin is warm and dry. Comments: Feet and nails in good condition. Neurological:      Mental Status: She is alert and oriented to person, place, and time. Comments: Normal monofilament exam         ASSESSMENT and PLAN    ICD-10-CM ICD-9-CM    1. Type 2 diabetes mellitus without complication, without long-term current use of insulin ([facility])  Q98.6 958.11 METABOLIC PANEL, BASIC      HEMOGLOBIN A1C WITH EAG      MICROALBUMIN, UR, RAND W/ MICROALB/CREAT RATIO       DIABETES FOOT EXAM   2. Essential hypertension  B07 297.6 METABOLIC PANEL, BASIC     Diabetes seems to be controlled  Unable to adjust BP medication, SBP high, DBP low  Labs per orders. Continue current plans. Foot exam  was performed.  .  Sensory and motor testing was assessed . Pedal pulse(s) was assessed. Follow-up and Dispositions    · Return in about 4 months (around 7/17/2021) for diabetes. Reviewed plan of care. Patient has provided input and agrees with goals.

## 2021-03-17 ENCOUNTER — OFFICE VISIT (OUTPATIENT)
Dept: FAMILY MEDICINE CLINIC | Age: 81
End: 2021-03-17
Payer: MEDICARE

## 2021-03-17 VITALS
TEMPERATURE: 97 F | SYSTOLIC BLOOD PRESSURE: 148 MMHG | WEIGHT: 163 LBS | BODY MASS INDEX: 30 KG/M2 | DIASTOLIC BLOOD PRESSURE: 56 MMHG | OXYGEN SATURATION: 98 % | HEIGHT: 62 IN | RESPIRATION RATE: 20 BRPM | HEART RATE: 69 BPM

## 2021-03-17 DIAGNOSIS — Z11.59 NEED FOR HEPATITIS C SCREENING TEST: ICD-10-CM

## 2021-03-17 DIAGNOSIS — Z00.00 MEDICARE ANNUAL WELLNESS VISIT, SUBSEQUENT: ICD-10-CM

## 2021-03-17 DIAGNOSIS — I10 ESSENTIAL HYPERTENSION: ICD-10-CM

## 2021-03-17 DIAGNOSIS — E11.9 TYPE 2 DIABETES MELLITUS WITHOUT COMPLICATION, WITHOUT LONG-TERM CURRENT USE OF INSULIN (HCC): Primary | ICD-10-CM

## 2021-03-17 PROCEDURE — G8536 NO DOC ELDER MAL SCRN: HCPCS | Performed by: FAMILY MEDICINE

## 2021-03-17 PROCEDURE — 1090F PRES/ABSN URINE INCON ASSESS: CPT | Performed by: FAMILY MEDICINE

## 2021-03-17 PROCEDURE — G8399 PT W/DXA RESULTS DOCUMENT: HCPCS | Performed by: FAMILY MEDICINE

## 2021-03-17 PROCEDURE — G8417 CALC BMI ABV UP PARAM F/U: HCPCS | Performed by: FAMILY MEDICINE

## 2021-03-17 PROCEDURE — G0439 PPPS, SUBSEQ VISIT: HCPCS | Performed by: FAMILY MEDICINE

## 2021-03-17 PROCEDURE — G8427 DOCREV CUR MEDS BY ELIG CLIN: HCPCS | Performed by: FAMILY MEDICINE

## 2021-03-17 PROCEDURE — G8753 SYS BP > OR = 140: HCPCS | Performed by: FAMILY MEDICINE

## 2021-03-17 PROCEDURE — 99214 OFFICE O/P EST MOD 30 MIN: CPT | Performed by: FAMILY MEDICINE

## 2021-03-17 PROCEDURE — G8754 DIAS BP LESS 90: HCPCS | Performed by: FAMILY MEDICINE

## 2021-03-17 PROCEDURE — 1101F PT FALLS ASSESS-DOCD LE1/YR: CPT | Performed by: FAMILY MEDICINE

## 2021-03-17 PROCEDURE — G8510 SCR DEP NEG, NO PLAN REQD: HCPCS | Performed by: FAMILY MEDICINE

## 2021-03-17 NOTE — PROGRESS NOTES
This is the Subsequent Medicare Annual Wellness Exam, performed 12 months or more after the Initial AWV or the last Subsequent AWV    I have reviewed the patient's medical history in detail and updated the computerized patient record. Depression Risk Factor Screening:     3 most recent PHQ Screens 3/17/2021   Little interest or pleasure in doing things Not at all   Feeling down, depressed, irritable, or hopeless Not at all   Total Score PHQ 2 0       Alcohol Risk Screen   AUDIT Screen Score: AUDIT Score: 1      Document Brief Intervention (corresponds directly with the 5 A's, Ask, Advise, Assess, Assist, and Arrange):  NA    At- Risk Patients (Score 7-15 for women; 8-15 for men)  Discuss concern patient is drinking at unhealthy levels known to increase risk of alcohol-related health problems. Is Patient ready to commit to change? NA    If No:   Encourage reflection   Discuss short term and long term health risks of consuming alcohol   Barriers to change   Reaffirm willingness to help / Educational materials provided  If Yes:   Set goal  [employer] provided    Harmful use or Dependence (Score 16 or greater)   Discuss short term and long term health risks of consuming alcohol   Recommendations   Negotiate drinking goal   Recommend addiction specialist/center   Arrange follow-up appointments. Functional Ability and Level of Safety:    Hearing: The patient wears hearing aids.       Activities of Daily Living:  ADL Assessment 3/17/2021   Feeding yourself No Help Needed   Getting from bed to chair No Help Needed   Getting dressed No Help Needed   Bathing or showering No Help Needed   Walk across the room (includes cane/walker) No Help Needed   Using the telphone No Help Needed   Taking your medications No Help Needed   Preparing meals No Help Needed   Managing money (expenses/bills) No Help Needed   Moderately strenuous housework (laundry) No Help Needed   Shopping for personal items (toiletries/medicines) No Help Needed   Shopping for groceries No Help Needed   Driving No Help Needed   Climbing a flight of stairs No Help Needed   Getting to places beyond walking distances No Help Needed           Ambulation: with no difficulty     Fall Risk:  Fall Risk Assessment, last 12 mths 3/17/2021   Able to walk? Yes   Fall in past 12 months? 0   Do you feel unsteady? 0   Are you worried about falling 0   Number of falls in past 12 months -   Fall with injury? -      Abuse Screen:  Abuse Screening Questionnaire 3/17/2021   Do you ever feel afraid of your partner? N   Are you in a relationship with someone who physically or mentally threatens you? N   Is it safe for you to go home? Y            Cognitive Screening    Has your family/caregiver stated any concerns about your memory: no     Cognitive Screening: Normal - Verbal Fluency Test    Assessment/Plan   Education and counseling provided:  Are appropriate based on today's review and evaluation   Advance Care Plan or Surrogate Decision Maker documented in medical record. Diagnoses and all orders for this visit:    1. Type 2 diabetes mellitus without complication, without long-term current use of insulin (HCC)  -     METABOLIC PANEL, BASIC; Future  -     HEMOGLOBIN A1C WITH EAG; Future  -     MICROALBUMIN, UR, RAND W/ MICROALB/CREAT RATIO; Future  -      DIABETES FOOT EXAM    2. Essential hypertension  -     METABOLIC PANEL, BASIC; Future    3. Medicare annual wellness visit, subsequent    4.  Need for hepatitis C screening test  -     HEPATITIS C AB, RFLX TO QT BY PCR; Future        Health Maintenance Due     Health Maintenance Due   Topic Date Due    Hepatitis C Screening  Never done    Shingrix Vaccine Age 50> (1 of 2) Never done    MICROALBUMIN Q1  01/31/2021       Patient Care Team   Patient Care Team:  Luis Mari MD as PCP - General (Family Medicine)  Luis Mari MD as PCP - [facility] Empaneled Provider  Lonnie Zurita Magy Schulz MD (Ophthalmology)    History     Patient Active Problem List   Diagnosis Code    Bell's palsy G51.0    Cholelithiasis K80.20    History of breast cancer Z85.3    History of duodenal ulcer Z87.19    Esophageal reflux K21.9    Hiatal hernia K44.9    Essential hypertension I10    Type 2 diabetes mellitus without complications ([address] K86.0    Left ventricular hypertrophy I51.7    Tobacco abuse Z72.0    Steatosis of liver K76.0    COPD (chronic obstructive pulmonary disease) ([address] J44.9    Osteopenia M85.80    CKD (chronic kidney disease), stage III N18.30    Elevated triglycerides with high cholesterol E78.2    IDALIA (obstructive sleep apnea) G47.33    Tubular adenoma of colon D12.6    H/O hysterectomy for benign disease Z90.710    ACP (advance care planning) Z71.89    Shortness of breath R06.02    After-cataract with vision obscured H26.499    Dermatochalasis H02.839    Pseudophakia of both eyes Z96.1     Past Medical History:   Diagnosis Date    ACP (advance care planning) 8/29/2018    Advance Care Plan or Surrogate Decision Maker documented in medical record.     Arthritis     Shoulders, knees    Bell's palsy 1980    Residual remains on right side    Breast cancer ([address] 5/1995    Cholelithiasis 6/23/2011    CKD (chronic kidney disease), stage III 6/6/2012    COPD (chronic obstructive pulmonary disease) ([address] 12/28/2011    Dyslipidemia 6/23/2011    Elevated triglycerides with high cholesterol 8/28/2015    Esophageal reflux 6/23/2011    Essential hypertension 6/23/2011    GERD (gastroesophageal reflux disease)     Hiatal hernia 6/23/2011    History of breast cancer 6/23/2011    Rt Breast    History of duodenal ulcer 6/23/2011    History of tobacco abuse 9/26/2011    HTN (hypertension) 6/23/2011    Hyperlipidemia 1/7/2013    Left ventricular hypertrophy 6/23/2011    Nicotine dependence 9/2/2015    Obstructive chronic bronchitis with acute bronchitis ([address] 6/23/2011    Osteopenia     Osteoporosis 6/23/2011    Other ill-defined conditions(799.89)     rightsided eye and cheek numb from surg    Pregnancy 6/23/2011    Steatosis of liver 10/9/2011    Tubular adenoma of colon 1/24/2016 1/4/16    Type 2 diabetes mellitus without complications ([address] 4/66/3061    Type II or unspecified type diabetes mellitus without mention of complication, not stated as uncontrolled 6/23/2011      Past Surgical History:   Procedure Laterality Date    ENDOSCOPY, COLON, DIAGNOSTIC  10/09    HX CHOLECYSTECTOMY  10/2011    laparoscopic    HX HEENT      bells palsy surgery,tongue numb    HX MASTECTOMY Right 5/1995    HX OTHER SURGICAL      colonoscopy    HX TONSIL AND ADENOIDECTOMY  1969    HX TONSILLECTOMY  1969    MD MASTECTOMY BRA  1995    R Breast    MD REMOVAL GALLBLADDER       Current Outpatient Medications   Medication Sig Dispense Refill    omeprazole (PRILOSEC) 40 mg capsule TAKE ONE CAPSULE BY MOUTH DAILY 90 Cap 2    losartan (COZAAR) 100 mg tablet TAKE ONE TABLET BY MOUTH DAILY 90 Tab 4    metoprolol succinate (TOPROL-XL) 200 mg XL tablet TAKE ONE TABLET BY MOUTH DAILY 90 Tab 4    amLODIPine (NORVASC) 10 mg tablet TAKE ONE TABLET BY MOUTH DAILY 90 Tab 4    Anoro Ellipta 62.5-25 mcg/actuation inhaler       glucose blood VI test strips (Accu-Chek Ifrah Plus test strp) strip USE ONE STRIP TO TEST DAILY, DX code:E11.9 50 Strip 2    rosuvastatin (CRESTOR) 20 mg tablet TAKE ONE TABLET BY MOUTH EVERY EVENING 90 Tab 3    omega-3 fatty acids-vitamin e (FISH OIL) 1,000 mg cap Take 2 Caps by mouth daily (after breakfast).  calcium-vitamin D (CALCIUM 500+D) 500 mg(1,250mg) -200 unit per tablet Take 1 Tab by mouth daily (after breakfast).  aspirin 81 mg tablet Take 81 mg by mouth daily (after breakfast).  multivitamin (ONE A DAY) tablet Take 1 Tab by mouth daily (after breakfast).        Allergies   Allergen Reactions    Nsaids (Non-Steroidal Anti-Inflammatory Drug) Anaphylaxis and Hives    Fenofibrate Other (comments)     Leg cramps    Metformin Diarrhea       Family History   Problem Relation Age of Onset    Asthma Mother     Cancer Mother         Pancreatic CA    Stroke Father     Other Father         Artherosclerosis    Cancer Sister         Rectum    Lung Disease Sister     Diabetes Sister     Coronary Artery Disease Sister     Heart Attack Brother     Diabetes Maternal Grandmother     Diabetes Paternal Grandmother     Cancer Paternal Grandfather     Diabetes Sister     Coronary Artery Disease Sister     Diabetes Brother     Alcohol abuse Brother     Other Brother         killed in car accident     Social History     Tobacco Use    Smoking status: Current Every Day Smoker     Packs/day: 0.50     Years: 65.00     Pack years: 32.50     Types: Cigarettes     Last attempt to quit: 2019     Years since quittin.3    Smokeless tobacco: Never Used   Substance Use Topics    Alcohol use: Yes     Comment: Rarely-holidays

## 2021-03-17 NOTE — PATIENT INSTRUCTIONS
Medicare Wellness Visit, Female The best way to live healthy is to have a lifestyle where you eat a well-balanced diet, exercise regularly, limit alcohol use, and quit all forms of tobacco/nicotine, if applicable. Regular preventive services are another way to keep healthy. Preventive services (vaccines, screening tests, monitoring & exams) can help personalize your care plan, which helps you manage your own care. Screening tests can find health problems at the earliest stages, when they are easiest to treat. [facility] follows the current, evidence-based guidelines published by the [facility] Catrachito Villanueva ([facility]) when recommending preventive services for our patients. Because we follow these guidelines, sometimes recommendations change over time as research supports it. (For example, mammograms used to be recommended annually. Even though Medicare will still pay for an annual mammogram, the newer guidelines recommend a mammogram every two years for women of average risk). Of course, you and your doctor may decide to screen more often for some diseases, based on your risk and your co-morbidities (chronic disease you are already diagnosed with). Preventive services for you include: - Medicare offers their members a free annual wellness visit, which is time for you and your primary care provider to discuss and plan for your preventive service needs. Take advantage of this benefit every year! 
-All adults over the age of 72 should receive the recommended pneumonia vaccines. Current USPSTF guidelines recommend a series of two vaccines for the best pneumonia protection.  
-All adults should have a flu vaccine yearly and a tetanus vaccine every 10 years.  
-All adults age 48 and older should receive the shingles vaccines (series of two vaccines).      
-All adults age 38-68 who are overweight should have a diabetes screening test once every three years.  
-All adults born between [birth year] and 1963 should be screened once for Hepatitis C. 
-Other screening tests and preventive services for persons with diabetes include: an eye exam to screen for diabetic retinopathy, a kidney function test, a foot exam, and stricter control over your cholesterol.  
-Cardiovascular screening for adults with routine risk involves an electrocardiogram (ECG) at intervals determined by your doctor.  
-Colorectal cancer screenings should be done for adults age 54-65 with no increased risk factors for colorectal cancer. There are a number of acceptable methods of screening for this type of cancer. Each test has its own benefits and drawbacks. Discuss with your doctor what is most appropriate for you during your annual wellness visit. The different tests include: colonoscopy (considered the best screening method), a fecal occult blood test, a fecal DNA test, and sigmoidoscopy. 
 
-A bone mass density test is recommended when a woman turns 65 to screen for osteoporosis. This test is only recommended one time, as a screening. Some providers will use this same test as a disease monitoring tool if you already have osteoporosis. -Breast cancer screenings are recommended every other year for women of normal risk, age 54-69. 
-Cervical cancer screenings for women over age 72 are only recommended with certain risk factors. Here is a list of your current Health Maintenance items (your personalized list of preventive services) with a due date: 
Health Maintenance Due Topic Date Due  
 Hepatitis C Test  Never done  COVID-19 Vaccine (1) Never done  Shingles Vaccine (1 of 2) Never done  Yearly Flu Vaccine (1) 09/01/2020  Albumin Urine Test  01/31/2021

## 2021-04-04 DIAGNOSIS — E78.2 ELEVATED TRIGLYCERIDES WITH HIGH CHOLESTEROL: ICD-10-CM

## 2021-04-06 RX ORDER — ROSUVASTATIN CALCIUM 20 MG/1
TABLET, COATED ORAL
Qty: 90 TAB | Refills: 0 | Status: SHIPPED | OUTPATIENT
Start: 2021-04-06 | End: 2021-06-11 | Stop reason: SDUPTHER

## 2021-06-11 DIAGNOSIS — E78.2 ELEVATED TRIGLYCERIDES WITH HIGH CHOLESTEROL: ICD-10-CM

## 2021-06-11 RX ORDER — ROSUVASTATIN CALCIUM 20 MG/1
TABLET, COATED ORAL
Qty: 90 TABLET | Refills: 0 | Status: SHIPPED | OUTPATIENT
Start: 2021-06-11 | End: 2021-08-14

## 2021-07-19 ENCOUNTER — OFFICE VISIT (OUTPATIENT)
Dept: FAMILY MEDICINE CLINIC | Age: 81
End: 2021-07-19
Payer: MEDICARE

## 2021-07-19 VITALS
WEIGHT: 158 LBS | TEMPERATURE: 96.9 F | HEART RATE: 71 BPM | SYSTOLIC BLOOD PRESSURE: 151 MMHG | HEIGHT: 62 IN | RESPIRATION RATE: 16 BRPM | OXYGEN SATURATION: 98 % | BODY MASS INDEX: 29.08 KG/M2 | DIASTOLIC BLOOD PRESSURE: 64 MMHG

## 2021-07-19 DIAGNOSIS — Z72.0 TOBACCO ABUSE: ICD-10-CM

## 2021-07-19 DIAGNOSIS — J44.1 CHRONIC OBSTRUCTIVE PULMONARY DISEASE WITH ACUTE EXACERBATION (HCC): ICD-10-CM

## 2021-07-19 DIAGNOSIS — I10 ESSENTIAL HYPERTENSION: ICD-10-CM

## 2021-07-19 DIAGNOSIS — E78.2 ELEVATED TRIGLYCERIDES WITH HIGH CHOLESTEROL: ICD-10-CM

## 2021-07-19 DIAGNOSIS — N18.30 STAGE 3 CHRONIC KIDNEY DISEASE, UNSPECIFIED WHETHER STAGE 3A OR 3B CKD (HCC): ICD-10-CM

## 2021-07-19 DIAGNOSIS — E11.9 TYPE 2 DIABETES MELLITUS WITHOUT COMPLICATION, WITHOUT LONG-TERM CURRENT USE OF INSULIN (HCC): Primary | ICD-10-CM

## 2021-07-19 PROCEDURE — G8754 DIAS BP LESS 90: HCPCS | Performed by: FAMILY MEDICINE

## 2021-07-19 PROCEDURE — G8536 NO DOC ELDER MAL SCRN: HCPCS | Performed by: FAMILY MEDICINE

## 2021-07-19 PROCEDURE — 1090F PRES/ABSN URINE INCON ASSESS: CPT | Performed by: FAMILY MEDICINE

## 2021-07-19 PROCEDURE — 99214 OFFICE O/P EST MOD 30 MIN: CPT | Performed by: FAMILY MEDICINE

## 2021-07-19 PROCEDURE — G8427 DOCREV CUR MEDS BY ELIG CLIN: HCPCS | Performed by: FAMILY MEDICINE

## 2021-07-19 PROCEDURE — 1101F PT FALLS ASSESS-DOCD LE1/YR: CPT | Performed by: FAMILY MEDICINE

## 2021-07-19 PROCEDURE — G8419 CALC BMI OUT NRM PARAM NOF/U: HCPCS | Performed by: FAMILY MEDICINE

## 2021-07-19 PROCEDURE — G8753 SYS BP > OR = 140: HCPCS | Performed by: FAMILY MEDICINE

## 2021-07-19 PROCEDURE — G8399 PT W/DXA RESULTS DOCUMENT: HCPCS | Performed by: FAMILY MEDICINE

## 2021-07-19 PROCEDURE — G8510 SCR DEP NEG, NO PLAN REQD: HCPCS | Performed by: FAMILY MEDICINE

## 2021-07-19 NOTE — PROGRESS NOTES
Agnes Wilde HISTORY OF PRESENT ILLNESS  Frederic Hall is a 80 y.o. female. Patient presents with:  Diabetes: dm [address] fasting sugars     Her granddaughter is with her. Review of Systems   Eyes: Negative for blurred vision. Respiratory: Negative for shortness of breath. Cardiovascular: Negative for chest pain. Genitourinary:        No polyuria   Neurological: Negative for dizziness, sensory change, speech change, focal weakness and headaches. Endo/Heme/Allergies: Negative for polydipsia. Visit Vitals  BP (!) 151/64   Pulse 71   Temp 96.9 °F (36.1 °C) (Oral)   Resp 16   Ht 5' 2\" (1.575 m)   Wt 158 lb (71.7 kg)   SpO2 98%   BMI 28.90 kg/m²     BP Readings from Last 3 Encounters:   07/19/21 (!) 151/64   03/17/21 (!) 148/56   11/18/20 (!) 153/68       Physical Exam  Vitals and nursing note reviewed. Constitutional:       General: She is not in acute distress. Appearance: She is well-developed. She is not diaphoretic. Cardiovascular:      Rate and Rhythm: Normal rate and regular rhythm. Heart sounds: Normal heart sounds. No murmur heard. No friction rub. No gallop. Pulmonary:      Effort: Pulmonary effort is normal. No respiratory distress. Breath sounds: Normal breath sounds. No wheezing or rales. Skin:     General: Skin is warm and dry. Neurological:      Mental Status: She is alert and oriented to person, place, and time. ASSESSMENT and PLAN    ICD-10-CM ICD-9-CM    1. Type 2 diabetes mellitus without complication, without long-term current use of insulin ([facility])  P12.1 198.28 METABOLIC PANEL, BASIC      HEMOGLOBIN A1C WITH EAG   2. Essential hypertension  O76 671.8 METABOLIC PANEL, BASIC   3. Stage 3 chronic kidney disease, unspecified whether stage 3a or 3b CKD ([facility])  C89.49 993.1 METABOLIC PANEL, BASIC   4. Elevated triglycerides with high cholesterol  E78.2 272.2 LIPID PANEL      HEPATIC FUNCTION PANEL   5. Tobacco abuse  Z72.0 305.1    6.  Chronic obstructive pulmonary disease with acute exacerbation (HCC)  J44.1 491.21         Diabetes seems to be controlled  SBP elevated but DBP low  COPD, continues to smoke  Labs per orders. Continue current plans. Strongly advised to stop smoking    Follow-up and Dispositions    · Return in about 4 months (around 11/19/2021) for diabetes. Reviewed plan of care. Patient has provided input and agrees with goals.

## 2021-07-20 ENCOUNTER — TELEPHONE (OUTPATIENT)
Dept: FAMILY MEDICINE CLINIC | Age: 81
End: 2021-07-20

## 2021-07-20 RX ORDER — BLOOD SUGAR DIAGNOSTIC
STRIP MISCELLANEOUS
Qty: 50 STRIP | Refills: 1 | Status: SHIPPED | OUTPATIENT
Start: 2021-07-20 | End: 2021-07-21

## 2021-07-21 RX ORDER — BLOOD SUGAR DIAGNOSTIC
STRIP MISCELLANEOUS
Qty: 100 STRIP | Refills: 4 | Status: SHIPPED | OUTPATIENT
Start: 2021-07-21

## 2021-08-13 DIAGNOSIS — E78.2 ELEVATED TRIGLYCERIDES WITH HIGH CHOLESTEROL: ICD-10-CM

## 2021-08-14 RX ORDER — ROSUVASTATIN CALCIUM 20 MG/1
TABLET, COATED ORAL
Qty: 90 TABLET | Refills: 3 | Status: SHIPPED | OUTPATIENT
Start: 2021-08-14

## 2021-11-11 RX ORDER — OMEPRAZOLE 40 MG/1
CAPSULE, DELAYED RELEASE ORAL
Qty: 90 CAPSULE | Refills: 2 | Status: SHIPPED | OUTPATIENT
Start: 2021-11-11 | End: 2022-08-08

## 2021-11-15 ENCOUNTER — OFFICE VISIT (OUTPATIENT)
Dept: FAMILY MEDICINE CLINIC | Age: 81
End: 2021-11-15
Payer: MEDICARE

## 2021-11-15 VITALS
DIASTOLIC BLOOD PRESSURE: 63 MMHG | OXYGEN SATURATION: 99 % | TEMPERATURE: 97.7 F | SYSTOLIC BLOOD PRESSURE: 144 MMHG | WEIGHT: 151 LBS | HEART RATE: 66 BPM | BODY MASS INDEX: 27.79 KG/M2 | HEIGHT: 62 IN

## 2021-11-15 DIAGNOSIS — E11.9 TYPE 2 DIABETES MELLITUS WITHOUT COMPLICATION, WITHOUT LONG-TERM CURRENT USE OF INSULIN (HCC): Primary | ICD-10-CM

## 2021-11-15 DIAGNOSIS — I10 ESSENTIAL HYPERTENSION: ICD-10-CM

## 2021-11-15 PROCEDURE — 1090F PRES/ABSN URINE INCON ASSESS: CPT | Performed by: FAMILY MEDICINE

## 2021-11-15 PROCEDURE — G8754 DIAS BP LESS 90: HCPCS | Performed by: FAMILY MEDICINE

## 2021-11-15 PROCEDURE — G8427 DOCREV CUR MEDS BY ELIG CLIN: HCPCS | Performed by: FAMILY MEDICINE

## 2021-11-15 PROCEDURE — G8510 SCR DEP NEG, NO PLAN REQD: HCPCS | Performed by: FAMILY MEDICINE

## 2021-11-15 PROCEDURE — G8399 PT W/DXA RESULTS DOCUMENT: HCPCS | Performed by: FAMILY MEDICINE

## 2021-11-15 PROCEDURE — G8419 CALC BMI OUT NRM PARAM NOF/U: HCPCS | Performed by: FAMILY MEDICINE

## 2021-11-15 PROCEDURE — G8753 SYS BP > OR = 140: HCPCS | Performed by: FAMILY MEDICINE

## 2021-11-15 PROCEDURE — 99214 OFFICE O/P EST MOD 30 MIN: CPT | Performed by: FAMILY MEDICINE

## 2021-11-15 PROCEDURE — G8536 NO DOC ELDER MAL SCRN: HCPCS | Performed by: FAMILY MEDICINE

## 2021-11-15 PROCEDURE — 1101F PT FALLS ASSESS-DOCD LE1/YR: CPT | Performed by: FAMILY MEDICINE

## 2021-11-15 NOTE — PROGRESS NOTES
HISTORY OF PRESENT ILLNESS  Karis Gonzalez is a 80 y.o. female. Patient presents with: Follow-up  Diabetes    She also needs to follow up on her HTN. Review of Systems   Eyes: Negative for blurred vision. Respiratory: Negative for shortness of breath. Cardiovascular: Negative for chest pain. Genitourinary:        No polyuria   Neurological: Negative for dizziness, sensory change, speech change, focal weakness and headaches. Endo/Heme/Allergies: Negative for polydipsia. Visit Vitals  BP (!) 144/63 (BP 1 Location: Left upper arm, BP Patient Position: Sitting, BP Cuff Size: Adult)   Pulse 66   Temp 97.7 °F (36.5 °C) (Temporal)   Ht 5' 2\" (1.575 m)   Wt 151 lb (68.5 kg)   SpO2 99%   BMI 27.62 kg/m²     Physical Exam  Vitals and nursing note reviewed. Constitutional:       General: She is not in acute distress. Appearance: She is well-developed. She is not diaphoretic. Cardiovascular:      Rate and Rhythm: Normal rate and regular rhythm. Heart sounds: Normal heart sounds. No murmur heard. No friction rub. No gallop. Pulmonary:      Effort: Pulmonary effort is normal. No respiratory distress. Breath sounds: Normal breath sounds. No wheezing or rales. Skin:     General: Skin is warm and dry. Neurological:      Mental Status: She is alert and oriented to person, place, and time. ASSESSMENT and PLAN    ICD-10-CM ICD-9-CM    1. Type 2 diabetes mellitus without complication, without long-term current use of insulin ([facility])  J59.8 504.30 METABOLIC PANEL, BASIC      HEMOGLOBIN A1C WITH EAG   2. Essential hypertension  G55 909.7 METABOLIC PANEL, BASIC        Diabetes seems to be controlled  Blood pressure adequately controlled  Labs per orders. Continue current plans. Stop aspirin    Follow-up and Dispositions    · Return in about 4 months (around 3/15/2022) for diabetes, blood pressure. Reviewed plan of care.   Patient has provided input and agrees with goals.

## 2022-02-05 DIAGNOSIS — I10 ESSENTIAL HYPERTENSION: ICD-10-CM

## 2022-02-05 RX ORDER — AMLODIPINE BESYLATE 10 MG/1
TABLET ORAL
Qty: 90 TABLET | Refills: 2 | Status: SHIPPED | OUTPATIENT
Start: 2022-02-05 | End: 2022-07-08 | Stop reason: SDUPTHER

## 2022-02-05 RX ORDER — METOPROLOL SUCCINATE 200 MG/1
TABLET, EXTENDED RELEASE ORAL
Qty: 90 TABLET | Refills: 2 | Status: SHIPPED | OUTPATIENT
Start: 2022-02-05 | End: 2022-07-08 | Stop reason: SDUPTHER

## 2022-02-05 RX ORDER — LOSARTAN POTASSIUM 100 MG/1
TABLET ORAL
Qty: 90 TABLET | Refills: 2 | Status: SHIPPED | OUTPATIENT
Start: 2022-02-05 | End: 2022-07-08 | Stop reason: SDUPTHER

## 2022-03-18 PROBLEM — Z90.710 H/O HYSTERECTOMY FOR BENIGN DISEASE: Status: ACTIVE | Noted: 2017-04-27

## 2022-03-18 PROBLEM — H02.839 DERMATOCHALASIS: Status: ACTIVE | Noted: 2018-08-13

## 2022-03-19 PROBLEM — Z71.89 ACP (ADVANCE CARE PLANNING): Status: ACTIVE | Noted: 2018-08-29

## 2022-03-19 PROBLEM — R06.02 SHORTNESS OF BREATH: Status: ACTIVE | Noted: 2020-06-29

## 2022-04-25 ENCOUNTER — OFFICE VISIT (OUTPATIENT)
Dept: FAMILY MEDICINE CLINIC | Age: 82
End: 2022-04-25
Payer: MEDICARE

## 2022-04-25 VITALS
OXYGEN SATURATION: 97 % | HEIGHT: 62 IN | SYSTOLIC BLOOD PRESSURE: 156 MMHG | BODY MASS INDEX: 28.89 KG/M2 | DIASTOLIC BLOOD PRESSURE: 61 MMHG | HEART RATE: 75 BPM | WEIGHT: 157 LBS | TEMPERATURE: 98.2 F

## 2022-04-25 DIAGNOSIS — J44.1 CHRONIC OBSTRUCTIVE PULMONARY DISEASE WITH ACUTE EXACERBATION (HCC): ICD-10-CM

## 2022-04-25 DIAGNOSIS — N18.30 STAGE 3 CHRONIC KIDNEY DISEASE, UNSPECIFIED WHETHER STAGE 3A OR 3B CKD (HCC): ICD-10-CM

## 2022-04-25 DIAGNOSIS — I10 ESSENTIAL HYPERTENSION: ICD-10-CM

## 2022-04-25 DIAGNOSIS — Z00.00 MEDICARE ANNUAL WELLNESS VISIT, SUBSEQUENT: ICD-10-CM

## 2022-04-25 DIAGNOSIS — E11.9 TYPE 2 DIABETES MELLITUS WITHOUT COMPLICATION, WITHOUT LONG-TERM CURRENT USE OF INSULIN (HCC): Primary | ICD-10-CM

## 2022-04-25 DIAGNOSIS — R51.9 FREQUENT HEADACHES: ICD-10-CM

## 2022-04-25 DIAGNOSIS — E11.9 TYPE 2 DIABETES MELLITUS WITHOUT COMPLICATION, WITHOUT LONG-TERM CURRENT USE OF INSULIN (HCC): ICD-10-CM

## 2022-04-25 DIAGNOSIS — R41.3 MEMORY LOSS: ICD-10-CM

## 2022-04-25 PROCEDURE — G8399 PT W/DXA RESULTS DOCUMENT: HCPCS | Performed by: FAMILY MEDICINE

## 2022-04-25 PROCEDURE — G8511 SCR DEP POS, NO PLAN DOC RNG: HCPCS | Performed by: FAMILY MEDICINE

## 2022-04-25 PROCEDURE — 99214 OFFICE O/P EST MOD 30 MIN: CPT | Performed by: FAMILY MEDICINE

## 2022-04-25 PROCEDURE — 1090F PRES/ABSN URINE INCON ASSESS: CPT | Performed by: FAMILY MEDICINE

## 2022-04-25 PROCEDURE — G8419 CALC BMI OUT NRM PARAM NOF/U: HCPCS | Performed by: FAMILY MEDICINE

## 2022-04-25 PROCEDURE — 1101F PT FALLS ASSESS-DOCD LE1/YR: CPT | Performed by: FAMILY MEDICINE

## 2022-04-25 PROCEDURE — G8753 SYS BP > OR = 140: HCPCS | Performed by: FAMILY MEDICINE

## 2022-04-25 PROCEDURE — G8536 NO DOC ELDER MAL SCRN: HCPCS | Performed by: FAMILY MEDICINE

## 2022-04-25 PROCEDURE — G8427 DOCREV CUR MEDS BY ELIG CLIN: HCPCS | Performed by: FAMILY MEDICINE

## 2022-04-25 PROCEDURE — G0439 PPPS, SUBSEQ VISIT: HCPCS | Performed by: FAMILY MEDICINE

## 2022-04-25 PROCEDURE — G8754 DIAS BP LESS 90: HCPCS | Performed by: FAMILY MEDICINE

## 2022-04-25 NOTE — PATIENT INSTRUCTIONS
Medicare Wellness Visit, Female     The best way to live healthy is to have a lifestyle where you eat a well-balanced diet, exercise regularly, limit alcohol use, and quit all forms of tobacco/nicotine, if applicable. Regular preventive services are another way to keep healthy. Preventive services (vaccines, screening tests, monitoring & exams) can help personalize your care plan, which helps you manage your own care. Screening tests can find health problems at the earliest stages, when they are easiest to treat. [facility] follows the current, evidence-based guidelines published by the [facility] ([facility]) when recommending preventive services for our patients. Because we follow these guidelines, sometimes recommendations change over time as research supports it. (For example, mammograms used to be recommended annually. Even though Medicare will still pay for an annual mammogram, the newer guidelines recommend a mammogram every two years for women of average risk). Of course, you and your doctor may decide to screen more often for some diseases, based on your risk and your co-morbidities (chronic disease you are already diagnosed with). Preventive services for you include:  - Medicare offers their members a free annual wellness visit, which is time for you and your primary care provider to discuss and plan for your preventive service needs. Take advantage of this benefit every year!  -All adults over the age of 72 should receive the recommended pneumonia vaccines. Current USPSTF guidelines recommend a series of two vaccines for the best pneumonia protection.   -All adults should have a flu vaccine yearly and a tetanus vaccine every 10 years.   -All adults age 48 and older should receive the shingles vaccines (series of two vaccines).       -All adults age 38-68 who are overweight should have a diabetes screening test once every three years.   -All adults born between [birth year] and 1965 should be screened once for Hepatitis C.  -Other screening tests and preventive services for persons with diabetes include: an eye exam to screen for diabetic retinopathy, a kidney function test, a foot exam, and stricter control over your cholesterol.   -Cardiovascular screening for adults with routine risk involves an electrocardiogram (ECG) at intervals determined by your doctor.   -Colorectal cancer screenings should be done for adults age 54-65 with no increased risk factors for colorectal cancer. There are a number of acceptable methods of screening for this type of cancer. Each test has its own benefits and drawbacks. Discuss with your doctor what is most appropriate for you during your annual wellness visit. The different tests include: colonoscopy (considered the best screening method), a fecal occult blood test, a fecal DNA test, and sigmoidoscopy.    -A bone mass density test is recommended when a woman turns 65 to screen for osteoporosis. This test is only recommended one time, as a screening. Some providers will use this same test as a disease monitoring tool if you already have osteoporosis. -Breast cancer screenings are recommended every other year for women of normal risk, age 54-69.  -Cervical cancer screenings for women over age 72 are only recommended with certain risk factors.      Here is a list of your current Health Maintenance items (your personalized list of preventive services) with a due date:  Health Maintenance Due   Topic Date Due    Shingles Vaccine (1 of 2) Never done    Smoker or Former [address]  Never done    Diabetic Foot Care  03/17/2022    Albumin Urine Test  03/17/2022    Annual Well Visit  03/18/2022

## 2022-04-25 NOTE — PROGRESS NOTES
HISTORY OF PRESENT ILLNESS  Juli Strickland is a 80 y.o. female. Juli Strickland is here for diabetic follow up. She also needs to follow up on her HTN. She has COPD and has stopped smoking but now vapes. Her daughter is with her today. Review of Systems   Eyes: Negative for blurred vision. Respiratory: Negative for shortness of breath. Cardiovascular: Negative for chest pain. Genitourinary:        No polyuria   Neurological: Positive for headaches. Negative for dizziness, sensory change, speech change and focal weakness. She has had headaches and seeing flashing lights in her right eye for the past several months   Endo/Heme/Allergies: Negative for polydipsia. Visit Vitals  BP (!) 156/61 (BP 1 Location: Left upper arm, BP Patient Position: Sitting, BP Cuff Size: Large adult)   Pulse 75   Temp 98.2 °F (36.8 °C) (Temporal)   Ht 5' 2\" (1.575 m)   Wt 157 lb (71.2 kg)   SpO2 97%   BMI 28.72 kg/m²     Physical Exam  Vitals and nursing note reviewed. Constitutional:       General: She is not in acute distress. Appearance: She is well-developed. She is not diaphoretic. Cardiovascular:      Rate and Rhythm: Normal rate and regular rhythm. Heart sounds: Normal heart sounds. No murmur heard. No friction rub. No gallop. Pulmonary:      Effort: Pulmonary effort is normal. No respiratory distress. Breath sounds: Normal breath sounds. No wheezing or rales. Skin:     General: Skin is warm and dry. Comments: Feet and nails in good condition. Neurological:      Mental Status: She is alert and oriented to person, place, and time. Comments: Normal monofilament exam         ASSESSMENT and PLAN    ICD-10-CM ICD-9-CM    1. Type 2 diabetes mellitus without complication, without long-term current use of insulin ([facility])  P73.3 257.91 METABOLIC PANEL, BASIC      HEMOGLOBIN A1C WITH EAG      MICROALBUMIN, UR, RAND W/ MICROALB/CREAT RATIO   2.  Essential hypertension  I10 401.9 METABOLIC PANEL, BASIC   3. Stage 3 chronic kidney disease, unspecified whether stage 3a or 3b CKD ([facility])  F18.62 930.6 METABOLIC PANEL, BASIC   4. Frequent headaches  R51.9 784.0 REFERRAL TO NEUROLOGY   5. Chronic obstructive pulmonary disease with acute exacerbation ([facility])  J44.1 491.21    6. Medicare annual wellness visit, subsequent  Z00.00 V70.0    7. Memory loss  R41.3 780.93 REFERRAL TO NEUROLOGY      SBP elevated, but DBP low, cannot adjust medication  Labs per orders. Counseled to stop vapeing  Neurology referral    Follow-up and Dispositions    · Return in about 4 months (around 8/25/2022) for diabetes, blood pressure. Reviewed plan of care. Patient has provided input and agrees with goals. This is the Subsequent Medicare Annual Wellness Exam, performed 12 months or more after the Initial AWV or the last Subsequent AWV    I have reviewed the patient's medical history in detail and updated the computerized patient record. Assessment/Plan   Education and counseling provided:  Are appropriate based on today's review and evaluation  Advance Care Plan or Surrogate Decision Maker documented in medical record. 1. Type 2 diabetes mellitus without complication, without long-term current use of insulin (HCC)  -     METABOLIC PANEL, BASIC; Future  -     HEMOGLOBIN A1C WITH EAG; Future  -     MICROALBUMIN, UR, RAND W/ MICROALB/CREAT RATIO; Future  2. Essential hypertension  -     METABOLIC PANEL, BASIC; Future  3. Stage 3 chronic kidney disease, unspecified whether stage 3a or 3b CKD ([facility])  -     METABOLIC PANEL, BASIC; Future  4. Frequent headaches  -     REFERRAL TO NEUROLOGY  5. Chronic obstructive pulmonary disease with acute exacerbation ([facility]  6. Medicare annual wellness visit, subsequent  7.  Memory loss  -     REFERRAL TO NEUROLOGY       Depression Risk Factor Screening     3 most recent PHQ Screens 4/25/2022   Little interest or pleasure in doing things Several days   Feeling down, depressed, irritable, or hopeless Several days   Total Score PHQ 2 2   Trouble falling or staying asleep, or sleeping too much -   Feeling tired or having little energy -   Poor appetite, weight loss, or overeating -   Feeling bad about yourself - or that you are a failure or have let yourself or your family down -   Trouble concentrating on things such as school, work, reading, or watching TV -   Moving or speaking so slowly that other people could have noticed; or the opposite being so fidgety that others notice -   Thoughts of being better off dead, or hurting yourself in some way -   PHQ 9 Score -   How difficult have these problems made it for you to do your work, take care of your home and get along with others -       Alcohol & Drug Abuse Risk Screen   Do you average more than 1 drink per night or more than 7 drinks a week?: No  On any one occasion in the past three months have you had more than 3 drinks containing alcohol?: No            Functional Ability and Level of Safety   Hearing:  Hearing: Patient wears hearing aid      Activities of Daily Living: The home contains: rugs  Functional ADLs: Patient does total self care     Ambulation:  Patient ambulates: with mild difficulty     Fall Risk:  Fall Risk Assessment, last 12 mths 4/25/2022   Able to walk? Yes   Fall in past 12 months? 0   Do you feel unsteady? -   Are you worried about falling -   Number of falls in past 12 months -   Fall with injury? -     Abuse Screen:  Do you ever feel afraid of your partner?: (P) No  Are you in a relationship with someone who physically or mentally threatens you?: (P) No  Is it safe for you to go home?: (P) Yes        Cognitive Screening   Has your family/caregiver stated any concerns about your memory?: Yes  Additional comments about memory: Pt is concerend about her memory.   She forgets things but remembers things later     Cognitive Screening: Abnormal - Clock Drawing Test    Health Maintenance Due     Health Maintenance Due   Topic Date Due    Shingrix Vaccine Age 49> (1 of 2) Never done    Low dose CT lung screening  Never done    Foot Exam Q1  03/17/2022    MICROALBUMIN Q1  03/17/2022    Medicare Yearly Exam  03/18/2022     Declines low dose CT lung screening    Patient Care Team   Patient Care Team:  Pearlean Romberg, MD as PCP - General (Family Medicine)  Pearlean Romberg, MD as PCP - [facility]  Jimmy Barajas MD (Ophthalmology)    History     Patient Active Problem List   Diagnosis Code    Bell's palsy G51.0    Cholelithiasis K80.20    History of breast cancer Z85.3    History of duodenal ulcer Z87.19    Esophageal reflux K21.9    Hiatal hernia K44.9    Essential hypertension I10    Type 2 diabetes mellitus without complications ([address] L34.3    Left ventricular hypertrophy I51.7    Tobacco abuse Z72.0    Steatosis of liver K76.0    COPD (chronic obstructive pulmonary disease) ([address] J44.9    Osteopenia M85.80    CKD (chronic kidney disease), stage III ([address] N18.30    Elevated triglycerides with high cholesterol E78.2    IDALIA (obstructive sleep apnea) G47.33    Tubular adenoma of colon D12.6    H/O hysterectomy for benign disease Z90.710    ACP (advance care planning) Z71.89    Shortness of breath R06.02    After-cataract with vision obscured H26.499    Dermatochalasis H02.839    Pseudophakia of both eyes Z96.1     Past Medical History:   Diagnosis Date    ACP (advance care planning) 8/29/2018    Advance Care Plan or Surrogate Decision Maker documented in medical record.     Arthritis     Shoulders, knees    Bell's palsy 1980    Residual remains on right side    Breast cancer ([address] 5/1995    Cholelithiasis 6/23/2011    CKD (chronic kidney disease), stage III ([address] 6/6/2012    COPD (chronic obstructive pulmonary disease) ([address] 12/28/2011    Dyslipidemia 6/23/2011    Elevated triglycerides with high cholesterol 8/28/2015    Esophageal reflux 6/23/2011    Essential hypertension 6/23/2011    GERD (gastroesophageal reflux disease)     Hiatal hernia 6/23/2011    History of breast cancer 6/23/2011    Rt Breast    History of duodenal ulcer 6/23/2011    HTN (hypertension) 6/23/2011    Hyperlipidemia 1/7/2013    Left ventricular hypertrophy 6/23/2011    Nicotine dependence 9/2/2015    Obstructive chronic bronchitis with acute bronchitis ([facility] 6/23/2011    Osteopenia     Osteoporosis 6/23/2011    Other ill-defined conditions(799.89)     rightsided eye and cheek numb from surg    Pregnancy 6/23/2011    Steatosis of liver 10/9/2011    Tubular adenoma of colon 1/24/2016 1/4/16    Type 2 diabetes mellitus without complications ([facility] 3/55/0883    Type II or unspecified type diabetes mellitus without mention of complication, not stated as uncontrolled 6/23/2011      Past Surgical History:   Procedure Laterality Date    ENDOSCOPY, COLON, DIAGNOSTIC  10/09    HX CHOLECYSTECTOMY  10/2011    laparoscopic    HX HEENT      bells palsy surgery,tongue numb    HX MASTECTOMY Right 5/1995    HX OTHER SURGICAL      colonoscopy    HX TONSIL AND ADENOIDECTOMY  1969    HX TONSILLECTOMY  1969    [facility] MASTECTOMY BRA  1995    R Breast    VA REMOVAL GALLBLADDER       Current Outpatient Medications   Medication Sig Dispense Refill    losartan (COZAAR) 100 mg tablet TAKE ONE TABLET BY MOUTH DAILY 90 Tablet 2    amLODIPine (NORVASC) 10 mg tablet TAKE ONE TABLET BY MOUTH DAILY 90 Tablet 2    metoprolol succinate (TOPROL-XL) 200 mg XL tablet TAKE ONE TABLET BY MOUTH DAILY 90 Tablet 2    omeprazole (PRILOSEC) 40 mg capsule TAKE ONE CAPSULE BY MOUTH DAILY 90 Capsule 2    rosuvastatin (CRESTOR) 20 mg tablet TAKE ONE TABLET BY MOUTH EVERY EVENING 90 Tablet 3    glucose blood VI test strips (Accu-Chek Ifrah Plus test strp) strip USE TO TEST BLOOD SUGAR DAILY, E11.9 100 Strip 4    omega-3 fatty acids-vitamin e (FISH OIL) 1,000 mg cap Take 2 Caps by mouth daily (after breakfast).       calcium-vitamin D (CALCIUM 500+D) 500 mg(1,250mg) -200 unit per tablet Take 1 Tab by mouth daily (after breakfast).  multivitamin (ONE A DAY) tablet Take 1 Tab by mouth daily (after breakfast).        Allergies   Allergen Reactions    Nsaids (Non-Steroidal Anti-Inflammatory Drug) Anaphylaxis and Hives    Fenofibrate Other (comments)     Leg cramps    Metformin Diarrhea       Family History   Problem Relation Age of Onset    Asthma Mother     Cancer Mother         Pancreatic CA    Stroke Father     Other Father         Artherosclerosis    Cancer Sister         Rectum    Lung Disease Sister     Diabetes Sister     Coronary Art Dis Sister     Heart Attack Brother     Diabetes Maternal Grandmother     Diabetes Paternal Grandmother     Cancer Paternal Grandfather     Diabetes Sister     Coronary Art Dis Sister     Diabetes Brother     Alcohol abuse Brother     Other Brother         killed in car accident     Social History     Tobacco Use    Smoking status: Current Every Day Smoker     Packs/day: 0.50     Years: 65.00     Pack years: 32.50     Types: Cigarettes     Last attempt to quit: 2019     Years since quittin.4    Smokeless tobacco: Never Used   Substance Use Topics    Alcohol use: Yes     Comment: Rarely-holidays         Shakira Maravilla MD

## 2022-04-25 NOTE — PROGRESS NOTES
Identified pt with two pt identifiers. Reviewed record in preparation for visit and have obtained necessary documentation. All patient medications has been reviewed. Chief Complaint   Patient presents with   Darío García Annual Wellness Visit     Additional information about chief complaint:    Visit Vitals  BP (!) 156/61 (BP 1 Location: Left upper arm, BP Patient Position: Sitting, BP Cuff Size: Large adult)   Pulse 75   Temp 98.2 °F (36.8 °C) (Temporal)   Ht 5' 2\" (1.575 m)   Wt 157 lb (71.2 kg)   SpO2 97%   BMI 28.72 kg/m²       Health Maintenance Due   Topic    Shingrix Vaccine Age 50> (1 of 2)    Low dose CT lung screening     Foot Exam Q1     MICROALBUMIN Q1     Medicare Yearly Exam        1. Have you been to the ER, urgent care clinic since your last visit? Hospitalized since your last visit? No    2. Have you seen or consulted any other health care providers outside of the [address] [place] [address] since your last visit? Include any pap smears or colon screening.  No

## 2022-04-26 ENCOUNTER — TELEPHONE (OUTPATIENT)
Dept: FAMILY MEDICINE CLINIC | Age: 82
End: 2022-04-26

## 2022-04-26 LAB
CREAT UR-MCNC: 113 MG/DL
MICROALBUMIN UR-MCNC: 45.9 MG/DL
MICROALBUMIN/CREAT UR-RTO: 406 MG/G (ref 0–30)

## 2022-05-24 ENCOUNTER — HOSPITAL ENCOUNTER (EMERGENCY)
Age: 82
Discharge: HOME | End: 2022-05-24
Attending: EMERGENCY MEDICINE
Payer: MEDICARE

## 2022-05-24 ENCOUNTER — APPOINTMENT (OUTPATIENT)
Dept: GENERAL RADIOLOGY | Age: 82
End: 2022-05-24
Attending: EMERGENCY MEDICINE
Payer: MEDICARE

## 2022-05-24 VITALS
RESPIRATION RATE: 19 BRPM | DIASTOLIC BLOOD PRESSURE: 66 MMHG | HEIGHT: 62 IN | HEART RATE: 78 BPM | BODY MASS INDEX: 27.6 KG/M2 | OXYGEN SATURATION: 92 % | TEMPERATURE: 99.3 F | SYSTOLIC BLOOD PRESSURE: 134 MMHG | WEIGHT: 150 LBS

## 2022-05-24 DIAGNOSIS — J12.9 VIRAL PNEUMONIA: Primary | ICD-10-CM

## 2022-05-24 LAB
ALBUMIN SERPL-MCNC: 2.1 G/DL (ref 3.5–5)
ALBUMIN/GLOB SERPL: 0.4 {RATIO} (ref 1.1–2.2)
ALP SERPL-CCNC: 104 U/L (ref 45–117)
ALT SERPL-CCNC: 39 U/L (ref 12–78)
ANION GAP SERPL CALC-SCNC: 11 MMOL/L (ref 5–15)
APPEARANCE UR: CLEAR
AST SERPL-CCNC: 48 U/L (ref 15–37)
BACTERIA URNS QL MICRO: NEGATIVE /HPF
BASOPHILS # BLD: 0 K/UL (ref 0–0.1)
BASOPHILS NFR BLD: 0 % (ref 0–1)
BILIRUB SERPL-MCNC: 0.5 MG/DL (ref 0.2–1)
BILIRUB UR QL: NEGATIVE
BUN SERPL-MCNC: 33 MG/DL (ref 6–20)
BUN/CREAT SERPL: 21 (ref 12–20)
CALCIUM SERPL-MCNC: 8.4 MG/DL (ref 8.5–10.1)
CHLORIDE SERPL-SCNC: 104 MMOL/L (ref 97–108)
CO2 SERPL-SCNC: 21 MMOL/L (ref 21–32)
COLOR UR: ABNORMAL
COMMENT, HOLDF: NORMAL
COVID-19 RAPID TEST, COVR: NOT DETECTED
CREAT SERPL-MCNC: 1.59 MG/DL (ref 0.55–1.02)
DIFFERENTIAL METHOD BLD: ABNORMAL
EOSINOPHIL # BLD: 0.1 K/UL (ref 0–0.4)
EOSINOPHIL NFR BLD: 1 % (ref 0–7)
EPITH CASTS URNS QL MICRO: ABNORMAL /LPF
ERYTHROCYTE [DISTWIDTH] IN BLOOD BY AUTOMATED COUNT: 13.2 % (ref 11.5–14.5)
GLOBULIN SER CALC-MCNC: 5.1 G/DL (ref 2–4)
GLUCOSE SERPL-MCNC: 119 MG/DL (ref 65–100)
GLUCOSE UR STRIP.AUTO-MCNC: NEGATIVE MG/DL
HCT VFR BLD AUTO: 29.9 % (ref 35–47)
HGB BLD-MCNC: 9.8 G/DL (ref 11.5–16)
HGB UR QL STRIP: NEGATIVE
HYALINE CASTS URNS QL MICRO: ABNORMAL /LPF (ref 0–2)
IMM GRANULOCYTES # BLD AUTO: 0 K/UL (ref 0–0.04)
IMM GRANULOCYTES NFR BLD AUTO: 0 % (ref 0–0.5)
KETONES UR QL STRIP.AUTO: NEGATIVE MG/DL
LACTATE SERPL-SCNC: 0.6 MMOL/L (ref 0.4–2)
LEUKOCYTE ESTERASE UR QL STRIP.AUTO: ABNORMAL
LYMPHOCYTES # BLD: 2.1 K/UL (ref 0.8–3.5)
LYMPHOCYTES NFR BLD: 20 % (ref 12–49)
MCH RBC QN AUTO: 29.1 PG (ref 26–34)
MCHC RBC AUTO-ENTMCNC: 32.8 G/DL (ref 30–36.5)
MCV RBC AUTO: 88.7 FL (ref 80–99)
MONOCYTES # BLD: 1.5 K/UL (ref 0–1)
MONOCYTES NFR BLD: 14 % (ref 5–13)
NEUTS SEG # BLD: 6.7 K/UL (ref 1.8–8)
NEUTS SEG NFR BLD: 65 % (ref 32–75)
NITRITE UR QL STRIP.AUTO: NEGATIVE
NRBC # BLD: 0 K/UL (ref 0–0.01)
NRBC BLD-RTO: 0 PER 100 WBC
PH UR STRIP: 5 [PH] (ref 5–8)
PLATELET # BLD AUTO: 375 K/UL (ref 150–400)
PMV BLD AUTO: 9.7 FL (ref 8.9–12.9)
POTASSIUM SERPL-SCNC: 4.4 MMOL/L (ref 3.5–5.1)
PROCALCITONIN SERPL-MCNC: 0.2 NG/ML
PROT SERPL-MCNC: 7.2 G/DL (ref 6.4–8.2)
PROT UR STRIP-MCNC: 100 MG/DL
RBC # BLD AUTO: 3.37 M/UL (ref 3.8–5.2)
RBC #/AREA URNS HPF: ABNORMAL /HPF (ref 0–5)
SAMPLES BEING HELD,HOLD: NORMAL
SODIUM SERPL-SCNC: 136 MMOL/L (ref 136–145)
SOURCE, COVRS: NORMAL
SP GR UR REFRACTOMETRY: 1.01 (ref 1–1.03)
TROPONIN-HIGH SENSITIVITY: 9 NG/L (ref 0–51)
UR CULT HOLD, URHOLD: NORMAL
UROBILINOGEN UR QL STRIP.AUTO: 0.2 EU/DL (ref 0.2–1)
WBC # BLD AUTO: 10.5 K/UL (ref 3.6–11)
WBC URNS QL MICRO: ABNORMAL /HPF (ref 0–4)

## 2022-05-24 PROCEDURE — 85025 COMPLETE CBC W/AUTO DIFF WBC: CPT

## 2022-05-24 PROCEDURE — 36415 COLL VENOUS BLD VENIPUNCTURE: CPT

## 2022-05-24 PROCEDURE — 84484 ASSAY OF TROPONIN QUANT: CPT

## 2022-05-24 PROCEDURE — 81001 URINALYSIS AUTO W/SCOPE: CPT

## 2022-05-24 PROCEDURE — 83605 ASSAY OF LACTIC ACID: CPT

## 2022-05-24 PROCEDURE — 99284 EMERGENCY DEPT VISIT MOD MDM: CPT

## 2022-05-24 PROCEDURE — 71045 X-RAY EXAM CHEST 1 VIEW: CPT

## 2022-05-24 PROCEDURE — 87040 BLOOD CULTURE FOR BACTERIA: CPT

## 2022-05-24 PROCEDURE — 87635 SARS-COV-2 COVID-19 AMP PRB: CPT

## 2022-05-24 PROCEDURE — 80053 COMPREHEN METABOLIC PANEL: CPT

## 2022-05-24 PROCEDURE — 84145 PROCALCITONIN (PCT): CPT

## 2022-05-25 ENCOUNTER — PATIENT OUTREACH (OUTPATIENT)
Dept: CASE MANAGEMENT | Age: 82
End: 2022-05-25

## 2022-05-25 NOTE — ED PROVIDER NOTES
Jolene Moura is an [de-identified] yo F with 1 week h/o fatigue who has had fever on and off for past 2 days and nasal congestion. She took a COVID test at home that was normal.  She denies nausea and vomiting. Past Medical History:   Diagnosis Date    ACP (advance care planning) 8/29/2018    Advance Care Plan or Surrogate Decision Maker documented in medical record.     Arthritis     Shoulders, knees    Bell's palsy 1980    Residual remains on right side    Breast cancer ([address] 5/1995    Cholelithiasis 6/23/2011    CKD (chronic kidney disease), stage III ([address] 6/6/2012    COPD (chronic obstructive pulmonary disease) ([address] 12/28/2011    Dyslipidemia 6/23/2011    Elevated triglycerides with high cholesterol 8/28/2015    Esophageal reflux 6/23/2011    Essential hypertension 6/23/2011    GERD (gastroesophageal reflux disease)     Hiatal hernia 6/23/2011    History of breast cancer 6/23/2011    Rt Breast    History of duodenal ulcer 6/23/2011    HTN (hypertension) 6/23/2011    Hyperlipidemia 1/7/2013    Left ventricular hypertrophy 6/23/2011    Nicotine dependence 9/2/2015    Obstructive chronic bronchitis with acute bronchitis ([address] 6/23/2011    Osteopenia     Osteoporosis 6/23/2011    Other ill-defined conditions(799.89)     rightsided eye and cheek numb from surg    Pregnancy 6/23/2011    Steatosis of liver 10/9/2011    Tubular adenoma of colon 1/24/2016 1/4/16    Type 2 diabetes mellitus without complications ([address] 8/80/2615    Type II or unspecified type diabetes mellitus without mention of complication, not stated as uncontrolled 6/23/2011       Past Surgical History:   Procedure Laterality Date    ENDOSCOPY, COLON, DIAGNOSTIC  10/09    HX CHOLECYSTECTOMY  10/2011    laparoscopic    HX HEENT      bells palsy surgery,tongue numb    HX MASTECTOMY Right 5/1995    HX OTHER SURGICAL      colonoscopy    HX TONSIL AND ADENOIDECTOMY  1969    HX TONSILLECTOMY  1969    [place] MASTECTOMY BRA     R Breast    HI REMOVAL GALLBLADDER           Family History:   Problem Relation Age of Onset    Asthma Mother     Cancer Mother         Pancreatic CA    Stroke Father     Other Father         Artherosclerosis    Cancer Sister         Rectum    Lung Disease Sister     Diabetes Sister     Coronary Art Dis Sister     Heart Attack Brother     Diabetes Maternal Grandmother     Diabetes Paternal Grandmother     Cancer Paternal Grandfather     Diabetes Sister     Coronary Art Dis Sister     Diabetes Brother     Alcohol abuse Brother     Other Brother         killed in car accident       Social History     Socioeconomic History    Marital status:      Spouse name: Not on file    Number of children: Not on file    Years of education: Not on file    Highest education level: Not on file   Occupational History    Not on file   Tobacco Use    Smoking status: Current Every Day Smoker     Packs/day: 0.50     Years: 65.00     Pack years: 32.50     Types: Cigarettes     Last attempt to quit: 2019     Years since quittin.5    Smokeless tobacco: Never Used   Vaping Use    Vaping Use: Some days    Substances: Nicotine   Substance and Sexual Activity    Alcohol use: Yes     Comment: Rarely-holidays    Drug use: No    Sexual activity: Not Currently   Other Topics Concern    Not on file   Social History Narrative    Not on file     Social Determinants of Health     Financial Resource Strain:     Difficulty of Paying Living Expenses: Not on file   Food Insecurity:     Worried About Running Out of Food in the Last Year: Not on file    Isabel of Food in the Last Year: Not on file   Transportation Needs:     Lack of Transportation (Medical): Not on file    Lack of Transportation (Non-Medical):  Not on file   Physical Activity:     Days of Exercise per Week: Not on file    Minutes of Exercise per Session: Not on file   Stress:     Feeling of Stress : Not on file   Social Connections:     Frequency of Communication with Friends and Family: Not on file    Frequency of Social Gatherings with Friends and Family: Not on file    Attends [employer] Services: Not on file    Active Member of Clubs or Organizations: Not on file    Attends Club or Organization Meetings: Not on file    Marital Status: Not on file   Intimate Partner Violence:     Fear of Current or Ex-Partner: Not on file    Emotionally Abused: Not on file    Physically Abused: Not on file    Sexually Abused: Not on file   Housing Stability:     Unable to Pay for Housing in the Last Year: Not on file    Number of [place] in the Last Year: Not on file    Unstable Housing in the Last Year: Not on file         ALLERGIES: Nsaids (non-steroidal anti-inflammatory drug), Fenofibrate, and Metformin    Review of Systems   Constitutional: Positive for fatigue and fever. HENT: Positive for congestion. Negative for sore throat. Eyes: Negative for visual disturbance. Respiratory: Negative for cough. Cardiovascular: Negative for chest pain. Gastrointestinal: Negative for abdominal pain. Genitourinary: Negative for dysuria. Musculoskeletal: Positive for myalgias. Negative for back pain. Skin: Negative for rash. Neurological: Negative for headaches. Vitals:    05/24/22 1658 05/24/22 1703   BP: (!) 149/66    Pulse: 82    Resp: 16    Temp: 99.3 °F (37.4 °C)    SpO2: 95% 92%   Weight: 68 kg (150 lb)    Height: 5' 2\" (1.575 m)             Physical Exam  Vitals and nursing note reviewed. Constitutional:       General: She is not in acute distress. Appearance: She is well-developed. HENT:      Head: Normocephalic and atraumatic. Eyes:      Conjunctiva/sclera: Conjunctivae normal.   Neck:      Trachea: Phonation normal.   Cardiovascular:      Rate and Rhythm: Normal rate. Pulmonary:      Effort: Pulmonary effort is normal. No respiratory distress.       Breath sounds: Examination of the right-middle field reveals decreased breath sounds. Examination of the right-lower field reveals decreased breath sounds. Decreased breath sounds present. No wheezing, rhonchi or rales. Abdominal:      General: There is no distension. Tenderness: There is no abdominal tenderness. There is no guarding. Musculoskeletal:         General: No tenderness. Normal range of motion. Cervical back: Normal range of motion. Skin:     General: Skin is warm and dry. Neurological:      Mental Status: She is alert. She is not disoriented. Motor: No abnormal muscle tone. MDM       9:27 PM  PAtient reassessed and remains in no distress. O2 sat 94% on room air. CXR with bilateral upper lobe pneumonia, viral pattern. Procalcitonin, Lactic acid  and COVID normal. UA normal.  Discussed with patient admission for observation vs discharge home. Will discharge patient. Patient and family agree with plan. Advised to return to ED if new or worsened symptoms. Provided Pulse oximeter with instructions.         Procedures

## 2022-05-25 NOTE — PROGRESS NOTES
Patient contacted regarding COVID-19 risk, pulse oximeter ordered at discharge. Discussed COVID-19 related testing which was available at this time. Test results were negative. Patient informed of results, if available? yes. LPN Care Coordinator contacted the patient by telephone to perform post discharge assessment. Call within 2 business days of discharge: Yes Verified name and  with patient as identifiers. Provided introduction to self, and explanation of the CTN/ACM role, and reason for call due to risk factors for infection and/or exposure to COVID-19. Symptoms reviewed with patient who verbalized the following symptoms: fever and fatigue      Due to no new or worsening symptoms encounter was not routed to provider for escalation. Discussed follow-up appointments. If no appointment was previously scheduled, appointment scheduling offered:  no. [address] follow up appointment(s):   Future Appointments   Date Time Provider Yari Morrow   2022  3:00 PM Kyle Avila  [facility]   2022  8:45 AM Hernandez Hawley MD [facility]     [facility] follow up appointment(s): n/a    Interventions to address risk factors: Education of patient/family/caregiver/guardian to support self-management-VDh and covid numbers given     Advance Care Planning:   Does patient have an Advance Directive: patient declined education. Educated patient about risk for severe COVID-19 due to risk factors according to CDC guidelines. LPN CC reviewed discharge instructions, medical action plan and red flag symptoms with the patient who verbalized understanding. Discussed COVID vaccination status: yes. Education provided on COVID-19 vaccination as appropriate. Discussed exposure protocols and quarantine with CDC Guidelines. Patient was given an opportunity to verbalize any questions and concerns and agrees to contact LPN CC or health care provider for questions related to their healthcare.     Reviewed and educated patient on any new and changed medications related to discharge diagnosis     Was patient discharged with a pulse oximeter? yes Discussed and confirmed pulse oximeter discharge instructions and when to notify provider or seek emergency care. Patient states oxygen level has been 95% and above    LPN CC provided contact information. Plan for follow-up call in 5-7 days based on severity of symptoms and risk factors.

## 2022-05-25 NOTE — ED NOTES
Discharge instructions reviewed with pt and family. Opportunity for questions provided. Pulse ox provided.  Pt taken to car by RN

## 2022-05-27 ENCOUNTER — PATIENT OUTREACH (OUTPATIENT)
Dept: CASE MANAGEMENT | Age: 82
End: 2022-05-27

## 2022-05-29 LAB
BACTERIA SPEC CULT: NORMAL
SERVICE CMNT-IMP: NORMAL

## 2022-06-01 ENCOUNTER — PATIENT OUTREACH (OUTPATIENT)
Dept: CASE MANAGEMENT | Age: 82
End: 2022-06-01

## 2022-06-01 NOTE — PROGRESS NOTES
Patient resolved from [address] Transitions episode on 6/1/22. Discussed COVID-19 related testing which was available at this time. Test results were negative. Patient informed of results, if available? Already advised  Patient pulse ox remains at 94-99%     Patient/family has been provided the following resources and education related to COVID-19:                         Signs, symptoms and red flags related to COVID-19            CDC exposure and quarantine guidelines            [facility] exposure contact - [phone]            Contact for their local Department of Health                 Patient currently reports that the following symptoms have improved:  fatigue. No further outreach scheduled with this CTN/ACM/LPN/HC/ MA. Episode of Care resolved. Patient has this CTN/ACM/LPN/HC/MA contact information if future needs arise.

## 2022-06-06 ENCOUNTER — TELEPHONE (OUTPATIENT)
Dept: FAMILY MEDICINE CLINIC | Age: 82
End: 2022-06-06

## 2022-06-06 ENCOUNTER — OFFICE VISIT (OUTPATIENT)
Dept: FAMILY MEDICINE CLINIC | Age: 82
End: 2022-06-06
Payer: MEDICARE

## 2022-06-06 ENCOUNTER — HOSPITAL ENCOUNTER (OUTPATIENT)
Dept: GENERAL RADIOLOGY | Age: 82
Discharge: HOME | End: 2022-06-06
Payer: MEDICARE

## 2022-06-06 VITALS
HEART RATE: 74 BPM | SYSTOLIC BLOOD PRESSURE: 138 MMHG | WEIGHT: 152.2 LBS | BODY MASS INDEX: 27.84 KG/M2 | RESPIRATION RATE: 16 BRPM | DIASTOLIC BLOOD PRESSURE: 56 MMHG | TEMPERATURE: 97.3 F | OXYGEN SATURATION: 97 %

## 2022-06-06 DIAGNOSIS — J18.9 PNEUMONIA OF BOTH UPPER LOBES DUE TO INFECTIOUS ORGANISM: Primary | ICD-10-CM

## 2022-06-06 DIAGNOSIS — D64.9 ANEMIA, UNSPECIFIED TYPE: ICD-10-CM

## 2022-06-06 DIAGNOSIS — R07.89 RIGHT-SIDED CHEST WALL PAIN: ICD-10-CM

## 2022-06-06 DIAGNOSIS — J18.9 PNEUMONIA OF BOTH UPPER LOBES DUE TO INFECTIOUS ORGANISM: ICD-10-CM

## 2022-06-06 DIAGNOSIS — M94.0 COSTOCHONDRITIS, ACUTE: ICD-10-CM

## 2022-06-06 PROCEDURE — 1123F ACP DISCUSS/DSCN MKR DOCD: CPT | Performed by: FAMILY MEDICINE

## 2022-06-06 PROCEDURE — G8417 CALC BMI ABV UP PARAM F/U: HCPCS | Performed by: FAMILY MEDICINE

## 2022-06-06 PROCEDURE — G8510 SCR DEP NEG, NO PLAN REQD: HCPCS | Performed by: FAMILY MEDICINE

## 2022-06-06 PROCEDURE — G8754 DIAS BP LESS 90: HCPCS | Performed by: FAMILY MEDICINE

## 2022-06-06 PROCEDURE — 71100 X-RAY EXAM RIBS UNI 2 VIEWS: CPT

## 2022-06-06 PROCEDURE — 71046 X-RAY EXAM CHEST 2 VIEWS: CPT

## 2022-06-06 PROCEDURE — 1101F PT FALLS ASSESS-DOCD LE1/YR: CPT | Performed by: FAMILY MEDICINE

## 2022-06-06 PROCEDURE — G8427 DOCREV CUR MEDS BY ELIG CLIN: HCPCS | Performed by: FAMILY MEDICINE

## 2022-06-06 PROCEDURE — 1090F PRES/ABSN URINE INCON ASSESS: CPT | Performed by: FAMILY MEDICINE

## 2022-06-06 PROCEDURE — G8536 NO DOC ELDER MAL SCRN: HCPCS | Performed by: FAMILY MEDICINE

## 2022-06-06 PROCEDURE — 99214 OFFICE O/P EST MOD 30 MIN: CPT | Performed by: FAMILY MEDICINE

## 2022-06-06 PROCEDURE — G8752 SYS BP LESS 140: HCPCS | Performed by: FAMILY MEDICINE

## 2022-06-06 PROCEDURE — G8399 PT W/DXA RESULTS DOCUMENT: HCPCS | Performed by: FAMILY MEDICINE

## 2022-06-06 RX ORDER — TIZANIDINE 2 MG/1
2 TABLET ORAL
Qty: 30 TABLET | Refills: 0 | Status: SHIPPED | OUTPATIENT
Start: 2022-06-06

## 2022-06-06 NOTE — PROGRESS NOTES
Chief Complaint   Patient presents with   [facility] Follow Up     1. Have you been to the ER, urgent care clinic since your last visit? Hospitalized since your last visit? Yes, 05/24/22 [facility] ER, viral pneumonia. 2. Have you seen or consulted any other health care providers outside of the [address] [place] [address] since your last visit? Include any pap smears or colon screening.  No

## 2022-06-06 NOTE — PROGRESS NOTES
HISTORY OF PRESENT ILLNESS  Milind Campbell is a [de-identified] y.o. female. Milind Campbell is here for ER follow up after being seen 5/24 for bilateral upper lobe pneumonia, felt to be viral in nature. She was getting better, but has felt worse in the past 24 hours. Symptoms include right chest wall pain going into her right arm and right hip and feeling more tired. Her temperature has been up to 99.4 F and her blood pressure has dropped. Her O2 sats dropped to 88% yesterday, but have been fine today. Also, her white count was normal but she was anemic. Review of Systems   Constitutional: Positive for chills and malaise/fatigue. Negative for fever. HENT: Negative for congestion. Respiratory: Positive for cough and shortness of breath. Negative for sputum production and wheezing. Cardiovascular: Negative for chest pain. Visit Vitals  BP (!) 138/56 (BP 1 Location: Left arm, BP Patient Position: Sitting, BP Cuff Size: Adult)   Pulse 74   Temp 97.3 °F (36.3 °C) (Temporal)   Resp 16   Wt 152 lb 3.2 oz (69 kg)   SpO2 97%   BMI 27.84 kg/m²       Physical Exam  Vitals and nursing note reviewed. Constitutional:       General: She is not in acute distress. Appearance: She is well-developed. She is not diaphoretic. Cardiovascular:      Rate and Rhythm: Normal rate and regular rhythm. Heart sounds: Normal heart sounds. No murmur heard. No friction rub. No gallop. Pulmonary:      Effort: Pulmonary effort is normal. No respiratory distress. Breath sounds: Normal breath sounds. No wheezing or rales. Chest:      Chest wall: Tenderness present. Comments: Right ribs and costochondral margin tender  Skin:     General: Skin is warm and dry. Neurological:      Mental Status: She is alert and oriented to person, place, and time. ASSESSMENT and PLAN    ICD-10-CM ICD-9-CM    1.  Pneumonia of both upper lobes due to infectious organism  J18.9 486 XR CHEST PA LAT      CBC WITH AUTOMATED DIFF CBC WITH AUTOMATED DIFF   2. Costochondritis, acute  M94.0 733.6 tiZANidine (ZANAFLEX) 2 mg tablet   3. Right-sided chest wall pain  R07.89 786.52 XR RIBS RT UNI 2 V      tiZANidine (ZANAFLEX) 2 mg tablet   4. Anemia, unspecified type  D64.9 285.9 CBC WITH AUTOMATED DIFF      CBC WITH AUTOMATED DIFF        Worsening pneumonia symptoms  Costochondritis/chest wall pain due to coughing  Labs per orders. X-ray of ribs and chest  Tizanidine prn  Heat to chest wall  Rest, push fluids  Tylenol prn    Follow-up and Dispositions    · Return in 1 month (on 7/6/2022) for pneumonia. Reviewed plan of care. Patient has provided input and agrees with goals.

## 2022-06-07 LAB
BASOPHILS # BLD AUTO: 0.1 X10E3/UL (ref 0–0.2)
BASOPHILS NFR BLD AUTO: 1 %
EOSINOPHIL # BLD AUTO: 0.1 X10E3/UL (ref 0–0.4)
EOSINOPHIL NFR BLD AUTO: 1 %
ERYTHROCYTE [DISTWIDTH] IN BLOOD BY AUTOMATED COUNT: 12.9 % (ref 11.7–15.4)
HCT VFR BLD AUTO: 31.3 % (ref 34–46.6)
HGB BLD-MCNC: 10.3 G/DL (ref 11.1–15.9)
IMM GRANULOCYTES # BLD AUTO: 0.1 X10E3/UL (ref 0–0.1)
IMM GRANULOCYTES NFR BLD AUTO: 1 %
LYMPHOCYTES # BLD AUTO: 3.4 X10E3/UL (ref 0.7–3.1)
LYMPHOCYTES NFR BLD AUTO: 27 %
MCH RBC QN AUTO: 29.2 PG (ref 26.6–33)
MCHC RBC AUTO-ENTMCNC: 32.9 G/DL (ref 31.5–35.7)
MCV RBC AUTO: 89 FL (ref 79–97)
MONOCYTES # BLD AUTO: 1.2 X10E3/UL (ref 0.1–0.9)
MONOCYTES NFR BLD AUTO: 10 %
NEUTROPHILS # BLD AUTO: 7.8 X10E3/UL (ref 1.4–7)
NEUTROPHILS NFR BLD AUTO: 60 %
PLATELET # BLD AUTO: 522 X10E3/UL (ref 150–450)
RBC # BLD AUTO: 3.53 X10E6/UL (ref 3.77–5.28)
WBC # BLD AUTO: 12.7 X10E3/UL (ref 3.4–10.8)

## 2022-07-08 ENCOUNTER — OFFICE VISIT (OUTPATIENT)
Dept: FAMILY MEDICINE CLINIC | Age: 82
End: 2022-07-08
Payer: MEDICARE

## 2022-07-08 VITALS
DIASTOLIC BLOOD PRESSURE: 66 MMHG | TEMPERATURE: 98.1 F | BODY MASS INDEX: 28.34 KG/M2 | HEIGHT: 62 IN | WEIGHT: 154 LBS | SYSTOLIC BLOOD PRESSURE: 142 MMHG | OXYGEN SATURATION: 97 % | HEART RATE: 73 BPM | RESPIRATION RATE: 20 BRPM

## 2022-07-08 DIAGNOSIS — E11.9 TYPE 2 DIABETES MELLITUS WITHOUT COMPLICATION, WITHOUT LONG-TERM CURRENT USE OF INSULIN (HCC): ICD-10-CM

## 2022-07-08 DIAGNOSIS — I10 ESSENTIAL HYPERTENSION: ICD-10-CM

## 2022-07-08 DIAGNOSIS — J18.9 PNEUMONIA OF BOTH UPPER LOBES DUE TO INFECTIOUS ORGANISM: Primary | ICD-10-CM

## 2022-07-08 PROCEDURE — 1101F PT FALLS ASSESS-DOCD LE1/YR: CPT | Performed by: FAMILY MEDICINE

## 2022-07-08 PROCEDURE — G8427 DOCREV CUR MEDS BY ELIG CLIN: HCPCS | Performed by: FAMILY MEDICINE

## 2022-07-08 PROCEDURE — 1090F PRES/ABSN URINE INCON ASSESS: CPT | Performed by: FAMILY MEDICINE

## 2022-07-08 PROCEDURE — G8754 DIAS BP LESS 90: HCPCS | Performed by: FAMILY MEDICINE

## 2022-07-08 PROCEDURE — 1123F ACP DISCUSS/DSCN MKR DOCD: CPT | Performed by: FAMILY MEDICINE

## 2022-07-08 PROCEDURE — G8510 SCR DEP NEG, NO PLAN REQD: HCPCS | Performed by: FAMILY MEDICINE

## 2022-07-08 PROCEDURE — G8417 CALC BMI ABV UP PARAM F/U: HCPCS | Performed by: FAMILY MEDICINE

## 2022-07-08 PROCEDURE — G8399 PT W/DXA RESULTS DOCUMENT: HCPCS | Performed by: FAMILY MEDICINE

## 2022-07-08 PROCEDURE — G8753 SYS BP > OR = 140: HCPCS | Performed by: FAMILY MEDICINE

## 2022-07-08 PROCEDURE — 3044F HG A1C LEVEL LT 7.0%: CPT | Performed by: FAMILY MEDICINE

## 2022-07-08 PROCEDURE — G8536 NO DOC ELDER MAL SCRN: HCPCS | Performed by: FAMILY MEDICINE

## 2022-07-08 PROCEDURE — 99214 OFFICE O/P EST MOD 30 MIN: CPT | Performed by: FAMILY MEDICINE

## 2022-07-08 RX ORDER — LOSARTAN POTASSIUM 100 MG/1
100 TABLET ORAL DAILY
Qty: 90 TABLET | Refills: 4 | Status: SHIPPED
Start: 2022-07-08 | End: 2022-09-19 | Stop reason: ALTCHOICE

## 2022-07-08 RX ORDER — METOPROLOL SUCCINATE 200 MG/1
200 TABLET, EXTENDED RELEASE ORAL DAILY
Qty: 90 TABLET | Refills: 4 | Status: SHIPPED | OUTPATIENT
Start: 2022-07-08

## 2022-07-08 RX ORDER — AMLODIPINE BESYLATE 10 MG/1
10 TABLET ORAL DAILY
Qty: 90 TABLET | Refills: 4 | Status: SHIPPED | OUTPATIENT
Start: 2022-07-08

## 2022-07-08 NOTE — PROGRESS NOTES
Chief Complaint   Patient presents with    Follow-up     1 mo fu for pna. feels 100%. 1. \"Have you been to the ER, urgent care clinic since your last visit? Hospitalized since your last visit? \" No    2. \"Have you seen or consulted any other health care providers outside of the [address] [place] [address] since your last visit? \" No     3. For patients aged 39-70: Has the patient had a colonoscopy / FIT/ Cologuard? NA - based on age      If the patient is female:    4. For patients aged 41-77: Has the patient had a mammogram within the past 2 years? NA - based on age or sex      11. For patients aged 21-65: Has the patient had a pap smear?  NA - based on age or sex

## 2022-07-08 NOTE — PROGRESS NOTES
HISTORY OF PRESENT ILLNESS  Rhona Pennington is a [de-identified] y.o. female. Rhona Pennington is here for follow up on her pneumonia. She is back to normal.    Also, she is due for a diabetic foot exam.    Her SBP is high, but her DBP if on the low side and always is. Review of Systems   Constitutional: Negative for chills, fever and malaise/fatigue. Respiratory: Negative for cough, shortness of breath and wheezing. Cardiovascular: Negative for chest pain. Visit Vitals  BP (!) 142/66   Pulse 73   Temp 98.1 °F (36.7 °C) (Temporal)   Resp 20   Ht 5' 2\" (1.575 m)   Wt 154 lb (69.9 kg)   SpO2 97%   BMI 28.17 kg/m²     Physical Exam  Vitals and nursing note reviewed. Constitutional:       General: She is not in acute distress. Appearance: She is well-developed. She is not diaphoretic. Cardiovascular:      Rate and Rhythm: Normal rate and regular rhythm. Heart sounds: Normal heart sounds. No murmur heard. No friction rub. No gallop. Pulmonary:      Effort: Pulmonary effort is normal. No respiratory distress. Breath sounds: Normal breath sounds. No wheezing or rales. Skin:     General: Skin is warm and dry. Neurological:      Mental Status: She is alert and oriented to person, place, and time. ASSESSMENT and PLAN    ICD-10-CM ICD-9-CM    1. Pneumonia of both upper lobes due to infectious organism  J18.9 486    2. Type 2 diabetes mellitus without complication, without long-term current use of insulin ([facility])  E11.9 250.00 HM DIABETES FOOT EXAM   3. Essential hypertension  I10 401.9 losartan (COZAAR) 100 mg tablet      amLODIPine (NORVASC) 10 mg tablet      metoprolol succinate (TOPROL-XL) 200 mg XL tablet        Pneumonia resolved  Blood pressure reasonably controlled  Continue current plans. Refills per orders  Foot exam  was performed. .  Sensory and motor testing was assessed . Pedal pulse(s) was assessed.             Follow-up and Dispositions    · Return in about 6 weeks (around 8/19/2022) for diabetes, blood pressure. Reviewed plan of care. Patient has provided input and agrees with goals.

## 2022-07-11 ENCOUNTER — OFFICE VISIT (OUTPATIENT)
Dept: NEUROLOGY | Age: 82
End: 2022-07-11
Payer: MEDICARE

## 2022-07-11 VITALS
HEIGHT: 62 IN | HEART RATE: 77 BPM | WEIGHT: 154 LBS | SYSTOLIC BLOOD PRESSURE: 120 MMHG | OXYGEN SATURATION: 96 % | BODY MASS INDEX: 28.34 KG/M2 | DIASTOLIC BLOOD PRESSURE: 70 MMHG

## 2022-07-11 DIAGNOSIS — G89.29 CHRONIC INTRACTABLE HEADACHE, UNSPECIFIED HEADACHE TYPE: Primary | ICD-10-CM

## 2022-07-11 DIAGNOSIS — R51.9 CHRONIC INTRACTABLE HEADACHE, UNSPECIFIED HEADACHE TYPE: Primary | ICD-10-CM

## 2022-07-11 DIAGNOSIS — R41.89 COGNITIVE IMPAIRMENT: ICD-10-CM

## 2022-07-11 PROCEDURE — 99205 OFFICE O/P NEW HI 60 MIN: CPT | Performed by: PSYCHIATRY & NEUROLOGY

## 2022-07-11 PROCEDURE — 1090F PRES/ABSN URINE INCON ASSESS: CPT | Performed by: PSYCHIATRY & NEUROLOGY

## 2022-07-11 NOTE — PROGRESS NOTES
NEUROLOGY NEW PATIENT OFFICE CONSULTATION      7/11/2022    RE: Yordan West         1940      REFERRED BY:  Chavez Chatman MD        CHIEF COMPLAINT:  This is Yordan West is a [de-identified] y.o. female right handed who had concerns including Headache (and memory). HPI:     For the past 6 months, patient noted headaches, bifrontal, occurring 3-4/ week but has gotten better for 2 weeks, (+) flashing of light (+) nausea (-) vomiting (+) photophobia (-) phonophobia    (+) migraines in the 5th grade  (+) memory issues - described as taking longer to respond. (-) hallucinations      ROS   (-) fever  (-) rash  All other systems reviewed and are negative    Past Medical Hx  Past Medical History:   Diagnosis Date    ACP (advance care planning) 8/29/2018    Advance Care Plan or Surrogate Decision Maker documented in medical record.     Arthritis     Shoulders, knees    Bell's palsy 1980    Residual remains on right side    Breast cancer ([address] 5/1995    Cholelithiasis 6/23/2011    CKD (chronic kidney disease), stage III ([address] 6/6/2012    COPD (chronic obstructive pulmonary disease) ([address] 12/28/2011    Dyslipidemia 6/23/2011    Elevated triglycerides with high cholesterol 8/28/2015    Esophageal reflux 6/23/2011    Essential hypertension 6/23/2011    GERD (gastroesophageal reflux disease)     Hiatal hernia 6/23/2011    History of breast cancer 6/23/2011    Rt Breast    History of duodenal ulcer 6/23/2011    HTN (hypertension) 6/23/2011    Hyperlipidemia 1/7/2013    Left ventricular hypertrophy 6/23/2011    Nicotine dependence 9/2/2015    Obstructive chronic bronchitis with acute bronchitis ([address] 6/23/2011    Osteopenia     Osteoporosis 6/23/2011    Other ill-defined conditions(799.89)     rightsided eye and cheek numb from surg    Pregnancy 6/23/2011    Steatosis of liver 10/9/2011    Tubular adenoma of colon 1/24/2016 1/4/16    Type 2 diabetes mellitus without complications ([address] 0/97/5406    Type II or unspecified type diabetes mellitus without mention of complication, not stated as uncontrolled 2011       Social Hx  Social History     Socioeconomic History    Marital status:    Tobacco Use    Smoking status: Current Every Day Smoker     Packs/day: 0.50     Years: 65.00     Pack years: 32.50     Types: Cigarettes     Last attempt to quit: 2019     Years since quittin.6    Smokeless tobacco: Never Used   Vaping Use    Vaping Use: Some days    Substances: Nicotine   Substance and Sexual Activity    Alcohol use: Yes     Comment: Rarely-holidays    Drug use: No    Sexual activity: Not Currently       Family Hx  Family History   Problem Relation Age of Onset    Asthma Mother     Cancer Mother         Pancreatic CA    Stroke Father     Other Father         Artherosclerosis    Cancer Sister         Rectum    Lung Disease Sister     Diabetes Sister     Coronary Art Dis Sister     Heart Attack Brother     Diabetes Maternal Grandmother     Diabetes Paternal Grandmother     Cancer Paternal Grandfather     Diabetes Sister     Coronary Art Dis Sister     Diabetes Brother     Alcohol abuse Brother     Other Brother         killed in car accident   Brother - dementia    ALLERGIES  Allergies   Allergen Reactions    Nsaids (Non-Steroidal Anti-Inflammatory Drug) Anaphylaxis and Hives    Fenofibrate Other (comments)     Leg cramps    Metformin Diarrhea       CURRENT MEDS  Current Outpatient Medications   Medication Sig Dispense Refill    losartan (COZAAR) 100 mg tablet Take 1 Tablet by mouth daily. 90 Tablet 4    amLODIPine (NORVASC) 10 mg tablet Take 1 Tablet by mouth daily. 90 Tablet 4    metoprolol succinate (TOPROL-XL) 200 mg XL tablet Take 1 Tablet by mouth daily. 90 Tablet 4    tiZANidine (ZANAFLEX) 2 mg tablet Take 1 Tablet by mouth three (3) times daily as needed for Pain.  30 Tablet 0    omeprazole (PRILOSEC) 40 mg capsule TAKE ONE CAPSULE BY MOUTH DAILY 90 Capsule 2    rosuvastatin (CRESTOR) 20 mg tablet TAKE ONE TABLET BY MOUTH EVERY EVENING 90 Tablet 3    glucose blood VI test strips (Accu-Chek Ifrah Plus test strp) strip USE TO TEST BLOOD SUGAR DAILY, E11.9 100 Strip 4    omega-3 fatty acids-vitamin e (FISH OIL) 1,000 mg cap Take 2 Caps by mouth daily (after breakfast).  calcium-vitamin D (CALCIUM 500+D) 500 mg(1,250mg) -200 unit per tablet Take 1 Tab by mouth daily (after breakfast).  multivitamin (ONE A DAY) tablet Take 1 Tab by mouth daily (after breakfast). PREVIOUS WORKUP: (reviewed)  IMAGING:    CT Results (recent):  Results from Hospital Encounter encounter on 10/02/20    CT CHEST WO CONT    Narrative  INDICATION: Pneumonia    COMPARISON: Radiographs 7/9/2020    CONTRAST: None. TECHNIQUE:  5 mm axial images were obtained through the chest. Coronal and  sagittal reformats were generated. CT dose reduction was achieved through use  of a standardized protocol tailored for this examination and automatic exposure  control for dose modulation. The absence of intravenous contrast reduces the sensitivity for evaluation of  the mediastinum, valente, vasculature, and upper abdominal organs. FINDINGS:    CHEST WALL: No mass or axillary lymphadenopathy. A right-sided breast implant is  present. Surgical clips are seen in the right axilla. THYROID: There is a 2.1 cm nodule again seen in the right thyroid gland. MEDIASTINUM: No mass or lymphadenopathy. VALENTE: No mass or lymphadenopathy. THORACIC AORTA: No aneurysm. MAIN PULMONARY ARTERY: Normal in caliber. TRACHEA/BRONCHI: Patent. ESOPHAGUS: No wall thickening or dilatation. There is a small hiatal hernia. HEART: Normal in size. Coronary atherosclerotic calcifications are present. PLEURA: No effusion or pneumothorax. LUNGS: Scarring is seen in the bilateral lung apices. A portion of this appears  nodular with calcification on image 9.  Area of scarring is seen in the anterior  medial right upper lobe there is moderate emphysema. Bronchiectasis is present,  most prominent in the lingula. There is a small cluster of 2 mm nodules in the  lateral left upper lobe on images 17 and 18. Several calcified granulomas are  seen in the posterior lungs. INCIDENTALLY IMAGED UPPER ABDOMEN: The patient is status post cholecystectomy. There is an area of scarring in the lateral left kidney. There is an unchanged  incidental 1.3 cm left adrenal adenoma. BONES: No destructive bone lesion. Impression  IMPRESSION:    1. New small cluster of tiny nodules in the lateral left upper lobe which is  likely subclinical infectious or inflammatory disease. Otherwise, no evidence of  infection. 2. Otherwise unchanged COPD with areas of scarring, most prominent in the  apices. Recommend follow-up chest CT in one year to document stability. 3. Unchanged 2.1 cm right thyroid nodule      MRI Results (recent):  Results from Hospital Encounter encounter on 07/06/10    MRI BRAIN W AND W/O CONTRAST    Narrative  Final Report      ICD Codes / Adm. Diagnosis:    /   INCREASING HEADACHES  Examination:  MRI BRAIN W AND WO CON  - [id number] - Jul 6 2010  4:04PM  Accession No:  [id number]  Reason:  INCREASING HEADACHES      REPORT:  INDICATION:   Diabetic, hypertensive patient with history of breast cancer,  increasing headaches    COMPARISON:  None. TECHNIQUE:  MR imaging of the brain was performed with sagittal T1, axial T1, T2, FLAIR,  GRE, DWI/ADC; pre and post contrast multiplanar T1 utilizing 15 mL Magnevist.    FINDINGS:  The ventricles are normal in size and are midline. There is no  intracranial hemorrhage  or extra-axial fluid collection. There is no  significant white matter disease. There is no acute infarction. The major  intracranial vascular flow-voids are patent. There is no abnormal  parenchymal or meningeal enhancement. The paranasal sinuses and mastoid air  cells are clear.       IMPRESSION:  No abnormalities demonstrated. Interpreting/Reading Doctor: Lian Kaiser ([id number])  Transcribed:  on 2010  Approved: [facility] ([id number])  2010        Distribution:  Attending Doctor: Michi White  Alternate Doctor: Michi White      IR Results (recent):  No results found for this or any previous visit. VAS/US Results (recent):  Results from Hospital Encounter encounter on 17    DUPLEX LOWER EXT [address] [employer]    FINAL REPORT     Name: Sonal Boyer  MRN: [id number]    Outpatient  : 17 May 1940  HIS Order #: [id number]  [address] [facility] [employer] Visit #: [id number]  Date: 09 Sep 2017    TYPE OF TEST: Peripheral Venous Testing    REASON FOR TEST  Limb swelling    Right Leg:-  Deep venous thrombosis:           No  Superficial venous thrombosis:    No  Deep venous insufficiency:        No  Superficial venous insufficiency: No    Left Leg:-  Deep venous thrombosis:           No  Superficial venous thrombosis:    No  Deep venous insufficiency:        No  Superficial venous insufficiency: No      INTERPRETATION/FINDINGS  PROCEDURE:  BILATERAL LE VENOUS DUPLEX. Evaluation of lower extremity veins with ultrasound (B-mode imaging,  pulsed Doppler, color Doppler). Includes the common femoral, deep  femoral, femoral, popliteal, posterior tibial, peroneal, and great  saphenous veins. FINDINGS:  Orie Ravinder scale and color flow duplex images of the veins in  both lower extremities demonstrate normal compressibility, spontaneous  and augmented flow profiles, and absence of filling defects  throughout the deep and superficial veins in both lower extremities. CONCLUSION:  Bilateral lower extremity venous duplex negative for deep  venous thrombosis or thrombophlebitis. NOTE:  Prominent lyphnodes  noted bilateral groin. ADDITIONAL COMMENTS    I have personally reviewed the data relevant to the interpretation of  this  study.     TECHNOLOGIST: Jacinta Chisholm, RVS  Signed: 09/09/2017 01:07 PM    PHYSICIAN: Janny Lopes. Barrett Litten, MD  Signed: 09/11/2017 09:12 AM          LABS (reviewed)  Results for orders placed or performed in visit on 06/06/22   CBC WITH AUTOMATED DIFF   Result Value Ref Range    WBC 12.7 (H) 3.4 - 10.8 x10E3/uL    RBC 3.53 (L) 3.77 - 5.28 x10E6/uL    HGB 10.3 (L) 11.1 - 15.9 g/dL    HCT 31.3 (L) 34.0 - 46.6 %    MCV 89 79 - 97 fL    MCH 29.2 26.6 - 33.0 pg    MCHC 32.9 31.5 - 35.7 g/dL    RDW 12.9 11.7 - 15.4 %    PLATELET 209 (H) 122 - 450 x10E3/uL    NEUTROPHILS 60 Not Estab. %    Lymphocytes 27 Not Estab. %    MONOCYTES 10 Not Estab. %    EOSINOPHILS 1 Not Estab. %    BASOPHILS 1 Not Estab. %    ABS. NEUTROPHILS 7.8 (H) 1.4 - 7.0 x10E3/uL    Abs Lymphocytes 3.4 (H) 0.7 - 3.1 x10E3/uL    ABS. MONOCYTES 1.2 (H) 0.1 - 0.9 x10E3/uL    ABS. EOSINOPHILS 0.1 0.0 - 0.4 x10E3/uL    ABS. BASOPHILS 0.1 0.0 - 0.2 x10E3/uL    IMMATURE GRANULOCYTES 1 Not Estab. %    ABS. IMM. GRANS. 0.1 0.0 - 0.1 x10E3/uL       Physical Exam:     Visit Vitals  /70   Pulse 77   Ht 5' 2\" (1.575 m)   Wt 69.9 kg (154 lb)   SpO2 96%   BMI 28.17 kg/m²     General:  Alert, cooperative, no distress. Head:  Normocephalic, without obvious abnormality, atraumatic. Eyes:  Conjunctivae/corneas clear. Lungs:  Heart:   Non labored breathing  Regular rate and rhythm, no carotid bruits   Abdomen:   Soft, non-distended   Extremities: Extremities normal, atraumatic, no cyanosis or edema. Pulses: 2+ and symmetric all extremities. Skin: Skin color, texture, turgor normal. No rashes or lesions.   Neurologic Exam     Gen: Attention normal             Language: naming, repetition, fluency normal             Memory: intact recent and remote memory  Cranial Nerves:  I: smell Not tested   II: visual fields Full to confrontation   II: pupils Equal, round, reactive to light   II: optic disc No papilledema   III,VII: ptosis none   III,IV,VI: extraocular muscles  Full ROM   V: mastication normal   V: facial light touch sensation  normal   VII: facial muscle function   symmetric   VIII: hearing symmetric   IX: soft palate elevation  normal   XI: trapezius strength  5/5   XI: sternocleidomastoid strength 5/5   XI: neck flexion strength  5/5   XII: tongue  midline     Motor: normal bulk and tone, no tremor              Strength: 5/5 all four extremities  Sensory: intact to LT, PP, vibration, and JPS  Reflexes: 1+ throughout; Down going toes  Coordination: Good FTN and HTS  Gait: normal gait including tandem            Impression:     Chris Canales is a [de-identified] y.o. female who  has a past medical history of R [place] palsy, Breast cancer ([facility] (5/1995), Cholelithiasis (6/23/2011), CKD (chronic kidney disease), stage III ([facility] (6/6/2012), COPD (chronic obstructive pulmonary disease) ([facility] (12/28/2011), Dyslipidemia (6/23/2011), Elevated triglycerides with high cholesterol (8/28/2015), Esophageal reflux (6/23/2011), Essential hypertension (6/23/2011), GERD (gastroesophageal reflux disease), Hiatal hernia (6/23/2011), History of breast cancer (6/23/2011), History of duodenal ulcer (6/23/2011), HTN (hypertension) (6/23/2011), Hyperlipidemia (1/7/2013), Left ventricular hypertrophy (6/23/2011), Nicotine dependence (9/2/2015), Obstructive chronic bronchitis with acute bronchitis ([facility] (6/23/2011), Osteopenia, Osteoporosis (6/23/2011), Other ill-defined conditions(799.89), Pregnancy (6/23/2011), Steatosis of liver (10/9/2011), Tubular adenoma of colon (1/24/2016), Type 2 diabetes mellitus who comes in with several concerns. For the past 6 months, patient noted headaches, bifrontal, occurring 3-4/ week but has gotten better for 2 weeks, (+) flashing of light (+) nausea (-) vomiting (+) photophobia (-) phonophobia. Consideration includes intractable migraine (migraines in the 5th grade). Patient also noted memory issues - described as taking longer to respond.  Consideration includes mild cognitive impairment exacerbated by recent bout of headaches. However, patient should be evaluated for mass lesion. (history of breast CA)      RECOMMENDATIONS  1. I had a long discussion with patient and daugther. Discussed diagnosis, prognosis, pathophysiology and available treatment. Reviewed test results. All questions were answered. 2. Due to new onset symptoms in the elderly, will order MRI brain to look for structural cause of symptoms. Patient to call for results  3. Offered referral for neuropsychological testing. Patient declined for now and wants to get MRI results first  4. Observe memory and headache for now      Follow-up and Dispositions    · Return for review of results.             Thank you for the consultation      Mik Scott MD  Diplomate, American Board of Psychiatry and Neurology  Diplomate, Neuromuscular Medicine  Diplomate, American Board of Electrodiagnostic Medicine        CC: Karan Ellsworth MD  Fax: [phone]

## 2022-07-11 NOTE — LETTER
7/11/2022    Patient: Kristin Canseco   YOB: 1940   Date of Visit: 7/11/2022     Kimmy Day MD  [address] [place] [address]  [place]    Dear Kimmy Day MD,      Thank you for referring Ms. Pierre Jensen to [facility] for evaluation. My notes for this consultation are attached. If you have questions, please do not hesitate to call me. I look forward to following your patient along with you.       Sincerely,    Ever Steven MD

## 2022-07-14 ENCOUNTER — HOSPITAL ENCOUNTER (OUTPATIENT)
Dept: MRI IMAGING | Age: 82
Discharge: HOME | End: 2022-07-14
Attending: PSYCHIATRY & NEUROLOGY
Payer: MEDICARE

## 2022-07-14 DIAGNOSIS — G89.29 CHRONIC INTRACTABLE HEADACHE, UNSPECIFIED HEADACHE TYPE: ICD-10-CM

## 2022-07-14 DIAGNOSIS — R51.9 CHRONIC INTRACTABLE HEADACHE, UNSPECIFIED HEADACHE TYPE: ICD-10-CM

## 2022-07-14 PROCEDURE — 70551 MRI BRAIN STEM W/O DYE: CPT

## 2022-07-29 NOTE — PROGRESS NOTES
Neurology Progress Note    Patient ID:  Fred Vaz  [id number]  46 y.o.  1940      Subjective:   History:  Fred Vaz is a [de-identified] y.o. female who  has a past medical history of R Juda palsy, Breast cancer ([facility] (1995), Cholelithiasis (2011), CKD (chronic kidney disease), stage III ([facility] (2012), COPD (chronic obstructive pulmonary disease) ([facility] (2011), Dyslipidemia (2011), Elevated triglycerides with high cholesterol (2015), Esophageal reflux (2011), Essential hypertension (2011), GERD (gastroesophageal reflux disease), Hiatal hernia (2011), History of breast cancer (2011), History of duodenal ulcer (2011), HTN (hypertension) (2011), Hyperlipidemia (2013), Left ventricular hypertrophy (2011), Nicotine dependence (2015), Obstructive chronic bronchitis with acute bronchitis ([facility] (2011), Osteopenia, Osteoporosis (2011)), Steatosis of liver (10/9/2011), Tubular adenoma of colon (2016), Type 2 diabetes mellitus who comes in with several concerns. For the past 6 months, patient noted headaches, bifrontal, occurring 3-4/ week but has gotten better for 2 weeks, (+) flashing of light (+) nausea (-) vomiting (+) photophobia (-) phonophobia. Consideration includes intractable migraine (migraines in the 5th grade). Patient noted socializing and staying active makes her memory better. No more headaches.       ROS:  Per HPI-  Otherwise the remainder of ROS was negative    Social Hx  Social History     Socioeconomic History    Marital status:    Tobacco Use    Smoking status: Every Day     Packs/day: 0.50     Years: 65.00     Pack years: 32.50     Types: Cigarettes     Last attempt to quit: 2019     Years since quittin.7    Smokeless tobacco: Never   Vaping Use    Vaping Use: Some days    Substances: Nicotine   Substance and Sexual Activity    Alcohol use: Yes     Comment: Rarely-holidays    Drug use: No    Sexual activity: Not Currently       Meds:  Current Outpatient Medications on File Prior to Visit   Medication Sig Dispense Refill    losartan (COZAAR) 100 mg tablet Take 1 Tablet by mouth daily. 90 Tablet 4    amLODIPine (NORVASC) 10 mg tablet Take 1 Tablet by mouth daily. 90 Tablet 4    metoprolol succinate (TOPROL-XL) 200 mg XL tablet Take 1 Tablet by mouth daily. 90 Tablet 4    tiZANidine (ZANAFLEX) 2 mg tablet Take 1 Tablet by mouth three (3) times daily as needed for Pain. 30 Tablet 0    omeprazole (PRILOSEC) 40 mg capsule TAKE ONE CAPSULE BY MOUTH DAILY 90 Capsule 2    rosuvastatin (CRESTOR) 20 mg tablet TAKE ONE TABLET BY MOUTH EVERY EVENING 90 Tablet 3    glucose blood VI test strips (Accu-Chek Ifrah Plus test strp) strip USE TO TEST BLOOD SUGAR DAILY, E11.9 100 Strip 4    omega-3 fatty acids-vitamin e 1,000 mg cap Take 2 Caps by mouth daily (after breakfast). calcium-vitamin D (OS-MNADI +D3) 500 mg-200 unit per tablet Take 1 Tab by mouth daily (after breakfast). multivitamin (ONE A DAY) tablet Take 1 Tab by mouth daily (after breakfast). No current facility-administered medications on file prior to visit. Imaging:    CT Results (recent):  Results from Hospital Encounter encounter on 10/02/20    CT CHEST WO CONT    Narrative  INDICATION: Pneumonia    COMPARISON: Radiographs 7/9/2020    CONTRAST: None. TECHNIQUE:  5 mm axial images were obtained through the chest. Coronal and  sagittal reformats were generated. CT dose reduction was achieved through use  of a standardized protocol tailored for this examination and automatic exposure  control for dose modulation. The absence of intravenous contrast reduces the sensitivity for evaluation of  the mediastinum, george, vasculature, and upper abdominal organs. FINDINGS:    CHEST WALL: No mass or axillary lymphadenopathy. A right-sided breast implant is  present. Surgical clips are seen in the right axilla.   THYROID: There is a 2.1 cm nodule again seen in the right thyroid gland. MEDIASTINUM: No mass or lymphadenopathy. VALENTE: No mass or lymphadenopathy. THORACIC AORTA: No aneurysm. MAIN PULMONARY ARTERY: Normal in caliber. TRACHEA/BRONCHI: Patent. ESOPHAGUS: No wall thickening or dilatation. There is a small hiatal hernia. HEART: Normal in size. Coronary atherosclerotic calcifications are present. PLEURA: No effusion or pneumothorax. LUNGS: Scarring is seen in the bilateral lung apices. A portion of this appears  nodular with calcification on image 9. Area of scarring is seen in the anterior  medial right upper lobe there is moderate emphysema. Bronchiectasis is present,  most prominent in the lingula. There is a small cluster of 2 mm nodules in the  lateral left upper lobe on images 17 and 18. Several calcified granulomas are  seen in the posterior lungs. INCIDENTALLY IMAGED UPPER ABDOMEN: The patient is status post cholecystectomy. There is an area of scarring in the lateral left kidney. There is an unchanged  incidental 1.3 cm left adrenal adenoma. BONES: No destructive bone lesion. Impression  IMPRESSION:    1. New small cluster of tiny nodules in the lateral left upper lobe which is  likely subclinical infectious or inflammatory disease. Otherwise, no evidence of  infection. 2. Otherwise unchanged COPD with areas of scarring, most prominent in the  apices. Recommend follow-up chest CT in one year to document stability. 3. Unchanged 2.1 cm right thyroid nodule      MRI Results (recent):  Results from Hospital Encounter encounter on 07/14/22    MRI BRAIN WO CONT    Narrative  EXAM: MRI BRAIN WO CONT    INDICATION: headache and memory issue, evaluate for mass lesion    COMPARISON: MRI brain 7/6/2010. CONTRAST: None. TECHNIQUE:  Multiplanar multisequence acquisition without contrast of the brain. FINDINGS:  The ventricles are normal in size and position.  Scattered periventricular and  deep white matter T2/FLAIR hyperintensities, most prominent in the periatrial  regions, consistent with mild chronic microvascular ischemic disease, and  progressed since prior exam. There is no acute infarct, hemorrhage, extra-axial  fluid collection, or mass effect. There is no cerebellar tonsillar herniation. Expected arterial flow-voids are present. The paranasal sinuses, mastoid air cells, and middle ears are clear. The orbital  contents are within normal limits with bilateral lens implants. No significant  osseous or scalp lesions are identified. Impression  1. No acute intracranial abnormality. 2. Mild chronic microvascular ischemic disease, which has progressed since . IR Results (recent):  No results found for this or any previous visit. VAS/US Results (recent):  Results from Hospital Encounter encounter on 17    DUPLEX LOWER EXT [id number]   FINAL REPORT   Name: Ifeanyi Ortiz  MRN: [id number]    Outpatient  : 17 May 1940  HIS Order #: [id number]  [address] [facility] Visit #: [id number]  Date: 09 Sep 2017    TYPE OF TEST: Peripheral Venous Testing    REASON FOR TEST  Limb swelling    Right Leg:-  Deep venous thrombosis:           No  Superficial venous thrombosis:    No  Deep venous insufficiency:        No  Superficial venous insufficiency: No    Left Leg:-  Deep venous thrombosis:           No  Superficial venous thrombosis:    No  Deep venous insufficiency:        No  Superficial venous insufficiency: No      INTERPRETATION/FINDINGS  PROCEDURE:  BILATERAL LE VENOUS DUPLEX. Evaluation of lower extremity veins with ultrasound (B-mode imaging,  pulsed Doppler, color Doppler). Includes the common femoral, deep  femoral, femoral, popliteal, posterior tibial, peroneal, and great  saphenous veins.     FINDINGS:  Vernard Mould scale and color flow duplex images of the veins in  both lower extremities demonstrate normal compressibility, spontaneous  and augmented flow profiles, and absence of filling defects  throughout the deep and superficial veins in both lower extremities. CONCLUSION:  Bilateral lower extremity venous duplex negative for deep  venous thrombosis or thrombophlebitis. NOTE:  Prominent lyphnodes  noted bilateral groin. ADDITIONAL COMMENTS    I have personally reviewed the data relevant to the interpretation of  this  study. TECHNOLOGIST: SHANELLE Grajeda  Signed: 09/09/2017 01:07 PM    PHYSICIAN: Keyur Lopez. Cleopatra Correa MD  Signed: 09/11/2017 09:12 AM      Reviewed records in [facility] and [facility] tab today    Lab Review     Office Visit on 06/06/2022   Component Date Value Ref Range Status    WBC 06/06/2022 12.7 (A) 3.4 - 10.8 x10E3/uL Final    RBC 06/06/2022 3.53 (A) 3.77 - 5.28 x10E6/uL Final    HGB 06/06/2022 10.3 (A) 11.1 - 15.9 g/dL Final    HCT 06/06/2022 31.3 (A) 34.0 - 46.6 % Final    MCV 06/06/2022 89  79 - 97 fL Final    MCH 06/06/2022 29.2  26.6 - 33.0 pg Final    MCHC 06/06/2022 32.9  31.5 - 35.7 g/dL Final    RDW 06/06/2022 12.9  11.7 - 15.4 % Final    PLATELET 81/92/9977 794 (A) 150 - 450 x10E3/uL Final    NEUTROPHILS 06/06/2022 60  Not Estab. % Final    Lymphocytes 06/06/2022 27  Not Estab. % Final    MONOCYTES 06/06/2022 10  Not Estab. % Final    EOSINOPHILS 06/06/2022 1  Not Estab. % Final    BASOPHILS 06/06/2022 1  Not Estab. % Final    ABS. NEUTROPHILS 06/06/2022 7.8 (A) 1.4 - 7.0 x10E3/uL Final    Abs Lymphocytes 06/06/2022 3.4 (A) 0.7 - 3.1 x10E3/uL Final    ABS. MONOCYTES 06/06/2022 1.2 (A) 0.1 - 0.9 x10E3/uL Final    ABS. EOSINOPHILS 06/06/2022 0.1  0.0 - 0.4 x10E3/uL Final    ABS. BASOPHILS 06/06/2022 0.1  0.0 - 0.2 x10E3/uL Final    IMMATURE GRANULOCYTES 06/06/2022 1  Not Estab. % Final    ABS. IMM.  GRANS. 06/06/2022 0.1  0.0 - 0.1 x10E3/uL Final   Admission on 05/24/2022, Discharged on 05/24/2022   Component Date Value Ref Range Status    WBC 05/24/2022 10.5  3.6 - 11.0 K/uL Final    RBC 05/24/2022 3.37 (A) 3.80 - 5.20 M/uL Final    HGB 05/24/2022 9.8 (A) 11.5 - 16.0 g/dL Final    HCT 05/24/2022 29.9 (A) 35.0 - 47.0 % Final    MCV 05/24/2022 88.7  80.0 - 99.0 FL Final    MCH 05/24/2022 29.1  26.0 - 34.0 PG Final    MCHC 05/24/2022 32.8  30.0 - 36.5 g/dL Final    RDW 05/24/2022 13.2  11.5 - 14.5 % Final    PLATELET 71/09/1311 898  150 - 400 K/uL Final    MPV 05/24/2022 9.7  8.9 - 12.9 FL Final    NRBC 05/24/2022 0.0  0  WBC Final    ABSOLUTE NRBC 05/24/2022 0.00  0.00 - 0.01 K/uL Final    NEUTROPHILS 05/24/2022 65  32 - 75 % Final    LYMPHOCYTES 05/24/2022 20  12 - 49 % Final    MONOCYTES 05/24/2022 14 (A) 5 - 13 % Final    EOSINOPHILS 05/24/2022 1  0 - 7 % Final    BASOPHILS 05/24/2022 0  0 - 1 % Final    IMMATURE GRANULOCYTES 05/24/2022 0  0.0 - 0.5 % Final    ABS. NEUTROPHILS 05/24/2022 6.7  1.8 - 8.0 K/UL Final    ABS. LYMPHOCYTES 05/24/2022 2.1  0.8 - 3.5 K/UL Final    ABS. MONOCYTES 05/24/2022 1.5 (A) 0.0 - 1.0 K/UL Final    ABS. EOSINOPHILS 05/24/2022 0.1  0.0 - 0.4 K/UL Final    ABS. BASOPHILS 05/24/2022 0.0  0.0 - 0.1 K/UL Final    ABS. IMM.  GRANS. 05/24/2022 0.0  0.00 - 0.04 K/UL Final    DF 05/24/2022 AUTOMATED    Final    Sodium 05/24/2022 136  136 - 145 mmol/L Final    Potassium 05/24/2022 4.4  3.5 - 5.1 mmol/L Final    SPECIMEN HEMOLYZED, RESULTS MAY BE AFFECTED    Chloride 05/24/2022 104  97 - 108 mmol/L Final    CO2 05/24/2022 21  21 - 32 mmol/L Final    Anion gap 05/24/2022 11  5 - 15 mmol/L Final    Glucose 05/24/2022 119 (A) 65 - 100 mg/dL Final    BUN 05/24/2022 33 (A) 6 - 20 MG/DL Final    Creatinine 05/24/2022 1.59 (A) 0.55 - 1.02 MG/DL Final    BUN/Creatinine ratio 05/24/2022 21 (A) 12 - 20   Final    GFR est AA 05/24/2022 38 (A) >60 ml/min/1.73m2 Final    GFR est non-AA 05/24/2022 31 (A) >60 ml/min/1.73m2 Final    Estimated GFR is calculated using the IDMS-traceable Modification of Diet in Renal Disease (MDRD) Study equation, reported for both  Americans (GFRAA) and non- Americans (GFRNA), and normalized to 1.73m2 body surface area. The physician must decide which value applies to the patient. Calcium 05/24/2022 8.4 (A) 8.5 - 10.1 MG/DL Final    Bilirubin, total 05/24/2022 0.5  0.2 - 1.0 MG/DL Final    ALT (SGPT) 05/24/2022 39  12 - 78 U/L Final    AST (SGOT) 05/24/2022 48 (A) 15 - 37 U/L Final    SPECIMEN HEMOLYZED, RESULTS MAY BE AFFECTED    Alk. phosphatase 05/24/2022 104  45 - 117 U/L Final    Protein, total 05/24/2022 7.2  6.4 - 8.2 g/dL Final    Albumin 05/24/2022 2.1 (A) 3.5 - 5.0 g/dL Final    Globulin 05/24/2022 5.1 (A) 2.0 - 4.0 g/dL Final    A-G Ratio 05/24/2022 0.4 (A) 1.1 - 2.2   Final    SAMPLES BEING HELD 05/24/2022  1BLU   Final    COMMENT 05/24/2022 Add-on orders for these samples will be processed based on acceptable specimen integrity and analyte stability, which may vary by analyte. Final    Special Requests: 05/24/2022 NO SPECIAL REQUESTS    Final    Culture result: 05/24/2022 NO GROWTH 5 DAYS    Final    Lactic acid 05/24/2022 0.6  0.4 - 2.0 MMOL/L Final    Troponin-High Sensitivity 05/24/2022 9  0 - 51 ng/L Final    Comment: A HS troponin value change of (+ or -) 50% or more below the 99th percentile, in a 1/2/3 hr interval represents a significant change. Clinical correcation is recommended. A HS troponin value change of (+ or -) 20% or above the 99th percentile, in a 1/2/3 hr interval represents a significant change. Clinical correlation is recommended. 99th Percentile:   Women: 0-51 ng/L                                                                Men:   0-76 ng/L      Specimen source 05/24/2022 NP   Final    COVID-19 rapid test 05/24/2022 Not detected  NOTD   Final    Comment: Rapid Abbott ID Now       Rapid NAAT:  The specimen is NEGATIVE for SARS-CoV-2, the novel coronavirus associated with COVID-19.        Negative results should be treated as presumptive and, if inconsistent with clinical signs and symptoms or necessary for patient management, should be tested with an alternative molecular assay. Negative results do not preclude SARS-CoV-2 infection and should not be used as the sole basis for patient management decisions. This test has been authorized by the FDA under an Emergency Use Authorization (EUA) for use by authorized laboratories. Fact sheet for Healthcare Providers: ConventionUpdate.co.nz  Fact sheet for Patients: ConventionUpdate.co.nz       Methodology: Isothermal Nucleic Acid Amplification      Procalcitonin 05/24/2022 0.20  ng/mL Final    Comment:      Suspected Sepsis:  <0.50 ng/mL     Low likelihood of sepsis. 0.50-2.00 ng/mL    Increased likelihood of sepsis. Antibiotics encouraged. >2.00 ng/mL  High risk of sepsis/shock. Antibiotics strongly encouraged. Suspected Lower Resp Tract Infections:  <0.24 ng/mL    Low likelihood of bacterial infection. >0.24 ng/mL    Increased likelihood of bacterial infection. Antibiotics encouraged. With successful antibiotic therapy, PCT levels should decrease rapidly. (Half-life of 24 to 36 hours)       Procalcitonin values from samples collected within the first 6 hours of systemic infection may still be low. Retesting may be indicated. Values from day 1 and day 4 can be entered into the Change in Procalcitonin Calculator (www.ChatStats-pct-calculator. com) to determine the patient's Mortality Risk Prognosis. In healthy neonates, plasma Procalcitonin (PCT) concentrations increase gradually after birth, reaching peak values at about 24 hours of age then decrease to normal valu                           es below 0.5 ng/mL by 48-72 hours of age.       Color 05/24/2022 YELLOW/STRAW    Final    Color Reference Range: Straw, Yellow or Dark Yellow    Appearance 05/24/2022 CLEAR  CLEAR   Final    Specific gravity 05/24/2022 1.013  1.003 - 1.030   Final    pH (UA) 05/24/2022 5.0  5.0 - 8.0   Final    Protein 05/24/2022 100 (A) NEG mg/dL Final    Glucose 05/24/2022 Negative  NEG mg/dL Final    Ketone 05/24/2022 Negative  NEG mg/dL Final    Bilirubin 05/24/2022 Negative  NEG   Final    Blood 05/24/2022 Negative  NEG   Final    Urobilinogen 05/24/2022 0.2  0.2 - 1.0 EU/dL Final    Nitrites 05/24/2022 Negative  NEG   Final    Leukocyte Esterase 05/24/2022 TRACE (A) NEG   Final    WBC 05/24/2022 0-4  0 - 4 /hpf Final    RBC 05/24/2022 0-5  0 - 5 /hpf Final    Epithelial cells 05/24/2022 MANY (A) FEW /lpf Final    Epithelial cell category consists of squamous cells and /or transitional urothelial cells. Renal tubular cells, if present, are separately identified as such. Bacteria 05/24/2022 Negative  NEG /hpf Final    Hyaline cast 05/24/2022 0-2  0 - 2 /lpf Final    Urine culture hold 05/24/2022 Urine on hold in Microbiology dept for 2 days. If unpreserved urine is submitted, it cannot be used for addtional testing after 24 hours, recollection will be required. Final         Objective:       Exam:  Visit Vitals  BP (!) 140/78 (BP 1 Location: Right upper arm, BP Patient Position: Sitting, BP Cuff Size: Adult)   Pulse 72   Resp 20   SpO2 96%     Gen: Awake, alert, follows commands  Appropriate appearance, normal speech. No visual field defect on confrontation exam.  Full eyes movement, with no nystagmus, no diplopia, no ptosis. Normal gag and swallow. All remaining cranial nerves were normal  Motor function: 5/5 in all extremities  Sensory: intact to LT, PP and JPS  Good FTN and HTS   Gait: Normal    Assessment:       ICD-10-CM ICD-9-CM    1. Cognitive impairment  R41.89 294.9       2. Nonintractable headache, unspecified chronicity pattern, unspecified headache type  R51.9 784.0           MRI brain (7/14/22): 1. No acute intracranial abnormality. Mild non specific white matter changes    Plan:   1.  I had a long discussion with patient and daugther. Discussed diagnosis, prognosis, pathophysiology and available treatment. Reviewed test results. All questions were answered.   2. Discussed MRI Brain showed no acute process  3. Offered referral for neuropsychological testing. Patient declined for now and will observe memory and headache for now  4. Advise physical and mental exercise, healthy lifestyle, socialization and Mediterranean diet      Follow-up and Dispositions    Return if symptoms worsen or fail to improve.                Ann Palacios MD  Diplomate, American Board of Psychiatry and Neurology  Diplomate, Neuromuscular Medicine  Diplomate, American Board of Electrodiagnostic Medicine

## 2022-08-01 ENCOUNTER — OFFICE VISIT (OUTPATIENT)
Dept: NEUROLOGY | Age: 82
End: 2022-08-01
Payer: MEDICARE

## 2022-08-01 VITALS
SYSTOLIC BLOOD PRESSURE: 140 MMHG | RESPIRATION RATE: 20 BRPM | HEART RATE: 72 BPM | DIASTOLIC BLOOD PRESSURE: 78 MMHG | OXYGEN SATURATION: 96 %

## 2022-08-01 DIAGNOSIS — R41.89 COGNITIVE IMPAIRMENT: Primary | ICD-10-CM

## 2022-08-01 DIAGNOSIS — R51.9 NONINTRACTABLE HEADACHE, UNSPECIFIED CHRONICITY PATTERN, UNSPECIFIED HEADACHE TYPE: ICD-10-CM

## 2022-08-01 PROCEDURE — G8754 DIAS BP LESS 90: HCPCS | Performed by: PSYCHIATRY & NEUROLOGY

## 2022-08-01 PROCEDURE — G8427 DOCREV CUR MEDS BY ELIG CLIN: HCPCS | Performed by: PSYCHIATRY & NEUROLOGY

## 2022-08-01 PROCEDURE — G8536 NO DOC ELDER MAL SCRN: HCPCS | Performed by: PSYCHIATRY & NEUROLOGY

## 2022-08-01 PROCEDURE — G8753 SYS BP > OR = 140: HCPCS | Performed by: PSYCHIATRY & NEUROLOGY

## 2022-08-01 PROCEDURE — 1123F ACP DISCUSS/DSCN MKR DOCD: CPT | Performed by: PSYCHIATRY & NEUROLOGY

## 2022-08-01 PROCEDURE — G8432 DEP SCR NOT DOC, RNG: HCPCS | Performed by: PSYCHIATRY & NEUROLOGY

## 2022-08-01 PROCEDURE — G8417 CALC BMI ABV UP PARAM F/U: HCPCS | Performed by: PSYCHIATRY & NEUROLOGY

## 2022-08-01 PROCEDURE — 99214 OFFICE O/P EST MOD 30 MIN: CPT | Performed by: PSYCHIATRY & NEUROLOGY

## 2022-08-01 PROCEDURE — G8399 PT W/DXA RESULTS DOCUMENT: HCPCS | Performed by: PSYCHIATRY & NEUROLOGY

## 2022-08-01 PROCEDURE — 1090F PRES/ABSN URINE INCON ASSESS: CPT | Performed by: PSYCHIATRY & NEUROLOGY

## 2022-08-01 PROCEDURE — 1101F PT FALLS ASSESS-DOCD LE1/YR: CPT | Performed by: PSYCHIATRY & NEUROLOGY

## 2022-08-01 NOTE — LETTER
8/1/2022    Patient: Jessica Maciel   YOB: 1940   Date of Visit: 8/1/2022     Tania Roth MD  [address]  [address]  [place] [facility] [address]    Dear Tania Roth MD,      Thank you for referring Ms. Sheyla Ware to [facility] for evaluation. My notes for this consultation are attached. If you have questions, please do not hesitate to call me. I look forward to following your patient along with you.       Sincerely,    Mary Ann Loredo MD

## 2022-08-08 RX ORDER — OMEPRAZOLE 40 MG/1
CAPSULE, DELAYED RELEASE ORAL
Qty: 90 CAPSULE | Refills: 3 | Status: SHIPPED | OUTPATIENT
Start: 2022-08-08

## 2022-09-15 ENCOUNTER — PATIENT OUTREACH (OUTPATIENT)
Dept: CASE MANAGEMENT | Age: 82
End: 2022-09-15

## 2022-09-15 NOTE — PROGRESS NOTES
09/15/22  10:21 AM  Attempted initial [facility] outreach call, no answer- lvm for return call and sent get in touch letter via [employer], will attempt again tomorrow if no callback. 09/16/22  2nd attempt at [facility] outreach, no answer or callback. No further outreach attempts scheduled at this time.

## 2022-09-19 ENCOUNTER — OFFICE VISIT (OUTPATIENT)
Dept: FAMILY MEDICINE CLINIC | Age: 82
End: 2022-09-19
Payer: MEDICARE

## 2022-09-19 VITALS
TEMPERATURE: 97 F | HEIGHT: 62 IN | OXYGEN SATURATION: 96 % | WEIGHT: 152 LBS | RESPIRATION RATE: 20 BRPM | BODY MASS INDEX: 27.97 KG/M2 | SYSTOLIC BLOOD PRESSURE: 150 MMHG | HEART RATE: 80 BPM | DIASTOLIC BLOOD PRESSURE: 77 MMHG

## 2022-09-19 DIAGNOSIS — E78.2 ELEVATED TRIGLYCERIDES WITH HIGH CHOLESTEROL: ICD-10-CM

## 2022-09-19 DIAGNOSIS — E11.9 TYPE 2 DIABETES MELLITUS WITHOUT COMPLICATION, WITHOUT LONG-TERM CURRENT USE OF INSULIN (HCC): Primary | ICD-10-CM

## 2022-09-19 DIAGNOSIS — N18.30 STAGE 3 CHRONIC KIDNEY DISEASE, UNSPECIFIED WHETHER STAGE 3A OR 3B CKD (HCC): ICD-10-CM

## 2022-09-19 DIAGNOSIS — I10 ESSENTIAL HYPERTENSION: ICD-10-CM

## 2022-09-19 PROCEDURE — 1101F PT FALLS ASSESS-DOCD LE1/YR: CPT | Performed by: FAMILY MEDICINE

## 2022-09-19 PROCEDURE — G8753 SYS BP > OR = 140: HCPCS | Performed by: FAMILY MEDICINE

## 2022-09-19 PROCEDURE — 1090F PRES/ABSN URINE INCON ASSESS: CPT | Performed by: FAMILY MEDICINE

## 2022-09-19 PROCEDURE — 99214 OFFICE O/P EST MOD 30 MIN: CPT | Performed by: FAMILY MEDICINE

## 2022-09-19 PROCEDURE — G8754 DIAS BP LESS 90: HCPCS | Performed by: FAMILY MEDICINE

## 2022-09-19 PROCEDURE — G8399 PT W/DXA RESULTS DOCUMENT: HCPCS | Performed by: FAMILY MEDICINE

## 2022-09-19 PROCEDURE — G8427 DOCREV CUR MEDS BY ELIG CLIN: HCPCS | Performed by: FAMILY MEDICINE

## 2022-09-19 PROCEDURE — 1123F ACP DISCUSS/DSCN MKR DOCD: CPT | Performed by: FAMILY MEDICINE

## 2022-09-19 PROCEDURE — G8536 NO DOC ELDER MAL SCRN: HCPCS | Performed by: FAMILY MEDICINE

## 2022-09-19 PROCEDURE — 3044F HG A1C LEVEL LT 7.0%: CPT | Performed by: FAMILY MEDICINE

## 2022-09-19 PROCEDURE — G8510 SCR DEP NEG, NO PLAN REQD: HCPCS | Performed by: FAMILY MEDICINE

## 2022-09-19 PROCEDURE — G8417 CALC BMI ABV UP PARAM F/U: HCPCS | Performed by: FAMILY MEDICINE

## 2022-09-19 RX ORDER — VALSARTAN AND HYDROCHLOROTHIAZIDE 320; 12.5 MG/1; MG/1
1 TABLET, FILM COATED ORAL DAILY
Qty: 90 TABLET | Refills: 0 | Status: SHIPPED | OUTPATIENT
Start: 2022-09-19

## 2022-09-19 NOTE — PROGRESS NOTES
HISTORY OF PRESENT ILLNESS  Khadra Bustamante is a [de-identified] y.o. female. Patient presents with:  Diabetes: Fasting 110 not taking meds       She also needs to follow up on her HTN. Review of Systems   Eyes:  Negative for blurred vision. Respiratory:  Positive for shortness of breath. Cardiovascular:  Negative for chest pain. Genitourinary:         No polyuria   Neurological:  Negative for dizziness, sensory change, speech change, focal weakness and headaches. Endo/Heme/Allergies:  Negative for polydipsia. Visit Vitals  BP (!) 150/77   Pulse 80   Temp 97 °F (36.1 °C) (Temporal)   Resp 20   Ht 5' 2\" (1.575 m)   Wt 152 lb (68.9 kg)   SpO2 96%   BMI 27.80 kg/m²     BP Readings from Last 3 Encounters:   09/19/22 (!) 150/77   08/01/22 (!) 140/78   07/11/22 120/70     Physical Exam    ASSESSMENT and PLAN    ICD-10-CM ICD-9-CM    1. Type 2 diabetes mellitus without complication, without long-term current use of insulin ([facility])  M43.5 351.03 METABOLIC PANEL, BASIC      HEMOGLOBIN A1C WITH EAG      2. Essential hypertension  U11 064.6 METABOLIC PANEL, BASIC      valsartan-hydroCHLOROthiazide (Diovan HCT) 320-12.5 mg per tablet      3. Stage 3 chronic kidney disease, unspecified whether stage 3a or 3b CKD ([facility])  N18.30 585.3       4. Elevated triglycerides with high cholesterol  E78.2 272.2 LIPID PANEL      HEPATIC FUNCTION PANEL          Blood pressure elevated  Stop losartan  Diovan Hct - will need to watch renal function carefully  Labs per orders. Follow-up and Dispositions    Return in about 6 weeks (around 10/31/2022) for blood pressure. Reviewed plan of care. Patient has provided input and agrees with goals.

## 2022-10-29 ENCOUNTER — HOSPITAL ENCOUNTER (INPATIENT)
Age: 82
LOS: 1 days | Discharge: HOME | DRG: 871 | End: 2022-10-30
Attending: STUDENT IN AN ORGANIZED HEALTH CARE EDUCATION/TRAINING PROGRAM | Admitting: FAMILY MEDICINE
Payer: MEDICARE

## 2022-10-29 ENCOUNTER — APPOINTMENT (OUTPATIENT)
Dept: CT IMAGING | Age: 82
DRG: 871 | End: 2022-10-29
Attending: STUDENT IN AN ORGANIZED HEALTH CARE EDUCATION/TRAINING PROGRAM
Payer: MEDICARE

## 2022-10-29 ENCOUNTER — APPOINTMENT (OUTPATIENT)
Dept: GENERAL RADIOLOGY | Age: 82
DRG: 871 | End: 2022-10-29
Attending: NURSE PRACTITIONER
Payer: MEDICARE

## 2022-10-29 DIAGNOSIS — I10 ESSENTIAL HYPERTENSION: ICD-10-CM

## 2022-10-29 DIAGNOSIS — J44.1 COPD WITH BRONCHIAL HYPERRESPONSIVENESS (HCC): ICD-10-CM

## 2022-10-29 DIAGNOSIS — M94.0 COSTOCHONDRITIS, ACUTE: ICD-10-CM

## 2022-10-29 DIAGNOSIS — J01.90 ACUTE SINUSITIS, RECURRENCE NOT SPECIFIED, UNSPECIFIED LOCATION: ICD-10-CM

## 2022-10-29 DIAGNOSIS — J18.9 PNEUMONIA OF LEFT LUNG DUE TO INFECTIOUS ORGANISM, UNSPECIFIED PART OF LUNG: Primary | ICD-10-CM

## 2022-10-29 DIAGNOSIS — R07.89 RIGHT-SIDED CHEST WALL PAIN: ICD-10-CM

## 2022-10-29 LAB
ALBUMIN SERPL-MCNC: 2.8 G/DL (ref 3.5–5)
ALBUMIN/GLOB SERPL: 0.5 {RATIO} (ref 1.1–2.2)
ALP SERPL-CCNC: 32 U/L (ref 45–117)
ALT SERPL-CCNC: 17 U/L (ref 12–78)
ANION GAP SERPL CALC-SCNC: 10 MMOL/L (ref 5–15)
APPEARANCE UR: CLEAR
AST SERPL-CCNC: 21 U/L (ref 15–37)
ATRIAL RATE: 89 BPM
ATRIAL RATE: 92 BPM
BACTERIA URNS QL MICRO: NEGATIVE /HPF
BASOPHILS # BLD: 0.1 K/UL (ref 0–0.1)
BASOPHILS NFR BLD: 1 % (ref 0–1)
BILIRUB SERPL-MCNC: 0.5 MG/DL (ref 0.2–1)
BILIRUB UR QL: NEGATIVE
BUN SERPL-MCNC: 29 MG/DL (ref 6–20)
BUN/CREAT SERPL: 21 (ref 12–20)
CALCIUM SERPL-MCNC: 7.7 MG/DL (ref 8.5–10.1)
CALCULATED P AXIS, ECG09: 40 DEGREES
CALCULATED P AXIS, ECG09: 63 DEGREES
CALCULATED R AXIS, ECG10: -31 DEGREES
CALCULATED R AXIS, ECG10: -35 DEGREES
CALCULATED T AXIS, ECG11: 26 DEGREES
CALCULATED T AXIS, ECG11: 27 DEGREES
CHLORIDE SERPL-SCNC: 101 MMOL/L (ref 97–108)
CO2 SERPL-SCNC: 26 MMOL/L (ref 21–32)
COLOR UR: ABNORMAL
COVID-19 RAPID TEST, COVR: NOT DETECTED
CREAT SERPL-MCNC: 1.39 MG/DL (ref 0.55–1.02)
DIAGNOSIS, 93000: NORMAL
DIAGNOSIS, 93000: NORMAL
DIFFERENTIAL METHOD BLD: ABNORMAL
EOSINOPHIL # BLD: 0.1 K/UL (ref 0–0.4)
EOSINOPHIL NFR BLD: 1 % (ref 0–7)
EPITH CASTS URNS QL MICRO: ABNORMAL /LPF
ERYTHROCYTE [DISTWIDTH] IN BLOOD BY AUTOMATED COUNT: 13.3 % (ref 11.5–14.5)
FLUAV AG NPH QL IA: NEGATIVE
FLUBV AG NOSE QL IA: NEGATIVE
GLOBULIN SER CALC-MCNC: 5.2 G/DL (ref 2–4)
GLUCOSE BLD STRIP.AUTO-MCNC: 101 MG/DL (ref 65–117)
GLUCOSE BLD STRIP.AUTO-MCNC: 112 MG/DL (ref 65–117)
GLUCOSE SERPL-MCNC: 173 MG/DL (ref 65–100)
GLUCOSE UR STRIP.AUTO-MCNC: NEGATIVE MG/DL
HCT VFR BLD AUTO: 32.3 % (ref 35–47)
HGB BLD-MCNC: 10.6 G/DL (ref 11.5–16)
HGB UR QL STRIP: NEGATIVE
HYALINE CASTS URNS QL MICRO: ABNORMAL /LPF (ref 0–2)
IMM GRANULOCYTES # BLD AUTO: 0.1 K/UL (ref 0–0.04)
IMM GRANULOCYTES NFR BLD AUTO: 1 % (ref 0–0.5)
KETONES UR QL STRIP.AUTO: NEGATIVE MG/DL
LACTATE BLD-SCNC: 1.53 MMOL/L (ref 0.4–2)
LEUKOCYTE ESTERASE UR QL STRIP.AUTO: NEGATIVE
LYMPHOCYTES # BLD: 4.1 K/UL (ref 0.8–3.5)
LYMPHOCYTES NFR BLD: 33 % (ref 12–49)
MCH RBC QN AUTO: 28.4 PG (ref 26–34)
MCHC RBC AUTO-ENTMCNC: 32.8 G/DL (ref 30–36.5)
MCV RBC AUTO: 86.6 FL (ref 80–99)
MONOCYTES # BLD: 1 K/UL (ref 0–1)
MONOCYTES NFR BLD: 8 % (ref 5–13)
NEUTS SEG # BLD: 7 K/UL (ref 1.8–8)
NEUTS SEG NFR BLD: 56 % (ref 32–75)
NITRITE UR QL STRIP.AUTO: NEGATIVE
NRBC # BLD: 0 K/UL (ref 0–0.01)
NRBC BLD-RTO: 0 PER 100 WBC
P-R INTERVAL, ECG05: 180 MS
P-R INTERVAL, ECG05: 182 MS
PH UR STRIP: 5 [PH] (ref 5–8)
PLATELET # BLD AUTO: 539 K/UL (ref 150–400)
PMV BLD AUTO: 9 FL (ref 8.9–12.9)
POTASSIUM SERPL-SCNC: 3.6 MMOL/L (ref 3.5–5.1)
PROCALCITONIN SERPL-MCNC: <0.05 NG/ML
PROT SERPL-MCNC: 8 G/DL (ref 6.4–8.2)
PROT UR STRIP-MCNC: 30 MG/DL
Q-T INTERVAL, ECG07: 324 MS
Q-T INTERVAL, ECG07: 348 MS
QRS DURATION, ECG06: 72 MS
QRS DURATION, ECG06: 84 MS
QTC CALCULATION (BEZET), ECG08: 400 MS
QTC CALCULATION (BEZET), ECG08: 423 MS
RBC # BLD AUTO: 3.73 M/UL (ref 3.8–5.2)
RBC #/AREA URNS HPF: ABNORMAL /HPF (ref 0–5)
SERVICE CMNT-IMP: NORMAL
SERVICE CMNT-IMP: NORMAL
SODIUM SERPL-SCNC: 137 MMOL/L (ref 136–145)
SOURCE, COVRS: NORMAL
SP GR UR REFRACTOMETRY: 1.01 (ref 1–1.03)
TROPONIN-HIGH SENSITIVITY: 10 NG/L (ref 0–51)
TROPONIN-HIGH SENSITIVITY: 11 NG/L (ref 0–51)
UA: UC IF INDICATED,UAUC: ABNORMAL
UROBILINOGEN UR QL STRIP.AUTO: 0.2 EU/DL (ref 0.2–1)
VENTRICULAR RATE, ECG03: 89 BPM
VENTRICULAR RATE, ECG03: 92 BPM
WBC # BLD AUTO: 12.4 K/UL (ref 3.6–11)
WBC URNS QL MICRO: ABNORMAL /HPF (ref 0–4)

## 2022-10-29 PROCEDURE — 36415 COLL VENOUS BLD VENIPUNCTURE: CPT

## 2022-10-29 PROCEDURE — 84484 ASSAY OF TROPONIN QUANT: CPT

## 2022-10-29 PROCEDURE — 70450 CT HEAD/BRAIN W/O DYE: CPT

## 2022-10-29 PROCEDURE — 94761 N-INVAS EAR/PLS OXIMETRY MLT: CPT

## 2022-10-29 PROCEDURE — 85025 COMPLETE CBC W/AUTO DIFF WBC: CPT

## 2022-10-29 PROCEDURE — 65270000029 HC RM PRIVATE

## 2022-10-29 PROCEDURE — 87804 INFLUENZA ASSAY W/OPTIC: CPT

## 2022-10-29 PROCEDURE — 83605 ASSAY OF LACTIC ACID: CPT

## 2022-10-29 PROCEDURE — 99285 EMERGENCY DEPT VISIT HI MDM: CPT

## 2022-10-29 PROCEDURE — 71045 X-RAY EXAM CHEST 1 VIEW: CPT

## 2022-10-29 PROCEDURE — 87899 AGENT NOS ASSAY W/OPTIC: CPT

## 2022-10-29 PROCEDURE — 87635 SARS-COV-2 COVID-19 AMP PRB: CPT

## 2022-10-29 PROCEDURE — 74011000250 HC RX REV CODE- 250: Performed by: STUDENT IN AN ORGANIZED HEALTH CARE EDUCATION/TRAINING PROGRAM

## 2022-10-29 PROCEDURE — 74011000250 HC RX REV CODE- 250

## 2022-10-29 PROCEDURE — 94640 AIRWAY INHALATION TREATMENT: CPT

## 2022-10-29 PROCEDURE — 80053 COMPREHEN METABOLIC PANEL: CPT

## 2022-10-29 PROCEDURE — 87449 NOS EACH ORGANISM AG IA: CPT

## 2022-10-29 PROCEDURE — 82962 GLUCOSE BLOOD TEST: CPT

## 2022-10-29 PROCEDURE — 74011250636 HC RX REV CODE- 250/636

## 2022-10-29 PROCEDURE — 74011250636 HC RX REV CODE- 250/636: Performed by: STUDENT IN AN ORGANIZED HEALTH CARE EDUCATION/TRAINING PROGRAM

## 2022-10-29 PROCEDURE — 87040 BLOOD CULTURE FOR BACTERIA: CPT

## 2022-10-29 PROCEDURE — 0202U NFCT DS 22 TRGT SARS-COV-2: CPT

## 2022-10-29 PROCEDURE — 81001 URINALYSIS AUTO W/SCOPE: CPT

## 2022-10-29 PROCEDURE — 74011250637 HC RX REV CODE- 250/637

## 2022-10-29 PROCEDURE — 71250 CT THORAX DX C-: CPT

## 2022-10-29 PROCEDURE — 93005 ELECTROCARDIOGRAM TRACING: CPT

## 2022-10-29 PROCEDURE — 84145 PROCALCITONIN (PCT): CPT

## 2022-10-29 RX ORDER — AMOXICILLIN AND CLAVULANATE POTASSIUM 500; 125 MG/1; MG/1
1 TABLET, FILM COATED ORAL EVERY 12 HOURS
Status: DISCONTINUED | OUTPATIENT
Start: 2022-10-29 | End: 2022-10-29

## 2022-10-29 RX ORDER — IPRATROPIUM BROMIDE AND ALBUTEROL SULFATE 2.5; .5 MG/3ML; MG/3ML
3 SOLUTION RESPIRATORY (INHALATION)
Status: DISCONTINUED | OUTPATIENT
Start: 2022-10-29 | End: 2022-10-30

## 2022-10-29 RX ORDER — ENOXAPARIN SODIUM 100 MG/ML
30 INJECTION SUBCUTANEOUS DAILY
Status: DISCONTINUED | OUTPATIENT
Start: 2022-10-30 | End: 2022-10-30 | Stop reason: HOSPADM

## 2022-10-29 RX ORDER — SODIUM CHLORIDE 0.9 % (FLUSH) 0.9 %
5-40 SYRINGE (ML) INJECTION AS NEEDED
Status: DISCONTINUED | OUTPATIENT
Start: 2022-10-29 | End: 2022-10-30 | Stop reason: HOSPADM

## 2022-10-29 RX ORDER — CEFUROXIME AXETIL 500 MG/1
TABLET ORAL
COMMUNITY
Start: 2022-10-21 | End: 2022-10-30

## 2022-10-29 RX ORDER — PREDNISONE 20 MG/1
40 TABLET ORAL
Status: DISCONTINUED | OUTPATIENT
Start: 2022-10-30 | End: 2022-10-30 | Stop reason: HOSPADM

## 2022-10-29 RX ORDER — LEVOFLOXACIN 5 MG/ML
750 INJECTION, SOLUTION INTRAVENOUS ONCE
Status: COMPLETED | OUTPATIENT
Start: 2022-10-29 | End: 2022-10-29

## 2022-10-29 RX ORDER — MAGNESIUM SULFATE 100 %
4 CRYSTALS MISCELLANEOUS AS NEEDED
Status: DISCONTINUED | OUTPATIENT
Start: 2022-10-29 | End: 2022-10-30 | Stop reason: HOSPADM

## 2022-10-29 RX ORDER — AMLODIPINE BESYLATE 5 MG/1
10 TABLET ORAL DAILY
Status: DISCONTINUED | OUTPATIENT
Start: 2022-10-30 | End: 2022-10-30 | Stop reason: HOSPADM

## 2022-10-29 RX ORDER — SODIUM CHLORIDE 0.9 % (FLUSH) 0.9 %
5-40 SYRINGE (ML) INJECTION EVERY 8 HOURS
Status: DISCONTINUED | OUTPATIENT
Start: 2022-10-29 | End: 2022-10-30 | Stop reason: HOSPADM

## 2022-10-29 RX ORDER — NICOTINE 7MG/24HR
1 PATCH, TRANSDERMAL 24 HOURS TRANSDERMAL DAILY
Status: DISCONTINUED | OUTPATIENT
Start: 2022-10-30 | End: 2022-10-30 | Stop reason: HOSPADM

## 2022-10-29 RX ORDER — BUDESONIDE 0.5 MG/2ML
500 INHALANT ORAL
Status: DISCONTINUED | OUTPATIENT
Start: 2022-10-29 | End: 2022-10-30

## 2022-10-29 RX ORDER — ARFORMOTEROL TARTRATE 15 UG/2ML
15 SOLUTION RESPIRATORY (INHALATION)
Status: DISCONTINUED | OUTPATIENT
Start: 2022-10-29 | End: 2022-10-30

## 2022-10-29 RX ORDER — POLYETHYLENE GLYCOL 3350 17 G/17G
17 POWDER, FOR SOLUTION ORAL DAILY PRN
Status: DISCONTINUED | OUTPATIENT
Start: 2022-10-29 | End: 2022-10-30 | Stop reason: HOSPADM

## 2022-10-29 RX ORDER — DEXTROSE MONOHYDRATE 100 MG/ML
0-250 INJECTION, SOLUTION INTRAVENOUS AS NEEDED
Status: DISCONTINUED | OUTPATIENT
Start: 2022-10-29 | End: 2022-10-30 | Stop reason: HOSPADM

## 2022-10-29 RX ORDER — PANTOPRAZOLE SODIUM 40 MG/1
40 TABLET, DELAYED RELEASE ORAL
Status: DISCONTINUED | OUTPATIENT
Start: 2022-10-30 | End: 2022-10-30 | Stop reason: HOSPADM

## 2022-10-29 RX ORDER — ROSUVASTATIN CALCIUM 10 MG/1
20 TABLET, COATED ORAL EVERY EVENING
Status: DISCONTINUED | OUTPATIENT
Start: 2022-10-29 | End: 2022-10-30 | Stop reason: HOSPADM

## 2022-10-29 RX ORDER — LEVOFLOXACIN 750 MG/1
750 TABLET ORAL
Status: DISCONTINUED | OUTPATIENT
Start: 2022-10-31 | End: 2022-10-30

## 2022-10-29 RX ORDER — ONDANSETRON 2 MG/ML
4 INJECTION INTRAMUSCULAR; INTRAVENOUS
Status: DISCONTINUED | OUTPATIENT
Start: 2022-10-29 | End: 2022-10-30 | Stop reason: HOSPADM

## 2022-10-29 RX ORDER — INSULIN LISPRO 100 [IU]/ML
INJECTION, SOLUTION INTRAVENOUS; SUBCUTANEOUS
Status: DISCONTINUED | OUTPATIENT
Start: 2022-10-29 | End: 2022-10-30 | Stop reason: HOSPADM

## 2022-10-29 RX ORDER — IPRATROPIUM BROMIDE AND ALBUTEROL SULFATE 2.5; .5 MG/3ML; MG/3ML
3 SOLUTION RESPIRATORY (INHALATION)
Status: COMPLETED | OUTPATIENT
Start: 2022-10-29 | End: 2022-10-29

## 2022-10-29 RX ORDER — ONDANSETRON 4 MG/1
4 TABLET, ORALLY DISINTEGRATING ORAL
Status: DISCONTINUED | OUTPATIENT
Start: 2022-10-29 | End: 2022-10-30 | Stop reason: HOSPADM

## 2022-10-29 RX ORDER — ACETAMINOPHEN 650 MG/1
650 SUPPOSITORY RECTAL
Status: DISCONTINUED | OUTPATIENT
Start: 2022-10-29 | End: 2022-10-30 | Stop reason: HOSPADM

## 2022-10-29 RX ORDER — ACETAMINOPHEN 325 MG/1
650 TABLET ORAL
Status: DISCONTINUED | OUTPATIENT
Start: 2022-10-29 | End: 2022-10-30 | Stop reason: HOSPADM

## 2022-10-29 RX ORDER — DOXYCYCLINE 100 MG/1
CAPSULE ORAL
COMMUNITY
Start: 2022-10-21 | End: 2022-10-30

## 2022-10-29 RX ORDER — LEVOFLOXACIN 750 MG/1
750 TABLET ORAL EVERY 24 HOURS
Status: DISCONTINUED | OUTPATIENT
Start: 2022-10-30 | End: 2022-10-29

## 2022-10-29 RX ADMIN — ONDANSETRON 4 MG: 4 TABLET, ORALLY DISINTEGRATING ORAL at 15:36

## 2022-10-29 RX ADMIN — IPRATROPIUM BROMIDE AND ALBUTEROL SULFATE 3 ML: .5; 3 SOLUTION RESPIRATORY (INHALATION) at 12:25

## 2022-10-29 RX ADMIN — SODIUM CHLORIDE, PRESERVATIVE FREE 10 ML: 5 INJECTION INTRAVENOUS at 21:43

## 2022-10-29 RX ADMIN — SODIUM CHLORIDE, PRESERVATIVE FREE 10 ML: 5 INJECTION INTRAVENOUS at 15:38

## 2022-10-29 RX ADMIN — ROSUVASTATIN CALCIUM 10 MG: 10 TABLET, COATED ORAL at 18:07

## 2022-10-29 RX ADMIN — LEVOFLOXACIN 750 MG: 5 INJECTION, SOLUTION INTRAVENOUS at 13:34

## 2022-10-29 NOTE — ED NOTES
TRANSFER - IN REPORT:    Verbal report received from [facility], RN(name) on Le Jenkins  being received from 4th floor(unit) for routine progression of care      Report consisted of patients Situation, Background, Assessment and   Recommendations(SBAR). Information from the following report(s) SBAR, ED Summary and MAR was reviewed with the receiving nurse. Opportunity for questions and clarification was provided. Assessment completed upon patients arrival to unit and care assumed.

## 2022-10-29 NOTE — ED TRIAGE NOTES
[facility], reports continued cough with headache. Has been taking doxycycline, cefuroxime, tessalon pearls and mucinex. Developed chest heaviness yesterday that has been consistent.

## 2022-10-29 NOTE — PROGRESS NOTES
Bedside shift change report given to Melva (oncoming nurse) by Pamella Beach (offgoing nurse). Report included the following information SBAR, Kardex, Intake/Output, MAR, and Recent Results.

## 2022-10-29 NOTE — H&P
[facility] [address] [place] [address]   Office [phone]  Fax [phone]       Admission H&P     Name: Miriam Velasco MRN: [id number]  Sex: Female   YOB: 1940  Age: [de-identified] y.o. PCP: Sascha Schaefer MD     Source of Information: patient, medical records    Chief complaint: SOB, cough, chest pressure    History of Present Illness  Miriam Velasco is a [de-identified] y.o. female with PMH COPD, HTN, HLD, DM (diet controlled), breast CA (s/p R mastectomy) who presents to the ER complaining of cough and SOB. Patient had a dry cough and clear rhinorrhea on 10/14. She went to [facility] on 10/21 and was prescribed Doxycyline, Cefuroxime, Tessalon, and Mucinex. Since then, her cough has worsened along with SOB and chest pressure L > R. She reports increased clear sputum production and overall weakness and fatigue. She also reports nausea since starting antibiotics with 1 episode of vomiting 10/26. She had viral PNA in 5/2022 managed with supportive therapy. She has been vaccinated for COVID and received her flu shot. Of Note: Patient has a significant smoking history. Used to smoke 1/2 a pack for 60 years. She quit cigarettes last year and now vapes daily. She does not take anything for her COPD. EKG: EKG NSR, LVH    ED summary/course: Levaquin 750mg x1. Duonebs x1. ER Vitals/Labs/Imaging:   Vitals:  Patient Vitals for the past 8 hrs:   Temp Pulse Resp BP SpO2   10/29/22 1346 -- 88 -- -- 97 %   10/29/22 1331 -- 92 -- -- 97 %   10/29/22 1316 -- 94 -- -- (!) 87 %   10/29/22 1301 -- 87 -- (!) 119/42 96 %   10/29/22 1246 -- 86 -- -- 90 %   10/29/22 1231 -- 90 -- -- --   10/29/22 0927 98 °F (36.7 °C) (!) 106 24 132/71 99 %       Home Medications   Prior to Admission medications    Medication Sig Start Date End Date Taking? Authorizing Provider   valsartan-hydroCHLOROthiazide (Diovan HCT) 320-12.5 mg per tablet Take 1 Tablet by mouth daily.  9/19/22   Sascha Schaefer MD   omeprazole (PRILOSEC) 40 mg capsule TAKE ONE CAPSULE BY MOUTH DAILY 8/8/22   Catrachita Au MD   amLODIPine (NORVASC) 10 mg tablet Take 1 Tablet by mouth daily. 7/8/22   Catrachita Au MD   metoprolol succinate (TOPROL-XL) 200 mg XL tablet Take 1 Tablet by mouth daily. 7/8/22   Catrachita Au MD   tiZANidine (ZANAFLEX) 2 mg tablet Take 1 Tablet by mouth three (3) times daily as needed for Pain. 6/6/22   Catrachita Au MD   rosuvastatin (CRESTOR) 20 mg tablet TAKE ONE TABLET BY MOUTH EVERY EVENING 8/14/21   Catrachita Au MD   glucose blood VI test strips (Accu-Chek Ifrah Plus test strp) strip USE TO TEST BLOOD SUGAR DAILY, E11.9 7/21/21   Catrachita Au MD   omega-3 fatty acids-vitamin e 1,000 mg cap Take 2 Caps by mouth daily (after breakfast). Provider, Historical   calcium-vitamin D (OS-MANDI +D3) 500 mg-200 unit per tablet Take 1 Tab by mouth daily (after breakfast). Provider, Historical   multivitamin (ONE A DAY) tablet Take 1 Tab by mouth daily (after breakfast). Provider, Historical       Allergies  Allergies   Allergen Reactions    Nsaids (Non-Steroidal Anti-Inflammatory Drug) Anaphylaxis and Hives    Fenofibrate Other (comments)     Leg cramps    Metformin Diarrhea       Past Medical History  Past Medical History:   Diagnosis Date    ACP (advance care planning) 8/29/2018    Advance Care Plan or Surrogate Decision Maker documented in medical record.     Arthritis     Shoulders, knees    Bell's palsy 1980    Residual remains on right side    Breast cancer ([facility] [address] 5/1995    Cholelithiasis 6/23/2011    CKD (chronic kidney disease), stage III ([address] 6/6/2012    COPD (chronic obstructive pulmonary disease) ([facility] 12/28/2011    Dyslipidemia 6/23/2011    Elevated triglycerides with high cholesterol 8/28/2015    Esophageal reflux 6/23/2011    Essential hypertension 6/23/2011    GERD (gastroesophageal reflux disease)     Hiatal hernia 6/23/2011    History of breast cancer 6/23/2011    Rt Breast    History of duodenal ulcer 6/23/2011 HTN (hypertension) 6/23/2011    Hyperlipidemia 1/7/2013    Left ventricular hypertrophy 6/23/2011    Nicotine dependence 9/2/2015    Obstructive chronic bronchitis with acute bronchitis ([facility] 6/23/2011    Osteopenia     Osteoporosis 6/23/2011    Other ill-defined conditions(799.89)     rightsided eye and cheek numb from surg    Pregnancy 6/23/2011    Steatosis of liver 10/9/2011    Tubular adenoma of colon 1/24/2016 1/4/16    Type 2 diabetes mellitus without complications ([facility] 6/50/0843    Type II or unspecified type diabetes mellitus without mention of complication, not stated as uncontrolled 6/23/2011        Previous Hospitalization(s)  Past Surgical History:   Procedure Laterality Date    ENDOSCOPY, COLON, DIAGNOSTIC  10/09    HX CHOLECYSTECTOMY  10/2011    laparoscopic    HX HEENT      bells palsy surgery,tongue numb    HX MASTECTOMY Right 5/1995    HX OTHER SURGICAL      colonoscopy    HX TONSIL AND ADENOIDECTOMY  1969    HX TONSILLECTOMY  1969    [place] MASTECTOMY BRA  1995    R Breast    AZ REMOVAL GALLBLADDER          Family History  Family History   Problem Relation Age of Onset    Asthma Mother     Cancer Mother         Pancreatic CA    Stroke Father     Other Father         Artherosclerosis    Cancer Sister         Rectum    Lung Disease Sister     Diabetes Sister     Coronary Art Dis Sister     Heart Attack Brother     Diabetes Maternal Grandmother     Diabetes Paternal Grandmother     Cancer Paternal Grandfather     Diabetes Sister     Coronary Art Dis Sister     Diabetes Brother     Alcohol abuse Brother     Other Brother         killed in car accident        Social History  Social History     Socioeconomic History    Marital status:      Spouse name: Not on file    Number of children: Not on file    Years of education: Not on file    Highest education level: Not on file   Occupational History    Not on file   Tobacco Use    Smoking status: Every Day     Packs/day: 0.50     Years: 65.00     Pack years: 32.50     Types: Cigarettes     Last attempt to quit: 2019     Years since quittin.9    Smokeless tobacco: Never   Vaping Use    Vaping Use: Some days    Substances: Nicotine   Substance and Sexual Activity    Alcohol use: Yes     Comment: Rarely-holidays    Drug use: No    Sexual activity: Not Currently   Other Topics Concern    Not on file   Social History Narrative    Not on file     Social Determinants of Health     Financial Resource Strain: Not on file   Food Insecurity: Not on file   Transportation Needs: Not on file   Physical Activity: Not on file   Stress: Not on file   Social Connections: Not on file   Intimate Partner Violence: Not on file   Housing Stability: Not on file          Patient resides    Independently    x  With family care      Assisted living      SNF      Ambulates  x  Independently      With cane       Assisted walker      Alcohol history     None   x  Social     Chronic     Smoking history    None     Former smoker   x  Current smoker     Drug history  x  None     Former drug user     Current drug user     Code status    Full code   x  DNR/DNI     Partial      Code status discussed with the patient/caregivers. Physical Exam    Visit Vitals  BP (!) 119/42   Pulse 88   Temp 98 °F (36.7 °C)   Resp 24   Ht 5' 2\" (1.575 m)   Wt 149 lb (67.6 kg)   SpO2 97%   BMI 27.25 kg/m²       Physical Exam  Constitutional:       General: She is not in acute distress. Appearance: Normal appearance. She is not ill-appearing. HENT:      Mouth/Throat:      Pharynx: Oropharyngeal exudate present. Comments: Wet cough  Eyes:      Extraocular Movements: Extraocular movements intact. Conjunctiva/sclera: Conjunctivae normal.   Cardiovascular:      Rate and Rhythm: Normal rate. Pulses: Normal pulses. Comments: Distant heart sounds  Pulmonary:      Effort: Pulmonary effort is normal. No respiratory distress. Breath sounds: Rhonchi present.    Musculoskeletal: Cervical back: Normal range of motion. Skin:     General: Skin is warm and dry. Neurological:      General: No focal deficit present. Mental Status: She is alert and oriented to person, place, and time. Psychiatric:         Mood and Affect: Mood normal.       Laboratory Data  Recent Results (from the past 8 hour(s))   EKG, 12 LEAD, INITIAL    Collection Time: 10/29/22  9:28 AM   Result Value Ref Range    Ventricular Rate 92 BPM    Atrial Rate 92 BPM    P-R Interval 180 ms    QRS Duration 72 ms    Q-T Interval 324 ms    QTC Calculation (Bezet) 400 ms    Calculated P Axis 63 degrees    Calculated R Axis -35 degrees    Calculated T Axis 27 degrees    Diagnosis       Sinus rhythm with occasional premature ventricular complexes  Left axis deviation  Inferior infarct , age undetermined  Abnormal ECG  When compared with ECG of 29-JUN-2020 19:21,  premature ventricular complexes are now present  Confirmed by Yonathan Bucio [id number]) on 10/29/2022 11:27:37 AM     CBC WITH AUTOMATED DIFF    Collection Time: 10/29/22  9:45 AM   Result Value Ref Range    WBC 12.4 (H) 3.6 - 11.0 K/uL    RBC 3.73 (L) 3.80 - 5.20 M/uL    HGB 10.6 (L) 11.5 - 16.0 g/dL    HCT 32.3 (L) 35.0 - 47.0 %    MCV 86.6 80.0 - 99.0 FL    MCH 28.4 26.0 - 34.0 PG    MCHC 32.8 30.0 - 36.5 g/dL    RDW 13.3 11.5 - 14.5 %    PLATELET 413 (H) 949 - 400 K/uL    MPV 9.0 8.9 - 12.9 FL    NRBC 0.0 0  WBC    ABSOLUTE NRBC 0.00 0.00 - 0.01 K/uL    NEUTROPHILS 56 32 - 75 %    LYMPHOCYTES 33 12 - 49 %    MONOCYTES 8 5 - 13 %    EOSINOPHILS 1 0 - 7 %    BASOPHILS 1 0 - 1 %    IMMATURE GRANULOCYTES 1 (H) 0.0 - 0.5 %    ABS. NEUTROPHILS 7.0 1.8 - 8.0 K/UL    ABS. LYMPHOCYTES 4.1 (H) 0.8 - 3.5 K/UL    ABS. MONOCYTES 1.0 0.0 - 1.0 K/UL    ABS. EOSINOPHILS 0.1 0.0 - 0.4 K/UL    ABS. BASOPHILS 0.1 0.0 - 0.1 K/UL    ABS. IMM.  GRANS. 0.1 (H) 0.00 - 0.04 K/UL    DF AUTOMATED     METABOLIC PANEL, COMPREHENSIVE    Collection Time: 10/29/22  9:45 AM   Result Value Ref Range    Sodium 137 136 - 145 mmol/L    Potassium 3.6 3.5 - 5.1 mmol/L    Chloride 101 97 - 108 mmol/L    CO2 26 21 - 32 mmol/L    Anion gap 10 5 - 15 mmol/L    Glucose 173 (H) 65 - 100 mg/dL    BUN 29 (H) 6 - 20 MG/DL    Creatinine 1.39 (H) 0.55 - 1.02 MG/DL    BUN/Creatinine ratio 21 (H) 12 - 20      eGFR 38 (L) >60 ml/min/1.73m2    Calcium 7.7 (L) 8.5 - 10.1 MG/DL    Bilirubin, total 0.5 0.2 - 1.0 MG/DL    ALT (SGPT) 17 12 - 78 U/L    AST (SGOT) 21 15 - 37 U/L    Alk.  phosphatase 32 (L) 45 - 117 U/L    Protein, total 8.0 6.4 - 8.2 g/dL    Albumin 2.8 (L) 3.5 - 5.0 g/dL    Globulin 5.2 (H) 2.0 - 4.0 g/dL    A-G Ratio 0.5 (L) 1.1 - 2.2     POC LACTIC ACID    Collection Time: 10/29/22 10:13 AM   Result Value Ref Range    Lactic Acid (POC) 1.53 0.40 - 2.00 mmol/L   TROPONIN-HIGH SENSITIVITY    Collection Time: 10/29/22 10:18 AM   Result Value Ref Range    Troponin-High Sensitivity 10 0 - 51 ng/L   EKG, 12 LEAD, INITIAL    Collection Time: 10/29/22 10:23 AM   Result Value Ref Range    Ventricular Rate 89 BPM    Atrial Rate 89 BPM    P-R Interval 182 ms    QRS Duration 84 ms    Q-T Interval 348 ms    QTC Calculation (Bezet) 423 ms    Calculated P Axis 40 degrees    Calculated R Axis -31 degrees    Calculated T Axis 26 degrees    Diagnosis       Normal sinus rhythm  Left axis deviation  Moderate voltage criteria for LVH, may be normal variant  Abnormal ECG  When compared with ECG of 29-OCT-2022 09:28,  MANUAL COMPARISON REQUIRED, DATA IS UNCONFIRMED  Confirmed by Shanda Willoughby [id number]) on 10/29/2022 11:27:40 AM     INFLUENZA A+B VIRAL AGS    Collection Time: 10/29/22 11:54 AM   Result Value Ref Range    Influenza A Antigen Negative NEG      Influenza B Antigen Negative NEG     COVID-19 RAPID TEST    Collection Time: 10/29/22 11:54 AM   Result Value Ref Range    Specimen source Nasopharyngeal      COVID-19 rapid test Not detected NOTD     URINALYSIS W/ REFLEX CULTURE    Collection Time: 10/29/22 12:02 PM    Specimen: Urine   Result Value Ref Range    Color YELLOW/STRAW      Appearance CLEAR CLEAR      Specific gravity 1.012 1.003 - 1.030      pH (UA) 5.0 5.0 - 8.0      Protein 30 (A) NEG mg/dL    Glucose Negative NEG mg/dL    Ketone Negative NEG mg/dL    Bilirubin Negative NEG      Blood Negative NEG      Urobilinogen 0.2 0.2 - 1.0 EU/dL    Nitrites Negative NEG      Leukocyte Esterase Negative NEG      UA:UC IF INDICATED CULTURE NOT INDICATED BY UA RESULT CNI      WBC 0-4 0 - 4 /hpf    RBC 0-5 0 - 5 /hpf    Epithelial cells MODERATE (A) FEW /lpf    Bacteria Negative NEG /hpf    Hyaline cast 2-5 0 - 2 /lpf     XR Results (most recent):  Results from Hospital Encounter encounter on 10/29/22    XR CHEST PORT    Narrative  PORTABLE CHEST RADIOGRAPH/S: 10/29/2022 10:49 AM    INDICATION: Dyspnea. COMPARISON: 6/6/2022, 7/9/2020. CT chest 10/2/2020. TECHNIQUE: Portable frontal upright radiograph/s of the chest.    FINDINGS:  Aside from patchy, chronic, peripheral scarring, the lungs are clear. The lungs  are emphysematous. The central airways are patent. No pneumothorax or pleural  effusion. Impression  No acute process. Emphysema. CT Results (most recent):  Results from Hospital Encounter encounter on 10/29/22    CT CHEST WO CONT    Narrative  INDICATION:   progressive cough    EXAM:  CT CHEST without CONTRAST    COMPARISON:  10/2/2020    TECHNIQUE:  Thin collimation axial images were obtained through the chest  without IV contrast administration. Coronal and sagittal reconstructions were  obtained. CT dose reduction was achieved through use of a standardized protocol  tailored for this examination and automatic exposure control for dose  modulation. FINDINGS:    LUNGS: Moderate emphysema. Biapical opacities no significant change and likely  scarring. Right apical lung nodule is stable measuring 6 mm.  Increased patchy  micronodular lung opacities in the left upper lobe, lingula, and left lung base  representing acute or chronic infectious/inflammatory process. Other bilateral  lung opacities remain stable. The central airways are patent. LYMPH NODES: No thoracic lymphadenopathy. PLEURAL FLUID: No pleural effusion. PERICARDIAL FLUID: No pericardial effusion. THYROID: 2.7 cm right thyroid nodule  OTHER: Mild to moderate hiatal hernia. Right breast prosthesis. Impression  Increased patchy micronodular lung opacities in the left upper lobe, lingula,  and left lung base representing acute or chronic infectious/inflammatory  process. No other significant change. Mild to moderate hiatal hernia. Stable 2.7  cm right thyroid nodule. Assessment and Plan     Nick Andrews is a [de-identified] y.o. female with PMH of COPD, HTN, HLD, DM (diet controlled), breast CA (s/p R mastectomy) who presents to the ER complaining of cough and SOB who is admitted for sepsis and COPD exacerbation. Sepsis in s/o COPD exacerbation SIRS 3/4 (WBC 12.4  RR 24). Most likely 2/2 viral illness vs COPD exacerbation given clear rhinorrhea and increased clear sputum production. Saturating appropriately on room air, not requiring O2 support. Patient reports symptom improvement after receiving duonebs in ED. CXR NAP, emphysema. Chest CT patchy opacities in the left upper lobe, lingula, and left lung base representing acute or chronic infectious/inflammatory process. Left upper lobe opacities seem to be chronic; they are mentioned on CT from 09/2020. Head CT acute bilateral maxillary sinusitis. POA WBC 12.4. Flu, covid neg. Trop 10.  - Admit to medicine   - O2 as needed for sats > 90%  - Levaquin 750mg q48h  - Duonebs q4h prn   - Brovana/pulmicort BID  - Prednisone burst 40mg every day for 5 days   - RVP, legionella, s pneumo pending   - procal pending  - Bcx pending  - rTtrop pending   - Pulm on consult; appreciate recs    Maxillary sinusitis acute addy sinusitis noted on head CT. Patient c/o facial pressure.   - Saline nasal spray q2h prn  - Levaquin 750mg q48h    Hypertension POA BP was 132/71  BP Readings from Last 3 Encounters:   10/29/22 130/65   09/19/22 (!) 150/77   08/01/22 (!) 140/78   - continue home Amlodipine 10mg every day  - Hold Metoprolol XL 200mg every day given BP readings on lower end and Diovan -12.5mg every day (recently started on 9/19)  - Will continue to monitor at this time and readjust as BP's trend. Hyperlipidemia  - continue Crestor 20mg every evening  Lab Results   Component Value Date/Time    Cholesterol, total 185 09/19/2022 09:15 AM    HDL Cholesterol 42 09/19/2022 09:15 AM    LDL, calculated 102.6 (H) 09/19/2022 09:15 AM    VLDL, calculated 40.4 09/19/2022 09:15 AM    Triglyceride 202 (H) 09/19/2022 09:15 AM    CHOL/HDL Ratio 4.4 09/19/2022 09:15 AM       T2DM Last A1C 6.7 9/2022. Diet controlled. - Insulin Sliding Scale high sensitivity with AC&HS glucose checks given steroid administration   - Hypoglycemia protocols ordered     CKD 3b POA Cr 1.39 (Bl~1.4)  - continue to monitor on BMP  - avoid nephrotoxic agents   - renally dose meds    Anemia POA Hgb 10.6 (Bl~10). Appears to be chronic.   - continue to monitor on daily CBC    Thrombocytosis POA Plts 539 (Bl~300s?). Most likely reactive   - peripheral smear pending     GERD Stable  - Substitute home Omeprazole 40mg daily with Protonix 40mg daily    Obesity  - PT with BMI of Body mass index is 27.25 kg/m². - Encouraging lifestyle modifications and further follow up outpatient. FEN/GI - Diabetic diet. Activity - Ambulate as tolerated  DVT prophylaxis - Lovenox  GI prophylaxis - Protonix  Fall prophylaxis - Not indicated at this time. Disposition - Admit to Medical. Plan to d/c to Home. Code Status - DNR, discussed with patient / caregivers.   Next of Kin Name and [facility] (Child)  [phone] (Mobile)    Patient Jessica Woodard will be discussed with Dr. Ria Okeefe.    2:42 PM, 10/29/22  Claire Velasquez MD  Family Medicine Resident       For Billing    Chief Complaint   Patient presents with    Pneumonia       Hospital Problems  Date Reviewed: 9/19/2022            Codes Class Noted POA    CAP (community acquired pneumonia) ICD-10-CM: J18.9  ICD-9-CM: 486  10/29/2022 Unknown

## 2022-10-29 NOTE — PROGRESS NOTES
[facility] Dosing Services    Augmentin was automatically dose-adjusted per [facility] P&T Committee Protocol, with respect to renal function. Assessment/Plan: Estimated Creatinine Clearance: 29.1 mL/min (A) (based on SCr of 1.39 mg/dL (H)). Augmentin changed to 500 mg PO every 12 hours per P&T approved protocol for patient with CrCl less than 30 mL/min. Serum Creatinine Lab Results   Component Value Date/Time    Creatinine 1.39 (H) 10/29/2022 09:45 AM    Creatinine (POC) 1.0 10/08/2011 10:52 AM       Creatinine Clearance Estimated Creatinine Clearance: 29.1 mL/min (A) (based on SCr of 1.39 mg/dL (H)).    BUN Lab Results   Component Value Date/Time    BUN 29 (H) 10/29/2022 09:45 AM    BUN (POC) 27 (H) 10/08/2011 10:52 AM         Pharmacist Alice Garsia

## 2022-10-29 NOTE — PROGRESS NOTES
Medicare pt has received, reviewed, and signed IM letter informing them of their right to appeal the discharge. Signed copy has been placed on pt bedside chart.   Mary Marie CMS

## 2022-10-29 NOTE — ED PROVIDER NOTES
HPI     Date of Service:  10/29/2022    Patient:  Regi Soares    Chief Complaint:  Cough       HPI:  Regi Soares is a [de-identified] y.o.  female with a past medical history of HTN, diet controlled diabetes, hyperlipidemia who presents for evaluation of a cough. Patient has been sick for the last week. Her symptoms initially started with cough and nasal congestion. [facility] and started on antibiotics (doxycycline and cefuroxime), Tessalon Perles and Mucinex. Patient was initially having improvement until 3 days ago when cough began to worsen. Also endorses that she has developed left-sided chest heaviness and shortness of breath. She also endorses associated headache for the last 3 days. Describes it as pressure and behind her eyes. No vomiting. Intermittent diarrhea which is chronic for her. No known fevers. Past Medical History:   Diagnosis Date    ACP (advance care planning) 8/29/2018    Advance Care Plan or Surrogate Decision Maker documented in medical record.     Arthritis     Shoulders, knees    Bell's palsy 1980    Residual remains on right side    Breast cancer ([address] 5/1995    Cholelithiasis 6/23/2011    CKD (chronic kidney disease), stage III ([address] 6/6/2012    COPD (chronic obstructive pulmonary disease) ([address] 12/28/2011    Dyslipidemia 6/23/2011    Elevated triglycerides with high cholesterol 8/28/2015    Esophageal reflux 6/23/2011    Essential hypertension 6/23/2011    GERD (gastroesophageal reflux disease)     Hiatal hernia 6/23/2011    History of breast cancer 6/23/2011    Rt Breast    History of duodenal ulcer 6/23/2011    HTN (hypertension) 6/23/2011    Hyperlipidemia 1/7/2013    Left ventricular hypertrophy 6/23/2011    Nicotine dependence 9/2/2015    Obstructive chronic bronchitis with acute bronchitis ([address] 6/23/2011    Osteopenia     Osteoporosis 6/23/2011    Other ill-defined conditions(799.89)     rightsided eye and cheek numb from surg    Pregnancy 2011    Steatosis of liver 10/9/2011    Tubular adenoma of colon 2016    Type 2 diabetes mellitus without complications ([address] 5/15/1534    Type II or unspecified type diabetes mellitus without mention of complication, not stated as uncontrolled 2011       Past Surgical History:   Procedure Laterality Date    ENDOSCOPY, COLON, DIAGNOSTIC  10/09    HX CHOLECYSTECTOMY  10/2011    laparoscopic    HX HEENT      bells palsy surgery,tongue numb    HX MASTECTOMY Right 1995    HX OTHER SURGICAL      colonoscopy    HX TONSIL AND ADENOIDECTOMY  1969    HX TONSILLECTOMY  1969    HI MASTECTOMY BRA      R Breast    HI REMOVAL GALLBLADDER           Family History:   Problem Relation Age of Onset    Asthma Mother     Cancer Mother         Pancreatic CA    Stroke Father     Other Father         Artherosclerosis    Cancer Sister         Rectum    Lung Disease Sister     Diabetes Sister     Coronary Art Dis Sister     Heart Attack Brother     Diabetes Maternal Grandmother     Diabetes Paternal Grandmother     Cancer Paternal Grandfather     Diabetes Sister     Coronary Art Dis Sister     Diabetes Brother     Alcohol abuse Brother     Other Brother         killed in car accident       Social History     Socioeconomic History    Marital status:      Spouse name: Not on file    Number of children: Not on file    Years of education: Not on file    Highest education level: Not on file   Occupational History    Not on file   Tobacco Use    Smoking status: Every Day     Packs/day: 0.50     Years: 65.00     Pack years: 32.50     Types: Cigarettes     Last attempt to quit: 2019     Years since quittin.9    Smokeless tobacco: Never   Vaping Use    Vaping Use: Some days    Substances: Nicotine   Substance and Sexual Activity    Alcohol use: Yes     Comment: Rarely-holidays    Drug use: No    Sexual activity: Not Currently   Other Topics Concern    Not on file   Social History Narrative    Not on file     Social Determinants of Health     Financial Resource Strain: Not on file   Food Insecurity: Not on file   Transportation Needs: Not on file   Physical Activity: Not on file   Stress: Not on file   Social Connections: Not on file   Intimate Partner Violence: Not on file   Housing Stability: Not on file         ALLERGIES: Nsaids (non-steroidal anti-inflammatory drug), Fenofibrate, and Metformin    Review of Systems   Constitutional:  Positive for fatigue. Negative for chills and fever. HENT:  Negative for congestion and rhinorrhea. Eyes:  Negative for discharge and redness. Respiratory:  Positive for cough and shortness of breath. Cardiovascular:  Positive for chest pain. Gastrointestinal:  Positive for diarrhea. Negative for abdominal pain, nausea and vomiting. Genitourinary:  Negative for dysuria and hematuria. Musculoskeletal:  Negative for back pain and neck stiffness. Skin:  Negative for rash and wound. Neurological:  Positive for headaches. Negative for speech difficulty. Psychiatric/Behavioral:  Negative for agitation and confusion. Vitals:    10/29/22 0927   BP: 132/71   Pulse: (!) 106   Resp: 24   Temp: 98 °F (36.7 °C)   SpO2: 99%   Weight: 67.6 kg (149 lb)   Height: 5' 2\" (1.575 m)            Physical Exam  Vitals and nursing note reviewed. Constitutional:       General: She is in acute distress. Appearance: Normal appearance. She is not ill-appearing or toxic-appearing. HENT:      Head: Normocephalic and atraumatic. Eyes:      General: No scleral icterus. Extraocular Movements: Extraocular movements intact. Conjunctiva/sclera: Conjunctivae normal.   Cardiovascular:      Rate and Rhythm: Regular rhythm. Tachycardia present. Pulses: Normal pulses. Pulmonary:      Effort: Pulmonary effort is normal. No respiratory distress. Breath sounds: Rhonchi present. Abdominal:      Palpations: Abdomen is soft. Tenderness:  There is no abdominal tenderness. There is no guarding or rebound. Musculoskeletal:         General: Normal range of motion. Right lower leg: No edema. Left lower leg: No edema. Skin:     General: Skin is warm and dry. Capillary Refill: Capillary refill takes less than 2 seconds. Neurological:      General: No focal deficit present. Mental Status: She is alert and oriented to person, place, and time. Psychiatric:         Mood and Affect: Mood normal.         Behavior: Behavior normal.        MDM  Number of Diagnoses or Management Options  Acute sinusitis, recurrence not specified, unspecified location  Pneumonia of left lung due to infectious organism, unspecified part of lung  Diagnosis management comments:     DECISION MAKING:  Lisa Cohen is a [de-identified] y.o. female who comes in as above. On arrival, patient is afebrile, mildly tachycardic at 106. Blood pressure stable. Oxygen saturation 99% on room air. On my examination she does appear tachypneic and has scattered rhonchi throughout lung fields. Her exam is otherwise unremarkable. Of note patient does have history of COPD, will give breathing treatment. Work-up notable for leukocytosis with a white blood cell count of 12.4. Electrolytes are stable. Creatinine elevated at 1.39, consistent with baseline. Lactate is not elevated. Influenza and COVID-19 negative. UA negative for infection. CT of the head does show findings consistent with acute bilateral maxillary sinusitis. Chest x-ray without any acute findings, given her symptoms proceeded with a CT chest without contrast which does show patchy micronodular opacities in the left upper lobe, lingula and left lung base concerning for infectious versus inflammatory process. Given her symptoms will cover for pneumonia. Levaquin ordered for both coverage of pneumonia and sinusitis. Given patient appears to have failed outpatient therapy will admit.     On reevaluation after breathing treatment, she does endorse some improvement of her shortness of breath and chest heaviness. I discussed results with patient and her daughter. They are in agreement with plan. Perfect Serve Consult for Admission  1:08 PM    ED Room Number: ER09/09  Patient Name and age:  Anurag Stone [de-identified] y.o.  female  Working Diagnosis: Pneumonia of left lung due to infectious organism, unspecified part of lung  (primary encounter diagnosis)  Acute sinusitis, recurrence not specified, unspecified location    COVID-19 Suspicion:  no  Sepsis present:  no  Reassessment needed: no  Code Status:  Do Not Resuscitate  Readmission: no  Isolation Requirements:  no  Recommended Level of Care:  med/surg  Department:[facility] ED - [phone]  Other:  [de-identified] y.o.  female with a past medical history of HTN, diet controlled diabetes, hyperlipidemia who presented for cough, congestion and headache. She was treated for pneumonia started on 10/21 with doxycyline and cefuroxime but symptoms worsened. WBC 12.4, lactate normal. Creat 1.39 c/w baseline. Negative flu and covid. Found to have acute b/l maxillary sinus and CT chest with opacities in the left upper, lingula and lower lung base. Covering with Levaquin for both        Amount and/or Complexity of Data Reviewed  Clinical lab tests: reviewed  Tests in the radiology section of CPT®: reviewed      ED Course as of 10/29/22 1250   Sat Oct 29, 2022   1029 EKG, 12 LEAD, INITIAL  ECG at 1023, interpreted by me: Normal sinus rhythm, rate 89 bpm.  Left axis deviation. Normal intervals. No ST elevations.  [SH]      ED Course User Index  [SH] Jerica White DO       Procedures          LABS:  Recent Results (from the past 6 hour(s))   EKG, 12 LEAD, INITIAL    Collection Time: 10/29/22  9:28 AM   Result Value Ref Range    Ventricular Rate 92 BPM    Atrial Rate 92 BPM    P-R Interval 180 ms    QRS Duration 72 ms    Q-T Interval 324 ms    QTC Calculation (Bezet) 400 ms    Calculated P Axis 63 degrees Calculated R Axis -35 degrees    Calculated T Axis 27 degrees    Diagnosis       Sinus rhythm with occasional premature ventricular complexes  Left axis deviation  Inferior infarct , age undetermined  Abnormal ECG  When compared with ECG of 29-JUN-2020 19:21,  premature ventricular complexes are now present  Confirmed by Claudette Reed ([id number]) on 10/29/2022 11:27:37 AM     CBC WITH AUTOMATED DIFF    Collection Time: 10/29/22  9:45 AM   Result Value Ref Range    WBC 12.4 (H) 3.6 - 11.0 K/uL    RBC 3.73 (L) 3.80 - 5.20 M/uL    HGB 10.6 (L) 11.5 - 16.0 g/dL    HCT 32.3 (L) 35.0 - 47.0 %    MCV 86.6 80.0 - 99.0 FL    MCH 28.4 26.0 - 34.0 PG    MCHC 32.8 30.0 - 36.5 g/dL    RDW 13.3 11.5 - 14.5 %    PLATELET 513 (H) 860 - 400 K/uL    MPV 9.0 8.9 - 12.9 FL    NRBC 0.0 0  WBC    ABSOLUTE NRBC 0.00 0.00 - 0.01 K/uL    NEUTROPHILS 56 32 - 75 %    LYMPHOCYTES 33 12 - 49 %    MONOCYTES 8 5 - 13 %    EOSINOPHILS 1 0 - 7 %    BASOPHILS 1 0 - 1 %    IMMATURE GRANULOCYTES 1 (H) 0.0 - 0.5 %    ABS. NEUTROPHILS 7.0 1.8 - 8.0 K/UL    ABS. LYMPHOCYTES 4.1 (H) 0.8 - 3.5 K/UL    ABS. MONOCYTES 1.0 0.0 - 1.0 K/UL    ABS. EOSINOPHILS 0.1 0.0 - 0.4 K/UL    ABS. BASOPHILS 0.1 0.0 - 0.1 K/UL    ABS. IMM. GRANS. 0.1 (H) 0.00 - 0.04 K/UL    DF AUTOMATED     METABOLIC PANEL, COMPREHENSIVE    Collection Time: 10/29/22  9:45 AM   Result Value Ref Range    Sodium 137 136 - 145 mmol/L    Potassium 3.6 3.5 - 5.1 mmol/L    Chloride 101 97 - 108 mmol/L    CO2 26 21 - 32 mmol/L    Anion gap 10 5 - 15 mmol/L    Glucose 173 (H) 65 - 100 mg/dL    BUN 29 (H) 6 - 20 MG/DL    Creatinine 1.39 (H) 0.55 - 1.02 MG/DL    BUN/Creatinine ratio 21 (H) 12 - 20      eGFR 38 (L) >60 ml/min/1.73m2    Calcium 7.7 (L) 8.5 - 10.1 MG/DL    Bilirubin, total 0.5 0.2 - 1.0 MG/DL    ALT (SGPT) 17 12 - 78 U/L    AST (SGOT) 21 15 - 37 U/L    Alk.  phosphatase 32 (L) 45 - 117 U/L    Protein, total 8.0 6.4 - 8.2 g/dL    Albumin 2.8 (L) 3.5 - 5.0 g/dL    Globulin 5.2 (H) 2.0 - 4.0 g/dL    A-G Ratio 0.5 (L) 1.1 - 2.2     POC LACTIC ACID    Collection Time: 10/29/22 10:13 AM   Result Value Ref Range    Lactic Acid (POC) 1.53 0.40 - 2.00 mmol/L   TROPONIN-HIGH SENSITIVITY    Collection Time: 10/29/22 10:18 AM   Result Value Ref Range    Troponin-High Sensitivity 10 0 - 51 ng/L   EKG, 12 LEAD, INITIAL    Collection Time: 10/29/22 10:23 AM   Result Value Ref Range    Ventricular Rate 89 BPM    Atrial Rate 89 BPM    P-R Interval 182 ms    QRS Duration 84 ms    Q-T Interval 348 ms    QTC Calculation (Bezet) 423 ms    Calculated P Axis 40 degrees    Calculated R Axis -31 degrees    Calculated T Axis 26 degrees    Diagnosis       Normal sinus rhythm  Left axis deviation  Moderate voltage criteria for LVH, may be normal variant  Abnormal ECG  When compared with ECG of 29-OCT-2022 09:28,  MANUAL COMPARISON REQUIRED, DATA IS UNCONFIRMED  Confirmed by Chloe Mccann ([id number]) on 10/29/2022 11:27:40 AM     INFLUENZA A+B VIRAL AGS    Collection Time: 10/29/22 11:54 AM   Result Value Ref Range    Influenza A Antigen Negative NEG      Influenza B Antigen Negative NEG     COVID-19 RAPID TEST    Collection Time: 10/29/22 11:54 AM   Result Value Ref Range    Specimen source Nasopharyngeal      COVID-19 rapid test Not detected NOTD     URINALYSIS W/ REFLEX CULTURE    Collection Time: 10/29/22 12:02 PM    Specimen: Urine   Result Value Ref Range    Color YELLOW/STRAW      Appearance CLEAR CLEAR      Specific gravity 1.012 1.003 - 1.030      pH (UA) 5.0 5.0 - 8.0      Protein 30 (A) NEG mg/dL    Glucose Negative NEG mg/dL    Ketone Negative NEG mg/dL    Bilirubin Negative NEG      Blood Negative NEG      Urobilinogen 0.2 0.2 - 1.0 EU/dL    Nitrites Negative NEG      Leukocyte Esterase Negative NEG      UA:UC IF INDICATED CULTURE NOT INDICATED BY UA RESULT CNI      WBC 0-4 0 - 4 /hpf    RBC 0-5 0 - 5 /hpf    Epithelial cells MODERATE (A) FEW /lpf    Bacteria Negative NEG /hpf    Hyaline cast 2-5 0 - 2 /lpf IMAGING:  CT CHEST WO CONT   Final Result      Increased patchy micronodular lung opacities in the left upper lobe, lingula,   and left lung base representing acute or chronic infectious/inflammatory   process. No other significant change. Mild to moderate hiatal hernia. Stable 2.7   cm right thyroid nodule. CT HEAD WO CONT   Final Result   No acute intracranial findings, acute bilateral maxillary sinusitis. XR CHEST PORT   Final Result   No acute process. Emphysema. Medications During Visit:  Medications   levoFLOXacin (LEVAQUIN) 750 mg in D5W IVPB (has no administration in time range)   albuterol-ipratropium (DUO-NEB) 2.5 MG-0.5 MG/3 ML (3 mL Nebulization Given 10/29/22 1225)         IMPRESSION:  1. Pneumonia of left lung due to infectious organism, unspecified part of lung    2.  Acute sinusitis, recurrence not specified, unspecified location        DISPOSITION:  Admitted         Tiffanie Jean Baptiste DO

## 2022-10-29 NOTE — PROGRESS NOTES
Pharmacy Dosing Services:     Levaquin dose adjusted from 750 mg IV Q24h to 750 mg IV Q48h per protocol.  - CrCl 29 ml/min    Thank you,  Francisca Da Silva, PharmD

## 2022-10-30 ENCOUNTER — APPOINTMENT (OUTPATIENT)
Dept: GENERAL RADIOLOGY | Age: 82
DRG: 871 | End: 2022-10-30
Payer: MEDICARE

## 2022-10-30 VITALS
HEART RATE: 99 BPM | HEIGHT: 62 IN | TEMPERATURE: 98.6 F | BODY MASS INDEX: 27.42 KG/M2 | RESPIRATION RATE: 20 BRPM | OXYGEN SATURATION: 94 % | DIASTOLIC BLOOD PRESSURE: 74 MMHG | WEIGHT: 149 LBS | SYSTOLIC BLOOD PRESSURE: 138 MMHG

## 2022-10-30 PROBLEM — J18.9 PNEUMONIA: Status: ACTIVE | Noted: 2022-10-30

## 2022-10-30 LAB
ANION GAP SERPL CALC-SCNC: 10 MMOL/L (ref 5–15)
B PERT DNA SPEC QL NAA+PROBE: NOT DETECTED
BASOPHILS # BLD: 0.1 K/UL (ref 0–0.1)
BASOPHILS NFR BLD: 0 % (ref 0–1)
BORDETELLA PARAPERTUSSIS PCR, BORPAR: NOT DETECTED
BUN SERPL-MCNC: 25 MG/DL (ref 6–20)
BUN/CREAT SERPL: 20 (ref 12–20)
C PNEUM DNA SPEC QL NAA+PROBE: NOT DETECTED
CALCIUM SERPL-MCNC: 7.6 MG/DL (ref 8.5–10.1)
CHLORIDE SERPL-SCNC: 103 MMOL/L (ref 97–108)
CO2 SERPL-SCNC: 23 MMOL/L (ref 21–32)
CREAT SERPL-MCNC: 1.22 MG/DL (ref 0.55–1.02)
DIFFERENTIAL METHOD BLD: ABNORMAL
EOSINOPHIL # BLD: 0 K/UL (ref 0–0.4)
EOSINOPHIL NFR BLD: 0 % (ref 0–7)
ERYTHROCYTE [DISTWIDTH] IN BLOOD BY AUTOMATED COUNT: 13.2 % (ref 11.5–14.5)
FLUAV SUBTYP SPEC NAA+PROBE: NOT DETECTED
FLUBV RNA SPEC QL NAA+PROBE: NOT DETECTED
GLUCOSE BLD STRIP.AUTO-MCNC: 120 MG/DL (ref 65–117)
GLUCOSE BLD STRIP.AUTO-MCNC: 149 MG/DL (ref 65–117)
GLUCOSE SERPL-MCNC: 135 MG/DL (ref 65–100)
HADV DNA SPEC QL NAA+PROBE: NOT DETECTED
HCOV 229E RNA SPEC QL NAA+PROBE: NOT DETECTED
HCOV HKU1 RNA SPEC QL NAA+PROBE: NOT DETECTED
HCOV NL63 RNA SPEC QL NAA+PROBE: NOT DETECTED
HCOV OC43 RNA SPEC QL NAA+PROBE: NOT DETECTED
HCT VFR BLD AUTO: 33.8 % (ref 35–47)
HGB BLD-MCNC: 11 G/DL (ref 11.5–16)
HMPV RNA SPEC QL NAA+PROBE: NOT DETECTED
HPIV1 RNA SPEC QL NAA+PROBE: NOT DETECTED
HPIV2 RNA SPEC QL NAA+PROBE: NOT DETECTED
HPIV3 RNA SPEC QL NAA+PROBE: NOT DETECTED
HPIV4 RNA SPEC QL NAA+PROBE: NOT DETECTED
IMM GRANULOCYTES # BLD AUTO: 0.1 K/UL (ref 0–0.04)
IMM GRANULOCYTES NFR BLD AUTO: 1 % (ref 0–0.5)
LYMPHOCYTES # BLD: 3.3 K/UL (ref 0.8–3.5)
LYMPHOCYTES NFR BLD: 24 % (ref 12–49)
M PNEUMO DNA SPEC QL NAA+PROBE: NOT DETECTED
MCH RBC QN AUTO: 27.8 PG (ref 26–34)
MCHC RBC AUTO-ENTMCNC: 32.5 G/DL (ref 30–36.5)
MCV RBC AUTO: 85.4 FL (ref 80–99)
MONOCYTES # BLD: 0.7 K/UL (ref 0–1)
MONOCYTES NFR BLD: 5 % (ref 5–13)
NEUTS SEG # BLD: 9.5 K/UL (ref 1.8–8)
NEUTS SEG NFR BLD: 70 % (ref 32–75)
NRBC # BLD: 0 K/UL (ref 0–0.01)
NRBC BLD-RTO: 0 PER 100 WBC
PERIPHERAL SMEAR,PSM: NORMAL
PLATELET # BLD AUTO: 434 K/UL (ref 150–400)
PMV BLD AUTO: 9.2 FL (ref 8.9–12.9)
POTASSIUM SERPL-SCNC: 3.5 MMOL/L (ref 3.5–5.1)
RBC # BLD AUTO: 3.96 M/UL (ref 3.8–5.2)
RSV RNA SPEC QL NAA+PROBE: DETECTED
RV+EV RNA SPEC QL NAA+PROBE: NOT DETECTED
SARS-COV-2 PCR, COVPCR: NOT DETECTED
SERVICE CMNT-IMP: ABNORMAL
SERVICE CMNT-IMP: ABNORMAL
SODIUM SERPL-SCNC: 136 MMOL/L (ref 136–145)
WBC # BLD AUTO: 13.7 K/UL (ref 3.6–11)

## 2022-10-30 PROCEDURE — 82962 GLUCOSE BLOOD TEST: CPT

## 2022-10-30 PROCEDURE — 94664 DEMO&/EVAL PT USE INHALER: CPT

## 2022-10-30 PROCEDURE — 74011250636 HC RX REV CODE- 250/636

## 2022-10-30 PROCEDURE — 74011250637 HC RX REV CODE- 250/637

## 2022-10-30 PROCEDURE — 74011250637 HC RX REV CODE- 250/637: Performed by: FAMILY MEDICINE

## 2022-10-30 PROCEDURE — 74018 RADEX ABDOMEN 1 VIEW: CPT

## 2022-10-30 PROCEDURE — 85025 COMPLETE CBC W/AUTO DIFF WBC: CPT

## 2022-10-30 PROCEDURE — 97161 PT EVAL LOW COMPLEX 20 MIN: CPT

## 2022-10-30 PROCEDURE — 94761 N-INVAS EAR/PLS OXIMETRY MLT: CPT

## 2022-10-30 PROCEDURE — 74011636637 HC RX REV CODE- 636/637

## 2022-10-30 PROCEDURE — 36415 COLL VENOUS BLD VENIPUNCTURE: CPT

## 2022-10-30 PROCEDURE — 74011250637 HC RX REV CODE- 250/637: Performed by: STUDENT IN AN ORGANIZED HEALTH CARE EDUCATION/TRAINING PROGRAM

## 2022-10-30 PROCEDURE — 99222 1ST HOSP IP/OBS MODERATE 55: CPT | Performed by: FAMILY MEDICINE

## 2022-10-30 PROCEDURE — 94640 AIRWAY INHALATION TREATMENT: CPT

## 2022-10-30 PROCEDURE — 80048 BASIC METABOLIC PNL TOTAL CA: CPT

## 2022-10-30 PROCEDURE — 74011000250 HC RX REV CODE- 250

## 2022-10-30 RX ORDER — PREDNISONE 20 MG/1
40 TABLET ORAL
Qty: 8 TABLET | Refills: 0 | Status: SHIPPED | OUTPATIENT
Start: 2022-10-31 | End: 2022-11-04

## 2022-10-30 RX ORDER — AMOXICILLIN AND CLAVULANATE POTASSIUM 875; 125 MG/1; MG/1
1 TABLET, FILM COATED ORAL EVERY 12 HOURS
Status: DISCONTINUED | OUTPATIENT
Start: 2022-10-30 | End: 2022-10-30 | Stop reason: HOSPADM

## 2022-10-30 RX ORDER — IPRATROPIUM BROMIDE AND ALBUTEROL SULFATE 2.5; .5 MG/3ML; MG/3ML
3 SOLUTION RESPIRATORY (INHALATION)
Status: DISCONTINUED | OUTPATIENT
Start: 2022-10-30 | End: 2022-10-30 | Stop reason: HOSPADM

## 2022-10-30 RX ORDER — ALBUTEROL SULFATE 90 UG/1
2 AEROSOL, METERED RESPIRATORY (INHALATION)
Status: DISCONTINUED | OUTPATIENT
Start: 2022-10-30 | End: 2022-10-30 | Stop reason: HOSPADM

## 2022-10-30 RX ORDER — ONDANSETRON 4 MG/1
4 TABLET, ORALLY DISINTEGRATING ORAL
Qty: 30 TABLET | Refills: 0 | Status: SHIPPED | OUTPATIENT
Start: 2022-10-30

## 2022-10-30 RX ORDER — ALBUTEROL SULFATE 90 UG/1
2 AEROSOL, METERED RESPIRATORY (INHALATION)
Status: DISCONTINUED | OUTPATIENT
Start: 2022-10-30 | End: 2022-10-30

## 2022-10-30 RX ORDER — PROMETHAZINE HYDROCHLORIDE 25 MG/1
25 SUPPOSITORY RECTAL
Status: DISCONTINUED | OUTPATIENT
Start: 2022-10-30 | End: 2022-10-30

## 2022-10-30 RX ORDER — IPRATROPIUM BROMIDE AND ALBUTEROL SULFATE 2.5; .5 MG/3ML; MG/3ML
3 SOLUTION RESPIRATORY (INHALATION)
Status: DISCONTINUED | OUTPATIENT
Start: 2022-10-30 | End: 2022-10-30

## 2022-10-30 RX ORDER — AMOXICILLIN AND CLAVULANATE POTASSIUM 875; 125 MG/1; MG/1
1 TABLET, FILM COATED ORAL EVERY 12 HOURS
Qty: 11 TABLET | Refills: 0 | Status: SHIPPED
Start: 2022-10-30 | End: 2022-10-30

## 2022-10-30 RX ORDER — PROCHLORPERAZINE EDISYLATE 5 MG/ML
10 INJECTION INTRAMUSCULAR; INTRAVENOUS
Status: DISCONTINUED | OUTPATIENT
Start: 2022-10-30 | End: 2022-10-30 | Stop reason: HOSPADM

## 2022-10-30 RX ORDER — PROMETHAZINE HYDROCHLORIDE 25 MG/1
25 TABLET ORAL
Status: DISCONTINUED | OUTPATIENT
Start: 2022-10-30 | End: 2022-10-30

## 2022-10-30 RX ORDER — GUAIFENESIN 600 MG/1
600 TABLET, EXTENDED RELEASE ORAL EVERY 12 HOURS
Status: DISCONTINUED | OUTPATIENT
Start: 2022-10-30 | End: 2022-10-30 | Stop reason: HOSPADM

## 2022-10-30 RX ORDER — AMOXICILLIN AND CLAVULANATE POTASSIUM 500; 125 MG/1; MG/1
1 TABLET, FILM COATED ORAL EVERY 8 HOURS
Qty: 16 TABLET | Refills: 0 | Status: SHIPPED | OUTPATIENT
Start: 2022-10-30 | End: 2022-11-05

## 2022-10-30 RX ADMIN — ONDANSETRON 4 MG: 2 INJECTION INTRAMUSCULAR; INTRAVENOUS at 00:53

## 2022-10-30 RX ADMIN — PROCHLORPERAZINE EDISYLATE 10 MG: 5 INJECTION INTRAMUSCULAR; INTRAVENOUS at 06:08

## 2022-10-30 RX ADMIN — ENOXAPARIN SODIUM 30 MG: 100 INJECTION SUBCUTANEOUS at 09:19

## 2022-10-30 RX ADMIN — ALBUTEROL SULFATE 2 PUFF: 108 INHALANT RESPIRATORY (INHALATION) at 14:23

## 2022-10-30 RX ADMIN — PREDNISONE 40 MG: 20 TABLET ORAL at 09:18

## 2022-10-30 RX ADMIN — GUAIFENESIN 600 MG: 600 TABLET ORAL at 03:44

## 2022-10-30 RX ADMIN — MOMETASONE FUROATE AND FORMOTEROL FUMARATE DIHYDRATE 2 PUFF: 200; 5 AEROSOL RESPIRATORY (INHALATION) at 07:50

## 2022-10-30 RX ADMIN — ALBUTEROL SULFATE 2 PUFF: 90 AEROSOL, METERED RESPIRATORY (INHALATION) at 03:21

## 2022-10-30 RX ADMIN — PANTOPRAZOLE SODIUM 40 MG: 40 TABLET, DELAYED RELEASE ORAL at 10:25

## 2022-10-30 RX ADMIN — AMOXICILLIN AND CLAVULANATE POTASSIUM 1 TABLET: 875; 125 TABLET, FILM COATED ORAL at 09:18

## 2022-10-30 RX ADMIN — ONDANSETRON 4 MG: 2 INJECTION INTRAMUSCULAR; INTRAVENOUS at 08:24

## 2022-10-30 RX ADMIN — ALBUTEROL SULFATE 2 PUFF: 108 INHALANT RESPIRATORY (INHALATION) at 07:44

## 2022-10-30 RX ADMIN — SODIUM CHLORIDE, PRESERVATIVE FREE 10 ML: 5 INJECTION INTRAVENOUS at 06:11

## 2022-10-30 RX ADMIN — AMLODIPINE BESYLATE 10 MG: 5 TABLET ORAL at 09:18

## 2022-10-30 NOTE — PROGRESS NOTES
Subjective / Objective     Subjective  Overnight Events: NAEO. Patient seen and examined at bedside. Reports she has been nauseous and vomited multiple times. Has improved greatly with IV Compazine. Denies CP, dysuria. Respiratory: O2 Device: None (Room air)   Visit Vitals  /61 (BP 1 Location: Left upper arm, BP Patient Position: At rest)   Pulse (!) 115   Temp 97.9 °F (36.6 °C)   Resp 20   Ht 5' 2\" (1.575 m)   Wt 149 lb (67.6 kg)   SpO2 96%   BMI 27.25 kg/m²     Physical Examination:   General appearance - alert, well appearing, and in no distress  Chest - clear to auscultation, no wheezes, rhonchi or rales. Symmetric air entry. Heart - normal rate (tachycardia noted on vital signs, normal rate on my exam), regular rhythm, normal S1, S2, no murmurs, rubs, clicks or gallops,   Abdomen - soft,, nondistended, no masses or organomegaly. Mild-moderate tenderness to moderate palpation at LLQ. Neurological - alert, oriented, normal speech, no focal findings  Skin - warm, dry. No notable rashes  Extremities - peripheral pulses normal, no pedal edema, no clubbing or cyanosis  Psychiatric - normal speech and thought processes    I/O:  Date 10/29/22 0700 - 10/30/22 0659 10/30/22 0700 - 10/31/22 0659   Shift [id number] 6443-0756 24 Hour Total 8727-7131 5548-4283 24 Hour Total   INTAKE   P.O.  20 20        P. O.  20 20      Shift Total(mL/kg)  20(0.3) 20(0.3)      OUTPUT   Urine(mL/kg/hr)           Urine Occurrence(s)  1 x 1 x      Emesis/NG output           Emesis Occurrence(s)  5 x 5 x      Shift Total(mL/kg)         NET  20 20      Weight (kg) 67.6 67.6 67.6 67.6 67.6 67.6       Inpatient Medications  Current Facility-Administered Medications   Medication    mometasone-formoterol (DULERA) 200mcg-5mcg/puff    Or    arformoterol 15 mcg/budesonide 0.5 mg neb solution    guaiFENesin ER (MUCINEX) tablet 600 mg    prochlorperazine (COMPAZINE) injection 10 mg    albuterol-ipratropium (DUO-NEB) 2.5 MG-0.5 MG/3 ML    Or    albuterol (PROVENTIL HFA, VENTOLIN HFA, PROAIR HFA) inhaler 2 Puff    amoxicillin-clavulanate (AUGMENTIN) 875-125 mg per tablet 1 Tablet    sodium chloride (NS) flush 5-40 mL    sodium chloride (NS) flush 5-40 mL    acetaminophen (TYLENOL) tablet 650 mg    Or    acetaminophen (TYLENOL) suppository 650 mg    polyethylene glycol (MIRALAX) packet 17 g    ondansetron (ZOFRAN ODT) tablet 4 mg    Or    ondansetron (ZOFRAN) injection 4 mg    enoxaparin (LOVENOX) injection 30 mg    amLODIPine (NORVASC) tablet 10 mg    pantoprazole (PROTONIX) tablet 40 mg    rosuvastatin (CRESTOR) tablet 20 mg    insulin lispro (HUMALOG) injection    glucose chewable tablet 16 g    glucagon (GLUCAGEN) injection 1 mg    dextrose 10% infusion 0-250 mL    predniSONE (DELTASONE) tablet 40 mg    nicotine (NICODERM CQ) 7 mg/24 hr patch 1 Patch    sodium chloride (OCEAN) 0.65 % nasal squeeze bottle 2 Spray     Current Facility-Administered Medications   Medication Dose Route Frequency    mometasone-formoterol (DULERA) 200mcg-5mcg/puff  2 Puff Inhalation BID RT    Or    arformoterol 15 mcg/budesonide 0.5 mg neb solution   Nebulization BID RT    guaiFENesin ER (MUCINEX) tablet 600 mg  600 mg Oral Q12H    prochlorperazine (COMPAZINE) injection 10 mg  10 mg IntraVENous Q6H PRN    albuterol-ipratropium (DUO-NEB) 2.5 MG-0.5 MG/3 ML  3 mL Nebulization Q6H RT    Or    albuterol (PROVENTIL HFA, VENTOLIN HFA, PROAIR HFA) inhaler 2 Puff  2 Puff Inhalation Q6H RT    amoxicillin-clavulanate (AUGMENTIN) 875-125 mg per tablet 1 Tablet  1 Tablet Oral Q12H    sodium chloride (NS) flush 5-40 mL  5-40 mL IntraVENous Q8H    sodium chloride (NS) flush 5-40 mL  5-40 mL IntraVENous PRN    acetaminophen (TYLENOL) tablet 650 mg  650 mg Oral Q6H PRN    Or    acetaminophen (TYLENOL) suppository 650 mg  650 mg Rectal Q6H PRN    polyethylene glycol (MIRALAX) packet 17 g  17 g Oral DAILY PRN    ondansetron (ZOFRAN ODT) tablet 4 mg  4 mg Oral Q8H PRN    Or ondansetron (ZOFRAN) injection 4 mg  4 mg IntraVENous Q6H PRN    enoxaparin (LOVENOX) injection 30 mg  30 mg SubCUTAneous DAILY    amLODIPine (NORVASC) tablet 10 mg  10 mg Oral DAILY    pantoprazole (PROTONIX) tablet 40 mg  40 mg Oral ACB    rosuvastatin (CRESTOR) tablet 20 mg  20 mg Oral QPM    insulin lispro (HUMALOG) injection   SubCUTAneous AC&HS    glucose chewable tablet 16 g  4 Tablet Oral PRN    glucagon (GLUCAGEN) injection 1 mg  1 mg IntraMUSCular PRN    dextrose 10% infusion 0-250 mL  0-250 mL IntraVENous PRN    predniSONE (DELTASONE) tablet 40 mg  40 mg Oral DAILY WITH BREAKFAST    nicotine (NICODERM CQ) 7 mg/24 hr patch 1 Patch  1 Patch TransDERmal DAILY    sodium chloride (OCEAN) 0.65 % nasal squeeze bottle 2 Spray  2 Spray Both Nostrils Q2H PRN     Allergies  Allergies   Allergen Reactions    Nsaids (Non-Steroidal Anti-Inflammatory Drug) Anaphylaxis and Hives    Fenofibrate Other (comments)     Leg cramps    Metformin Diarrhea     CBC:  Recent Labs     10/30/22  0259 10/29/22  0945   WBC 13.7* 12.4*   HGB 11.0* 10.6*   * 429*     Metabolic Panel:  Recent Labs     10/30/22  0259 10/29/22  0945    137   K 3.5 3.6    101   CO2 23 26   BUN 25* 29*   CREA 1.22* 1.39*   * 173*   CA 7.6* 7.7*   ALB  --  2.8*   ALT  --  17     Imaging/procedures:   XR ABD (KUB)    Result Date: 10/30/2022  No acute abdominal pathology. CT HEAD WO CONT    Result Date: 10/29/2022  No acute intracranial findings, acute bilateral maxillary sinusitis. CT CHEST WO CONT    Result Date: 10/29/2022  Increased patchy micronodular lung opacities in the left upper lobe, lingula, and left lung base representing acute or chronic infectious/inflammatory process. No other significant change. Mild to moderate hiatal hernia. Stable 2.7 cm right thyroid nodule. XR CHEST PORT    Result Date: 10/29/2022  No acute process. Emphysema.           Assessment and Plan     Kaitlin Menjivar is a [de-identified] y.o. female  with PMH of COPD, HTN, HLD, DM (diet controlled), breast CA (s/p R mastectomy) who presents to the ER complaining of cough and SOB who is admitted for sepsis and COPD exacerbation. Sepsis in s/o COPD exacerbation SIRS 3/4 (WBC 12.4  RR 24). Likely 2/2 RSV vs COPD exacerbation given clear rhinorrhea and increased clear sputum production. No O2 support. Chest CT patchy opacities in the left upper lobe, lingula, and left lung base representing acute or chronic infectious/inflammatory process. Left upper lobe opacities seem to be chronic; they are mentioned on CT from 09/2020. Head CT acute bilateral maxillary sinusitis. S/p Levaquin 750mg q48h. RVP + for RSV. Procal <0.05.   - O2 as needed for sats > 90%  - Duonebs q4h prn   - Brovana/pulmicort BID  - Prednisone burst 40mg every day for 5 days   - legionella, s pneumo pending   - Bcx pending - no growth for 19 hours   - Pulm on consult; appreciate recs    Maxillary sinusitis acute BL sinusitis noted on head CT. Patient c/o facial pressure. S/p Levaquin 750mg.   - Saline nasal spray q2h prn  - Augmentin 875-125 mg BID      Nausea / vomiting with LLQ: multiple episodes of n/v with mild-moderate tenderness at LLQ to moderate palpation. KUB negative for acute process. - Nausea / vomiting improving with IV compazine.    - Encouraged patient to get up and walk as tolerated     Hypertension POA BP was 132/71      BP Readings from Last 3 Encounters:   10/29/22 130/65   09/19/22 (!) 150/77   08/01/22 (!) 140/78   - continue home Amlodipine 10mg every day  - Hold Metoprolol XL 200mg every day given BP readings on lower end and Diovan -12.5mg every day (recently started on 9/19)  - Will continue to monitor at this time and readjust as BP's trend.      Hyperlipidemia:Last lipid panel:   Lab Results   Component Value Date/Time    Cholesterol, total 185 09/19/2022 09:15 AM    HDL Cholesterol 42 09/19/2022 09:15 AM    LDL, calculated 102.6 (H) 09/19/2022 09:15 AM    VLDL, calculated 40.4 09/19/2022 09:15 AM    Triglyceride 202 (H) 09/19/2022 09:15 AM    CHOL/HDL Ratio 4.4 09/19/2022 09:15 AM      - Continue home Crestor 20 mg QD     T2DM: Last A1C 6.7 9/2022. Diet controlled. - Insulin Sliding Scale high sensitivity with AC&HS glucose checks given steroid administration   - Hypoglycemia protocols ordered      CKD 3b: POA Cr 1.39 (Bl~1.4)  - Continue to monitor on BMP  - Avoid nephrotoxic agents   - Renally dose meds     Anemia POA Hgb 10.6 (Bl~10). Appears to be chronic.   - Continue to monitor on daily CBC     Thrombocytosis POA Plts 539 (Bl~300s?). Most likely reactive      GERD: Stable.  Home Omeprazole 40mg daily  - Continue the substitute of Protonix 40 mg daily       Patient will be discussed with Dr. Candy Monsivais MD  Family Medicine Resident       For Billing    Chief Complaint   Patient presents with    Pneumonia       Hospital Problems  Date Reviewed: 9/19/2022            Codes Class Noted POA    CAP (community acquired pneumonia) ICD-10-CM: J18.9  ICD-9-CM: 486  10/29/2022 Unknown

## 2022-10-30 NOTE — PROGRESS NOTES
10/30/2022  2:34 PM    Care Management Progress Note      ICD-10-CM ICD-9-CM    1. Pneumonia of left lung due to infectious organism, unspecified part of lung  J18.9 486       2. Acute sinusitis, recurrence not specified, unspecified location  J01.90 461.9       3. COPD with bronchial hyperresponsiveness ([facility] [address] [J44.1 (ICD-10-CM)]  J44.1 491.21       4. Essential hypertension [I10 (ICD-10-CM)]  I10 401.9       5. Right-sided chest wall pain  R07.89 786.52       6. Costochondritis, acute  M94.0 733.6           RUR:  11%  Risk Level: [x]Low []Moderate []High  Value-based purchasing: [x] Yes [] No  Bundle patient: [] Yes [x] No   Specify:     Transition of care plan:  Medically stable with discharge order  Home with family assistance - daughter states she or her brother will be with patient to provide assistance  Outpatient follow-up.   Pt's family to transport  No further CM needs identified    Care Management Interventions  Support Systems: Child(olga)  Discharge Location  Patient Expects to be Discharged to[de-identified] Home with family assistance    Ericka De La Torre RN

## 2022-10-30 NOTE — PROGRESS NOTES
Problem: Falls - Risk of  Goal: *Absence of Falls  Description: Document Kareem Hernandez Fall Risk and appropriate interventions in the flowsheet.   10/30/2022 1423 by Leila Brown RN  Outcome: Resolved/Met  Note: Fall Risk Interventions:  Mobility Interventions: Bed/chair exit alarm, OT consult for ADLs, Patient to call before getting OOB, PT Consult for mobility concerns, PT Consult for assist device competence, Utilize walker, cane, or other assistive device, Utilize gait belt for transfers/ambulation         Medication Interventions: Bed/chair exit alarm, Patient to call before getting OOB, Teach patient to arise slowly, Utilize gait belt for transfers/ambulation    Elimination Interventions: Bed/chair exit alarm, Call light in reach, Toileting schedule/hourly rounds, Patient to call for help with toileting needs           10/30/2022 1118 by Leila Brown RN  Outcome: Progressing Towards Goal  Note: Fall Risk Interventions:  Mobility Interventions: Bed/chair exit alarm, OT consult for ADLs, Patient to call before getting OOB, PT Consult for mobility concerns, PT Consult for assist device competence, Utilize walker, cane, or other assistive device, Utilize gait belt for transfers/ambulation         Medication Interventions: Bed/chair exit alarm, Patient to call before getting OOB, Teach patient to arise slowly, Utilize gait belt for transfers/ambulation    Elimination Interventions: Bed/chair exit alarm, Call light in reach, Toileting schedule/hourly rounds, Patient to call for help with toileting needs           10/30/2022 1118 by Leila Brown RN  Outcome: Progressing Towards Goal  Note: Fall Risk Interventions:  Mobility Interventions: Bed/chair exit alarm, OT consult for ADLs, Patient to call before getting OOB, PT Consult for mobility concerns, PT Consult for assist device competence, Utilize walker, cane, or other assistive device, Utilize gait belt for transfers/ambulation         Medication Interventions: Bed/chair exit alarm, Patient to call before getting OOB, Teach patient to arise slowly, Utilize gait belt for transfers/ambulation    Elimination Interventions: Bed/chair exit alarm, Call light in reach, Toileting schedule/hourly rounds, Patient to call for help with toileting needs              Problem: Patient Education: Go to Patient Education Activity  Goal: Patient/Family Education  Outcome: Resolved/Met     Problem: Pressure Injury - Risk of  Goal: *Prevention of pressure injury  Description: Document Negro Scale and appropriate interventions in the flowsheet. 10/30/2022 1423 by Pantera Inches, RN  Outcome: Resolved/Met  Note: Pressure Injury Interventions:  Sensory Interventions: Assess changes in LOC, Discuss PT/OT consult with provider, Float heels, Keep linens dry and wrinkle-free, Maintain/enhance activity level, Minimize linen layers, Turn and reposition approx. every two hours (pillows and wedges if needed)         Activity Interventions: Increase time out of bed, PT/OT evaluation    Mobility Interventions: HOB 30 degrees or less, PT/OT evaluation, Turn and reposition approx. every two hours(pillow and wedges)    Nutrition Interventions: Document food/fluid/supplement intake    Friction and Shear Interventions: Apply protective barrier, creams and emollients, Foam dressings/transparent film/skin sealants, Feet elevated on foot rest, Lift sheet             10/30/2022 1118 by Pantera Inches, RN  Outcome: Progressing Towards Goal  Note: Pressure Injury Interventions:  Sensory Interventions: Assess changes in LOC, Discuss PT/OT consult with provider, Float heels, Keep linens dry and wrinkle-free, Maintain/enhance activity level, Minimize linen layers, Turn and reposition approx.  every two hours (pillows and wedges if needed)         Activity Interventions: Increase time out of bed, PT/OT evaluation    Mobility Interventions: HOB 30 degrees or less, PT/OT evaluation, Turn and reposition approx. every two hours(pillow and wedges)    Nutrition Interventions: Document food/fluid/supplement intake    Friction and Shear Interventions: Apply protective barrier, creams and emollients, Foam dressings/transparent film/skin sealants, Feet elevated on foot rest, Lift sheet             10/30/2022 1118 by Alejandra Wick RN  Outcome: Progressing Towards Goal  Note: Pressure Injury Interventions:  Sensory Interventions: Assess changes in LOC, Discuss PT/OT consult with provider, Float heels, Keep linens dry and wrinkle-free, Maintain/enhance activity level, Minimize linen layers, Turn and reposition approx. every two hours (pillows and wedges if needed)         Activity Interventions: Increase time out of bed, PT/OT evaluation    Mobility Interventions: HOB 30 degrees or less, PT/OT evaluation, Turn and reposition approx.  every two hours(pillow and wedges)    Nutrition Interventions: Document food/fluid/supplement intake    Friction and Shear Interventions: Apply protective barrier, creams and emollients, Foam dressings/transparent film/skin sealants, Feet elevated on foot rest, Lift sheet                Problem: Patient Education: Go to Patient Education Activity  Goal: Patient/Family Education  Outcome: Resolved/Met     Problem: Patient Education: Go to Patient Education Activity  Goal: Patient/Family Education  Outcome: Resolved/Met     Problem: Pneumonia: Day 1  Goal: Off Pathway (Use only if patient is Off Pathway)  Outcome: Resolved/Met  Goal: Activity/Safety  Outcome: Resolved/Met  Goal: Consults, if ordered  Outcome: Resolved/Met  Goal: Diagnostic Test/Procedures  Outcome: Resolved/Met  Goal: Nutrition/Diet  Outcome: Resolved/Met  Goal: Medications  Outcome: Resolved/Met  Goal: Respiratory  Outcome: Resolved/Met  Goal: Treatments/Interventions/Procedures  Outcome: Resolved/Met  Goal: Psychosocial  Outcome: Resolved/Met  Goal: *Oxygen saturation within defined limits  Outcome: Resolved/Met  Goal: *Influenza vaccine administered (October-March)  Outcome: Resolved/Met  Goal: *Pneumoccocal vaccine administered  Outcome: Resolved/Met  Goal: *Hemodynamically stable  Outcome: Resolved/Met  Goal: *Demonstrates progressive activity  Outcome: Resolved/Met  Goal: *Tolerating diet  Outcome: Resolved/Met     Problem: Pneumonia: Day 2  Goal: Off Pathway (Use only if patient is Off Pathway)  Outcome: Resolved/Met  Goal: Activity/Safety  10/30/2022 1423 by Chloe Hamilton RN  Outcome: Resolved/Met  10/30/2022 1118 by Chloe Hamilton RN  Outcome: Not Progressing Towards Goal  Goal: Consults, if ordered  10/30/2022 1423 by Chloe Hamilton RN  Outcome: Resolved/Met  10/30/2022 1118 by Chloe Hamilton RN  Outcome: Progressing Towards Goal  Goal: Diagnostic Test/Procedures  10/30/2022 1423 by Chloe Hamilton RN  Outcome: Resolved/Met  10/30/2022 1118 by Chloe Hamilton RN  Outcome: Progressing Towards Goal  Goal: Nutrition/Diet  10/30/2022 1423 by Chloe Hamilton RN  Outcome: Resolved/Met  10/30/2022 1118 by Chloe Hamilton RN  Outcome: Not Progressing Towards Goal  Goal: Discharge Planning  10/30/2022 1423 by Chloe Hamilton RN  Outcome: Resolved/Met  10/30/2022 1118 by Chloe Hamilton RN  Outcome: Not Progressing Towards Goal  Goal: Medications  10/30/2022 1423 by Chloe Hamilton RN  Outcome: Resolved/Met  10/30/2022 1118 by Chloe Hamilton RN  Outcome: Progressing Towards Goal  Goal: Respiratory  10/30/2022 1423 by Chloe Hamilton RN  Outcome: Resolved/Met  10/30/2022 1118 by Chloe Hamilton RN  Outcome: Progressing Towards Goal  Goal: Treatments/Interventions/Procedures  10/30/2022 1423 by Chloe Hamilton RN  Outcome: Resolved/Met  10/30/2022 1118 by Chloe Hamilton RN  Outcome: Progressing Towards Goal  Goal: Psychosocial  10/30/2022 1423 by Chloe Hamilton RN  Outcome: Resolved/Met  10/30/2022 1118 by Jasper Kramer KLAUDIA RN  Outcome: Progressing Towards Goal  Goal: *Oxygen saturation within defined limits  10/30/2022 1423 by Asmita Walker RN  Outcome: Resolved/Met  10/30/2022 1118 by Asmita Walker RN  Outcome: Progressing Towards Goal  Goal: *Hemodynamically stable  10/30/2022 1423 by Asmita Walker RN  Outcome: Resolved/Met  10/30/2022 1118 by Asmita Walker RN  Outcome: Progressing Towards Goal  Goal: *Demonstrates progressive activity  10/30/2022 1423 by Asmita Walker RN  Outcome: Resolved/Met  10/30/2022 1118 by Asmita Walker RN  Outcome: Not Progressing Towards Goal  Goal: *Tolerating diet  10/30/2022 1423 by Asmita Walker RN  Outcome: Resolved/Met  10/30/2022 1118 by Asmita Walker RN  Outcome: Not Progressing Towards Goal  Goal: *Optimal pain control at patient's stated goal  10/30/2022 1423 by Asmita Walker RN  Outcome: Resolved/Met  10/30/2022 1118 by Asmita Walker RN  Outcome: Progressing Towards Goal     Problem: Pneumonia: Day 3  Goal: Off Pathway (Use only if patient is Off Pathway)  Outcome: Resolved/Met  Goal: Activity/Safety  Outcome: Resolved/Met  Goal: Consults, if ordered  Outcome: Resolved/Met  Goal: Diagnostic Test/Procedures  Outcome: Resolved/Met  Goal: Nutrition/Diet  Outcome: Resolved/Met  Goal: Discharge Planning  Outcome: Resolved/Met  Goal: Medications  Outcome: Resolved/Met  Goal: Respiratory  Outcome: Resolved/Met  Goal: Treatments/Interventions/Procedures  Outcome: Resolved/Met  Goal: Psychosocial  Outcome: Resolved/Met  Goal: *Oxygen saturation within defined limits  Outcome: Resolved/Met  Goal: *Hemodynamically stable  Outcome: Resolved/Met  Goal: *Demonstrates progressive activity  Outcome: Resolved/Met  Goal: *Tolerating diet  Outcome: Resolved/Met  Goal: *Describes available resources and support systems  Outcome: Resolved/Met  Goal: *Optimal pain control at patient's stated goal  Outcome: Resolved/Met

## 2022-10-30 NOTE — DISCHARGE SUMMARY
[facility] [facility] [address] [place] [address]   Office [phone]  Fax [phone]       Discharge / Transfer / Off-Service Note     Name: Ellis Piña MRN: [id number]  Sex: Female   YOB: 1940  Age: [de-identified] y.o. PCP: Kiki Bobo MD     Date of admission: 10/29/2022  Date of discharge/transfer: 10/30/2022    Attending physician at admission: Dr. Arianna Casey.  Attending physician at discharge/transfer: Joshua Modi MD  Resident physician at discharge/transfer: Itzel Baires MD     Consultants during hospitalization  IP CONSULT TO PULMONOLOGY     Admission diagnoses   CAP (community acquired pneumonia) [J18.9]    Recommended follow-up after discharge    1. PCP-Billie Gonzalez MD     Things to follow up on with PCP:   - Sinusitis improves on Augmentin  - Successful prednisone course for COPD  - Follow-up on final blood cultures as well as legionella, and s. Pneumo labs. Medication Changes:  Current Discharge Medication List        START taking these medications    Details   ondansetron (ZOFRAN ODT) 4 mg disintegrating tablet Take 1 Tablet by mouth every eight (8) hours as needed for Nausea or Vomiting. Qty: 30 Tablet, Refills: 0  Start date: 10/30/2022      predniSONE (DELTASONE) 20 mg tablet Take 2 Tablets by mouth daily (with breakfast) for 4 doses. Qty: 8 Tablet, Refills: 0  Start date: 10/31/2022, End date: 11/4/2022      amoxicillin-clavulanate (AUGMENTIN) 500-125 mg per tablet Take 1 Tablet by mouth every eight (8) hours for 16 doses. Qty: 16 Tablet, Refills: 0  Start date: 10/30/2022, End date: 11/5/2022    Comments: First dose tonight. CONTINUE these medications which have NOT CHANGED    Details   valsartan-hydroCHLOROthiazide (Diovan HCT) 320-12.5 mg per tablet Take 1 Tablet by mouth daily.   Qty: 90 Tablet, Refills: 0    Associated Diagnoses: Essential hypertension      omeprazole (PRILOSEC) 40 mg capsule TAKE ONE CAPSULE BY MOUTH DAILY  Qty: 90 Capsule, Refills: 3      amLODIPine (NORVASC) 10 mg tablet Take 1 Tablet by mouth daily. Qty: 90 Tablet, Refills: 4    Associated Diagnoses: Essential hypertension      metoprolol succinate (TOPROL-XL) 200 mg XL tablet Take 1 Tablet by mouth daily. Qty: 90 Tablet, Refills: 4    Associated Diagnoses: Essential hypertension      omega-3 fatty acids-vitamin e 1,000 mg cap Take 2 Caps by mouth daily (after breakfast). calcium-vitamin D (OS-MANDI +D3) 500 mg-200 unit per tablet Take 1 Tab by mouth daily (after breakfast). multivitamin (ONE A DAY) tablet Take 1 Tab by mouth daily (after breakfast). tiZANidine (ZANAFLEX) 2 mg tablet Take 1 Tablet by mouth three (3) times daily as needed for Pain. Qty: 30 Tablet, Refills: 0    Associated Diagnoses: Costochondritis, acute; Right-sided chest wall pain      rosuvastatin (CRESTOR) 20 mg tablet TAKE ONE TABLET BY MOUTH EVERY EVENING  Qty: 90 Tablet, Refills: 3    Associated Diagnoses: Elevated triglycerides with high cholesterol      glucose blood VI test strips (Accu-Chek Ifrah Plus test strp) strip USE TO TEST BLOOD SUGAR DAILY, E11.9  Qty: 100 Strip, Refills: 4           STOP taking these medications       doxycycline (VIBRAMYCIN) 100 mg capsule Comments:   Reason for Stopping:         cefUROXime (CEFTIN) 500 mg tablet Comments:   Reason for Stopping:                No other changes were made to your medications, please take all your other home medicines as previously prescribed. History of Present Illness    Tera Herndon is a [de-identified] y.o. female with PMH COPD, HTN, HLD, DM (diet controlled), breast CA (s/p R mastectomy) who presents to the ER complaining of cough and SOB. Patient had a dry cough and clear rhinorrhea on 10/14. She went to [facility] on 10/21 and was prescribed Doxycyline, Cefuroxime, Tessalon, and Mucinex. Since then, her cough has worsened along with SOB and chest pressure L > R.  She reports increased clear sputum production and overall weakness and fatigue. She also reports nausea since starting antibiotics with 1 episode of vomiting 10/26. She had viral PNA in 5/2022 managed with supportive therapy. She has been vaccinated for COVID and received her flu shot. Of Note: Patient has a significant smoking history. Used to smoke 1/2 a pack for 60 years. She quit cigarettes last year and now vapes daily. She does not take anything for her COPD. Hospital course    Jessica Delaney is a [de-identified] y.o. female  with PMH of COPD, HTN, HLD, DM (diet controlled), breast CA (s/p R mastectomy) who presents to the ER complaining of cough and SOB who is admitted for sepsis and COPD exacerbation. Over the course of her stay here, she was found to have viral pneumonia. Patient clinically improving. To complete prednisone burst for COPD exacerbation. Maxillary sinusitis seen on CT. Augmentin started and will be continued until 11/4. Sepsis with viral pneumonia and COPD exacerbation SIRS 3/4 (WBC 12.4  RR 24). Likely 2/2 RSV as seen on RVP. No O2 support, no increased purulent sputum production. Discontinued Levaquin after RVP resulted. Chest CT patchy opacities in the left upper lobe, lingula, and left lung base representing acute or chronic infectious/inflammatory process. Left upper lobe opacities seem to be chronic; they are mentioned on CT from 09/2020.   - Prednisone burst 40mg every day for 5 days   - Legionella, s pneumo pending   - Bcx pending      Maxillary sinusitis  Head CT acute bilateral maxillary sinusitis. Patient c/o facial pressure. S/p Levaquin 750mg. - Augmentin 500 TID until 11/4       Nausea / vomiting: Improved. Tolerated diet. Able to walk to restroom. Has had BM    - Zofran PRN     Hypertension: chronic.           BP Readings from Last 3 Encounters:   10/29/22 130/65   09/19/22 (!) 150/77   08/01/22 (!) 140/78   - Continue home Amlodipine 10mg QD, and home Metoprolol XL 200mg, and Diovan -12.5mg QD      Hyperlipidemia:Last lipid panel:         Lab Results   Component Value Date/Time     Cholesterol, total 185 09/19/2022 09:15 AM     HDL Cholesterol 42 09/19/2022 09:15 AM     LDL, calculated 102.6 (H) 09/19/2022 09:15 AM     VLDL, calculated 40.4 09/19/2022 09:15 AM     Triglyceride 202 (H) 09/19/2022 09:15 AM     CHOL/HDL Ratio 4.4 09/19/2022 09:15 AM      - Continue home Crestor 20 mg QD     T2DM: Last A1C 6.7 9/2022. Diet controlled. - Continue diabetic diet       CKD 3b: POA Cr 1.39 (Bl~1. 4)     Anemia: chronic. POA Hgb 10.6 (Bl~10). Thrombocytosis: Most likely reactive. POA Plts 539 (Bl~300s) improved to 434. GERD: Stable. Home Omeprazole 40mg daily  - Continue home medication         Vitals Reviewed. Patient Vitals for the past 12 hrs:   Temp Pulse Resp BP SpO2   10/30/22 1238 98.6 °F (37 °C) 99 20 138/74 94 %   10/30/22 0754 97.9 °F (36.6 °C) (!) 115 20 100/61 96 %   10/30/22 0459 97.9 °F (36.6 °C) (!) 102 20 134/78 91 %          Condition at discharge: Stable. Labs  Recent Labs     10/30/22  0259 10/29/22  0945   WBC 13.7* 12.4*   HGB 11.0* 10.6*   HCT 33.8* 32.3*   * 539*     Recent Labs     10/30/22  0259 10/29/22  0945    137   K 3.5 3.6    101   CO2 23 26   BUN 25* 29*   CREA 1.22* 1.39*   * 173*   CA 7.6* 7.7*     Recent Labs     10/29/22  0945   ALT 17   AP 32*   TBILI 0.5   TP 8.0   ALB 2.8*   GLOB 5.2*     Recent Labs     10/30/22  1239 10/30/22  0756 10/29/22  2141 10/29/22  1616   GLUCPOC 149* 120* 112 101       Micro:  Lab Results   Component Value Date/Time    Culture result: NO GROWTH AFTER 19 HOURS 10/29/2022 09:45 AM    Culture result: NO GROWTH AFTER 19 HOURS 10/29/2022 09:45 AM    Culture result: NO GROWTH 5 DAYS 05/24/2022 06:17 PM       Imaging:  XR ABD (KUB)    Result Date: 10/30/2022  EXAM:  XR ABD (KUB) INDICATION: Left lower quadrant tenderness with nausea COMPARISON: October 2011. TECHNIQUE: Supine frontal abdomen (KUB). FINDINGS: No dilated small bowel.  Stool and gas are present in the colon. No pathologic calcification. Osseous structures are age appropriate. Cholecystectomy clips are noted in the right upper quadrant. No acute abdominal pathology. CT HEAD WO CONT    Result Date: 10/29/2022  EXAM: CT HEAD WO CONT INDICATION: headache COMPARISON: None. CONTRAST: None. TECHNIQUE: Unenhanced CT of the head was performed using 5 mm images. Brain and bone windows were generated. Coronal and sagittal reformats. CT dose reduction was achieved through use of a standardized protocol tailored for this examination and automatic exposure control for dose modulation. FINDINGS: There is no extra-axial fluid collection, hemorrhage or shift. No mass. Incidental air-fluid levels within the maxillary sinuses. Next     No acute intracranial findings, acute bilateral maxillary sinusitis. CT CHEST WO CONT    Result Date: 10/29/2022  INDICATION:   progressive cough EXAM:  CT CHEST without CONTRAST COMPARISON:  10/2/2020 TECHNIQUE:  Thin collimation axial images were obtained through the chest without IV contrast administration. Coronal and sagittal reconstructions were obtained. CT dose reduction was achieved through use of a standardized protocol tailored for this examination and automatic exposure control for dose modulation. FINDINGS:  LUNGS: Moderate emphysema. Biapical opacities no significant change and likely scarring. Right apical lung nodule is stable measuring 6 mm. Increased patchy micronodular lung opacities in the left upper lobe, lingula, and left lung base representing acute or chronic infectious/inflammatory process. Other bilateral lung opacities remain stable. The central airways are patent. LYMPH NODES: No thoracic lymphadenopathy. PLEURAL FLUID: No pleural effusion. PERICARDIAL FLUID: No pericardial effusion. THYROID: 2.7 cm right thyroid nodule OTHER: Mild to moderate hiatal hernia. Right breast prosthesis.      Increased patchy micronodular lung opacities in the left upper lobe, lingula, and left lung base representing acute or chronic infectious/inflammatory process. No other significant change. Mild to moderate hiatal hernia. Stable 2.7 cm right thyroid nodule. XR CHEST PORT    Result Date: 10/29/2022  PORTABLE CHEST RADIOGRAPH/S: 10/29/2022 10:49 AM INDICATION: Dyspnea. COMPARISON: 6/6/2022, 7/9/2020. CT chest 10/2/2020. TECHNIQUE: Portable frontal upright radiograph/s of the chest. FINDINGS: Aside from patchy, chronic, peripheral scarring, the lungs are clear. The lungs are emphysematous. The central airways are patent. No pneumothorax or pleural effusion. No acute process. Emphysema. Chronic diagnoses   Problem List as of 10/30/2022 Date Reviewed: 9/19/2022            Codes Class Noted - Resolved    CAP (community acquired pneumonia) ICD-10-CM: J18.9  ICD-9-CM: 486  10/29/2022 - Present        Shortness of breath ICD-10-CM: R06.02  ICD-9-CM: 786.05  6/29/2020 - Present        ACP (advance care planning) ICD-10-CM: Z71.89  ICD-9-CM: V65.49  8/29/2018 - Present    Overview Signed 8/29/2018 12:05 PM by Esperanza Gómez MD     Advance Care Plan or Surrogate Decision Maker documented in medical record. Dermatochalasis ICD-10-CM: V44.435  ICD-9-CM: 374.87  8/13/2018 - Present        H/O hysterectomy for benign disease ICD-10-CM: Z90.710  ICD-9-CM: V88.01  4/27/2017 - Present    Overview Signed 4/27/2017  9:26 AM by Nivia Burden     Pt still has cervix, had surgery for NALLELY and vaginal prolapse.               After-cataract with vision obscured ICD-10-CM: H26.499  ICD-9-CM: 366.53  2/18/2016 - Present        Pseudophakia of both eyes ICD-10-CM: Z96.1  ICD-9-CM: V43.1  2/18/2016 - Present        Tubular adenoma of colon ICD-10-CM: D12.6  ICD-9-CM: 211.3  1/24/2016 - Present    Overview Signed 1/24/2016  4:11 PM by Esperanza Gómez MD     1/4/16             IDALIA (obstructive sleep apnea) ICD-10-CM: N79.91  ICD-9-CM: 327.23  9/2/2015 - Present Elevated triglycerides with high cholesterol ICD-10-CM: E78.2  ICD-9-CM: 272.2  8/28/2015 - Present        CKD (chronic kidney disease), stage III (HCC) ICD-10-CM: N18.30  ICD-9-CM: 585.3  6/6/2012 - Present        Osteopenia ICD-10-CM: M85.80  ICD-9-CM: 733.90  Unknown - Present        COPD (chronic obstructive pulmonary disease) (HCC) ICD-10-CM: J44.9  ICD-9-CM: 496  12/28/2011 - Present        Steatosis of liver ICD-10-CM: K76.0  ICD-9-CM: 571.8  10/9/2011 - Present    Overview Signed 10/9/2011  3:54 PM by Reyes Franco, MD     On abdominal CT 10/2011             Tobacco abuse ICD-10-CM: Z72.0  ICD-9-CM: 305.1  9/26/2011 - Present        Bell's palsy ICD-10-CM: G51.0  ICD-9-CM: 351.0  6/23/2011 - Present        Cholelithiasis ICD-10-CM: K80.20  ICD-9-CM: 574.20  6/23/2011 - Present        History of breast cancer ICD-10-CM: Z85.3  ICD-9-CM: V10.3  6/23/2011 - Present    Overview Signed 6/23/2011  3:55 PM by Porfirio Rodríguez     Rt Breast             History of duodenal ulcer ICD-10-CM: Z87.19  ICD-9-CM: V12.79  6/23/2011 - Present        Esophageal reflux ICD-10-CM: K21.9  ICD-9-CM: 530.81  6/23/2011 - Present        Hiatal hernia ICD-10-CM: K44.9  ICD-9-CM: 553.3  6/23/2011 - Present        Essential hypertension ICD-10-CM: I10  ICD-9-CM: 401.9  6/23/2011 - Present        Type 2 diabetes mellitus without complications ([facility] [id number]: E11.9  ICD-9-CM: 250.00  6/23/2011 - Present        Left ventricular hypertrophy ICD-10-CM: I51.7  ICD-9-CM: 429.3  6/23/2011 - Present        RESOLVED: IDALIA (obstructive sleep apnea) ICD-10-CM: G47.33  ICD-9-CM: 327.23  9/2/2015 - 9/2/2015        RESOLVED: Hyperlipidemia ICD-10-CM: E78.5  ICD-9-CM: 272.4  1/7/2013 - 9/2/2015        RESOLVED: Osteoporosis ICD-10-CM: M81.0  ICD-9-CM: 733.00  6/23/2011 - 5/7/2012            Diet:  Diabetic diet.     Activity:  As tolerated     Disposition: Home or Self Care    Discharge instructions to patient/family  Please seek medical attention for any new or worsening symptoms particularly fever, chest pain, shortness of breath, abdominal pain, nausea, vomiting    Follow up plans/appointments  Follow-up Information       Follow up With Specialties Details Why Contact Info    Ji Beebe MD Family Medicine Go to Go to scheduled appointment on 11/7 at 8:00 AM. [address] [address]               Bo Sanchez MD  Family Medicine Resident       For Billing    Chief Complaint   Patient presents with    Pneumonia       Hospital Problems  Date Reviewed: 9/19/2022            Codes Class Noted POA    CAP (community acquired pneumonia) ICD-10-CM: J18.9  ICD-9-CM: 486  10/29/2022 Unknown

## 2022-10-30 NOTE — PROGRESS NOTES
Problem: Falls - Risk of  Goal: *Absence of Falls  Description: Document Clearence Skates Fall Risk and appropriate interventions in the flowsheet. 10/30/2022 1118 by Grisel Morrow RN  Outcome: Progressing Towards Goal  Note: Fall Risk Interventions:  Mobility Interventions: Bed/chair exit alarm, OT consult for ADLs, Patient to call before getting OOB, PT Consult for mobility concerns, PT Consult for assist device competence, Utilize walker, cane, or other assistive device, Utilize gait belt for transfers/ambulation         Medication Interventions: Bed/chair exit alarm, Patient to call before getting OOB, Teach patient to arise slowly, Utilize gait belt for transfers/ambulation    Elimination Interventions: Bed/chair exit alarm, Call light in reach, Toileting schedule/hourly rounds, Patient to call for help with toileting needs           10/30/2022 1118 by Grisel Morrow RN  Outcome: Progressing Towards Goal  Note: Fall Risk Interventions:  Mobility Interventions: Bed/chair exit alarm, OT consult for ADLs, Patient to call before getting OOB, PT Consult for mobility concerns, PT Consult for assist device competence, Utilize walker, cane, or other assistive device, Utilize gait belt for transfers/ambulation         Medication Interventions: Bed/chair exit alarm, Patient to call before getting OOB, Teach patient to arise slowly, Utilize gait belt for transfers/ambulation    Elimination Interventions: Bed/chair exit alarm, Call light in reach, Toileting schedule/hourly rounds, Patient to call for help with toileting needs              Problem: Pressure Injury - Risk of  Goal: *Prevention of pressure injury  Description: Document Negro Scale and appropriate interventions in the flowsheet.   10/30/2022 1118 by Grisel Morrow RN  Outcome: Progressing Towards Goal  Note: Pressure Injury Interventions:  Sensory Interventions: Assess changes in LOC, Discuss PT/OT consult with provider, Float heels, Keep linens dry and wrinkle-free, Maintain/enhance activity level, Minimize linen layers, Turn and reposition approx. every two hours (pillows and wedges if needed)         Activity Interventions: Increase time out of bed, PT/OT evaluation    Mobility Interventions: HOB 30 degrees or less, PT/OT evaluation, Turn and reposition approx. every two hours(pillow and wedges)    Nutrition Interventions: Document food/fluid/supplement intake    Friction and Shear Interventions: Apply protective barrier, creams and emollients, Foam dressings/transparent film/skin sealants, Feet elevated on foot rest, Lift sheet             10/30/2022 1118 by Tom Power RN  Outcome: Progressing Towards Goal  Note: Pressure Injury Interventions:  Sensory Interventions: Assess changes in LOC, Discuss PT/OT consult with provider, Float heels, Keep linens dry and wrinkle-free, Maintain/enhance activity level, Minimize linen layers, Turn and reposition approx. every two hours (pillows and wedges if needed)         Activity Interventions: Increase time out of bed, PT/OT evaluation    Mobility Interventions: HOB 30 degrees or less, PT/OT evaluation, Turn and reposition approx.  every two hours(pillow and wedges)    Nutrition Interventions: Document food/fluid/supplement intake    Friction and Shear Interventions: Apply protective barrier, creams and emollients, Foam dressings/transparent film/skin sealants, Feet elevated on foot rest, Lift sheet                Problem: Pneumonia: Day 2  Goal: Activity/Safety  Outcome: Not Progressing Towards Goal  Goal: Consults, if ordered  Outcome: Progressing Towards Goal  Goal: Diagnostic Test/Procedures  Outcome: Progressing Towards Goal  Goal: Nutrition/Diet  Outcome: Not Progressing Towards Goal  Goal: Discharge Planning  Outcome: Not Progressing Towards Goal  Goal: Medications  Outcome: Progressing Towards Goal  Goal: Respiratory  Outcome: Progressing Towards Goal  Goal: Treatments/Interventions/Procedures  Outcome: Progressing Towards Goal  Goal: Psychosocial  Outcome: Progressing Towards Goal  Goal: *Oxygen saturation within defined limits  Outcome: Progressing Towards Goal  Goal: *Hemodynamically stable  Outcome: Progressing Towards Goal  Goal: *Demonstrates progressive activity  Outcome: Not Progressing Towards Goal  Goal: *Tolerating diet  Outcome: Not Progressing Towards Goal  Goal: *Optimal pain control at patient's stated goal  Outcome: Progressing Towards Goal

## 2022-10-30 NOTE — DISCHARGE INSTRUCTIONS
HOME DISCHARGE INSTRUCTIONS    Yun Judge / [id number] : 1940    Admission date: 10/29/2022 Discharge date: 10/30/2022     Please bring this form with you to show your care provider at your follow-up appointment. Primary care provider:  Roc Crowley MD    Discharging provider:  Nicolle Liu MD  - Family Medicine Resident  Faheem Richey - Attending, Family Medicine     You have been admitted to the hospital with the following diagnoses:    ACUTE DIAGNOSES:  CAP (community acquired pneumonia) [J18.9]      . . . . . . . . . . . . . . . . . . . . . . . . . . . . . . . . . . . . . . . . . . . . . . . . . . . . . . . . . . . . . . . . . . . . . . . Morena Stands FOLLOW-UP CARE RECOMMENDATIONS:  You are well enough to be discharged from the hospital. You were in the hospital for COPD exacerbation and started on steroids and antibiotics. We found you had a viral infection (respiratory syncytial virus) and a sinus infection. NEW medications that you will start: Prednisone 40 mg until 11/3 (for you COPD), Augmentin (antibiotic for your sinuses), and Zofran as needed for your nausea. We stopped your previous antibiotics. Please continue your other home medications. Appointments  Future Appointments   Date Time Provider Yari Morrow   2022  8:00 AM Roc Crowley MD [facility] AMB         Please follow up with your PCP regarding:  - Your sinus infection clears up   - Breathing improves with prednisone treatment    Follow-up tests needed: none    Pending test results: At the time of your discharge the following test results are still pending: S. Pneum, Legionella, final blood cultures   Please make sure you review these results with your outpatient follow-up provider(s). Specific symptoms to watch for: chest pain, shortness of breath, fever, chills, nausea, vomiting, diarrhea, change in mentation, falling, weakness, bleeding.      DIET/what to eat:  Diabetic Diet    ACTIVITY:  Activity as tolerated    Wound care: n/a    Equipment needed:  none    What to do if new or unexpected symptoms occur? If you experience any of the above symptoms (or should other concerns or questions arise after discharge) please call your primary care physician. Return to the emergency room if you cannot get hold of your doctor. It is very important that you keep your follow-up appointment(s). Please bring discharge papers, medication list (and/or medication bottles) to your follow-up appointments for review by your outpatient provider(s). Please check the list of medications and be sure it includes every medication (even non-prescription medications) that your provider wants you to take. It is important that you take the medication exactly as they are prescribed. Keep your medication in the bottles provided by the pharmacist and keep a list of the medication names, dosages, and times to be taken in your wallet. Do not take other medications without consulting your doctor. If you have any questions about your medications or other instructions, please talk to your nurse or care provider before you leave the hospital.     Information obtained by:     I understand that if any problems occur once I am at home I am to contact my physician. These instructions were explained to me and I had the opportunity to ask questions. I understand and acknowledge receipt of the instructions indicated above.                                                                                                                                                Physician's or R.N.'s Signature                                                                  Date/Time                                                                                                                                              Patient or Representative Signature                                                          Date/Time

## 2022-10-30 NOTE — PROGRESS NOTES
Problem: Mobility Impaired (Adult and Pediatric)  Goal: *Acute Goals and Plan of Care (Insert Text)  Description: FUNCTIONAL STATUS PRIOR TO ADMISSION: Patient was independent and active without use of DME.    HOME SUPPORT PRIOR TO ADMISSION: The patient lived with her son but did not require assist.    Physical Therapy Goals  Initiated 10/30/2022  1. Patient will transfer from bed to chair and chair to bed with modified independence using the least restrictive device within 7 day(s). 2.  Patient will perform sit to stand with modified independence within 7 day(s). 3.  Patient will ambulate with modified independence for 150 feet with the least restrictive device within 7 day(s). Outcome: Not Met   PHYSICAL THERAPY EVALUATION  Patient: Ashley Widl (63 y.o. female)  Date: 10/30/2022  Primary Diagnosis: CAP (community acquired pneumonia) [J18.9]       Precautions:          ASSESSMENT  Based on the objective data described below, the patient presents with mild generalized weakness, impaired dynamic standing balance, decreased activity tolerance, and mild gait dysfunction not consistent with baseline functional mobility level s/p admission for CAP. Patient on RA upon arrival, completed gait in room and up to chair with mild tachycardia to 120s and SpO2 down to 92% at lowest, mild SOB noted. Gait generally steady but slowed and cautious. At baseline, patient very indep and active, uses no AD and lives with son. Anticipate likely will not need HHPT if progresses quickly but if stay is extended she may benefit. Declined using RW today for energy conservation, revisit this as indicated. Current Level of Function Impacting Discharge (mobility/balance): CGA gait in room    Functional Outcome Measure: The patient scored Total: 60/100 on the Barthel Index outcome measure which is indicative of being minimally dependent in basic self-care.      Other factors to consider for discharge: supportive son and daughter     Patient will benefit from skilled therapy intervention to address the above noted impairments. PLAN :  Recommendations and Planned Interventions: transfer training, gait training, therapeutic exercises, neuromuscular re-education, patient and family training/education, and therapeutic activities      Frequency/Duration: Patient will be followed by physical therapy:  4 times a week to address goals. Recommendation for discharge: (in order for the patient to meet his/her long term goals)  Physical therapy at least 2 days/week in the home  vs none    This discharge recommendation:  Has been made in collaboration with the attending provider and/or case management    IF patient discharges home will need the following DME: likely none or RW         SUBJECTIVE:   Patient stated Oh I'm usually very active, don't like to sit around.     OBJECTIVE DATA SUMMARY:   HISTORY:    Past Medical History:   Diagnosis Date    ACP (advance care planning) 8/29/2018    Advance Care Plan or Surrogate Decision Maker documented in medical record.     Arthritis     Shoulders, knees    Bell's palsy 1980    Residual remains on right side    Breast cancer ([address] 5/1995    Cholelithiasis 6/23/2011    CKD (chronic kidney disease), stage III ([address] 6/6/2012    COPD (chronic obstructive pulmonary disease) ([address] 12/28/2011    Dyslipidemia 6/23/2011    Elevated triglycerides with high cholesterol 8/28/2015    Esophageal reflux 6/23/2011    Essential hypertension 6/23/2011    GERD (gastroesophageal reflux disease)     Hiatal hernia 6/23/2011    History of breast cancer 6/23/2011    Rt Breast    History of duodenal ulcer 6/23/2011    HTN (hypertension) 6/23/2011    Hyperlipidemia 1/7/2013    Left ventricular hypertrophy 6/23/2011    Nicotine dependence 9/2/2015    Obstructive chronic bronchitis with acute bronchitis ([address] 6/23/2011    Osteopenia     Osteoporosis 6/23/2011    Other ill-defined conditions(799.89)     rightsided eye and cheek numb from surg    Pregnancy 6/23/2011    Steatosis of liver 10/9/2011    Tubular adenoma of colon 1/24/2016 1/4/16    Type 2 diabetes mellitus without complications ([facility] 6/15/8442    Type II or unspecified type diabetes mellitus without mention of complication, not stated as uncontrolled 6/23/2011     Past Surgical History:   Procedure Laterality Date    ENDOSCOPY, COLON, DIAGNOSTIC  10/09    HX CHOLECYSTECTOMY  10/2011    laparoscopic    HX HEENT      bells palsy surgery,tongue numb    HX MASTECTOMY Right 5/1995    HX OTHER SURGICAL      colonoscopy    HX TONSIL AND ADENOIDECTOMY  1969    HX TONSILLECTOMY  1969    [place] MASTECTOMY BRA  1995    R Breast    [place] REMOVAL GALLBLADDER         Personal factors and/or comorbidities impacting plan of care:     Home Situation  Home Environment: Apartment  # Steps to Enter: 0  One/Two Story Residence: One story  Living Alone: No  Support Systems: Child(olga)  Current DME Used/Available at Home: Cane, straight  Tub or Shower Type: Tub/Shower combination    EXAMINATION/PRESENTATION/DECISION MAKING:   Critical Behavior:  Neurologic State: Alert  Orientation Level: Oriented X4  Cognition: Follows commands     Hearing: Auditory  Auditory Impairment: Hard of hearing, bilateral, Hearing aid(s)    Range Of Motion:  AROM: Within functional limits                       Strength:    Strength: Generally decreased, functional                    Tone & Sensation:   Tone: Normal              Sensation: Intact               Coordination:  Coordination: Within functional limits  Vision:      Functional Mobility:  Bed Mobility:  Rolling: Independent  Supine to Sit: Independent     Scooting: Independent  Transfers:  Sit to Stand: Contact guard assistance  Stand to Sit: Contact guard assistance        Bed to Chair: Contact guard assistance              Balance:   Sitting: Intact  Standing: Impaired  Standing - Static: Good; Unsupported  Standing - Dynamic : Fair;Unsupported  Ambulation/Gait Training:  Distance (ft): 20 Feet (ft)  Assistive Device: Gait belt  Ambulation - Level of Assistance: Contact guard assistance        Gait Abnormalities: Decreased step clearance              Speed/Deborah: Pace decreased (<100 feet/min)                        Functional Measure:  Barthel Index:    Bathin  Bladder: 5  Bowels: 10  Groomin  Dressing: 10  Feeding: 10  Mobility: 0  Stairs: 5  Toilet Use: 5  Transfer (Bed to Chair and Back): 10  Total: 60/100       The Barthel ADL Index: Guidelines  1. The index should be used as a record of what a patient does, not as a record of what a patient could do. 2. The main aim is to establish degree of independence from any help, physical or verbal, however minor and for whatever reason. 3. The need for supervision renders the patient not independent. 4. A patient's performance should be established using the best available evidence. Asking the patient, friends/relatives and nurses are the usual sources, but direct observation and common sense are also important. However direct testing is not needed. 5. Usually the patient's performance over the preceding 24-48 hours is important, but occasionally longer periods will be relevant. 6. Middle categories imply that the patient supplies over 50 per cent of the effort. 7. Use of aids to be independent is allowed. Score Interpretation (from [address] [address])    Independent   60-79 Minimally independent   40-59 Partially dependent   20-39 Very dependent   <20 Totally dependent     -Oscar Bravo., Barthel, D.W. (1965). Functional evaluation: the Barthel Index. [address] [facility] ([address] (). The Barthel activities of daily living index: self-reporting versus actual performance in the old (> or = 75 years). Journal of [address] [place] [address] 45(7), [facility], JLUIS, Silvia Boyce., Shashi Uriostegui. (1999).  Measuring the change in disability after inpatient rehabilitation; comparison of the responsiveness of the Barthel Index and Functional Corrigan Measure. Journal of Neurology, Neurosurgery, and Psychiatry, 66(4), 504-051. ALANNA Cantor.ÁLVARO, SIA Dye, & Ana Pope M.A. (2004) Assessment of post-stroke quality of life in cost-effectiveness studies: The usefulness of the Barthel Index and the EuroQoL-5D. Quality of Life Research, 15, 477-15        Physical Therapy Evaluation Charge Determination   History Examination Presentation Decision-Making   MEDIUM  Complexity : 1-2 comorbidities / personal factors will impact the outcome/ POC  LOW Complexity : 1-2 Standardized tests and measures addressing body structure, function, activity limitation and / or participation in recreation  MEDIUM Complexity : Evolving with changing characteristics  Other outcome measures Barthel 60  MEDIUM      Based on the above components, the patient evaluation is determined to be of the following complexity level: LOW     Pain Rating:  None reported    Activity Tolerance:   Fair, SpO2 stable on RA, and observed SOB with activity    After treatment patient left in no apparent distress:   Sitting in chair, Call bell within reach, Bed / chair alarm activated, and Caregiver / family present    COMMUNICATION/EDUCATION:   The patients plan of care was discussed with: Registered nurse. Fall prevention education was provided and the patient/caregiver indicated understanding. and Patient/family agree to work toward stated goals and plan of care.     Thank you for this referral.  Wellington Crain, PT   Time Calculation: 13 mins

## 2022-10-30 NOTE — PROGRESS NOTES
0815: [facility] patient's , she did have albuterol treatment. Patient also still reporting nausea so I will bring zofran before PO meds. MD states ordering xray of abdomen    1343: Verified with family medicine patient is okay to discharge and notified them the the 875mg Augmentin is on backorder, [facility] called and said they can do 500mg TID. MD resent rx and updated AVS    I have reviewed discharge instructions with the patient and caregiver. The patient and caregiver verbalized understanding. Discharge packet and information on RSV provided to patient, offered to answer questions. No complaints of pain, VSS, IV removed.

## 2022-10-31 ENCOUNTER — PATIENT OUTREACH (OUTPATIENT)
Dept: CASE MANAGEMENT | Age: 82
End: 2022-10-31

## 2022-10-31 LAB
FLUID CULTURE, SPNG2: NORMAL
L PNEUMO1 AG UR QL IA: NEGATIVE
ORGANISM ID, SPNG3: NORMAL
PLEASE NOTE, SPNG4: NORMAL
S PNEUM AG SPEC QL LA: NEGATIVE
SPECIMEN SOURCE: NORMAL
SPECIMEN SOURCE: NORMAL
SPECIMEN, SPNG1: NORMAL

## 2022-10-31 NOTE — PROGRESS NOTES
Care Transitions Initial Call    Call within 2 business days of discharge: Yes     Patient: Karoline Jolley Patient : 1940 MRN: [id number]    Last Discharge  Street       Date Complaint Diagnosis Description Type Department Provider    10/29/22 Pneumonia Pneumonia of left lung due to infectious organism, unspecified part of lung . .. ED to Hosp-Admission (Discharged) (ADMIT) [id number] [address] [place], ; CHRISTINA Turner. Was this an external facility discharge? No Discharge Facility: [facility] 10/29-10/30    Called and LM on 10/31 and . Message sent via [employer] as well. Closing episode at this time as unable to contact the patient.

## 2022-11-04 LAB
BACTERIA SPEC CULT: NORMAL
BACTERIA SPEC CULT: NORMAL
SERVICE CMNT-IMP: NORMAL
SERVICE CMNT-IMP: NORMAL

## 2022-11-06 ENCOUNTER — HOSPITAL ENCOUNTER (INPATIENT)
Age: 82
LOS: 2 days | Discharge: NURSING, REHAB OR LONG-TERM CARE | DRG: 391 | End: 2022-11-08
Attending: EMERGENCY MEDICINE | Admitting: FAMILY MEDICINE
Payer: MEDICARE

## 2022-11-06 ENCOUNTER — APPOINTMENT (OUTPATIENT)
Dept: GENERAL RADIOLOGY | Age: 82
DRG: 391 | End: 2022-11-06
Attending: EMERGENCY MEDICINE
Payer: MEDICARE

## 2022-11-06 ENCOUNTER — APPOINTMENT (OUTPATIENT)
Dept: CT IMAGING | Age: 82
DRG: 391 | End: 2022-11-06
Attending: EMERGENCY MEDICINE
Payer: MEDICARE

## 2022-11-06 DIAGNOSIS — K52.9 ACUTE COLITIS: Primary | ICD-10-CM

## 2022-11-06 DIAGNOSIS — E87.0 HYPERNATREMIA: ICD-10-CM

## 2022-11-06 DIAGNOSIS — K29.00 OTHER ACUTE GASTRITIS WITHOUT HEMORRHAGE: ICD-10-CM

## 2022-11-06 DIAGNOSIS — E87.6 HYPOKALEMIA: ICD-10-CM

## 2022-11-06 DIAGNOSIS — N18.30 STAGE 3 CHRONIC KIDNEY DISEASE, UNSPECIFIED WHETHER STAGE 3A OR 3B CKD (HCC): ICD-10-CM

## 2022-11-06 DIAGNOSIS — N17.9 ACUTE RENAL FAILURE, UNSPECIFIED ACUTE RENAL FAILURE TYPE (HCC): ICD-10-CM

## 2022-11-06 DIAGNOSIS — E83.51 HYPOCALCEMIA: ICD-10-CM

## 2022-11-06 DIAGNOSIS — K52.9 GASTROENTERITIS: ICD-10-CM

## 2022-11-06 DIAGNOSIS — J44.9 CHRONIC OBSTRUCTIVE PULMONARY DISEASE, UNSPECIFIED COPD TYPE (HCC): ICD-10-CM

## 2022-11-06 DIAGNOSIS — E83.42 HYPOMAGNESEMIA: ICD-10-CM

## 2022-11-06 DIAGNOSIS — R73.9 HYPERGLYCEMIA: ICD-10-CM

## 2022-11-06 DIAGNOSIS — I10 PRIMARY HYPERTENSION: ICD-10-CM

## 2022-11-06 DIAGNOSIS — J96.01 ACUTE RESPIRATORY FAILURE WITH HYPOXIA (HCC): ICD-10-CM

## 2022-11-06 PROBLEM — D75.839 THROMBOCYTOSIS: Status: ACTIVE | Noted: 2022-11-06

## 2022-11-06 PROBLEM — K29.70 GASTRITIS: Status: ACTIVE | Noted: 2022-11-06

## 2022-11-06 LAB
ALBUMIN SERPL-MCNC: 3.1 G/DL (ref 3.5–5)
ALBUMIN/GLOB SERPL: 0.7 {RATIO} (ref 1.1–2.2)
ALP SERPL-CCNC: 24 U/L (ref 45–117)
ALT SERPL-CCNC: 22 U/L (ref 12–78)
AMYLASE SERPL-CCNC: 45 U/L (ref 25–115)
ANION GAP SERPL CALC-SCNC: 12 MMOL/L (ref 5–15)
APPEARANCE UR: CLEAR
AST SERPL-CCNC: 25 U/L (ref 15–37)
BACTERIA URNS QL MICRO: NEGATIVE /HPF
BASOPHILS # BLD: 0 K/UL (ref 0–0.1)
BASOPHILS NFR BLD: 0 % (ref 0–1)
BILIRUB SERPL-MCNC: 0.6 MG/DL (ref 0.2–1)
BILIRUB UR QL: NEGATIVE
BUN SERPL-MCNC: 63 MG/DL (ref 6–20)
BUN/CREAT SERPL: 29 (ref 12–20)
CALCIUM SERPL-MCNC: 7.3 MG/DL (ref 8.5–10.1)
CHLORIDE SERPL-SCNC: 103 MMOL/L (ref 97–108)
CO2 SERPL-SCNC: 25 MMOL/L (ref 21–32)
COLOR UR: ABNORMAL
COMMENT, HOLDF: NORMAL
CREAT SERPL-MCNC: 2.15 MG/DL (ref 0.55–1.02)
CREAT UR-MCNC: 33 MG/DL
DIFFERENTIAL METHOD BLD: ABNORMAL
EOSINOPHIL # BLD: 0 K/UL (ref 0–0.4)
EOSINOPHIL NFR BLD: 0 % (ref 0–7)
EPITH CASTS URNS QL MICRO: ABNORMAL /LPF
ERYTHROCYTE [DISTWIDTH] IN BLOOD BY AUTOMATED COUNT: 14.1 % (ref 11.5–14.5)
GLOBULIN SER CALC-MCNC: 4.6 G/DL (ref 2–4)
GLUCOSE BLD STRIP.AUTO-MCNC: 110 MG/DL (ref 65–117)
GLUCOSE BLD STRIP.AUTO-MCNC: 94 MG/DL (ref 65–117)
GLUCOSE SERPL-MCNC: 242 MG/DL (ref 65–100)
GLUCOSE UR STRIP.AUTO-MCNC: NEGATIVE MG/DL
HCT VFR BLD AUTO: 37.7 % (ref 35–47)
HGB BLD-MCNC: 12.2 G/DL (ref 11.5–16)
HGB UR QL STRIP: NEGATIVE
HYALINE CASTS URNS QL MICRO: ABNORMAL /LPF (ref 0–5)
IMM GRANULOCYTES # BLD AUTO: 0.2 K/UL (ref 0–0.04)
IMM GRANULOCYTES NFR BLD AUTO: 1 % (ref 0–0.5)
KETONES UR QL STRIP.AUTO: ABNORMAL MG/DL
LACTATE BLD-SCNC: 0.89 MMOL/L (ref 0.4–2)
LEUKOCYTE ESTERASE UR QL STRIP.AUTO: NEGATIVE
LIPASE SERPL-CCNC: 97 U/L (ref 73–393)
LYMPHOCYTES # BLD: 1.6 K/UL (ref 0.8–3.5)
LYMPHOCYTES NFR BLD: 8 % (ref 12–49)
MCH RBC QN AUTO: 28.4 PG (ref 26–34)
MCHC RBC AUTO-ENTMCNC: 32.4 G/DL (ref 30–36.5)
MCV RBC AUTO: 87.7 FL (ref 80–99)
MONOCYTES # BLD: 0.6 K/UL (ref 0–1)
MONOCYTES NFR BLD: 3 % (ref 5–13)
NEUTS SEG # BLD: 18 K/UL (ref 1.8–8)
NEUTS SEG NFR BLD: 88 % (ref 32–75)
NITRITE UR QL STRIP.AUTO: NEGATIVE
NRBC # BLD: 0 K/UL (ref 0–0.01)
NRBC BLD-RTO: 0 PER 100 WBC
OSMOLALITY UR: 463 MOSM/KG H2O
PH UR STRIP: 5 [PH] (ref 5–8)
PLATELET # BLD AUTO: 425 K/UL (ref 150–400)
PMV BLD AUTO: 9.5 FL (ref 8.9–12.9)
POTASSIUM SERPL-SCNC: 3.3 MMOL/L (ref 3.5–5.1)
PROCALCITONIN SERPL-MCNC: <0.05 NG/ML
PROT SERPL-MCNC: 7.7 G/DL (ref 6.4–8.2)
PROT UR STRIP-MCNC: 30 MG/DL
RBC # BLD AUTO: 4.3 M/UL (ref 3.8–5.2)
RBC #/AREA URNS HPF: ABNORMAL /HPF (ref 0–5)
SAMPLES BEING HELD,HOLD: NORMAL
SERVICE CMNT-IMP: NORMAL
SERVICE CMNT-IMP: NORMAL
SODIUM SERPL-SCNC: 140 MMOL/L (ref 136–145)
SODIUM UR-SCNC: 106 MMOL/L
SP GR UR REFRACTOMETRY: 1.02 (ref 1–1.03)
UA: UC IF INDICATED,UAUC: ABNORMAL
UROBILINOGEN UR QL STRIP.AUTO: 0.2 EU/DL (ref 0.2–1)
WBC # BLD AUTO: 20.4 K/UL (ref 3.6–11)
WBC URNS QL MICRO: ABNORMAL /HPF (ref 0–4)

## 2022-11-06 PROCEDURE — 82150 ASSAY OF AMYLASE: CPT

## 2022-11-06 PROCEDURE — 80053 COMPREHEN METABOLIC PANEL: CPT

## 2022-11-06 PROCEDURE — 74011250636 HC RX REV CODE- 250/636: Performed by: STUDENT IN AN ORGANIZED HEALTH CARE EDUCATION/TRAINING PROGRAM

## 2022-11-06 PROCEDURE — 96372 THER/PROPH/DIAG INJ SC/IM: CPT

## 2022-11-06 PROCEDURE — 74176 CT ABD & PELVIS W/O CONTRAST: CPT

## 2022-11-06 PROCEDURE — 83605 ASSAY OF LACTIC ACID: CPT

## 2022-11-06 PROCEDURE — 99222 1ST HOSP IP/OBS MODERATE 55: CPT | Performed by: FAMILY MEDICINE

## 2022-11-06 PROCEDURE — 85025 COMPLETE CBC W/AUTO DIFF WBC: CPT

## 2022-11-06 PROCEDURE — 74011000250 HC RX REV CODE- 250: Performed by: STUDENT IN AN ORGANIZED HEALTH CARE EDUCATION/TRAINING PROGRAM

## 2022-11-06 PROCEDURE — 94761 N-INVAS EAR/PLS OXIMETRY MLT: CPT

## 2022-11-06 PROCEDURE — 83690 ASSAY OF LIPASE: CPT

## 2022-11-06 PROCEDURE — U0005 INFEC AGEN DETEC AMPLI PROBE: HCPCS

## 2022-11-06 PROCEDURE — 96374 THER/PROPH/DIAG INJ IV PUSH: CPT

## 2022-11-06 PROCEDURE — 96375 TX/PRO/DX INJ NEW DRUG ADDON: CPT

## 2022-11-06 PROCEDURE — 36415 COLL VENOUS BLD VENIPUNCTURE: CPT

## 2022-11-06 PROCEDURE — C9113 INJ PANTOPRAZOLE SODIUM, VIA: HCPCS | Performed by: STUDENT IN AN ORGANIZED HEALTH CARE EDUCATION/TRAINING PROGRAM

## 2022-11-06 PROCEDURE — 74011250636 HC RX REV CODE- 250/636: Performed by: EMERGENCY MEDICINE

## 2022-11-06 PROCEDURE — 84300 ASSAY OF URINE SODIUM: CPT

## 2022-11-06 PROCEDURE — 74011250636 HC RX REV CODE- 250/636

## 2022-11-06 PROCEDURE — 96361 HYDRATE IV INFUSION ADD-ON: CPT

## 2022-11-06 PROCEDURE — 81001 URINALYSIS AUTO W/SCOPE: CPT

## 2022-11-06 PROCEDURE — 82962 GLUCOSE BLOOD TEST: CPT

## 2022-11-06 PROCEDURE — 71045 X-RAY EXAM CHEST 1 VIEW: CPT

## 2022-11-06 PROCEDURE — 99285 EMERGENCY DEPT VISIT HI MDM: CPT

## 2022-11-06 PROCEDURE — 83935 ASSAY OF URINE OSMOLALITY: CPT

## 2022-11-06 PROCEDURE — 84145 PROCALCITONIN (PCT): CPT

## 2022-11-06 PROCEDURE — 65270000046 HC RM TELEMETRY

## 2022-11-06 PROCEDURE — 82570 ASSAY OF URINE CREATININE: CPT

## 2022-11-06 RX ORDER — ENOXAPARIN SODIUM 100 MG/ML
30 INJECTION SUBCUTANEOUS DAILY
Status: DISCONTINUED | OUTPATIENT
Start: 2022-11-06 | End: 2022-11-06

## 2022-11-06 RX ORDER — DEXTROSE MONOHYDRATE 100 MG/ML
0-250 INJECTION, SOLUTION INTRAVENOUS AS NEEDED
Status: DISCONTINUED | OUTPATIENT
Start: 2022-11-06 | End: 2022-11-08 | Stop reason: HOSPADM

## 2022-11-06 RX ORDER — HEPARIN SODIUM 5000 [USP'U]/ML
5000 INJECTION, SOLUTION INTRAVENOUS; SUBCUTANEOUS EVERY 8 HOURS
Status: DISCONTINUED | OUTPATIENT
Start: 2022-11-06 | End: 2022-11-08 | Stop reason: HOSPADM

## 2022-11-06 RX ORDER — SODIUM CHLORIDE 9 MG/ML
100 INJECTION, SOLUTION INTRAVENOUS CONTINUOUS
Status: DISCONTINUED | OUTPATIENT
Start: 2022-11-06 | End: 2022-11-07

## 2022-11-06 RX ORDER — VALSARTAN 80 MG/1
320 TABLET ORAL DAILY
Status: DISCONTINUED | OUTPATIENT
Start: 2022-11-06 | End: 2022-11-08 | Stop reason: HOSPADM

## 2022-11-06 RX ORDER — SODIUM CHLORIDE 0.9 % (FLUSH) 0.9 %
5-40 SYRINGE (ML) INJECTION AS NEEDED
Status: DISCONTINUED | OUTPATIENT
Start: 2022-11-06 | End: 2022-11-08 | Stop reason: HOSPADM

## 2022-11-06 RX ORDER — LABETALOL HCL 20 MG/4 ML
10 SYRINGE (ML) INTRAVENOUS
Status: DISCONTINUED | OUTPATIENT
Start: 2022-11-06 | End: 2022-11-08 | Stop reason: HOSPADM

## 2022-11-06 RX ORDER — SODIUM CHLORIDE 0.9 % (FLUSH) 0.9 %
5-40 SYRINGE (ML) INJECTION EVERY 8 HOURS
Status: DISCONTINUED | OUTPATIENT
Start: 2022-11-06 | End: 2022-11-08 | Stop reason: HOSPADM

## 2022-11-06 RX ORDER — ROSUVASTATIN CALCIUM 10 MG/1
10 TABLET, COATED ORAL DAILY
Status: DISCONTINUED | OUTPATIENT
Start: 2022-11-06 | End: 2022-11-08 | Stop reason: HOSPADM

## 2022-11-06 RX ORDER — MAGNESIUM SULFATE 100 %
4 CRYSTALS MISCELLANEOUS AS NEEDED
Status: DISCONTINUED | OUTPATIENT
Start: 2022-11-06 | End: 2022-11-08 | Stop reason: HOSPADM

## 2022-11-06 RX ORDER — HYDROCHLOROTHIAZIDE 25 MG/1
12.5 TABLET ORAL DAILY
Status: DISCONTINUED | OUTPATIENT
Start: 2022-11-06 | End: 2022-11-08 | Stop reason: HOSPADM

## 2022-11-06 RX ORDER — KETOROLAC TROMETHAMINE 30 MG/ML
15 INJECTION, SOLUTION INTRAMUSCULAR; INTRAVENOUS
Status: COMPLETED | OUTPATIENT
Start: 2022-11-06 | End: 2022-11-06

## 2022-11-06 RX ORDER — ONDANSETRON 2 MG/ML
INJECTION INTRAMUSCULAR; INTRAVENOUS
Status: COMPLETED
Start: 2022-11-06 | End: 2022-11-06

## 2022-11-06 RX ORDER — ENOXAPARIN SODIUM 100 MG/ML
40 INJECTION SUBCUTANEOUS DAILY
Status: DISCONTINUED | OUTPATIENT
Start: 2022-11-06 | End: 2022-11-06

## 2022-11-06 RX ORDER — INSULIN LISPRO 100 [IU]/ML
INJECTION, SOLUTION INTRAVENOUS; SUBCUTANEOUS
Status: DISCONTINUED | OUTPATIENT
Start: 2022-11-06 | End: 2022-11-08 | Stop reason: HOSPADM

## 2022-11-06 RX ORDER — ONDANSETRON 2 MG/ML
4 INJECTION INTRAMUSCULAR; INTRAVENOUS
Status: DISCONTINUED | OUTPATIENT
Start: 2022-11-06 | End: 2022-11-08 | Stop reason: HOSPADM

## 2022-11-06 RX ORDER — AMLODIPINE BESYLATE 5 MG/1
10 TABLET ORAL DAILY
Status: DISCONTINUED | OUTPATIENT
Start: 2022-11-06 | End: 2022-11-08 | Stop reason: HOSPADM

## 2022-11-06 RX ORDER — METOPROLOL SUCCINATE 50 MG/1
200 TABLET, EXTENDED RELEASE ORAL DAILY
Status: DISCONTINUED | OUTPATIENT
Start: 2022-11-06 | End: 2022-11-08 | Stop reason: HOSPADM

## 2022-11-06 RX ORDER — VALSARTAN AND HYDROCHLOROTHIAZIDE 320; 12.5 MG/1; MG/1
1 TABLET, FILM COATED ORAL DAILY
Status: DISCONTINUED | OUTPATIENT
Start: 2022-11-06 | End: 2022-11-06

## 2022-11-06 RX ORDER — DICYCLOMINE HYDROCHLORIDE 10 MG/ML
20 INJECTION INTRAMUSCULAR
Status: COMPLETED | OUTPATIENT
Start: 2022-11-06 | End: 2022-11-06

## 2022-11-06 RX ORDER — PROCHLORPERAZINE EDISYLATE 5 MG/ML
10 INJECTION INTRAMUSCULAR; INTRAVENOUS
Status: DISCONTINUED | OUTPATIENT
Start: 2022-11-06 | End: 2022-11-08 | Stop reason: HOSPADM

## 2022-11-06 RX ORDER — ONDANSETRON 2 MG/ML
8 INJECTION INTRAMUSCULAR; INTRAVENOUS
Status: COMPLETED | OUTPATIENT
Start: 2022-11-06 | End: 2022-11-06

## 2022-11-06 RX ADMIN — SODIUM CHLORIDE, PRESERVATIVE FREE 10 ML: 5 INJECTION INTRAVENOUS at 18:09

## 2022-11-06 RX ADMIN — ONDANSETRON 4 MG: 2 INJECTION INTRAMUSCULAR; INTRAVENOUS at 11:26

## 2022-11-06 RX ADMIN — ONDANSETRON 8 MG: 2 INJECTION INTRAMUSCULAR; INTRAVENOUS at 05:25

## 2022-11-06 RX ADMIN — SODIUM CHLORIDE, PRESERVATIVE FREE 10 ML: 5 INJECTION INTRAVENOUS at 11:25

## 2022-11-06 RX ADMIN — SODIUM CHLORIDE 500 ML: 9 INJECTION, SOLUTION INTRAVENOUS at 05:27

## 2022-11-06 RX ADMIN — SODIUM CHLORIDE, PRESERVATIVE FREE 10 ML: 5 INJECTION INTRAVENOUS at 22:02

## 2022-11-06 RX ADMIN — DICYCLOMINE HYDROCHLORIDE 20 MG: 20 INJECTION, SOLUTION INTRAMUSCULAR at 05:39

## 2022-11-06 RX ADMIN — SODIUM CHLORIDE 1000 ML: 9 INJECTION, SOLUTION INTRAVENOUS at 10:32

## 2022-11-06 RX ADMIN — PROCHLORPERAZINE EDISYLATE 10 MG: 5 INJECTION INTRAMUSCULAR; INTRAVENOUS at 17:29

## 2022-11-06 RX ADMIN — SODIUM CHLORIDE 100 ML/HR: 9 INJECTION, SOLUTION INTRAVENOUS at 13:55

## 2022-11-06 RX ADMIN — SODIUM CHLORIDE 100 ML/HR: 9 INJECTION, SOLUTION INTRAVENOUS at 23:54

## 2022-11-06 RX ADMIN — HEPARIN SODIUM 5000 UNITS: 5000 INJECTION INTRAVENOUS; SUBCUTANEOUS at 18:28

## 2022-11-06 RX ADMIN — SODIUM CHLORIDE 40 MG: 9 INJECTION, SOLUTION INTRAMUSCULAR; INTRAVENOUS; SUBCUTANEOUS at 18:28

## 2022-11-06 RX ADMIN — KETOROLAC TROMETHAMINE 15 MG: 30 INJECTION, SOLUTION INTRAMUSCULAR; INTRAVENOUS at 05:31

## 2022-11-06 NOTE — ED PROVIDER NOTES
45-year-old female with a past medical history significant for arthritis, Bell's palsy, breast cancer, cholelithiasis, chronic kidney disease, COPD, dyslipidemia, hypercholesterolemia, GERD, hypertension, hiatal hernia who presents to the ER by EMS for evaluation for intractable nausea and vomiting with epigastric abdominal pain x2 days, severity 5 out of 10, without any aggravating or relieving factors. The patient was recently admitted to the hospital for similar symptoms. She is a very limited historian. She denies any fever, cough or congestion, headache, neck and back pain, abdominal pain, sick contact, skin rash or recent travel. Past Medical History:   Diagnosis Date    ACP (advance care planning) 8/29/2018    Advance Care Plan or Surrogate Decision Maker documented in medical record.     Arthritis     Shoulders, knees    Bell's palsy 1980    Residual remains on right side    Breast cancer ([address] 5/1995    Cholelithiasis 6/23/2011    CKD (chronic kidney disease), stage III ([address] 6/6/2012    COPD (chronic obstructive pulmonary disease) ([address] 12/28/2011    Dyslipidemia 6/23/2011    Elevated triglycerides with high cholesterol 8/28/2015    Esophageal reflux 6/23/2011    Essential hypertension 6/23/2011    GERD (gastroesophageal reflux disease)     Hiatal hernia 6/23/2011    History of breast cancer 6/23/2011    Rt Breast    History of duodenal ulcer 6/23/2011    HTN (hypertension) 6/23/2011    Hyperlipidemia 1/7/2013    Left ventricular hypertrophy 6/23/2011    Nicotine dependence 9/2/2015    Obstructive chronic bronchitis with acute bronchitis ([address] 6/23/2011    Osteopenia     Osteoporosis 6/23/2011    Other ill-defined conditions(799.89)     rightsided eye and cheek numb from surg    Pregnancy 6/23/2011    Steatosis of liver 10/9/2011    Tubular adenoma of colon 1/24/2016 1/4/16    Type 2 diabetes mellitus without complications ([address] 8/86/6668    Type II or unspecified type diabetes mellitus without mention of complication, not stated as uncontrolled 6/23/2011       Past Surgical History:   Procedure Laterality Date    ENDOSCOPY, COLON, DIAGNOSTIC  10/09    HX CHOLECYSTECTOMY  10/2011    laparoscopic    HX HEENT      bells palsy surgery,tongue numb    HX MASTECTOMY Right 5/1995    HX OTHER SURGICAL      colonoscopy    HX TONSIL AND ADENOIDECTOMY  1969    HX TONSILLECTOMY  1969    [place] MASTECTOMY BRA  1995    R Breast    [place] REMOVAL GALLBLADDER           Family History:   Problem Relation Age of Onset    Asthma Mother     Cancer Mother         Pancreatic CA    Stroke Father     Other Father         Artherosclerosis    Cancer Sister         Rectum    Lung Disease Sister     Diabetes Sister     Coronary Art Dis Sister     Heart Attack Brother     Diabetes Maternal Grandmother     Diabetes Paternal Grandmother     Cancer Paternal Grandfather     Diabetes Sister     Coronary Art Dis Sister     Diabetes Brother     Alcohol abuse Brother     Other Brother         killed in car accident       Social History     Socioeconomic History    Marital status:      Spouse name: Not on file    Number of children: Not on file    Years of education: Not on file    Highest education level: Not on file   Occupational History    Not on file   Tobacco Use    Smoking status: Every Day     Packs/day: 0.50     Years: 65.00     Pack years: 32.50     Types: Cigarettes     Last attempt to quit: 11/2/2019     Years since quitting: 3.0    Smokeless tobacco: Never   Vaping Use    Vaping Use: Some days    Substances: Nicotine   Substance and Sexual Activity    Alcohol use: Yes     Comment: Rarely-holidays    Drug use: No    Sexual activity: Not Currently   Other Topics Concern    Not on file   Social History Narrative    Not on file     Social Determinants of Health     Financial Resource Strain: Not on file   Food Insecurity: Not on file   Transportation Needs: Not on file   Physical Activity: Not on file   Stress: Not on file   Social Connections: Not on file   Intimate Partner Violence: Not on file   Housing Stability: Not on file         ALLERGIES: Nsaids (non-steroidal anti-inflammatory drug), Fenofibrate, and Metformin    Review of Systems   All other systems reviewed and are negative. Vitals:    11/06/22 0517   Pulse: (!) 111   Resp: 17   Temp: 98.6 °F (37 °C)            Physical Exam  Vitals and nursing note reviewed. Exam conducted with a chaperone present. CONSTITUTIONAL: Frail elderly female; in mild distress  HEAD: Normocephalic; atraumatic  EYES: PERRL; EOM intact; conjunctiva and sclera are clear bilaterally. ENT: No rhinorrhea; normal pharynx with no tonsillar hypertrophy; mucous membranes pink/moist, no erythema, no exudate. NECK: Supple; non-tender; no cervical lymphadenopathy  CARD: Normal S1, S2; no murmurs, rubs, or gallops. Regular rate and rhythm. RESP: Normal respiratory effort; breath sounds clear and equal bilaterally; no wheezes, rhonchi, or rales. ABD: Normal bowel sounds; non-distended; diffuse palpatory tenderness without any rebound or guarding no palpable organomegaly, no masses, no bruits. Back Exam: Normal inspection; no vertebral point tenderness, no CVA tenderness. Normal range of motion. EXT: Normal ROM in all four extremities; non-tender to palpation; no swelling or deformity; distal pulses are normal, no edema. SKIN: Warm; dry; no rash. NEURO:Alert and oriented x 3, coherent, TANVI-XII grossly intact, sensory and motor are non-focal.        MDM  Number of Diagnoses or Management Options  Acute colitis  Diagnosis management comments: Assessment: 53-year-old female with abdominal pain with nausea, vomiting, electrolytes abnormality, dehydration gastritis versus gastroenteritis versus gastroparesis. Plan: Lab/IV fluid' antiemetic and analgesia' see described abdomen pelvis/serial exam/ Monitor and Reevaluate.          Amount and/or Complexity of Data Reviewed  Clinical lab tests: ordered and reviewed  Tests in the radiology section of CPT®: ordered and reviewed  Tests in the medicine section of CPT®: reviewed and ordered  Discussion of test results with the performing providers: yes  Decide to obtain previous medical records or to obtain history from someone other than the patient: yes  Obtain history from someone other than the patient: yes  Review and summarize past medical records: yes  Discuss the patient with other providers: yes  Independent visualization of images, tracings, or specimens: yes    Risk of Complications, Morbidity, and/or Mortality  Presenting problems: low  Diagnostic procedures: low  Management options: low           Procedures    ED EKG interpretation:  Rhythm: sinus tachycardia; and regular . Rate (approx.): 101; Axis: normal; P wave: normal; QRS interval: normal ; ST/T wave: normal; in  Lead: Diffusely; Other findings: abnormal ekg. This EKG was interpreted by Ronald Gibson MD,ED Provider. 7:05 AM  Change of shift. Care of patient signed over to Dr. Basilia Handy. Bedside handoff complete. Awaiting lab and radiographic studies as well as clinical disposition as deemed appropriate.

## 2022-11-06 NOTE — PROGRESS NOTES
Pharmacy Dosing Services:     Enoxaparin dose reduced from 40 mg to 30 mg SC Q24h per protocol.  - CrCl 15-29 ml/min, Weight 67.6 kg (10/29/22)    Thank you,  Connie Adler, PharmD

## 2022-11-06 NOTE — Clinical Note
Status[de-identified] INPATIENT [101]   Type of Bed: Medical [8]   Inpatient Hospitalization Certified Necessary for the Following Reasons: 3.  Patient receiving treatment that can only be provided in an inpatient setting (further clarification in H&P documentation)   Admitting Diagnosis: Colitis [095387]   Admitting Physician: Sloane Root [[id number]]   Attending Physician: Sloane Root [[id number]]   Estimated Length of Stay: 3-4 Midnights   Discharge Plan[de-identified] Home with Office Follow-up

## 2022-11-06 NOTE — ED PROVIDER NOTES
[facility] Consult for Admission  8:48 AM    ED Room Number: ER20/20  Patient Name and age:  Varun Gramajo [de-identified] y.o.  female  Working Diagnosis:   1. Acute colitis    2. Stage 3 chronic kidney disease, unspecified whether stage 3a or 3b CKD ([facility]    3. Hyperglycemia    4. Other acute gastritis without hemorrhage        COVID-19 Suspicion:  no  Sepsis present:  no  Reassessment needed: yes  Code Status:  Full Code  Readmission: yes  Isolation Requirements:  no  Recommended Level of Care:  telemetry  Department: [facility] ED - [phone]      Other: 82-year-old female presented to the emergency department due to intractable nausea and vomiting. She has had 2 days of the symptoms. Recently admitted for similar symptoms. Has been very weak and cannot get out of bed. Labs show white count and LESLIE. No infectious source. CT negative for any infectious process. Still symptomatic and cannot tolerate p.o.

## 2022-11-06 NOTE — ED TRIAGE NOTES
Pt. Maxim Rist in by EMS for Nausea and vomiting from home. Pt. Was recently discharged from here about 1 week ago after dx with RSV. N/V/D for 24 hours.

## 2022-11-06 NOTE — PROGRESS NOTES
1000: Received report on this patient from Tal Rodriguez RN at this time. This RN assumed care of this patient at this time.

## 2022-11-06 NOTE — H&P
Admission H&P     Name: Cleo Martin MRN: [id number]    Sex: Female   YOB: 1940  Age: [de-identified] y.o. PCP: Esperanza Gómez MD      Source of Information: patient, medical records    Chief complaint: n/v/d    History of Present Illness  Cleo Martin is a [de-identified] y.o. female with PMHx of COPD. DM, HTN, HLD, breast s/p R mastectomy who presents to the ED complaining of worsening n/v/d x3-4 days. Patient was recently admitted to the hospital, found to be RSV+ and was treated for COPD exacerbation and discharged on Augmentin. While at home over the last week has progressively not been feeling well. Daughter reports over the last 24 hours has had large episode of diarrhea and multiple episodes of nonbloody, nonbilious emesis x3-4 which brought them to the ED today. Prior to the last 24 hours has had intermittent milder episodes of n/v and loose stools over the last 1-2 weeks. Does have persistent cough from prior illness, new intermittent epigastric pain, weakness, and decreased po intake over the last 4-5 days. Denies worsening shortness of breath, chest pain, headaches, fevers/chills, or known sick contacts. At baseline, daughter reports patient is very active, performs all ADLs. Has been vaccinated for COVID. COVID Questions:   Experiencing any of the following symptoms: fever, chills, cough, SOB, diarrhea, URI symptoms. Cough  Any Sick contacts with fever, cough, diarrhea, SOB, URI symptoms. None  Traveled out of state or out of country. None  Lives with daughter. Has been staying at home. In the ED:  Vitals: Temp 98.6      /55   RR 17   SatO2  97% on RA  Labs: WBC 20.4. Hgb 12.2. Plt 425. Na 140. K 3.3. Cr 2.15 (BL 1.2). Amylase 45. Lipase 97. UA w/ trace ketones. Imaging: CXR nap. CT a/p with gastric wall thickening, otherwise no acute changes. Treatment: Bentyl 20mg. Toradol 15mg. Zofran 8mg. 500 ml bolus. EKG: Sinus tachycardia. Normal axis. Normal intervals.  No acute ST changes. Patient Vitals for the past 12 hrs:   Temp Pulse Resp BP SpO2   11/06/22 0701 -- 97 -- -- --   11/06/22 0700 -- 98 19 125/61 99 %   11/06/22 0603 -- (!) 101 (!) 33 (!) 123/55 97 %   11/06/22 0558 -- 100 -- -- --   11/06/22 0517 98.6 °F (37 °C) (!) 111 17 -- --       Review of Systems  Review of Systems   Constitutional:  Positive for appetite change. Negative for chills and fever. HENT:  Negative for congestion, postnasal drip and rhinorrhea. Eyes:  Negative for pain and redness. Respiratory:  Positive for cough. Negative for shortness of breath. Cardiovascular:  Negative for chest pain and palpitations. Gastrointestinal:  Positive for abdominal pain, diarrhea, nausea and vomiting. Genitourinary:  Negative for dysuria and frequency. Musculoskeletal:  Negative for back pain and neck pain. Skin:  Negative for pallor and rash. Neurological:  Negative for dizziness and headaches. Home Medications  Prior to Admission medications    Medication Sig Start Date End Date Taking? Authorizing Provider   ondansetron (ZOFRAN ODT) 4 mg disintegrating tablet Take 1 Tablet by mouth every eight (8) hours as needed for Nausea or Vomiting. 10/30/22   Erika Connell MD   amoxicillin-clavulanate (AUGMENTIN) 500-125 mg per tablet Take 1 Tablet by mouth every eight (8) hours for 16 doses. 10/30/22 11/5/22  Erika Connell MD   valsartan-hydroCHLOROthiazide (Diovan HCT) 320-12.5 mg per tablet Take 1 Tablet by mouth daily. 9/19/22   Michelle Marks MD   omeprazole (PRILOSEC) 40 mg capsule TAKE ONE CAPSULE BY MOUTH DAILY 8/8/22   Michelle Marks MD   amLODIPine (NORVASC) 10 mg tablet Take 1 Tablet by mouth daily. 7/8/22   Michelle Marks MD   metoprolol succinate (TOPROL-XL) 200 mg XL tablet Take 1 Tablet by mouth daily. 7/8/22   Michelle Marks MD   tiZANidine (ZANAFLEX) 2 mg tablet Take 1 Tablet by mouth three (3) times daily as needed for Pain.   Patient not taking: Reported on 10/29/2022 6/6/22 Pinky Woo MD   rosuvastatin (CRESTOR) 20 mg tablet TAKE ONE TABLET BY MOUTH EVERY EVENING  Patient taking differently: 10 mg. TAKE ONE TABLET BY MOUTH EVERY EVENING 8/14/21   Pinky Woo MD   glucose blood VI test strips (Accu-Chek Ifrah Plus test strp) strip USE TO TEST BLOOD SUGAR DAILY, E11.9 7/21/21   Pinky Woo MD   omega-3 fatty acids-vitamin e 1,000 mg cap Take 2 Caps by mouth daily (after breakfast). Provider, Historical   calcium-vitamin D (OS-MANDI +D3) 500 mg-200 unit per tablet Take 1 Tab by mouth daily (after breakfast). Provider, Historical   multivitamin (ONE A DAY) tablet Take 1 Tab by mouth daily (after breakfast). Provider, Historical       Allergies  Allergies   Allergen Reactions    Nsaids (Non-Steroidal Anti-Inflammatory Drug) Anaphylaxis and Hives    Fenofibrate Other (comments)     Leg cramps    Metformin Diarrhea       Past Medical History  Past Medical History:   Diagnosis Date    ACP (advance care planning) 8/29/2018    Advance Care Plan or Surrogate Decision Maker documented in medical record.     Arthritis     Shoulders, knees    Bell's palsy 1980    Residual remains on right side    Breast cancer ([address] 5/1995    Cholelithiasis 6/23/2011    CKD (chronic kidney disease), stage III ([address] 6/6/2012    COPD (chronic obstructive pulmonary disease) ([address] 12/28/2011    Dyslipidemia 6/23/2011    Elevated triglycerides with high cholesterol 8/28/2015    Esophageal reflux 6/23/2011    Essential hypertension 6/23/2011    GERD (gastroesophageal reflux disease)     Hiatal hernia 6/23/2011    History of breast cancer 6/23/2011    Rt Breast    History of duodenal ulcer 6/23/2011    HTN (hypertension) 6/23/2011    Hyperlipidemia 1/7/2013    Left ventricular hypertrophy 6/23/2011    Nicotine dependence 9/2/2015    Obstructive chronic bronchitis with acute bronchitis ([address] 6/23/2011    Osteopenia     Osteoporosis 6/23/2011    Other ill-defined conditions(799.89)     rightsided eye and cheek numb from surg    Pregnancy 6/23/2011    Steatosis of liver 10/9/2011    Tubular adenoma of colon 1/24/2016 1/4/16    Type 2 diabetes mellitus without complications ([facility] 6/83/9591    Type II or unspecified type diabetes mellitus without mention of complication, not stated as uncontrolled 6/23/2011       Past Surgical History  Past Surgical History:   Procedure Laterality Date    ENDOSCOPY, COLON, DIAGNOSTIC  10/09    HX CHOLECYSTECTOMY  10/2011    laparoscopic    HX HEENT      bells palsy surgery,tongue numb    HX MASTECTOMY Right 5/1995    HX OTHER SURGICAL      colonoscopy    HX TONSIL AND ADENOIDECTOMY  1969    HX TONSILLECTOMY  1969    NH MASTECTOMY BRA  1995    R Breast    NH REMOVAL GALLBLADDER         Family History  Family History   Problem Relation Age of Onset    Asthma Mother     Cancer Mother         Pancreatic CA    Stroke Father     Other Father         Artherosclerosis    Cancer Sister         Rectum    Lung Disease Sister     Diabetes Sister     Coronary Art Dis Sister     Heart Attack Brother     Diabetes Maternal Grandmother     Diabetes Paternal Grandmother     Cancer Paternal Grandfather     Diabetes Sister     Coronary Art Dis Sister     Diabetes Brother     Alcohol abuse Brother     Other Brother         killed in car accident       Social History  Alcohol history: Not at all  Smoking history:  Quit cigarette use last year, former 1/2 PPD. Now currently daily vape use. Illicit drug history: Not at all  Living arrangement: patient lives with their family. Ambulates: Independently     Vital Signs  Visit Vitals  /61   Pulse 97   Temp 98.6 °F (37 °C)   Resp 19   SpO2 99%       Physical Exam  General: In mild distress secondary to pain. Alert. Cooperative. Head: Normocephalic. Atraumatic. Eyes:              Conjunctiva pink. Sclera white. PERRL. Ears:              Hearing grossly intact. Nose:             Septum midline. Mucosa pink. No drainage. Throat: Mucosa pink.  Dry mucous membranes. No tonsillar exudates or erythema. Palate movement equal bilaterally. Neck: Supple. Normal ROM. No stiffness. Respiratory: Mild coarse breath sounds. No w/r/r/c.   Cardiovascular: RRR. Normal S1,S2. No m/r/g. Pulses 2+ throughout. GI: + bowel sounds. Mildly tender in the epigastrium. No rebound tenderness or guarding. Nondistended. Extremities: No LE edema. Distal pulses intact. Musculoskeletal: Full ROM in all extremities. Skin: Warm, dry. No rashes. Neuro: CN II-XII grossly intact. Strength 5/5 in all extremities. Laboratory Data  Recent Results (from the past 8 hour(s))   URINALYSIS W/ REFLEX CULTURE    Collection Time: 11/06/22  5:30 AM    Specimen: Urine   Result Value Ref Range    Color YELLOW/STRAW      Appearance CLEAR CLEAR      Specific gravity 1.017 1.003 - 1.030      pH (UA) 5.0 5.0 - 8.0      Protein 30 (A) NEG mg/dL    Glucose Negative NEG mg/dL    Ketone TRACE (A) NEG mg/dL    Bilirubin Negative NEG      Blood Negative NEG      Urobilinogen 0.2 0.2 - 1.0 EU/dL    Nitrites Negative NEG      Leukocyte Esterase Negative NEG      WBC 0-4 0 - 4 /hpf    RBC 0-5 0 - 5 /hpf    Epithelial cells MODERATE (A) FEW /lpf    Bacteria Negative NEG /hpf    UA:UC IF INDICATED CULTURE NOT INDICATED BY UA RESULT CNI      Hyaline cast 2-5 0 - 5 /lpf   CBC WITH AUTOMATED DIFF    Collection Time: 11/06/22  5:36 AM   Result Value Ref Range    WBC 20.4 (H) 3.6 - 11.0 K/uL    RBC 4.30 3.80 - 5.20 M/uL    HGB 12.2 11.5 - 16.0 g/dL    HCT 37.7 35.0 - 47.0 %    MCV 87.7 80.0 - 99.0 FL    MCH 28.4 26.0 - 34.0 PG    MCHC 32.4 30.0 - 36.5 g/dL    RDW 14.1 11.5 - 14.5 %    PLATELET 316 (H) 190 - 400 K/uL    MPV 9.5 8.9 - 12.9 FL    NRBC 0.0 0  WBC    ABSOLUTE NRBC 0.00 0.00 - 0.01 K/uL    NEUTROPHILS 88 (H) 32 - 75 %    LYMPHOCYTES 8 (L) 12 - 49 %    MONOCYTES 3 (L) 5 - 13 %    EOSINOPHILS 0 0 - 7 %    BASOPHILS 0 0 - 1 %    IMMATURE GRANULOCYTES 1 (H) 0.0 - 0.5 %    ABS.  NEUTROPHILS 18.0 (H) 1.8 - 8.0 K/UL    ABS. LYMPHOCYTES 1.6 0.8 - 3.5 K/UL    ABS. MONOCYTES 0.6 0.0 - 1.0 K/UL    ABS. EOSINOPHILS 0.0 0.0 - 0.4 K/UL    ABS. BASOPHILS 0.0 0.0 - 0.1 K/UL    ABS. IMM. GRANS. 0.2 (H) 0.00 - 0.04 K/UL    DF AUTOMATED     METABOLIC PANEL, COMPREHENSIVE    Collection Time: 11/06/22  5:36 AM   Result Value Ref Range    Sodium 140 136 - 145 mmol/L    Potassium 3.3 (L) 3.5 - 5.1 mmol/L    Chloride 103 97 - 108 mmol/L    CO2 25 21 - 32 mmol/L    Anion gap 12 5 - 15 mmol/L    Glucose 242 (H) 65 - 100 mg/dL    BUN 63 (H) 6 - 20 MG/DL    Creatinine 2.15 (H) 0.55 - 1.02 MG/DL    BUN/Creatinine ratio 29 (H) 12 - 20      eGFR 23 (L) >60 ml/min/1.73m2    Calcium 7.3 (L) 8.5 - 10.1 MG/DL    Bilirubin, total 0.6 0.2 - 1.0 MG/DL    ALT (SGPT) 22 12 - 78 U/L    AST (SGOT) 25 15 - 37 U/L    Alk. phosphatase 24 (L) 45 - 117 U/L    Protein, total 7.7 6.4 - 8.2 g/dL    Albumin 3.1 (L) 3.5 - 5.0 g/dL    Globulin 4.6 (H) 2.0 - 4.0 g/dL    A-G Ratio 0.7 (L) 1.1 - 2.2     LIPASE    Collection Time: 11/06/22  5:36 AM   Result Value Ref Range    Lipase 97 73 - 393 U/L   AMYLASE    Collection Time: 11/06/22  5:36 AM   Result Value Ref Range    Amylase 45 25 - 115 U/L   SAMPLES BEING HELD    Collection Time: 11/06/22  5:36 AM   Result Value Ref Range    SAMPLES BEING HELD 1DRKGRN,1SST,1BLU,1UA,1UC     COMMENT        Add-on orders for these samples will be processed based on acceptable specimen integrity and analyte stability, which may vary by analyte. Imaging  CXR Results  (Last 48 hours)                 11/06/22 0600  XR CHEST PORT Final result    Impression:      No acute cardiopulmonary process           Narrative:  EXAM:  XR CHEST PORT       INDICATION: Chest pain       COMPARISON: 10/29/2022       TECHNIQUE: Upright portable chest AP view       FINDINGS: Cardiac monitoring leads heart size is borderline. The pulmonary   vasculature is within normal limits. The lungs and pleural spaces are clear.  The visualized bones and upper abdomen   are age-appropriate. CT Results  (Last 48 hours)                 11/06/22 0656  CT ABD PELV WO CONT Final result    Impression:  1. No definite acute intra-abdominal pathology. 2.  There is suggestion of gastric wall thickening which may represent   gastritis. Small hiatal hernia is noted. Narrative:  EXAM: CT ABD PELV WO CONT       INDICATION: Nausea and vomiting with abdominal pain       COMPARISON: CT dated 10/30/2020       CONTRAST:  None. TECHNIQUE:    Thin axial images were obtained through the abdomen and pelvis. Coronal and   sagittal reformats were generated. Oral contrast was not administered. CT dose   reduction was achieved through use of a standardized protocol tailored for this   examination and automatic exposure control for dose modulation. The absence of intravenous contrast material reduces the sensitivity for   evaluation of the vasculature and solid organs. FINDINGS:    LOWER THORAX: Hiatal hernia. Right breast prostheses. LIVER: No mass. BILIARY TREE: Cholecystectomy CBD is not dilated. SPLEEN: within normal limits. PANCREAS: No focal abnormality. ADRENALS: Unremarkable. KIDNEYS/URETERS: No calculus or hydronephrosis. STOMACH: Suggestion of gastric wall thickening. SMALL BOWEL: No dilatation or wall thickening. COLON: Diverticulosis. APPENDIX: Unremarkable   PERITONEUM: No ascites or pneumoperitoneum. RETROPERITONEUM: Atherosclerotic disease   REPRODUCTIVE ORGANS: Unremarkable   URINARY BLADDER: No mass or calculus. BONES: No destructive bone lesion. ABDOMINAL WALL: No mass or hernia. ADDITIONAL COMMENTS: N/A                       Assessment and Plan     Anurag Stone is a [de-identified] y.o. female with a PMHx of COPD, HTN, HLD, DM (diet controlled), breast CA s/p R mastectomy who is admitted for sepsis likely secondary to gastritis.     Sepsis with intractable n/v/d likely 2/2 gastritis: Gastritis supported by CT imaging w/o evidence of colitis may be due to viral illness vs recent abx use. Leukocytosis present on initial labs likely due to gastritis; however, recent steroid burst may be contributing factor. Procal and LA were unremarkable. Amylase/lipase were unremarkable. Unlikely due to urinary source as UA was reassuring. Low suspicion for bacterial etiology; therefore, will defer abx at this time. - Admit to telemetry   - continue supportive management   - Give 1L IV fluid bolus then start maintenance fluids given decreased po intake   - CLD, will re-assess and advance as tolerated   - Zofran prn, will monitor Qtc, if prolonged will trial alternative agent   - check rapid covid, influenza A/B   - daily cbc, bmp   - if persistent or worsening diarrhea, could consider stool studies     LESLIE on CKD3: Baseline Cr 1.2 uptrended to 2.15 on admission. Suspect due to IVVD and expect to improve with IV hydration. - 1L NS bolus, then start MIVF   - avoid nephrotoxic agents   - check urine lytes     Hypokalemia: Mild, likely secondary to GI losses. Diovan could be contributing factor.   - daily bmp   - replete prn     Thrombocytosis: 425 on admission (BL ?300s). Suspect reactive due to acute illness. Peripheral smear completed on last admission without significant platelet abnormalities. - monitor on daily cbc      Hypertension: Chronic, stable on admission.   - will hold home medications of Diovan /12.5 daily, Metoprolol XL 200mg daily, Norvasc 10mg daily until able to tolerate po   - Labetalol prn ordered  - Will continue to monitor at this time and readjust as BP's trend     DMII - A1c 6.7 (9/19/2022) and at goal. Controlled with diet. Not currently on home medications. - daily bmp   - Insulin Sliding Scale normal sensitivity with AC&HS glucose checks. - Hypoglycemia protocols ordered.   - Goal glucose concentration >70, <140 pre-meal and <180 with all random glucoses    COPD: Recent admission for exacerbation treated with abx and steroid burst with resolution. No evidence of further exacerbation on exam. Not currently on outpatient regimen. CXR on admission was unremarkable. Chronic tobacco use present. - Monitor VS   - encourage cessation of vaping  - Follow up with PCP as outpatient    HLD: Chronic, will continue home meds when able to tolerate po.   - Hold home Crestor 10mg daily     GERD: Chronic, on Omeprazole 40mg daily. Given unable to tolerate po will substitute IV Protonix. FEN/GI - Clear liquid diet. NS at 100 mL/hr. Activity - Ambulate with assistance  DVT prophylaxis - Sub Q Heparin  GI prophylaxis - Protonix  Fall prophylaxis - Not indicated at this time. Disposition - Admit to Telemetry. Plan to d/c to TBD. Consulting PT, OT, and CM  Code Status - DNR. Discussed with patient / caregivers. Next of Kin Name and Contact - Nuvia Sanchez,  Daughter - [phone]    Patient Reida Salt will be discussed with Dr. Magdy Bliss (-OB Attending) .     10:24 AM, 11/06/22  Rohith Salcedo MD  Family Medicine Resident       For Billing    Chief Complaint   Patient presents with    Vomiting    Diarrhea       Hospital Problems  Date Reviewed: 9/19/2022            Codes Class Noted POA    Gastritis ICD-10-CM: K29.70  ICD-9-CM: 535.50  11/6/2022 Unknown        Thrombocytosis ICD-10-CM: D75.839  ICD-9-CM: 238.71  11/6/2022 Unknown        Hypokalemia ICD-10-CM: E87.6  ICD-9-CM: 276.8  11/6/2022 Unknown        Elevated triglycerides with high cholesterol ICD-10-CM: E78.2  ICD-9-CM: 272.2  8/28/2015 Yes        CKD (chronic kidney disease), stage III ([facility] ICD-10-CM: N18.30  ICD-9-CM: 585.3  6/6/2012 Yes        COPD (chronic obstructive pulmonary disease) ([facility] ICD-10-CM: J44.9  ICD-9-CM: 312  12/28/2011 Yes        Essential hypertension ICD-10-CM: I10  ICD-9-CM: 401.9  6/23/2011 Yes        Type 2 diabetes mellitus without complications ([facility] NXG-55-ES: E11.9  ICD-9-CM: 250.00  6/23/2011 Yes

## 2022-11-07 LAB
ALBUMIN SERPL-MCNC: 2.5 G/DL (ref 3.5–5)
ALBUMIN/GLOB SERPL: 0.6 {RATIO} (ref 1.1–2.2)
ALP SERPL-CCNC: 20 U/L (ref 45–117)
ALT SERPL-CCNC: 26 U/L (ref 12–78)
ANION GAP SERPL CALC-SCNC: 10 MMOL/L (ref 5–15)
ANION GAP SERPL CALC-SCNC: 7 MMOL/L (ref 5–15)
ANION GAP SERPL CALC-SCNC: 8 MMOL/L (ref 5–15)
AST SERPL-CCNC: 98 U/L (ref 15–37)
BASOPHILS # BLD: 0 K/UL (ref 0–0.1)
BASOPHILS NFR BLD: 0 % (ref 0–1)
BILIRUB SERPL-MCNC: 0.6 MG/DL (ref 0.2–1)
BUN SERPL-MCNC: 30 MG/DL (ref 6–20)
BUN SERPL-MCNC: 31 MG/DL (ref 6–20)
BUN SERPL-MCNC: 32 MG/DL (ref 6–20)
BUN/CREAT SERPL: 26 (ref 12–20)
BUN/CREAT SERPL: 27 (ref 12–20)
BUN/CREAT SERPL: 30 (ref 12–20)
CA-I BLD-SCNC: 0.83 MMOL/L (ref 1.13–1.32)
CALCIUM SERPL-MCNC: 5.8 MG/DL (ref 8.5–10.1)
CALCIUM SERPL-MCNC: 6.1 MG/DL (ref 8.5–10.1)
CALCIUM SERPL-MCNC: 6.6 MG/DL (ref 8.5–10.1)
CALCIUM SERPL-MCNC: 6.6 MG/DL (ref 8.5–10.1)
CHLORIDE SERPL-SCNC: 111 MMOL/L (ref 97–108)
CHLORIDE SERPL-SCNC: 112 MMOL/L (ref 97–108)
CHLORIDE SERPL-SCNC: 113 MMOL/L (ref 97–108)
CO2 SERPL-SCNC: 25 MMOL/L (ref 21–32)
CO2 SERPL-SCNC: 25 MMOL/L (ref 21–32)
CO2 SERPL-SCNC: 27 MMOL/L (ref 21–32)
COMMENT, HOLDF: NORMAL
COMMENT, HOLDF: NORMAL
CREAT SERPL-MCNC: 1.06 MG/DL (ref 0.55–1.02)
CREAT SERPL-MCNC: 1.15 MG/DL (ref 0.55–1.02)
CREAT SERPL-MCNC: 1.17 MG/DL (ref 0.55–1.02)
DIFFERENTIAL METHOD BLD: ABNORMAL
EOSINOPHIL # BLD: 0 K/UL (ref 0–0.4)
EOSINOPHIL NFR BLD: 0 % (ref 0–7)
ERYTHROCYTE [DISTWIDTH] IN BLOOD BY AUTOMATED COUNT: 14 % (ref 11.5–14.5)
GLOBULIN SER CALC-MCNC: 3.9 G/DL (ref 2–4)
GLUCOSE BLD STRIP.AUTO-MCNC: 130 MG/DL (ref 65–117)
GLUCOSE BLD STRIP.AUTO-MCNC: 153 MG/DL (ref 65–117)
GLUCOSE BLD STRIP.AUTO-MCNC: 164 MG/DL (ref 65–117)
GLUCOSE BLD STRIP.AUTO-MCNC: 61 MG/DL (ref 65–117)
GLUCOSE SERPL-MCNC: 100 MG/DL (ref 65–100)
GLUCOSE SERPL-MCNC: 184 MG/DL (ref 65–100)
GLUCOSE SERPL-MCNC: 91 MG/DL (ref 65–100)
HCT VFR BLD AUTO: 31.3 % (ref 35–47)
HGB BLD-MCNC: 10 G/DL (ref 11.5–16)
IMM GRANULOCYTES # BLD AUTO: 0.1 K/UL (ref 0–0.04)
IMM GRANULOCYTES NFR BLD AUTO: 1 % (ref 0–0.5)
LYMPHOCYTES # BLD: 3.1 K/UL (ref 0.8–3.5)
LYMPHOCYTES NFR BLD: 27 % (ref 12–49)
MAGNESIUM SERPL-MCNC: 0.4 MG/DL (ref 1.6–2.4)
MAGNESIUM SERPL-MCNC: 0.9 MG/DL (ref 1.6–2.4)
MAGNESIUM SERPL-MCNC: <0.3 MG/DL (ref 1.6–2.4)
MCH RBC QN AUTO: 28 PG (ref 26–34)
MCHC RBC AUTO-ENTMCNC: 31.9 G/DL (ref 30–36.5)
MCV RBC AUTO: 87.7 FL (ref 80–99)
MONOCYTES # BLD: 0.9 K/UL (ref 0–1)
MONOCYTES NFR BLD: 7 % (ref 5–13)
NEUTS SEG # BLD: 7.5 K/UL (ref 1.8–8)
NEUTS SEG NFR BLD: 65 % (ref 32–75)
NRBC # BLD: 0 K/UL (ref 0–0.01)
NRBC BLD-RTO: 0 PER 100 WBC
PHOSPHATE SERPL-MCNC: 2.9 MG/DL (ref 2.6–4.7)
PLATELET # BLD AUTO: 293 K/UL (ref 150–400)
PMV BLD AUTO: 9.7 FL (ref 8.9–12.9)
POTASSIUM SERPL-SCNC: 2.5 MMOL/L (ref 3.5–5.1)
POTASSIUM SERPL-SCNC: 2.8 MMOL/L (ref 3.5–5.1)
POTASSIUM SERPL-SCNC: 3.4 MMOL/L (ref 3.5–5.1)
PROT SERPL-MCNC: 6.4 G/DL (ref 6.4–8.2)
PTH-INTACT SERPL-MCNC: 42.7 PG/ML (ref 18.4–88)
RBC # BLD AUTO: 3.57 M/UL (ref 3.8–5.2)
SAMPLES BEING HELD,HOLD: NORMAL
SAMPLES BEING HELD,HOLD: NORMAL
SARS-COV-2, XPLCVT: NOT DETECTED
SERVICE CMNT-IMP: ABNORMAL
SODIUM SERPL-SCNC: 144 MMOL/L (ref 136–145)
SODIUM SERPL-SCNC: 146 MMOL/L (ref 136–145)
SODIUM SERPL-SCNC: 148 MMOL/L (ref 136–145)
SOURCE, COVRS: NORMAL
WBC # BLD AUTO: 11.6 K/UL (ref 3.6–11)

## 2022-11-07 PROCEDURE — C9113 INJ PANTOPRAZOLE SODIUM, VIA: HCPCS | Performed by: STUDENT IN AN ORGANIZED HEALTH CARE EDUCATION/TRAINING PROGRAM

## 2022-11-07 PROCEDURE — 74011250636 HC RX REV CODE- 250/636: Performed by: STUDENT IN AN ORGANIZED HEALTH CARE EDUCATION/TRAINING PROGRAM

## 2022-11-07 PROCEDURE — 85025 COMPLETE CBC W/AUTO DIFF WBC: CPT

## 2022-11-07 PROCEDURE — 82962 GLUCOSE BLOOD TEST: CPT

## 2022-11-07 PROCEDURE — 2709999900 HC NON-CHARGEABLE SUPPLY

## 2022-11-07 PROCEDURE — 74011636637 HC RX REV CODE- 636/637: Performed by: STUDENT IN AN ORGANIZED HEALTH CARE EDUCATION/TRAINING PROGRAM

## 2022-11-07 PROCEDURE — 97162 PT EVAL MOD COMPLEX 30 MIN: CPT

## 2022-11-07 PROCEDURE — 74011000250 HC RX REV CODE- 250: Performed by: STUDENT IN AN ORGANIZED HEALTH CARE EDUCATION/TRAINING PROGRAM

## 2022-11-07 PROCEDURE — 83735 ASSAY OF MAGNESIUM: CPT

## 2022-11-07 PROCEDURE — 77030020847 HC STATLOK BARD -A

## 2022-11-07 PROCEDURE — 80048 BASIC METABOLIC PNL TOTAL CA: CPT

## 2022-11-07 PROCEDURE — 97530 THERAPEUTIC ACTIVITIES: CPT

## 2022-11-07 PROCEDURE — 83970 ASSAY OF PARATHORMONE: CPT

## 2022-11-07 PROCEDURE — 65270000029 HC RM PRIVATE

## 2022-11-07 PROCEDURE — 74011250637 HC RX REV CODE- 250/637: Performed by: STUDENT IN AN ORGANIZED HEALTH CARE EDUCATION/TRAINING PROGRAM

## 2022-11-07 PROCEDURE — 82306 VITAMIN D 25 HYDROXY: CPT

## 2022-11-07 PROCEDURE — 82330 ASSAY OF CALCIUM: CPT

## 2022-11-07 PROCEDURE — 99232 SBSQ HOSP IP/OBS MODERATE 35: CPT | Performed by: FAMILY MEDICINE

## 2022-11-07 PROCEDURE — 36415 COLL VENOUS BLD VENIPUNCTURE: CPT

## 2022-11-07 PROCEDURE — 97165 OT EVAL LOW COMPLEX 30 MIN: CPT

## 2022-11-07 PROCEDURE — 97535 SELF CARE MNGMENT TRAINING: CPT

## 2022-11-07 PROCEDURE — 97116 GAIT TRAINING THERAPY: CPT

## 2022-11-07 PROCEDURE — 77010033678 HC OXYGEN DAILY

## 2022-11-07 PROCEDURE — 36600 WITHDRAWAL OF ARTERIAL BLOOD: CPT

## 2022-11-07 PROCEDURE — 80053 COMPREHEN METABOLIC PANEL: CPT

## 2022-11-07 PROCEDURE — 94761 N-INVAS EAR/PLS OXIMETRY MLT: CPT

## 2022-11-07 PROCEDURE — 84100 ASSAY OF PHOSPHORUS: CPT

## 2022-11-07 RX ORDER — SODIUM CHLORIDE AND POTASSIUM CHLORIDE 300; 900 MG/100ML; MG/100ML
INJECTION, SOLUTION INTRAVENOUS CONTINUOUS
Status: DISCONTINUED | OUTPATIENT
Start: 2022-11-07 | End: 2022-11-07

## 2022-11-07 RX ORDER — POTASSIUM CHLORIDE 7.45 MG/ML
10 INJECTION INTRAVENOUS
Status: COMPLETED | OUTPATIENT
Start: 2022-11-07 | End: 2022-11-07

## 2022-11-07 RX ORDER — CALCIUM GLUCONATE 20 MG/ML
1 INJECTION, SOLUTION INTRAVENOUS ONCE
Status: COMPLETED | OUTPATIENT
Start: 2022-11-07 | End: 2022-11-07

## 2022-11-07 RX ORDER — POTASSIUM CHLORIDE 750 MG/1
20 TABLET, FILM COATED, EXTENDED RELEASE ORAL
Status: DISPENSED | OUTPATIENT
Start: 2022-11-07 | End: 2022-11-07

## 2022-11-07 RX ORDER — DEXTROSE, SODIUM CHLORIDE, AND POTASSIUM CHLORIDE 5; .45; .15 G/100ML; G/100ML; G/100ML
125 INJECTION INTRAVENOUS CONTINUOUS
Status: DISCONTINUED | OUTPATIENT
Start: 2022-11-07 | End: 2022-11-08

## 2022-11-07 RX ORDER — POTASSIUM CHLORIDE 7.45 MG/ML
10 INJECTION INTRAVENOUS
Status: DISPENSED | OUTPATIENT
Start: 2022-11-07 | End: 2022-11-07

## 2022-11-07 RX ORDER — MAGNESIUM SULFATE HEPTAHYDRATE 40 MG/ML
2 INJECTION, SOLUTION INTRAVENOUS
Status: DISPENSED | OUTPATIENT
Start: 2022-11-07 | End: 2022-11-07

## 2022-11-07 RX ORDER — MAGNESIUM SULFATE HEPTAHYDRATE 40 MG/ML
2 INJECTION, SOLUTION INTRAVENOUS
Status: COMPLETED | OUTPATIENT
Start: 2022-11-07 | End: 2022-11-07

## 2022-11-07 RX ADMIN — POTASSIUM CHLORIDE 10 MEQ: 7.46 INJECTION, SOLUTION INTRAVENOUS at 18:25

## 2022-11-07 RX ADMIN — POTASSIUM CHLORIDE, DEXTROSE MONOHYDRATE AND SODIUM CHLORIDE 125 ML/HR: 150; 5; 450 INJECTION, SOLUTION INTRAVENOUS at 21:58

## 2022-11-07 RX ADMIN — SODIUM CHLORIDE, PRESERVATIVE FREE 10 ML: 5 INJECTION INTRAVENOUS at 21:58

## 2022-11-07 RX ADMIN — CALCIUM GLUCONATE 1000 MG: 20 INJECTION, SOLUTION INTRAVENOUS at 19:24

## 2022-11-07 RX ADMIN — MAGNESIUM SULFATE HEPTAHYDRATE 2 G: 40 INJECTION, SOLUTION INTRAVENOUS at 18:25

## 2022-11-07 RX ADMIN — MAGNESIUM SULFATE HEPTAHYDRATE 2 G: 40 INJECTION, SOLUTION INTRAVENOUS at 15:58

## 2022-11-07 RX ADMIN — HEPARIN SODIUM 5000 UNITS: 5000 INJECTION INTRAVENOUS; SUBCUTANEOUS at 16:03

## 2022-11-07 RX ADMIN — HEPARIN SODIUM 5000 UNITS: 5000 INJECTION INTRAVENOUS; SUBCUTANEOUS at 01:45

## 2022-11-07 RX ADMIN — MAGNESIUM SULFATE HEPTAHYDRATE 2 G: 40 INJECTION, SOLUTION INTRAVENOUS at 06:20

## 2022-11-07 RX ADMIN — POTASSIUM CHLORIDE 10 MEQ: 7.46 INJECTION, SOLUTION INTRAVENOUS at 15:59

## 2022-11-07 RX ADMIN — VALSARTAN 320 MG: 80 TABLET, FILM COATED ORAL at 09:55

## 2022-11-07 RX ADMIN — POTASSIUM CHLORIDE 20 MEQ: 750 TABLET, FILM COATED, EXTENDED RELEASE ORAL at 09:54

## 2022-11-07 RX ADMIN — METOPROLOL SUCCINATE 200 MG: 50 TABLET, FILM COATED, EXTENDED RELEASE ORAL at 09:55

## 2022-11-07 RX ADMIN — CALCIUM GLUCONATE 1000 MG: 20 INJECTION, SOLUTION INTRAVENOUS at 13:15

## 2022-11-07 RX ADMIN — ROSUVASTATIN CALCIUM 10 MG: 10 TABLET, COATED ORAL at 09:54

## 2022-11-07 RX ADMIN — Medication 2 UNITS: at 18:42

## 2022-11-07 RX ADMIN — SODIUM CHLORIDE, PRESERVATIVE FREE 10 ML: 5 INJECTION INTRAVENOUS at 15:59

## 2022-11-07 RX ADMIN — SODIUM CHLORIDE 40 MG: 9 INJECTION, SOLUTION INTRAMUSCULAR; INTRAVENOUS; SUBCUTANEOUS at 09:58

## 2022-11-07 RX ADMIN — POTASSIUM CHLORIDE 20 MEQ: 750 TABLET, FILM COATED, EXTENDED RELEASE ORAL at 06:23

## 2022-11-07 RX ADMIN — AMLODIPINE BESYLATE 10 MG: 5 TABLET ORAL at 09:54

## 2022-11-07 RX ADMIN — POTASSIUM CHLORIDE, DEXTROSE MONOHYDRATE AND SODIUM CHLORIDE 125 ML/HR: 150; 5; 450 INJECTION, SOLUTION INTRAVENOUS at 07:05

## 2022-11-07 RX ADMIN — HYDROCHLOROTHIAZIDE 12.5 MG: 25 TABLET ORAL at 09:55

## 2022-11-07 RX ADMIN — PROCHLORPERAZINE EDISYLATE 10 MG: 5 INJECTION INTRAMUSCULAR; INTRAVENOUS at 05:58

## 2022-11-07 NOTE — PROGRESS NOTES
Vascular Access Team:  Extended dwell Arrow endurance ultrasound guided IV placed in the left forearm . 6 cm, by [place]. Line has brisk blood return. Secured with stat lock, dressed with large tegaderm, alcohol cap utilized. Patient tolerated well. Primary RN Diann Haro made aware line is ready for use.       Adam Herron RN

## 2022-11-07 NOTE — ED NOTES
Verbal shift change report given to Mari Arenas (oncoming nurse) by Ana Marie (offgoing nurse). Report included the following information SBAR, ED Summary, Procedure Summary and MAR.

## 2022-11-07 NOTE — PROGRESS NOTES
Problem: Self Care Deficits Care Plan (Adult)  Goal: *Acute Goals and Plan of Care (Insert Text)  Description: FUNCTIONAL STATUS PRIOR TO ADMISSION: Patient was modified independent using a single point cane for functional mobility. HOME SUPPORT: The patient lived with son but did not require assist.    Occupational Therapy Goals  Initiated 11/7/2022  1. Patient will perform lower body dressing with supervision/set-up within 7 day(s). 2.  Patient will perform grooming with supervision/set-up within 7 day(s). 3.  Patient will perform toilet transfers with supervision/set-up within 7 day(s). 4.  Patient will perform all aspects of toileting with supervision/set-up within 7 day(s). 5.  Patient will participate in upper extremity therapeutic exercise/activities with supervision/set-up for 10 minutes within 7 day(s). 6.  Patient will utilize energy conservation techniques during functional activities with verbal cues within 7 day(s). Outcome: Progressing Towards Goal   OCCUPATIONAL THERAPY EVALUATION  Patient: Ashley Wild (3 y.o. female)  Date: 11/7/2022  Primary Diagnosis: Colitis [K52.9]       Precautions:   DNR, Contact (COVID r/o)    ASSESSMENT  Based on the objective data described below, the patient presents with hospital admission secondary to gastritis. Patient with recent RSV and discharge home. She reports independence at baseline and uses [facility]PC outside of the home. Patient today requires min assist for bed mobility. Once seated ,noted patient shaking and holding to therapist secondary to fear. Patient able to stand and requires mod x 2 to [facility] set up next to bed. Patient successful for voiding and able to manage hygiene in sitting, but requires assist in standing for thoroughness. Patient returned to bed at end of session. Patient to benefit from rehab setting at discharge. .    Current Level of Function Impacting Discharge (ADLs/self-care):  Mod A x 2 mobility, up to min assist for ADLs      Other factors to consider for discharge:      Patient will benefit from skilled therapy intervention to address the above noted impairments. PLAN :  Recommendations and Planned Interventions: self care training, functional mobility training, therapeutic exercise, balance training, therapeutic activities, endurance activities, patient education, home safety training, and family training/education    Frequency/Duration: Patient will be followed by occupational therapy 5 times a week to address goals. Recommendation for discharge: (in order for the patient to meet his/her long term goals)  Therapy up to 5 days/week in SNF setting    This discharge recommendation:  Has been made in collaboration with the attending provider and/or case management    IF patient discharges home will need the following DME: TBD       SUBJECTIVE:   Patient stated [place] Getting you for coming.     OBJECTIVE DATA SUMMARY:   HISTORY:   Past Medical History:   Diagnosis Date    ACP (advance care planning) 8/29/2018    Advance Care Plan or Surrogate Decision Maker documented in medical record.     Arthritis     Shoulders, knees    Bell's palsy 1980    Residual remains on right side    Breast cancer ([address] 5/1995    Cholelithiasis 6/23/2011    CKD (chronic kidney disease), stage III ([address] 6/6/2012    COPD (chronic obstructive pulmonary disease) ([address] 12/28/2011    Dyslipidemia 6/23/2011    Elevated triglycerides with high cholesterol 8/28/2015    Esophageal reflux 6/23/2011    Essential hypertension 6/23/2011    GERD (gastroesophageal reflux disease)     Hiatal hernia 6/23/2011    History of breast cancer 6/23/2011    Rt Breast    History of duodenal ulcer 6/23/2011    HTN (hypertension) 6/23/2011    Hyperlipidemia 1/7/2013    Left ventricular hypertrophy 6/23/2011    Nicotine dependence 9/2/2015    Obstructive chronic bronchitis with acute bronchitis ([address] 6/23/2011    Osteopenia     Osteoporosis 6/23/2011    Other ill-defined conditions(799.89)     rightsided eye and cheek numb from surg    Pregnancy 6/23/2011    Steatosis of liver 10/9/2011    Tubular adenoma of colon 1/24/2016 1/4/16    Type 2 diabetes mellitus without complications ([facility] 1/37/8655    Type II or unspecified type diabetes mellitus without mention of complication, not stated as uncontrolled 6/23/2011     Past Surgical History:   Procedure Laterality Date    ENDOSCOPY, COLON, DIAGNOSTIC  10/09    HX CHOLECYSTECTOMY  10/2011    laparoscopic    HX HEENT      bells palsy surgery,tongue numb    HX MASTECTOMY Right 5/1995    HX OTHER SURGICAL      colonoscopy    HX TONSIL AND ADENOIDECTOMY  1969    HX TONSILLECTOMY  1969    NM MASTECTOMY BRA  1995    R Breast    NM REMOVAL GALLBLADDER         Expanded or extensive additional review of patient history:     Home Situation  Home Environment: Apartment  # Steps to Enter: 0  One/Two Story Residence: One story  Living Alone: No  Support Systems: Child(olga)  Patient Expects to be Discharged to[de-identified] Home  Current DME Used/Available at Home: Cane, straight  Tub or Shower Type: Tub/Shower combination    Hand dominance: Right    EXAMINATION OF PERFORMANCE DEFICITS:  Cognitive/Behavioral Status:  Neurologic State: Alert  Orientation Level: Oriented X4  Cognition: Follows commands  Perception: Appears intact  Perseveration: No perseveration noted  Safety/Judgement: Awareness of environment    Skin: intact as seen    Edema: none noted     Hearing:       Vision/Perceptual:                                     Range of Motion:  UE ROM WFL                            Strength:  UE strength WFL                   Coordination:     Fine Motor Skills-Upper: Left Intact; Right Intact    Gross Motor Skills-Upper: Left Intact; Right Intact    Tone & Sensation:  Normal tone, sensation intact                            Balance:  Sitting: High guard  Standing: Impaired; With support  Standing - Static: Constant support  Standing - Dynamic : Constant support    Functional Mobility and Transfers for ADLs:  Bed Mobility:  Supine to Sit: Minimum assistance  Sit to Supine: Minimum assistance  Scooting: Minimum assistance    Transfers:  Sit to Stand: Moderate assistance;Assist x2  Stand to Sit: Moderate assistance;Assist x2  Toilet Transfer : Moderate assistance;Assist x2  Assistive Device : Walker, rolling    ADL Assessment:  Feeding: Setup    Oral Facial Hygiene/Grooming: Contact guard assistance    Bathing: Minimum assistance         Upper Body Dressing: Minimum assistance    Lower Body Dressing: Minimum assistance    Toileting: Minimum assistance                  ADL Intervention and task modifications:                                          Cognitive Retraining  Safety/Judgement: Awareness of environment      Therapeutic Exercise:        Occupational Therapy Evaluation Charge Determination   History Examination Decision-Making   LOW Complexity : Brief history review  LOW Complexity : 1-3 performance deficits relating to physical, cognitive , or psychosocial skils that result in activity limitations and / or participation restrictions  LOW Complexity : No comorbidities that affect functional and no verbal or physical assistance needed to complete eval tasks       Based on the above components, the patient evaluation is determined to be of the following complexity level: LOW   Pain Rating:  None noted     Activity Tolerance:   Good and requires rest breaks    After treatment patient left in no apparent distress:    Supine in bed, Call bell within reach, and Caregiver / family present    COMMUNICATION/EDUCATION:   The patients plan of care was discussed with: Physical therapist and Registered nurse. Home safety education was provided and the patient/caregiver indicated understanding., Patient/family have participated as able in goal setting and plan of care. , and Patient/family agree to work toward stated goals and plan of care.     This patients plan of care is appropriate for delegation to [facility].     Thank you for this referral.  Nahed Catalan, OTR/L  Time Calculation: 27 mins

## 2022-11-07 NOTE — PROGRESS NOTES
Problem: Mobility Impaired (Adult and Pediatric)  Goal: *Acute Goals and Plan of Care (Insert Text)  Description: FUNCTIONAL STATUS PRIOR TO ADMISSION: Patient was modified independent using a single point cane for functional mobility. HOME SUPPORT PRIOR TO ADMISSION: The patient lived with son but did not require assist.    Physical Therapy Goals  Initiated 11/7/2022  1. Patient will move from supine to sit and sit to supine  in bed with supervision/set-up within 7 day(s). 2.  Patient will transfer from bed to chair and chair to bed with minimal assistance/contact guard assist using the least restrictive device within 7 day(s). 3.  Patient will perform sit to stand with minimal assistance/contact guard assist within 7 day(s). 4.  Patient will ambulate with minimal assistance/contact guard assist for 150 feet with the least restrictive device within 7 day(s). Outcome: Progressing Towards Goal   PHYSICAL THERAPY EVALUATION  Patient: Madi Sanders (58 y.o. female)  Date: 11/7/2022  Primary Diagnosis: Colitis [K52.9]       Precautions:   DNR, Contact (COVID r/o)    ASSESSMENT  Based on the objective data described below, the patient presents with admission due to colitis/RSV (10-29-22). Pt received in ER bed on 2LO2. Pt with R facial droop from Altheimer Palsy. He speech is slightly slurred. Pt needing Min Ax2 to EOB with supine to sit. Noted incontinence of urine and and in need of changing and toileting. Pt fearful of falling with sit to stand with RW needing Mod Ax2 with RW. Performed self care with set up. Gait of 3'x2 returning to supine. Pt will need rehab upon discharge. She is a fall risk with noted balance/gait/strength and activity tolerance deficits. Current Level of Function Impacting Discharge (mobility/balance): Min to Mod Ax2/fair to poor    Functional Outcome Measure:   The patient scored 35/100 on the barhtel outcome measure   Other factors to consider for discharge: per above Patient will benefit from skilled therapy intervention to address the above noted impairments. PLAN :  Recommendations and Planned Interventions: bed mobility training, transfer training, gait training, therapeutic exercises, neuromuscular re-education, edema management/control, patient and family training/education, and therapeutic activities      Frequency/Duration: Patient will be followed by physical therapy:  5 times a week to address goals. Recommendation for discharge: (in order for the patient to meet his/her long term goals)  Therapy up to 5 days/week in SNF setting    This discharge recommendation:  Has been made in collaboration with the attending provider and/or case management    IF patient discharges home will need the following DME: to be determined (TBD)         SUBJECTIVE:   Patient stated I am afraid.     OBJECTIVE DATA SUMMARY:   HISTORY:    Past Medical History:   Diagnosis Date    ACP (advance care planning) 8/29/2018    Advance Care Plan or Surrogate Decision Maker documented in medical record.     Arthritis     Shoulders, knees    Bell's palsy 1980    Residual remains on right side    Breast cancer ([address] 5/1995    Cholelithiasis 6/23/2011    CKD (chronic kidney disease), stage III ([address] 6/6/2012    COPD (chronic obstructive pulmonary disease) ([address] 12/28/2011    Dyslipidemia 6/23/2011    Elevated triglycerides with high cholesterol 8/28/2015    Esophageal reflux 6/23/2011    Essential hypertension 6/23/2011    GERD (gastroesophageal reflux disease)     Hiatal hernia 6/23/2011    History of breast cancer 6/23/2011    Rt Breast    History of duodenal ulcer 6/23/2011    HTN (hypertension) 6/23/2011    Hyperlipidemia 1/7/2013    Left ventricular hypertrophy 6/23/2011    Nicotine dependence 9/2/2015    Obstructive chronic bronchitis with acute bronchitis ([address] 6/23/2011    Osteopenia     Osteoporosis 6/23/2011    Other ill-defined conditions(799.89)     rightsided eye and cheek numb from surg    Pregnancy 6/23/2011    Steatosis of liver 10/9/2011    Tubular adenoma of colon 1/24/2016 1/4/16    Type 2 diabetes mellitus without complications ([facility] 6/90/9778    Type II or unspecified type diabetes mellitus without mention of complication, not stated as uncontrolled 6/23/2011     Past Surgical History:   Procedure Laterality Date    ENDOSCOPY, COLON, DIAGNOSTIC  10/09    HX CHOLECYSTECTOMY  10/2011    laparoscopic    HX HEENT      bells palsy surgery,tongue numb    HX MASTECTOMY Right 5/1995    HX OTHER SURGICAL      colonoscopy    HX TONSIL AND ADENOIDECTOMY  1969    HX TONSILLECTOMY  1969    TN MASTECTOMY BRA  1995    R Breast    TN REMOVAL GALLBLADDER         Personal factors and/or comorbidities impacting plan of care: per above and below    Home Situation  Home Environment: Apartment  # Steps to Enter: 0  One/Two Story Residence: One story  Living Alone: No  Support Systems: Child(olga)  Patient Expects to be Discharged to[de-identified] Home  Current DME Used/Available at Home: Cane, straight  Tub or Shower Type: Tub/Shower combination    EXAMINATION/PRESENTATION/DECISION MAKING:   Critical Behavior:  Neurologic State: Alert  Orientation Level: Oriented X4  Cognition: Follows commands  Safety/Judgement: Awareness of environment  Hearing:     Skin:  IV  Edema: WNL  Range Of Motion:  AROM: Within functional limits                       Strength:    Strength: Generally decreased, functional                    Tone & Sensation:   Tone: Normal              Sensation: Intact               Coordination:  Coordination: Generally decreased, functional  Vision:      Functional Mobility:  Bed Mobility:     Supine to Sit: Minimum assistance  Sit to Supine: Minimum assistance  Scooting: Minimum assistance  Transfers:  Sit to Stand: Minimum assistance;Assist x2  Stand to Sit: Minimum assistance;Assist x2                       Balance:   Sitting: High guard  Standing: Impaired; With support  Standing - Static: Constant support  Standing - Dynamic : Constant support  Ambulation/Gait Training:  Distance (ft): 3 Feet (ft) (x2)  Assistive Device: Walker, rolling;Gait belt  Ambulation - Level of Assistance: Moderate assistance;Assist x2        Gait Abnormalities: Decreased step clearance        Base of Support: Narrowed; Center of gravity altered     Speed/Deborah: Slow;Shuffled;Pace decreased (<100 feet/min)  Step Length: Right shortened;Left shortened                    Functional Measure:  Barthel Index:    Bathin  Bladder: 0  Bowels: 5  Groomin  Dressin  Feeding: 10  Mobility: 0  Stairs: 0  Toilet Use: 5  Transfer (Bed to Chair and Back): 5  Total: 35/100       The Barthel ADL Index: Guidelines  1. The index should be used as a record of what a patient does, not as a record of what a patient could do. 2. The main aim is to establish degree of independence from any help, physical or verbal, however minor and for whatever reason. 3. The need for supervision renders the patient not independent. 4. A patient's performance should be established using the best available evidence. Asking the patient, friends/relatives and nurses are the usual sources, but direct observation and common sense are also important. However direct testing is not needed. 5. Usually the patient's performance over the preceding 24-48 hours is important, but occasionally longer periods will be relevant. 6. Middle categories imply that the patient supplies over 50 per cent of the effort. 7. Use of aids to be independent is allowed. Score Interpretation (from [address] [address])    Independent   60-79 Minimally independent   40-59 Partially dependent   20-39 Very dependent   <20 Totally dependent     -Kathryn Bravo, Barthel, D.W. (1965). Functional evaluation: the Barthel Index. [facility] ([address] (1997). The Barthel activities of daily living index: self-reporting versus actual performance in the old (> or = 75 years). Journal of [address] [place] [address] 45(6), [facility], [facility], Alejandro Richter., Maureen Ziegler. (1999). Measuring the change in disability after inpatient rehabilitation; comparison of the responsiveness of the Barthel Index and Functional [place] Measure. Journal of Neurology, Neurosurgery, and Psychiatry, 66(4), 947-633. ERIC Liu, ISA Dye, & Carol Bronson M.A. (2004) Assessment of post-stroke quality of life in cost-effectiveness studies: The usefulness of the Barthel Index and the EuroQoL-5D. Quality of Life Research, 15, 391-78           Physical Therapy Evaluation Charge Determination   History Examination Presentation Decision-Making   MEDIUM  Complexity : 1-2 comorbidities / personal factors will impact the outcome/ POC  MEDIUM Complexity : 3 Standardized tests and measures addressing body structure, function, activity limitation and / or participation in recreation  MEDIUM Complexity : Evolving with changing characteristics  Other outcome measures barthel  HIGH       Based on the above components, the patient evaluation is determined to be of the following complexity level: MEDIUM    Pain Rating:  none    Activity Tolerance:   Fair    After treatment patient left in no apparent distress:   Supine in bed, Heels elevated for pressure relief, Call bell within reach, Caregiver / family present, and Side rails x 3    COMMUNICATION/EDUCATION:   The patients plan of care was discussed with: Occupational therapist, Registered nurse, and Case management. Fall prevention education was provided and the patient/caregiver indicated understanding., Patient/family have participated as able in goal setting and plan of care. , and Patient/family agree to work toward stated goals and plan of care.     Thank you for this referral.  Ivy Euceda, PT   Time Calculation: 36 mins

## 2022-11-07 NOTE — PROGRESS NOTES
[address] [facility] Tarun Aguilar 35   Office [phone]  Fax [phone]          Subjective / Objective     24 Hour Events:  Patient tolerating CLD. BMP with elevated Na 148, K 2.5. Corrected Ca 6.8. Mag 0.3. PTH, vitamin D, and K+ repletion ordered. Fluids held given tolerating CLD and elevated Na. Subjective: Patient reports persistent but improving mild epigastric pain. Overall feels improved from yesterday when she was admitted. Denies recent episodes of vomiting and diarrhea this morning. No acute questions or concerns at this time. Remains on O2 however O2 saturation stable and patient denies respiratory symptoms. Not on O2 at home. Temp (24hrs), Av.8 °F (36.6 °C), Min:97.7 °F (36.5 °C), Max:97.9 °F (36.6 °C)     Physical Exam  Vitals and nursing note reviewed. Constitutional:       General: She is not in acute distress. HENT:      Head: Normocephalic and atraumatic. Mouth/Throat:      Mouth: Mucous membranes are moist.      Pharynx: Oropharynx is clear. Eyes:      Conjunctiva/sclera: Conjunctivae normal.      Pupils: Pupils are equal, round, and reactive to light. Cardiovascular:      Rate and Rhythm: Normal rate and regular rhythm. Heart sounds: No murmur heard. No gallop. Pulmonary:      Effort: Pulmonary effort is normal. No respiratory distress. Breath sounds: No wheezing, rhonchi or rales. Abdominal:      General: Abdomen is flat. Bowel sounds are normal. There is no distension. Palpations: Abdomen is soft. Tenderness: There is no abdominal tenderness. There is no guarding or rebound. Skin:     General: Skin is warm and dry. Capillary Refill: Capillary refill takes less than 2 seconds. Neurological:      General: No focal deficit present. Mental Status: She is alert and oriented to person, place, and time.       Respiratory: O2 Flow Rate (L/min): 3 l/min O2 Device: Nasal cannula     I/O:  Date 22 0700 - 22 1651 11/07/22 0700 - 11/08/22 0659   Shift 5847-4394 1863-2255 24 Hour Total 7438-0176 1768-3587 24 Hour Total   INTAKE   I.V. 986.7 1813.3 2800        Volume (sodium chloride 0.9 % bolus infusion 1,000 mL)  1000 1000        Volume (0.9% sodium chloride infusion) 486.7 813.3 1300        Volume (sodium chloride 0.9 % bolus infusion 500 mL) 500  500      Shift Total(mL/kg) 986.7 1813.3(26.8) 2800(41.4)      OUTPUT   Urine 950  950        Urine Voided 400  400        Urine Occurrence(s) 1 x  1 x        Urine Output (mL) (External Urinary Catheter 11/06/22) 550  550      Emesis/NG output 450  450        Emesis 450  450        Emesis Occurrence(s) 2 x  2 x      Shift Total(mL/kg) 1400  1400(20.7)      NET -413.3 1813.3 1400      Weight (kg)  67.6 67.6 67.6 67.6 67.6       Inpatient Medications  Current Facility-Administered Medications   Medication Dose Route Frequency    [Held by provider] 0.9% sodium chloride with KCl 40 mEq/L infusion   IntraVENous CONTINUOUS    potassium chloride SR (KLOR-CON 10) tablet 20 mEq  20 mEq Oral Q2H    potassium chloride 10 mEq in 100 ml IVPB  10 mEq IntraVENous Q1H    magnesium sulfate 2 g/50 ml IVPB (premix or compounded)  2 g IntraVENous Q1H    sodium chloride (NS) flush 5-40 mL  5-40 mL IntraVENous Q8H    sodium chloride (NS) flush 5-40 mL  5-40 mL IntraVENous PRN    ondansetron (ZOFRAN) injection 4 mg  4 mg IntraVENous Q6H PRN    rosuvastatin (CRESTOR) tablet 10 mg  10 mg Oral DAILY    metoprolol succinate (TOPROL-XL) XL tablet 200 mg  200 mg Oral DAILY    amLODIPine (NORVASC) tablet 10 mg  10 mg Oral DAILY    valsartan (DIOVAN) tablet 320 mg  320 mg Oral DAILY    hydroCHLOROthiazide (HYDRODIURIL) tablet 12.5 mg  12.5 mg Oral DAILY    insulin lispro (HUMALOG) injection   SubCUTAneous AC&HS    glucose chewable tablet 16 g  4 Tablet Oral PRN    glucagon (GLUCAGEN) injection 1 mg  1 mg IntraMUSCular PRN    dextrose 10% infusion 0-250 mL  0-250 mL IntraVENous PRN    prochlorperazine (COMPAZINE) injection 10 mg  10 mg IntraVENous Q6H PRN    labetaloL (NORMODYNE;TRANDATE) 20 mg/4 mL (5 mg/mL) injection 10 mg  10 mg IntraVENous Q4H PRN    heparin (porcine) injection 5,000 Units  5,000 Units SubCUTAneous Q8H    pantoprazole (PROTONIX) 40 mg in 0.9% sodium chloride 10 mL injection  40 mg IntraVENous DAILY     Current Outpatient Medications   Medication Sig    ondansetron (ZOFRAN ODT) 4 mg disintegrating tablet Take 1 Tablet by mouth every eight (8) hours as needed for Nausea or Vomiting.    valsartan-hydroCHLOROthiazide (Diovan HCT) 320-12.5 mg per tablet Take 1 Tablet by mouth daily. omeprazole (PRILOSEC) 40 mg capsule TAKE ONE CAPSULE BY MOUTH DAILY    amLODIPine (NORVASC) 10 mg tablet Take 1 Tablet by mouth daily. metoprolol succinate (TOPROL-XL) 200 mg XL tablet Take 1 Tablet by mouth daily. tiZANidine (ZANAFLEX) 2 mg tablet Take 1 Tablet by mouth three (3) times daily as needed for Pain. (Patient not taking: Reported on 10/29/2022)    rosuvastatin (CRESTOR) 20 mg tablet TAKE ONE TABLET BY MOUTH EVERY EVENING (Patient taking differently: 10 mg. TAKE ONE TABLET BY MOUTH EVERY EVENING)    glucose blood VI test strips (Accu-Chek Ifrah Plus test strp) strip USE TO TEST BLOOD SUGAR DAILY, E11.9    omega-3 fatty acids-vitamin e 1,000 mg cap Take 2 Caps by mouth daily (after breakfast). calcium-vitamin D (OS-MANDI +D3) 500 mg-200 unit per tablet Take 1 Tab by mouth daily (after breakfast). multivitamin (ONE A DAY) tablet Take 1 Tab by mouth daily (after breakfast).          Allergies  Allergies   Allergen Reactions    Nsaids (Non-Steroidal Anti-Inflammatory Drug) Anaphylaxis and Hives    Fenofibrate Other (comments)     Leg cramps    Metformin Diarrhea         CBC:  Recent Labs     11/07/22  0144 11/06/22  0536   WBC 11.6* 20.4*   HGB 10.0* 12.2   HCT 31.3* 37.7    055*       Metabolic Panel:  Recent Labs     11/07/22  0519 11/07/22  0314 11/07/22  0144 11/06/22  0536   *  -- 148* 140   K 2.8*  --  2.5* 3.3*   *  --  113* 103   CO2 27  --  25 25   BUN 31*  --  32* 63*   CREA 1.15*  --  1.06* 2.15*     --  91 242*   CA 6.6*  --  6.1* 7.3*   MG 0.4* <0.3*  --   --    PHOS  --  2.9  --   --    ALB 2.5*  --   --  3.1*   ALT 26  --   --  22            Assessment and Plan     Anurag Stone is a [de-identified] y.o. female with a PMHx of COPD, HTN, HLD, DM (diet controlled), breast CA s/p R mastectomy who is admitted for sepsis likely secondary to gastritis. Sepsis with intractable n/v/d likely 2/2 gastritis: Gastritis supported by CT imaging w/o evidence of colitis may be due to viral illness vs recent abx use. Leukocytosis present on initial labs likely due to gastritis; however, recent steroid burst may be contributing factor. Procal and LA were unremarkable. Amylase/lipase were unremarkable. COVID negative. Patient appears to be clinically improving.   - Continue MIVF; switched to D51/2NS with KCL given electrolyte disturbances   - CLD, ADAT    - Compazine prn   - check influenza   - if persistent or worsening diarrhea, could consider stool studies      LESLIE on CKD3: Baseline Cr 1.2 uptrended to 2.15 on admission. Suspect due to IVVD. Improving s/p fluid replacement. FeNa 4.9% indicating postobstructive cause. Ulytes collected later than bmp suspect inaccurate given no evidence of obstruction with good uop. - continue fluids as above   - avoid nephrotoxic agents      Hypokalemia: Persistent on repeat labs overnight. Repleting and K+ added to fluids.   - IV K+ 10meq x3 doses; Klor con 10meq q2h x3 doses  - repeat afternoon bmp  - replete prn     Hypocalcemia: Acute, no prior record of chronic decrease. Could be secondary to sepsis. Ionized calcium low. Most recent Ca corrected to 7 and patient asymptomatic; however, given acute drop will replete. - Calcium gluconate 1g      Thrombocytosis: Resolved. 425 on admission (BL ?300s). Suspect reactive due to acute illness.  PS completed on last admission without significant platelet abnormalities. Improved to wnl.   - monitor on daily cbc      Hypertension: Chronic, stable on admission.   - continue home medications of Diovan /12.5 daily, Metoprolol XL 200mg daily, Norvasc 10mg daily  - Labetalol prn ordered  - Will continue to monitor at this time and readjust as BP's trend      DMII - A1c 6.7 (9/19/2022) and at goal. Controlled with diet. Not currently on home medications. - daily bmp   - Insulin Sliding Scale normal sensitivity with AC&HS glucose checks. - Hypoglycemia protocols ordered. - Goal glucose concentration >70, <140 pre-meal and <180 with all random glucoses     COPD: Recent admission for exacerbation treated with abx and steroid burst with resolution. No evidence of further exacerbation on exam. Not currently on outpatient regimen. CXR on admission was unremarkable. Chronic tobacco use present. - Monitor VS   - encourage cessation of vaping  - Follow up with PCP as outpatient     HLD: Chronic, will continue home meds when able to tolerate po.   - Continue home Crestor 10mg daily      GERD: Chronic, on Omeprazole 40mg daily. - substitute IV Protonix. FEN/GI - Clear liquid diet. D5 1/2NS w/ KCL at 125 ml/h. Activity - Ambulate with assistance  DVT prophylaxis - Sub Q Heparin  GI prophylaxis - Protonix  Fall prophylaxis - Not indicated at this time. Disposition - Plan to d/c to [facility]. Consulting PT, OT, and CM  Code Status - DNR. Discussed with patient / caregivers.   Next of Katie 71 Name and Contact - Harsha Kline,  Daughter - [phone]     I appreciate the opportunity to participate in the care of this patient,  Ellis Brush MD  Family Medicine Resident       For Billing    Chief Complaint   Patient presents with    Vomiting    Diarrhea       Hospital Problems  Date Reviewed: 9/19/2022            Codes Class Noted POA    Gastritis ICD-10-CM: K29.70  ICD-9-CM: 535.50  11/6/2022 Unknown        Thrombocytosis ICD-10-CM: V82.518  ICD-9-CM: 238.71  11/6/2022 Unknown        Hypokalemia ICD-10-CM: E87.6  ICD-9-CM: 276.8  11/6/2022 Unknown        Elevated triglycerides with high cholesterol ICD-10-CM: E78.2  ICD-9-CM: 272.2  8/28/2015 Yes        CKD (chronic kidney disease), stage III (HCC) ICD-10-CM: N18.30  ICD-9-CM: 585.3  6/6/2012 Yes        COPD (chronic obstructive pulmonary disease) ([facility] ICD-10-CM: J44.9  ICD-9-CM: 496  12/28/2011 Yes        Essential hypertension ICD-10-CM: I10  ICD-9-CM: 401.9  6/23/2011 Yes        Type 2 diabetes mellitus without complications ([facility] BHX-30-YF: E11.9  ICD-9-CM: 250.00  6/23/2011 Yes

## 2022-11-07 NOTE — PROGRESS NOTES
Pt ordered for Home O2 evaluation. Pt on 3L NC. Currently getting line placed. Will try to wean O2 in bed but pt is a high fall risk (Starr=4) and RT will need help to ambulate pt safely.

## 2022-11-07 NOTE — PROGRESS NOTES
Care Management Readmission Assessment        NAME:   Yun Judge   :     1940   MRN:     [id number]             RUR Score/Risk Level:       RUR:  16%  Risk Level: Moderate    Assessment: In person with patient and daughter Severo Ates)    Reason for Readmission:  Ms. Fuad Esqueda is a [de-identified] y.o. female with history that includes arthritis, breast cancer, DM, COPD, CKD, HTN, and HLD  who was emergently admitted for:  sepsis    Patient Active Problem List   Diagnosis Code    Bell's palsy G51.0    Cholelithiasis K80.20    History of breast cancer Z85.3    History of duodenal ulcer Z87.19    Esophageal reflux K21.9    Hiatal hernia K44.9    Essential hypertension I10    Type 2 diabetes mellitus without complications ([facility] C04.3    Left ventricular hypertrophy I51.7    Tobacco abuse Z72.0    Steatosis of liver K76.0    COPD (chronic obstructive pulmonary disease) (HCC) J44.9    Osteopenia M85.80    CKD (chronic kidney disease), stage III (HCC) N18.30    Elevated triglycerides with high cholesterol E78.2    IDALIA (obstructive sleep apnea) G47.33    Tubular adenoma of colon D12.6    H/O hysterectomy for benign disease Z90.710    ACP (advance care planning) Z71.89    Shortness of breath R06.02    After-cataract with vision obscured H26.499    Dermatochalasis H02.839    Pseudophakia of both eyes Z96.1    CAP (community acquired pneumonia) J18.9    Pneumonia J18.9    Colitis K52.9    Gastritis K29.70    Thrombocytosis D75.839    Hypokalemia E87.6       Has any pertinent information changed since previous Care Management Assessment? Living arrangements:   Pt alternates living with 2 of her children Juliet Dawson and Fuad Taylor)   ADLs: At baseline, Pt is independent with ADLs and ambulation. Pt has been experiencing weakness with ambulation.    DME:    [address]:   Oni Alexandrahian    Insurer:  Payor: Prasanth Garay / Plan: [address] [place], [place] [address]y / Product Type: Medicare /     PCP: Roc Crowley MD   Name of Practice:  [facility]   Current patient: Yes   Approximate date of last visit: 9/19/22   Access to virtual PCP visits:  Unknown    Is a Care Conference indicated:   No    Did you attend your follow up appointment(s):   No  If not, why not:  Pt's follow up appt was scheduled for today. Pt unable to make visit. Resources/supports as identified by patient/family:  Pt has a very supportive family         Top Challenges facing patient (as identified by patient/family and CM): Finances/Medication cost?  None identified  Transportation? None identified       Support system or lack thereof? None identified     Living arrangements? None identified         Self-care/ADLs/Cognition? None identified       Advance Directive:  DNR, has an advance directive. Copy on chart. Plan for utilizing home health:  Unknown             Transition of Care Plan:      SNF    Additional information:  Pt recently discharged from [facility] on 10/30/22. Pt admitted on 11/6/22 for sepsis with intractable n/v/d likely 2/2 gastritis. CM met with Pt and daughter Cristofer Guillermo) to complete initial assessment. Pt's children Cristofer Kwokosta and Abdifatah Cortez) alternate Pt staying with them. Pt has no hx of HH or home O2. Pt has a cane that she uses PRN. Pt is independent with ADLs. David Crook denies any usual problems with ADLs. David Crook endorses weakness lately with ambulation. At baseline, Pt is able to ambulate independently without difficulty. Pt is retired. Pt is usually able to drive. CM reviewed PT and OT evals with Pt and David Crook. PT/OT recommending SNF rehab. CM provided education on SNF rehab. David Crook states that would like to speak with her siblings before making a decision. CM offered SNF rehab list.     CM went back to Pt room. David Crook reports all are agreeable to SNF rehab. Preferences are [facility], [facility], and [facility]. Patient choice letter signed.      CM sent referred via [facility] for [address] of [address] [facility]. CM sent referral via [employer] for Tessa Patient. CM waiting decisions (likely tomorrow). Zander De Oliveira states that she would like to drive Pt upon discharge. CM spoke with MD to update on discharge plan. CM requested rapid COVID for placement. MD asked timeframe for acceptance. CM made MD aware CM anticipates that facilities would likely provide decisions tomorrow. CM will continue to follow. _____________________________________  GEORGINA Son - Care Management  11/7/2022   5:05 PM     Readmission Assessment  Number of days since last admission?: 8-30 days  Previous disposition: Home with Family  Who is being interviewed?: Caregiver, Patient  What was the patient's/caregiver's perception as to why they think they needed to return back to the hospital?: Other (Comment) (N/V)  Did you visit your Primary Care Physician after you left the hospital, before you returned this time?: No  Why weren't you able to visit your PCP?: Other (Comment) (appt was scheduled for today)  Did you see a specialist, such as Cardiac, Pulmonary, Orthopedic Physician, etc. after you left the hospital?: No  Who advised the patient to return to the hospital?: Self-referral  Does the patient report anything that got in the way of taking their medications?: No  In our efforts to provide the best possible care to you and others like you, can you think of anything that we could have done to help you after you left the hospital the first time, so that you might not have needed to return so soon?: Other (Comment) (no)        Care Management Interventions  PCP Verified by CM: Yes Yasmani Carter MD)  Mode of Transport at Discharge:  Other (see comment) (family)  Transition of Care Consult (CM Consult): Discharge Planning, SNF  MyChart Signup: No  Discharge Durable Medical Equipment: No  Physical Therapy Consult: Yes  Occupational Therapy Consult: Yes  Support Systems: Child(olga)  Confirm Follow Up Transport: Family  The Plan for Transition of Care is Related to the Following Treatment Goals : (S) weakness  The Patient and/or Patient Representative was Provided with a Choice of Provider and Agrees with the Discharge Plan?: (S) Yes  Name of the Patient Representative Who was Provided with a Choice of Provider and Agrees with the Discharge Plan: (S) patient & blake  Freedom of Choice List was Provided with Basic Dialogue that Supports the Patient's Individualized Plan of Care/Goals, Treatment Preferences and Shares the Quality Data Associated with the Providers?: (S) Yes  The [employer] Information Provided?: No  Discharge Location  Patient Expects to be Discharged to[de-identified] Skilled nursing facility

## 2022-11-08 VITALS
OXYGEN SATURATION: 91 % | HEIGHT: 62 IN | SYSTOLIC BLOOD PRESSURE: 112 MMHG | RESPIRATION RATE: 17 BRPM | TEMPERATURE: 97.5 F | BODY MASS INDEX: 27.42 KG/M2 | HEART RATE: 63 BPM | DIASTOLIC BLOOD PRESSURE: 62 MMHG | WEIGHT: 149 LBS

## 2022-11-08 LAB
25(OH)D3 SERPL-MCNC: 62.1 NG/ML (ref 30–100)
ALBUMIN SERPL-MCNC: 2.3 G/DL (ref 3.5–5)
ANION GAP SERPL CALC-SCNC: 5 MMOL/L (ref 5–15)
BASOPHILS # BLD: 0 K/UL (ref 0–0.1)
BASOPHILS NFR BLD: 0 % (ref 0–1)
BUN SERPL-MCNC: 23 MG/DL (ref 6–20)
BUN/CREAT SERPL: 21 (ref 12–20)
CALCIUM SERPL-MCNC: 7.2 MG/DL (ref 8.5–10.1)
CHLORIDE SERPL-SCNC: 111 MMOL/L (ref 97–108)
CO2 SERPL-SCNC: 26 MMOL/L (ref 21–32)
COVID-19 RAPID TEST, COVR: NOT DETECTED
CREAT SERPL-MCNC: 1.12 MG/DL (ref 0.55–1.02)
DIFFERENTIAL METHOD BLD: ABNORMAL
EOSINOPHIL # BLD: 0.3 K/UL (ref 0–0.4)
EOSINOPHIL NFR BLD: 2 % (ref 0–7)
ERYTHROCYTE [DISTWIDTH] IN BLOOD BY AUTOMATED COUNT: 14.1 % (ref 11.5–14.5)
GLUCOSE BLD STRIP.AUTO-MCNC: 112 MG/DL (ref 65–117)
GLUCOSE BLD STRIP.AUTO-MCNC: 116 MG/DL (ref 65–117)
GLUCOSE BLD STRIP.AUTO-MCNC: 141 MG/DL (ref 65–117)
GLUCOSE SERPL-MCNC: 186 MG/DL (ref 65–100)
HCT VFR BLD AUTO: 32.6 % (ref 35–47)
HGB BLD-MCNC: 10.3 G/DL (ref 11.5–16)
IMM GRANULOCYTES # BLD AUTO: 0.1 K/UL (ref 0–0.04)
IMM GRANULOCYTES NFR BLD AUTO: 1 % (ref 0–0.5)
LYMPHOCYTES # BLD: 2 K/UL (ref 0.8–3.5)
LYMPHOCYTES NFR BLD: 15 % (ref 12–49)
MAGNESIUM SERPL-MCNC: 2.1 MG/DL (ref 1.6–2.4)
MCH RBC QN AUTO: 28.3 PG (ref 26–34)
MCHC RBC AUTO-ENTMCNC: 31.6 G/DL (ref 30–36.5)
MCV RBC AUTO: 89.6 FL (ref 80–99)
MONOCYTES # BLD: 0.7 K/UL (ref 0–1)
MONOCYTES NFR BLD: 5 % (ref 5–13)
NEUTS SEG # BLD: 10.3 K/UL (ref 1.8–8)
NEUTS SEG NFR BLD: 77 % (ref 32–75)
NRBC # BLD: 0 K/UL (ref 0–0.01)
NRBC BLD-RTO: 0 PER 100 WBC
PLATELET # BLD AUTO: 323 K/UL (ref 150–400)
PMV BLD AUTO: 9.7 FL (ref 8.9–12.9)
POTASSIUM SERPL-SCNC: 3.8 MMOL/L (ref 3.5–5.1)
RBC # BLD AUTO: 3.64 M/UL (ref 3.8–5.2)
SERVICE CMNT-IMP: ABNORMAL
SERVICE CMNT-IMP: NORMAL
SERVICE CMNT-IMP: NORMAL
SODIUM SERPL-SCNC: 142 MMOL/L (ref 136–145)
SOURCE, COVRS: NORMAL
WBC # BLD AUTO: 13.5 K/UL (ref 3.6–11)

## 2022-11-08 PROCEDURE — 74011000250 HC RX REV CODE- 250: Performed by: STUDENT IN AN ORGANIZED HEALTH CARE EDUCATION/TRAINING PROGRAM

## 2022-11-08 PROCEDURE — 97535 SELF CARE MNGMENT TRAINING: CPT

## 2022-11-08 PROCEDURE — 94761 N-INVAS EAR/PLS OXIMETRY MLT: CPT

## 2022-11-08 PROCEDURE — 74011250637 HC RX REV CODE- 250/637: Performed by: STUDENT IN AN ORGANIZED HEALTH CARE EDUCATION/TRAINING PROGRAM

## 2022-11-08 PROCEDURE — 80048 BASIC METABOLIC PNL TOTAL CA: CPT

## 2022-11-08 PROCEDURE — 97530 THERAPEUTIC ACTIVITIES: CPT

## 2022-11-08 PROCEDURE — 83735 ASSAY OF MAGNESIUM: CPT

## 2022-11-08 PROCEDURE — 82962 GLUCOSE BLOOD TEST: CPT

## 2022-11-08 PROCEDURE — 87635 SARS-COV-2 COVID-19 AMP PRB: CPT

## 2022-11-08 PROCEDURE — 82040 ASSAY OF SERUM ALBUMIN: CPT

## 2022-11-08 PROCEDURE — 85025 COMPLETE CBC W/AUTO DIFF WBC: CPT

## 2022-11-08 PROCEDURE — 74011250636 HC RX REV CODE- 250/636: Performed by: STUDENT IN AN ORGANIZED HEALTH CARE EDUCATION/TRAINING PROGRAM

## 2022-11-08 PROCEDURE — 97116 GAIT TRAINING THERAPY: CPT

## 2022-11-08 PROCEDURE — C9113 INJ PANTOPRAZOLE SODIUM, VIA: HCPCS | Performed by: STUDENT IN AN ORGANIZED HEALTH CARE EDUCATION/TRAINING PROGRAM

## 2022-11-08 PROCEDURE — 36415 COLL VENOUS BLD VENIPUNCTURE: CPT

## 2022-11-08 PROCEDURE — 99238 HOSP IP/OBS DSCHRG MGMT 30/<: CPT | Performed by: FAMILY MEDICINE

## 2022-11-08 RX ADMIN — HYDROCHLOROTHIAZIDE 12.5 MG: 25 TABLET ORAL at 09:47

## 2022-11-08 RX ADMIN — SODIUM CHLORIDE, PRESERVATIVE FREE 10 ML: 5 INJECTION INTRAVENOUS at 06:15

## 2022-11-08 RX ADMIN — HEPARIN SODIUM 5000 UNITS: 5000 INJECTION INTRAVENOUS; SUBCUTANEOUS at 00:00

## 2022-11-08 RX ADMIN — AMLODIPINE BESYLATE 10 MG: 5 TABLET ORAL at 09:47

## 2022-11-08 RX ADMIN — ROSUVASTATIN CALCIUM 10 MG: 10 TABLET, COATED ORAL at 09:48

## 2022-11-08 RX ADMIN — VALSARTAN 320 MG: 80 TABLET, FILM COATED ORAL at 09:47

## 2022-11-08 RX ADMIN — POTASSIUM CHLORIDE, DEXTROSE MONOHYDRATE AND SODIUM CHLORIDE 125 ML/HR: 150; 5; 450 INJECTION, SOLUTION INTRAVENOUS at 06:15

## 2022-11-08 RX ADMIN — SODIUM CHLORIDE 40 MG: 9 INJECTION, SOLUTION INTRAMUSCULAR; INTRAVENOUS; SUBCUTANEOUS at 09:47

## 2022-11-08 RX ADMIN — METOPROLOL SUCCINATE 200 MG: 50 TABLET, FILM COATED, EXTENDED RELEASE ORAL at 09:48

## 2022-11-08 RX ADMIN — HEPARIN SODIUM 5000 UNITS: 5000 INJECTION INTRAVENOUS; SUBCUTANEOUS at 09:48

## 2022-11-08 NOTE — PROGRESS NOTES
11/8/2022  1:41 PM  If patient discharges to [facility]:  Room # 135a  Number to call report: [phone], ask for \"Janneth\"     1:32 PM  ANGELA spoke with admissions at [facility] - they have accepted but do not have a bed available until tomorrow. [facility] will not be able to accept patient until tomorrow, after 3 days inpatient. [facility] is able to accept and has bed available today. CM met with pt's daughter, Merary Erickson, at the bedside. She is agreeable to transfer to [facility]. CM requested HARSHAD Stephenson complete rapid COVID testing. ANGELA spoke with MD Blanchard with Family Practice and updated regarding above. Awaiting discharge order.     Manoj Dean RN

## 2022-11-08 NOTE — PROGRESS NOTES
Problem: Mobility Impaired (Adult and Pediatric)  Goal: *Acute Goals and Plan of Care (Insert Text)  Description: FUNCTIONAL STATUS PRIOR TO ADMISSION: Patient was modified independent using a single point cane for functional mobility. HOME SUPPORT PRIOR TO ADMISSION: The patient lived with son but did not require assist.    Physical Therapy Goals  Initiated 11/7/2022  1. Patient will move from supine to sit and sit to supine  in bed with supervision/set-up within 7 day(s). 2.  Patient will transfer from bed to chair and chair to bed with minimal assistance/contact guard assist using the least restrictive device within 7 day(s). 3.  Patient will perform sit to stand with minimal assistance/contact guard assist within 7 day(s). 4.  Patient will ambulate with minimal assistance/contact guard assist for 150 feet with the least restrictive device within 7 day(s). Outcome: Progressing Towards Goal   PHYSICAL THERAPY TREATMENT  Patient: Britni Kraft (89 y.o. female)  Date: 11/8/2022  Diagnosis: Colitis [K52.9] <principal problem not specified>      Precautions: Fall ([place])  Chart, physical therapy assessment, plan of care and goals were reviewed. ASSESSMENT  Patient continues with skilled PT services and is progressing towards goals. Pt received supine in bed on room air with sats of 95%. No SOB noted. Improved level of alertness, although [place]. Dtr present and very involved with mother's care as in-pt. Pt SBA with supine to sit. Good sitting balance on EOB. Sit to stand fair improved from yesterday with Min A. Transferred to [facility] with Min A. Set up for self care. Gait of 50' tolerated well, yet needed assist with RW management. Pt used SPC with Mod I PTA. Attempted gait of another 8' via HHA on R, yet pt in agreement that she needs RW at this time. No desaturation of O2 with mobility per below.    Continue to support SNF for rehab with this pt to progress back to baseline independence. Documentation for home O2:     ROOM AIR    AT REST   O2 SATS  95 HR  61   ROOM AIR WITH ACTIVITY 02 SATS  93 HR  66   (0    ) LITERS OF O2 WITH ACTIVITY O2 SATS  91 HR  74   (0    )LITERS OF 02 PATIENT LEFT COMFORTABLY  SITTING/SUPINE 02 SATS  93 HR  66        Current Level of Function Impacting Discharge (mobility/balance): Min A/good with RW static/fair dynamic    Other factors to consider for discharge: per above         PLAN :  Patient continues to benefit from skilled intervention to address the above impairments. Continue treatment per established plan of care. to address goals. Recommendation for discharge: (in order for the patient to meet his/her long term goals)  Therapy up to 5 days/week in SNF setting    This discharge recommendation:  Has been made in collaboration with the attending provider and/or case management    IF patient discharges home will need the following DME: dtr purchased RW        SUBJECTIVE:   Patient stated I am feeling better.     OBJECTIVE DATA SUMMARY:   Critical Behavior:  Neurologic State: Alert, Eyes open spontaneously  Orientation Level: Oriented to person, Oriented to place, Oriented to situation, Disoriented to time  Cognition: Follows commands  Safety/Judgement: Awareness of environment  Functional Mobility Training:  Bed Mobility:     Supine to Sit: Stand-by assistance     Scooting: Stand-by assistance        Transfers:  Sit to Stand: Minimum assistance  Stand to Sit: Minimum assistance                             Balance:  Sitting: Intact  Standing: Impaired  Standing - Static: Constant support;Good  Standing - Dynamic : Fair;Constant support  Ambulation/Gait Training:  Distance (ft): 50 Feet (ft) (+10)  Assistive Device: Walker, rolling;Gait belt  Ambulation - Level of Assistance: Minimal assistance        Gait Abnormalities: Decreased step clearance; Path deviations              Speed/Deborah: Slow;Pace decreased (<100 feet/min)  Step Length: Right shortened;Left shortened                  Pain Rating:  none    Activity Tolerance:   Good    After treatment patient left in no apparent distress:   Sitting in chair, Call bell within reach, and Bed / chair alarm activated    COMMUNICATION/COLLABORATION:   The patients plan of care was discussed with: Occupational therapist, Registered nurse, and Case management.      Estefani Thakur, PT   32min

## 2022-11-08 NOTE — PROGRESS NOTES
Transition of Care Plan to SNF/Rehab    SNF/Rehab Transition:  Patient has been accepted to [facility] and meets criteria for admission. Patient will transported by her daughter, Caroline Barrera, and expected to leave at 1600. Communication to Patient/Family:  Met with patient and Caroline Barrera (identified care giver, daughter) and they are agreeable to the transition plan. Communication to SNF/Rehab:  Bedside RN, Phu Rubi, has been notified to update the transition plan to the facility and call report (phone number [phone], request \"Janneth\"). Discharge information has been updated on the AVS.       Nursing Please include all hard scripts for controlled substances, med rec and dc summary, and AVS in packet. Reviewed and confirmed with facility, [facility], can manage the patient care needs for the following:     Phuong Mccoy with (X) only those applicable:    Medication:  [x]  Medications will be available at the facility  []  IV Antibiotics   []  Controlled Substance - hard copy to be sent with patient   [x]  Weekly Labs   Documents:  [] Hard RX  [] MAR  [] Kardex  [x] AVS  []Transfer Summary  []Discharge   Equipment:  []  CPAP/BiPAP  []  Wound Vacuum  []  Benavides or Urinary Device  []  PICC/Central Line  []  Nebulizer  []  Ventilator   Treatment:  []Isolation (for MRSA, VRE, etc.)  []Surgical Drain Management  []Tracheostomy Care  []Dressing Changes  []Dialysis with transportation and chair time  []PEG Care  []Oxygen  []Daily Weights for Heart Failure   Dietary:  []Any diet limitations  []Tube Feedings   []Total Parenteral Management (TPN)   Eligible for Medicaid Long Term Services and Supports  Yes:  [] Eligible for medical assistance or will become eligible within 180 days and UAI completed. [] Provider/Patient and/or support system has requested screening. [] UAI copy provided to patient or responsible party  [] UAI unavailable at discharge will send once processed to SNF provider.   [] UAI unavailable at discharged mailed to patient  No:   [x] Private pay and is not financially eligible for Medicaid within the next 180 days. [] Reside out-of-state. [] A residents of a state owned/operated facility that is licensed  by [address] [facility] or [facility]  [] Enrollment in [facility] services  [] [address] [facility]  [] Patient /Family declines to have screening completed or provide financial information for screening     Financial Resources:  Medicaid    [] Initiated and application pending   [] Full coverage     Advanced Care Plan:  []Surrogate Decision Maker of Care  []POA  [x]Communicated Code Status - FULL   Other   Care Management Interventions  PCP Verified by CM: Yes Ervin Wray MD)  Mode of Transport at Discharge:  Other (see comment) (family)  Transition of Care Consult (CM Consult): Discharge Planning, SNF  MyChart Signup: No  Discharge Durable Medical Equipment: No  Physical Therapy Consult: Yes  Occupational Therapy Consult: Yes  Support Systems: Child(olga)  Confirm Follow Up Transport: Family  The Plan for Transition of Care is Related to the Following Treatment Goals : (S) weakness  The Patient and/or Patient Representative was Provided with a Choice of Provider and Agrees with the Discharge Plan?: (S) Yes  Name of the Patient Representative Who was Provided with a Choice of Provider and Agrees with the Discharge Plan: (S) patient & blake  Freedom of Choice List was Provided with Basic Dialogue that Supports the Patient's Individualized Plan of Care/Goals, Treatment Preferences and Shares the Quality Data Associated with the Providers?: (S) Yes  The [employer] Information Provided?: No  Discharge Location  Patient Expects to be Discharged to[de-identified] Skilled nursing facility    Luisana Dale RN

## 2022-11-08 NOTE — PROGRESS NOTES
[address] [facility] [place]    Office [phone]  Fax [phone]          Subjective / Objective     24 Hour Events: S/p electrolyte repletion with improving labs this morning. Patient able to tolerate CLD. Subjective: Patient reports improved pain level. No recent episodes of n/v. Has been able to tolerate liquid diet. Has had some persistent loose stools but decreasing frequency. Has been weaned off oxygen. Temp (24hrs), Av.2 °F (36.8 °C), Min:98 °F (36.7 °C), Max:98.5 °F (36.9 °C)     Physical Exam  Vitals and nursing note reviewed. Constitutional:       General: She is not in acute distress. HENT:      Head: Normocephalic and atraumatic. Mouth/Throat:      Mouth: Mucous membranes are moist.      Pharynx: Oropharynx is clear. Eyes:      Conjunctiva/sclera: Conjunctivae normal.      Pupils: Pupils are equal, round, and reactive to light. Cardiovascular:      Rate and Rhythm: Normal rate and regular rhythm. Heart sounds: No murmur heard. No gallop. Pulmonary:      Effort: Pulmonary effort is normal. No respiratory distress. Breath sounds: No wheezing, rhonchi or rales. Abdominal:      General: Abdomen is flat. Bowel sounds are normal. There is no distension. Palpations: Abdomen is soft. Tenderness: There is no abdominal tenderness. There is no guarding or rebound. Skin:     General: Skin is warm and dry. Capillary Refill: Capillary refill takes less than 2 seconds. Neurological:      General: No focal deficit present. Mental Status: She is alert and oriented to person, place, and time. Respiratory: O2 Flow Rate (L/min): 3 l/min O2 Device: None (Room air)     I/O:  Date 22 - 22 - 22 0659   Shift 5221-2741 9349-6896 24 Hour Total 1431-0458 7915-4395 24 Hour Total   INTAKE   P.O.  120 120        P. O.  120 120      I. V.(mL/kg/hr) 50(0.1) (2.4) (1.2)        Volume (dextrose 5% - 0.45% NaCl with KCl 20 mEq/L infusion)  1945 1945        Volume (magnesium sulfate 2 g/50 ml IVPB (premix or compounded)) 50  50      Shift Total(mL/kg) 50(0.7) 3247(74.7) 3316(08.1)      OUTPUT   Urine(mL/kg/hr)  400(0.5) 400(0.2)        Urine Occurrence(s)  2 x 2 x        Urine Output (mL) (External Urinary Catheter 11/06/22)  400 400      Emesis/NG output  0 0        Emesis  0 0      Other  0 0        Other Output  0 0      Stool  0 0        Stool Occurrence(s)  1 x 1 x        Stool  0 0      Blood  0 0        Quantitative Blood Loss  0 0        Blood  0 0      Shift Total(mL/kg)  400(5.9) 400(5.9)      NET 50 1664 171      Weight (kg) 67.6 67.6 67.6 67.6 67.6 67.6         Inpatient Medications  Current Facility-Administered Medications   Medication Dose Route Frequency    dextrose 5% - 0.45% NaCl with KCl 20 mEq/L infusion  125 mL/hr IntraVENous CONTINUOUS    sodium chloride (NS) flush 5-40 mL  5-40 mL IntraVENous Q8H    sodium chloride (NS) flush 5-40 mL  5-40 mL IntraVENous PRN    ondansetron (ZOFRAN) injection 4 mg  4 mg IntraVENous Q6H PRN    rosuvastatin (CRESTOR) tablet 10 mg  10 mg Oral DAILY    metoprolol succinate (TOPROL-XL) XL tablet 200 mg  200 mg Oral DAILY    amLODIPine (NORVASC) tablet 10 mg  10 mg Oral DAILY    valsartan (DIOVAN) tablet 320 mg  320 mg Oral DAILY    hydroCHLOROthiazide (HYDRODIURIL) tablet 12.5 mg  12.5 mg Oral DAILY    insulin lispro (HUMALOG) injection   SubCUTAneous AC&HS    glucose chewable tablet 16 g  4 Tablet Oral PRN    glucagon (GLUCAGEN) injection 1 mg  1 mg IntraMUSCular PRN    dextrose 10% infusion 0-250 mL  0-250 mL IntraVENous PRN    prochlorperazine (COMPAZINE) injection 10 mg  10 mg IntraVENous Q6H PRN    labetaloL (NORMODYNE;TRANDATE) 20 mg/4 mL (5 mg/mL) injection 10 mg  10 mg IntraVENous Q4H PRN    heparin (porcine) injection 5,000 Units  5,000 Units SubCUTAneous Q8H    pantoprazole (PROTONIX) 40 mg in 0.9% sodium chloride 10 mL injection  40 mg IntraVENous DAILY         Allergies  Allergies   Allergen Reactions    Nsaids (Non-Steroidal Anti-Inflammatory Drug) Anaphylaxis and Hives    Fenofibrate Other (comments)     Leg cramps    Metformin Diarrhea         CBC:  Recent Labs     11/07/22  0144 11/06/22  0536   WBC 11.6* 20.4*   HGB 10.0* 12.2   HCT 31.3* 37.7    623*         Metabolic Panel:  Recent Labs     11/07/22  1326 11/07/22  0519 11/07/22  0314 11/07/22  0144 11/06/22  0536    146*  --  148* 140   K 3.4* 2.8*  --  2.5* 3.3*   * 111*  --  113* 103   CO2 25 27  --  25 25   BUN 30* 31*  --  32* 63*   CREA 1.17* 1.15*  --  1.06* 2.15*   * 100  --  91 242*   CA 6.6* 6.6* 5.8* 6.1* 7.3*   MG 0.9* 0.4* <0.3*  --   --    PHOS  --   --  2.9  --   --    ALB  --  2.5*  --   --  3.1*   ALT  --  26  --   --  22              Assessment and Plan     Jessica Delaney is a [de-identified] y.o. female with a PMHx of COPD, HTN, HLD, DM (diet controlled), breast CA s/p R mastectomy who is admitted for sepsis likely secondary to gastritis. Sepsis with intractable n/v/d likely 2/2 gastritis: Gastritis supported by CT imaging w/o evidence of colitis may be due to viral illness vs recent abx use. Leukocytosis present on initial labs likely due to gastritis; however, recent steroid burst may be contributing factor. Procal and LA were unremarkable. Amylase/lipase were unremarkable. COVID negative. Clinically improved and tolerating liquid diet will advance today. - GI lite diet   - Compazine prn   - accepted to snf, if able to tolerate po intake likely plan for discharge      LESLIE on CKD3: Resolved to baseline s/p IV fluids given likely secondary to IVVD. FeNa 4.9% indicating postobstructive cause. Ulytes collected later than bmp suspect inaccurate given no evidence of obstruction with good uop. - avoid nephrotoxic agents      Hypokalemia: K+ levels improved this morning s/p repletion. - daily bmp    Hypocalcemia: Improved s/p repletion.  Corrected calcium elevated to 8.6, no prior record of chronic decrease. Could be secondary to sepsis. Patient asymptomatic. Thrombocytosis: Resolved. Suspect reactive due to acute illness. PS completed on last admission without significant platelet abnormalities. Hypertension: Chronic, stable on admission.   - continue home medications of Diovan /12.5 daily, Metoprolol XL 200mg daily, Norvasc 10mg daily  - Labetalol prn ordered    DMII - A1c 6.7 (9/19/2022) and at goal. Controlled with diet. Not currently on home medications. - daily bmp   - Insulin Sliding Scale normal sensitivity with AC&HS glucose checks. - Hypoglycemia protocols ordered. - Goal glucose concentration >70, <140 pre-meal and <180 with all random glucoses     COPD: Recent admission for exacerbation treated with abx and steroid burst with resolution. No evidence of further exacerbation on exam. Not currently on outpatient regimen. CXR on admission was unremarkable. Chronic tobacco use present. - Monitor VS   - encourage cessation of vaping  - Follow up with PCP as outpatient     HLD: Chronic, will continue home meds when able to tolerate po.   - Continue home Crestor 10mg daily      GERD: Chronic, on Omeprazole 40mg daily. - substitute IV Protonix. FEN/GI - GI lite. D5 1/2NS w/ KCL at 125 ml/h. Activity - Ambulate with assistance  DVT prophylaxis - Sub Q Heparin  GI prophylaxis - Protonix  Fall prophylaxis - Not indicated at this time. Disposition - Plan to d/c to SNF. Code Status - DNR. Discussed with patient / caregivers.   Next of Katie 71 Name and Contact - Nuvia aSnchez,  Daughter - [phone]     I appreciate the opportunity to participate in the care of this patient,  Rohith Salcedo MD  Family Medicine Resident       For Billing    Chief Complaint   Patient presents with    Vomiting    Diarrhea       Hospital Problems  Date Reviewed: 9/19/2022            Codes Class Noted POA    Gastritis ICD-10-CM: K29.70  ICD-9-CM: 535.50  11/6/2022 Unknown        Thrombocytosis ICD-10-CM: D75.839  ICD-9-CM: 238.71  11/6/2022 Unknown        Hypokalemia ICD-10-CM: E87.6  ICD-9-CM: 276.8  11/6/2022 Unknown        Elevated triglycerides with high cholesterol ICD-10-CM: E78.2  ICD-9-CM: 272.2  8/28/2015 Yes        CKD (chronic kidney disease), stage III (HCC) ICD-10-CM: N18.30  ICD-9-CM: 585.3  6/6/2012 Yes        COPD (chronic obstructive pulmonary disease) ([facility] ICD-10-CM: J44.9  ICD-9-CM: 496  12/28/2011 Yes        Essential hypertension ICD-10-CM: I10  ICD-9-CM: 401.9  6/23/2011 Yes        Type 2 diabetes mellitus without complications ([facility] QAR-85-NF: E11.9  ICD-9-CM: 250.00  6/23/2011 Yes

## 2022-11-08 NOTE — DISCHARGE INSTRUCTIONS
Patient Discharge Instructions    Karal Lowery / [id number] : 1940    Admitted 2022 Discharged: 2022 1:52 PM     Primary care provider: Shakila Norman MD    Discharging provider:  Evangelina Nicolas MD  - Family Medicine Resident  Cherisebeth Fahad, [address], Attending    . . . . . . . . . . . . . . . . . . . . . . . . . . . . . . . . . . . . . . . . . . . . . . . . . . . . . . . . . . . . . . . . . . . . . . . . MEDICATION CHANGES  START None. STOP None. CHANGES None. No other changes were made to your medications, please take all your other home medicines as previously prescribed. . . . . . . . . . . . . . . . . . . . . . . . . . . . . . . . . . . . . . . . . . . . . . . . . . . . . . . . . . . . . . . . . . . . . . . Jackson Dunbar FINAL [address] [facility] COURSE:    Karla Lowery is a [de-identified] y.o. female with a PMHx of COPD, HTN, HLD, DM (diet controlled), breast CA s/p R mastectomy who is admitted for sepsis secondary to gastritis. Sepsis with intractable n/v/d 2/2 gastritis: Resolved. Patient clinically improved. Gastritis supported by CT imaging w/o evidence of colitis suspected due to viral illness. Lactic/Amylase/lipase were unremarkable. COVID negative. Clinically improved tolerating po intake. - Further management per SNF team    LESLIE on CKD3: Resolved to baseline s/p IV fluids given likely secondary to IVVD. FeNa 4.9% indicating postobstructive cause. Ulytes collected later than bmp suspect inaccurate given no evidence of obstruction with good uop. - avoid nephrotoxic agents   - Further management per SNF team     Hypokalemia: Resolved s/p repletion. - follow up with pcp, recheck bmp   - Further management per SNF team     Hypocalcemia: Improved s/p repletion. Corrected calcium elevated to 8.6, no prior record of chronic depletion. Likely secondary to sepsis. Patient asymptomatic.   - Further management per SNF team     Thrombocytosis: Resolved.  Suspect reactive due to acute illness. PS completed on last admission without significant platelet abnormalities. - Further management per SNF team    Hypertension: Chronic, stable   - continue home medications of Diovan /12.5 daily, Metoprolol XL 200mg daily, Norvasc 10mg daily  - Further management per SNF team     DMII - A1c 6.7 (9/19/2022) and at goal. Controlled with diet. Not currently on home medications. - Continue fasting blood glucose monitoring  - Further management per SNF team    COPD: Recent admission for exacerbation treated with abx and steroid burst with resolution. No evidence of further exacerbation on exam. Not currently on outpatient regimen. CXR on admission was unremarkable. Chronic tobacco use present. - encourage cessation of vaping  - Follow up with pulmonology as outpatient  - Further management per SNF team     HLD: Chronic  - Continue home Crestor 10mg daily   - Further management per SNF team     GERD: Chronic  - continue Omeprazole 40mg daily.   - Further management per SNF team      FOLLOW-UP CARE RECOMMENDATIONS:    APPOINTMENTS:  Please follow up with primary care doctor after discharge from SNF. Please schedule follow up with pulmonology for COPD evaluation after discharge fron SNF. It is very important that you keep follow-up appointment(s). Bring discharge papers, medication list (and/or medication bottles) to follow-up appointments for review by outpatient provider(s). FOLLOW-UP TESTS RECOMMENDED:   Follow up for repeat metabolic panel to recheck electrolytes    ONGOING TREATMENT PLAN: Plan as noted below. PENDING TEST RESULTS:  At the time of discharge the following test results are still pending: None. Please review these results as they become available. Specific symptoms to watch for: chest pain, shortness of breath, fever, chills, nausea, vomiting, diarrhea, change in mentation, falling, weakness, bleeding.     DIET:  Diabetic Diet    ACTIVITY:  Activity as tolerated and PT/OT Eval and Treat    WOUND CARE: None. Eval and treat as needed. EQUIPMENT needed:  May need ambulation assistance device given deconditioning    INCIDENTAL FINDINGS:  None    GOALS OF CARE:       [x] Eventual return to home/independent/assisted living       [] Long term SNF       [] Hospice    Patient condition at discharge:   Functional status       [] Poor       [x] Deconditioned       [] Independent  Cognition       [x] Lucid       [] Forgetful (Some senescence)       [] Dementia  Catheters/lines (plus indication)       [] Benavides       [] PICC           [] PEG       [x] None  Code status       [] Full code       [x] DNR  . . . . . . . . . . . . . . . . . . . . . . . . . . . . . . . . . . . . . . . . . . . . . . . . . . . . . . . . . . . . . . . . . . . . . . . Adelia Castro CHRONIC MEDICAL CONDITIONS:  As noted above. Information obtained by :   I understand that if any problems occur once I am at home I am to contact my physician. I understand and acknowledge receipt of the instructions indicated above.                                                                                                                                              Physician's or R.N.'s Signature                                                                  Date/Time                                                                                                                                              Patient or Representative Signature                                                          Date/Time

## 2022-11-08 NOTE — PROGRESS NOTES
Bedside and Verbal shift change report given to HARSHAD Garcia (oncoming nurse) by Baldomero HURTADO RN (offgoing nurse). Report included the following information SBAR, Kardex, Intake/Output, MAR, and Accordion.

## 2022-11-08 NOTE — PROGRESS NOTES
Problem: Risk for Spread of Infection  Goal: Prevent transmission of infectious organism to others  Description: Prevent the transmission of infectious organisms to other patients, staff members, and visitors. Outcome: Progressing Towards Goal     Problem: Patient Education:  Go to Education Activity  Goal: Patient/Family Education  Outcome: Progressing Towards Goal     Problem: Pressure Injury - Risk of  Goal: *Prevention of pressure injury  Description: Document Negro Scale and appropriate interventions in the flowsheet. Outcome: Progressing Towards Goal  Note: Pressure Injury Interventions:  Sensory Interventions: Assess changes in LOC    Moisture Interventions: Absorbent underpads    Activity Interventions: Assess need for specialty bed    Mobility Interventions: Assess need for specialty bed    Nutrition Interventions: Offer support with meals,snacks and hydration                     Problem: Patient Education: Go to Patient Education Activity  Goal: Patient/Family Education  Outcome: Progressing Towards Goal     Problem: Falls - Risk of  Goal: *Absence of Falls  Description: Document Bobbetta Hoop Fall Risk and appropriate interventions in the flowsheet.   Outcome: Progressing Towards Goal  Note: Fall Risk Interventions:  Mobility Interventions: Bed/chair exit alarm, Communicate number of staff needed for ambulation/transfer, Utilize walker, cane, or other assistive device, Strengthening exercises (ROM-active/passive), Utilize gait belt for transfers/ambulation    Mentation Interventions: Adequate sleep, hydration, pain control, Bed/chair exit alarm, Door open when patient unattended, Reorient patient, Room close to nurse's station, More frequent rounding    Medication Interventions: Bed/chair exit alarm, Patient to call before getting OOB, Teach patient to arise slowly, Utilize gait belt for transfers/ambulation    Elimination Interventions: Bed/chair exit alarm, Call light in reach, Patient to call for help with toileting needs              Problem: Patient Education: Go to Patient Education Activity  Goal: Patient/Family Education  Outcome: Progressing Towards Goal     Problem: Patient Education: Go to Patient Education Activity  Goal: Patient/Family Education  Outcome: Progressing Towards Goal     Problem: Patient Education: Go to Patient Education Activity  Goal: Patient/Family Education  Outcome: Progressing Towards Goal

## 2022-11-08 NOTE — PROGRESS NOTES
Pt DC to A SNF. Telemetry and IV'S dc. Pt voice no jesenia's. . Daughter will be transporting pt with grand-son to [facility] via private car.

## 2022-11-08 NOTE — PROGRESS NOTES
Problem: Self Care Deficits Care Plan (Adult)  Goal: *Acute Goals and Plan of Care (Insert Text)  Description: FUNCTIONAL STATUS PRIOR TO ADMISSION: Patient was modified independent using a single point cane for functional mobility. HOME SUPPORT: The patient lived with son but did not require assist.    Occupational Therapy Goals  Initiated 11/7/2022  1. Patient will perform lower body dressing with supervision/set-up within 7 day(s). 2.  Patient will perform grooming with supervision/set-up within 7 day(s). 3.  Patient will perform toilet transfers with supervision/set-up within 7 day(s). 4.  Patient will perform all aspects of toileting with supervision/set-up within 7 day(s). 5.  Patient will participate in upper extremity therapeutic exercise/activities with supervision/set-up for 10 minutes within 7 day(s). 6.  Patient will utilize energy conservation techniques during functional activities with verbal cues within 7 day(s). Outcome: Progressing Towards Goal   OCCUPATIONAL THERAPY TREATMENT  Patient: Madi Sanders (89 y.o. female)  Date: 11/8/2022  Diagnosis: Colitis [K52.9] <principal problem not specified>      Precautions: Fall ([facility])  Chart, occupational therapy assessment, plan of care, and goals were reviewed. ASSESSMENT  Patient continues with skilled OT services and is progressing towards goals. Patient agreeable to activity. Patient SBA for bed mobility today, improved from evaluation. She is able to demonstrate good sitting balance at EOB, prior to standing. Min assist to [facility]. Patient manages hygiene in sitting, min assist in standing for thoroughness. Patient transferred to chair and left in NAD at end of session. Daughter present in room. Plan for SNF.     Current Level of Function Impacting Discharge (ADLs): Min A    Other factors to consider for discharge: lives with family         PLAN :  Patient continues to benefit from skilled intervention to address the above impairments. Continue treatment per established plan of care to address goals. Recommend with staff: Melquiades Weber for meals, commode transfers     Recommend next OT session: continue goals    Recommendation for discharge: (in order for the patient to meet his/her long term goals)  Therapy up to 5 days/week in SNF setting    This discharge recommendation:  Has been made in collaboration with the attending provider and/or case management    IF patient discharges home will need the following DME: none        SUBJECTIVE:   Patient stated I can always use the bathroom.     OBJECTIVE DATA SUMMARY:   Cognitive/Behavioral Status:                      Functional Mobility and Transfers for ADLs:  Bed Mobility:  Supine to Sit: Stand-by assistance  Scooting: Stand-by assistance    Transfers:  Sit to Stand: Minimum assistance  Functional Transfers  Toilet Transfer : Minimum assistance  Cues: Physical assistance;Verbal cues provided       Balance:  Sitting: Intact  Standing: Impaired  Standing - Static: Constant support;Good  Standing - Dynamic : Fair;Constant support    ADL Intervention:       Therapeutic Exercises:       Pain:  None     Activity Tolerance:   Good    After treatment patient left in no apparent distress:   Sitting in chair, Call bell within reach, Bed / chair alarm activated, and Caregiver / family present    COMMUNICATION/COLLABORATION:   The patients plan of care was discussed with: Physical therapist and Registered nurse.      Sita Jarrell OTR/L  Time Calculation: 32 mins

## 2022-11-08 NOTE — DISCHARGE SUMMARY
Discharge / Transfer / Off-Service Note                          [facility]   [address] [place], [place] [address]    Office [phone]  Fax [phone]       Name: Yun Judge MRN: [id number]  Sex: Female   YOB: 1940  Age: [de-identified] y.o. PCP: Roc Crowley MD     Date of admission: 11/6/2022  Date of discharge/transfer: 11/8/2022    Attending physician at admission:  Barbie Strange DO . Attending physician at discharge/transfer: Court Maya MD  Resident physician at discharge/transfer: Elodia Gonzales MD     Consultants during hospitalization  None     Admission diagnoses   Colitis [K52.9]    Recommended follow-up after discharge    1. PCP-Kathleen Gonzalez MD  2. Pulmonology - Jonathon Sparks MD      Things to follow up on with PCP:   - hospital transition of care   - please assist with arranging follow up with pulmonology for COPD evaluation   - repeat cmp, mg, phos to ensure electrolytes stable and assess renal function     Things to follow up on with pulmonology:   - COPD eval and management     Medication Changes:  Discharge Medication List as of 11/8/2022  4:49 PM        CONTINUE these medications which have NOT CHANGED    Details   ondansetron (ZOFRAN ODT) 4 mg disintegrating tablet Take 1 Tablet by mouth every eight (8) hours as needed for Nausea or Vomiting., Normal, Disp-30 Tablet, R-0      valsartan-hydroCHLOROthiazide (Diovan HCT) 320-12.5 mg per tablet Take 1 Tablet by mouth daily. , Normal, Disp-90 Tablet, R-0      omeprazole (PRILOSEC) 40 mg capsule TAKE ONE CAPSULE BY MOUTH DAILY, Normal, Disp-90 Capsule, R-3      amLODIPine (NORVASC) 10 mg tablet Take 1 Tablet by mouth daily. , Normal, Disp-90 Tablet, R-4      metoprolol succinate (TOPROL-XL) 200 mg XL tablet Take 1 Tablet by mouth daily. , Normal, Disp-90 Tablet, R-4      rosuvastatin (CRESTOR) 20 mg tablet TAKE ONE TABLET BY MOUTH EVERY EVENING, Normal, Disp-90 Tablet, R-3      glucose blood VI test strips (Accu-Chek Ifrah Plus test strp) strip USE TO TEST BLOOD SUGAR DAILY, E11.9, Normal, Disp-100 Strip, R-4      omega-3 fatty acids-vitamin e 1,000 mg cap Take 2 Caps by mouth daily (after breakfast). , Historical Med      calcium-vitamin D (OS-MANDI +D3) 500 mg-200 unit per tablet Take 1 Tab by mouth daily (after breakfast). , Historical Med      multivitamin (ONE A DAY) tablet Take 1 Tab by mouth daily (after breakfast). , Historical Med           STOP taking these medications       amoxicillin-clavulanate (AUGMENTIN) 500-125 mg per tablet Comments:   Reason for Stopping:         tiZANidine (ZANAFLEX) 2 mg tablet Comments:   Reason for Stopping:                 History of Present Illness     Kumar Guillory is a [de-identified] y.o. female with PMHx of COPD. DM, HTN, HLD, breast s/p R mastectomy who presents to the ED complaining of worsening n/v/d x3-4 days. Patient was recently admitted to the hospital, found to be RSV+ and was treated for COPD exacerbation and discharged on Augmentin. While at home over the last week has progressively not been feeling well. Daughter reports over the last 24 hours has had large episode of diarrhea and multiple episodes of nonbloody, nonbilious emesis x3-4 which brought them to the ED today. Prior to the last 24 hours has had intermittent milder episodes of n/v and loose stools over the last 1-2 weeks. Does have persistent cough from prior illness, new intermittent epigastric pain, weakness, and decreased po intake over the last 4-5 days. Denies worsening shortness of breath, chest pain, headaches, fevers/chills, or known sick contacts. At baseline, daughter reports patient is very active, performs all ADLs. Has been vaccinated for COVID. Hospital course    Kumar Guillory is a [de-identified] y.o. female with a PMHx of COPD, HTN, HLD, DM (diet controlled), breast CA s/p R mastectomy who is admitted for sepsis secondary to gastritis.      Sepsis with intractable n/v/d 2/2 gastritis: Resolved. Patient clinically improved. Gastritis supported by CT imaging w/o evidence of colitis suspected due to viral illness. Lactic/Amylase/lipase were unremarkable. COVID negative. Clinically improved tolerating po intake. - Further management per SNF team     LESLIE on CKD3: Resolved to baseline s/p IV fluids given likely secondary to IVVD. FeNa 4.9% indicating postobstructive cause. Ulytes collected later than bmp suspect inaccurate given no evidence of obstruction with good uop. - avoid nephrotoxic agents   - Further management per SNF team     Hypokalemia: Resolved s/p repletion. - follow up with pcp, recheck bmp   - Further management per SNF team     Hypocalcemia: Improved s/p repletion. Corrected calcium elevated to 8.6, no prior record of chronic depletion. Likely secondary to sepsis. Patient asymptomatic.   - Further management per SNF team     Thrombocytosis: Resolved. Suspect reactive due to acute illness. PS completed on last admission without significant platelet abnormalities. - Further management per SNF team     Hypertension: Chronic, stable   - continue home medications of Diovan /12.5 daily, Metoprolol XL 200mg daily, Norvasc 10mg daily  - Further management per SNF team     DMII - A1c 6.7 (9/19/2022) and at goal. Controlled with diet. Not currently on home medications. - Continue fasting blood glucose monitoring  - Further management per SNF team     COPD: Recent admission for exacerbation treated with abx and steroid burst with resolution. No evidence of further exacerbation on exam. Not currently on outpatient regimen. CXR on admission was unremarkable. Chronic tobacco use present.    - encourage cessation of vaping  - Follow up with pulmonology as outpatient  - Further management per SNF team     HLD: Chronic  - Continue home Crestor 10mg daily   - Further management per SNF team     GERD: Chronic  - continue Omeprazole 40mg daily.   - Further management per SNF team      Physical exam at discharge:    Vitals Reviewed. Patient Vitals for the past 12 hrs:   Temp Pulse Resp BP SpO2   11/08/22 1355 -- (P) 65 -- (!) (P) 110/49 (P) 93 %   11/08/22 1248 98.4 °F (36.9 °C) 60 17 113/71 90 %   11/08/22 1200 -- 60 -- -- --   11/08/22 0846 98 °F (36.7 °C) 64 16 (!) 110/59 90 %   11/08/22 0800 -- 64 -- -- --   11/08/22 0700 -- 61 -- -- --   11/08/22 0449 98.5 °F (36.9 °C) 77 17 113/65 94 %      General: No acute distress. Alert. Cooperative. Head: Normocephalic. Atraumatic. Throat: Mucosa pink. Moist mucous membranes. Respiratory: CTAB. No w/r/r/c.  Cardiovascular: RRR. Normal S1,S2. No m/r/g.   GI: Nondistended.+ bowel sounds. Nontender. No rebound tenderness or guarding. Extremities: No LE edema. Distal pulses present. Musculoskeletal: Full ROM in all extremities. Skin: Warm, dry. No rashes. Neuro: Alert and oriented. Condition at discharge: Stable.     Labs  Recent Labs     11/08/22  0948 11/07/22  0144 11/06/22  0536   WBC 13.5* 11.6* 20.4*   HGB 10.3* 10.0* 12.2   HCT 32.6* 31.3* 37.7    293 425*     Recent Labs     11/08/22  0948 11/07/22  1326 11/07/22  0519 11/07/22  0314    144 146*  --    K 3.8 3.4* 2.8*  --    * 112* 111*  --    CO2 26 25 27  --    BUN 23* 30* 31*  --    CREA 1.12* 1.17* 1.15*  --    * 184* 100  --    CA 7.2* 6.6* 6.6* 5.8*   MG 2.1 0.9* 0.4* <0.3*   PHOS  --   --   --  2.9     Recent Labs     11/08/22  0948 11/07/22  0519 11/06/22  0536   ALT  --  26 22   AP  --  20* 24*   TBILI  --  0.6 0.6   TP  --  6.4 7.7   ALB 2.3* 2.5* 3.1*   GLOB  --  3.9 4.6*   AML  --   --  45   LPSE  --   --  97     Recent Labs     11/08/22  1128 11/08/22  0802 11/07/22  2234 11/07/22  2150 11/07/22  1832   GLUCPOC 116 141* 130* 61* 164*       Micro:  Lab Results   Component Value Date/Time    Culture result: NO GROWTH 6 DAYS 10/29/2022 09:45 AM    Culture result: NO GROWTH 6 DAYS 10/29/2022 09:45 AM    Culture result: NO GROWTH 5 DAYS 05/24/2022 06:17 PM       Imaging:  XR ABD (KUB)    Result Date: 10/30/2022  EXAM:  XR ABD (KUB) INDICATION: Left lower quadrant tenderness with nausea COMPARISON: October 2011. TECHNIQUE: Supine frontal abdomen (KUB). FINDINGS: No dilated small bowel. Stool and gas are present in the colon. No pathologic calcification. Osseous structures are age appropriate. Cholecystectomy clips are noted in the right upper quadrant. No acute abdominal pathology. CT HEAD WO CONT    Result Date: 10/29/2022  EXAM: CT HEAD WO CONT INDICATION: headache COMPARISON: None. CONTRAST: None. TECHNIQUE: Unenhanced CT of the head was performed using 5 mm images. Brain and bone windows were generated. Coronal and sagittal reformats. CT dose reduction was achieved through use of a standardized protocol tailored for this examination and automatic exposure control for dose modulation. FINDINGS: There is no extra-axial fluid collection, hemorrhage or shift. No mass. Incidental air-fluid levels within the maxillary sinuses. Next     No acute intracranial findings, acute bilateral maxillary sinusitis. CT CHEST WO CONT    Result Date: 10/29/2022  INDICATION:   progressive cough EXAM:  CT CHEST without CONTRAST COMPARISON:  10/2/2020 TECHNIQUE:  Thin collimation axial images were obtained through the chest without IV contrast administration. Coronal and sagittal reconstructions were obtained. CT dose reduction was achieved through use of a standardized protocol tailored for this examination and automatic exposure control for dose modulation. FINDINGS:  LUNGS: Moderate emphysema. Biapical opacities no significant change and likely scarring. Right apical lung nodule is stable measuring 6 mm. Increased patchy micronodular lung opacities in the left upper lobe, lingula, and left lung base representing acute or chronic infectious/inflammatory process. Other bilateral lung opacities remain stable. The central airways are patent.  LYMPH NODES: No thoracic lymphadenopathy. PLEURAL FLUID: No pleural effusion. PERICARDIAL FLUID: No pericardial effusion. THYROID: 2.7 cm right thyroid nodule OTHER: Mild to moderate hiatal hernia. Right breast prosthesis. Increased patchy micronodular lung opacities in the left upper lobe, lingula, and left lung base representing acute or chronic infectious/inflammatory process. No other significant change. Mild to moderate hiatal hernia. Stable 2.7 cm right thyroid nodule. CT ABD PELV WO CONT    Result Date: 11/6/2022  EXAM: CT ABD PELV WO CONT INDICATION: Nausea and vomiting with abdominal pain COMPARISON: CT dated 10/30/2020 CONTRAST:  None. TECHNIQUE: Thin axial images were obtained through the abdomen and pelvis. Coronal and sagittal reformats were generated. Oral contrast was not administered. CT dose reduction was achieved through use of a standardized protocol tailored for this examination and automatic exposure control for dose modulation. The absence of intravenous contrast material reduces the sensitivity for evaluation of the vasculature and solid organs. FINDINGS: LOWER THORAX: Hiatal hernia. Right breast prostheses. LIVER: No mass. BILIARY TREE: Cholecystectomy CBD is not dilated. SPLEEN: within normal limits. PANCREAS: No focal abnormality. ADRENALS: Unremarkable. KIDNEYS/URETERS: No calculus or hydronephrosis. STOMACH: Suggestion of gastric wall thickening. SMALL BOWEL: No dilatation or wall thickening. COLON: Diverticulosis. APPENDIX: Unremarkable PERITONEUM: No ascites or pneumoperitoneum. RETROPERITONEUM: Atherosclerotic disease REPRODUCTIVE ORGANS: Unremarkable URINARY BLADDER: No mass or calculus. BONES: No destructive bone lesion. ABDOMINAL WALL: No mass or hernia. ADDITIONAL COMMENTS: N/A     1. No definite acute intra-abdominal pathology. 2.  There is suggestion of gastric wall thickening which may represent gastritis. Small hiatal hernia is noted.     XR CHEST PORT    Result Date: 11/6/2022  EXAM: XR CHEST PORT INDICATION: Chest pain COMPARISON: 10/29/2022 TECHNIQUE: Upright portable chest AP view FINDINGS: Cardiac monitoring leads heart size is borderline. The pulmonary vasculature is within normal limits. The lungs and pleural spaces are clear. The visualized bones and upper abdomen are age-appropriate. No acute cardiopulmonary process     XR CHEST PORT    Result Date: 10/29/2022  PORTABLE CHEST RADIOGRAPH/S: 10/29/2022 10:49 AM INDICATION: Dyspnea. COMPARISON: 6/6/2022, 7/9/2020. CT chest 10/2/2020. TECHNIQUE: Portable frontal upright radiograph/s of the chest. FINDINGS: Aside from patchy, chronic, peripheral scarring, the lungs are clear. The lungs are emphysematous. The central airways are patent. No pneumothorax or pleural effusion. No acute process. Emphysema. Procedures / Diagnostic Studies: None    Chronic diagnoses   Problem List as of 11/8/2022 Date Reviewed: 9/19/2022            Codes Class Noted - Resolved    Colitis ICD-10-CM: K52.9  ICD-9-CM: 558.9  11/6/2022 - Present        Gastritis ICD-10-CM: K29.70  ICD-9-CM: 535.50  11/6/2022 - Present        Thrombocytosis ICD-10-CM: C36.011  ICD-9-CM: 238.71  11/6/2022 - Present        Hypokalemia ICD-10-CM: E87.6  ICD-9-CM: 276.8  11/6/2022 - Present        Pneumonia ICD-10-CM: J18.9  ICD-9-CM: 492  10/30/2022 - Present        CAP (community acquired pneumonia) ICD-10-CM: J18.9  ICD-9-CM: 044  10/29/2022 - Present        Shortness of breath ICD-10-CM: R06.02  ICD-9-CM: 786.05  6/29/2020 - Present        ACP (advance care planning) ICD-10-CM: Z71.89  ICD-9-CM: V65.49  8/29/2018 - Present    Overview Signed 8/29/2018 12:05 PM by Natalya Enriquez MD     Advance Care Plan or Surrogate Decision Maker documented in medical record.              Dermatochalasis ICD-10-CM: L53.407  ICD-9-CM: 374.87  8/13/2018 - Present        H/O hysterectomy for benign disease ICD-10-CM: Z90.710  ICD-9-CM: V88.01  4/27/2017 - Present    Overview Signed 4/27/2017 9:26 AM by [facility]     Pt still has cervix, had surgery for NALLELY and vaginal prolapse.               After-cataract with vision obscured ICD-10-CM: H26.499  ICD-9-CM: 366.53  2/18/2016 - Present        Pseudophakia of both eyes ICD-10-CM: Z96.1  ICD-9-CM: V43.1  2/18/2016 - Present        Tubular adenoma of colon ICD-10-CM: D12.6  ICD-9-CM: 211.3  1/24/2016 - Present    Overview Signed 1/24/2016  4:11 PM by Lilia Tran MD     1/4/16             IDALIA (obstructive sleep apnea) ICD-10-CM: X37.01  ICD-9-CM: 327.23  9/2/2015 - Present        Elevated triglycerides with high cholesterol ICD-10-CM: E78.2  ICD-9-CM: 272.2  8/28/2015 - Present        CKD (chronic kidney disease), stage III ([facility] ICD-10-CM: N18.30  ICD-9-CM: 585.3  6/6/2012 - Present        Osteopenia ICD-10-CM: M85.80  ICD-9-CM: 733.90  Unknown - Present        COPD (chronic obstructive pulmonary disease) (HCC) ICD-10-CM: J44.9  ICD-9-CM: 496  12/28/2011 - Present        Steatosis of liver ICD-10-CM: K76.0  ICD-9-CM: 571.8  10/9/2011 - Present    Overview Signed 10/9/2011  3:54 PM by Lilia Tran MD     On abdominal CT 10/2011             Tobacco abuse ICD-10-CM: Z72.0  ICD-9-CM: 305.1  9/26/2011 - Present        Bell's palsy ICD-10-CM: G51.0  ICD-9-CM: 351.0  6/23/2011 - Present        Cholelithiasis ICD-10-CM: K80.20  ICD-9-CM: 574.20  6/23/2011 - Present        History of breast cancer ICD-10-CM: Z85.3  ICD-9-CM: V10.3  6/23/2011 - Present    Overview Signed 6/23/2011  3:55 PM by Bedford Vega     Rt Breast             History of duodenal ulcer ICD-10-CM: Z87.19  ICD-9-CM: V12.79  6/23/2011 - Present        Esophageal reflux ICD-10-CM: K21.9  ICD-9-CM: 530.81  6/23/2011 - Present        Hiatal hernia ICD-10-CM: K44.9  ICD-9-CM: 553.3  6/23/2011 - Present        Essential hypertension ICD-10-CM: I10  ICD-9-CM: 401.9  6/23/2011 - Present        Type 2 diabetes mellitus without complications ([facility] KNP-80-XL: E11.9  ICD-9-CM: 250.00 6/23/2011 - Present        Left ventricular hypertrophy ICD-10-CM: I51.7  ICD-9-CM: 429.3  6/23/2011 - Present        RESOLVED: IDALIA (obstructive sleep apnea) ICD-10-CM: G47.33  ICD-9-CM: 327.23  9/2/2015 - 9/2/2015        RESOLVED: Hyperlipidemia ICD-10-CM: E78.5  ICD-9-CM: 272.4  1/7/2013 - 9/2/2015        RESOLVED: Osteoporosis ICD-10-CM: M81.0  ICD-9-CM: 733.00  6/23/2011 - 5/7/2012            Diet:  Diabetic diet.     Activity:  As tolerated     Disposition: Home or Self Care    Discharge instructions to patient/family  Please seek medical attention for any new or worsening symptoms particularly fever, chest pain, shortness of breath, abdominal pain, nausea, vomiting    Follow up plans/appointments  Follow-up Information       Follow up With Specialties Details Why Contact Info    Kiki Bobo MD Family Medicine Schedule an appointment as soon as possible for a visit  [address] [place] [address] [phone]      Kiki Bobo, [address]   [address] [place] [address]  [phone]               Felicia Reese MD  Family Medicine Resident       For Billing    Chief Complaint   Patient presents with    Vomiting    Diarrhea       Hospital Problems  Date Reviewed: 9/19/2022            Codes Class Noted POA    Gastritis ICD-10-CM: K29.70  ICD-9-CM: 535.50  11/6/2022 Unknown        Thrombocytosis ICD-10-CM: D75.839  ICD-9-CM: 238.71  11/6/2022 Unknown        Hypokalemia ICD-10-CM: E87.6  ICD-9-CM: 276.8  11/6/2022 Unknown        Elevated triglycerides with high cholesterol ICD-10-CM: E78.2  ICD-9-CM: 272.2  8/28/2015 Yes        CKD (chronic kidney disease), stage III ([facility] ICD-10-CM: N18.30  ICD-9-CM: 585.3  6/6/2012 Yes        COPD (ch[facility] obstructive pulmonary disease) ([facility] ICD-10-CM: J44.9  ICD-9-CM: 409  12/28/2011 Yes        Essential hypertension ICD-10-CM: I10  ICD-9-CM: 401.9  6/23/2011 Yes        Type 2 diabetes mellitus without complications [facility]) ICD-10-CM: E11.9  ICD-9-CM: 250.00  6/23/2011 Yes

## 2022-11-08 NOTE — PROGRESS NOTES
Physician Progress Note      Jeffery Nuñez  CSN #:                  [id number]  :                       1940  ADMIT DATE:       2022 5:12 AM  DISCH DATE:        2022 5:33 PM  RESPONDING  PROVIDER #:        Ritu Pearson MD          QUERY TEXT:    Good Morning,  Patient admitted with Nausea vomiting, and diarrhea. The diagnosis of Sepsis was considered in the H&P, however Dr. Alireza Moran attested  in her from the H&P and today's progress notes low suspicion for sepsis, and today notes \" suspect presentation from long exposure to antibiotics. \"        If possible, can you please clarify and please document please document in progress notes and discharge summary      The medical record reflects the following:  Risk Factors: Recent hospitalization for Pneumonia RSV, and on mx. AbX  Clinical Indicators: Presented with n/v/d/  Afebrile, HR in the 90-100s, resp up to 26---WBC 20.4, however pt also on recent steroids, No blood cx were assessed, UA negative, Procal and Lactic acid are all unremarkable. Treatment: NO blood cx, Lactic acid assessed, did receive mx IVF NS boluses, but not tx with abx. Thank you for clarifying  PHYLICIA RmN,RN, CPHQ, CCDS, SMART  Options provided:  -- SIRS only due to Gastritis and LESLIE from nausea and vomiting after prolonged abx, and Sepsis has been ruled out  -- Sepsis POA is confirmed  -- Other - I will add my own diagnosis  -- Disagree - Not applicable / Not valid  -- Disagree - Clinically unable to determine / Unknown  -- Refer to Clinical Documentation Reviewer    PROVIDER RESPONSE TEXT:    After study, SIRS only due to LESLIE and Gastritis with nausea and vomiting after prolonged abx use, and Sepsis POA has now been ruled out    Query created by: May Nino on 2022 11:04 AM      QUERY TEXT:    Good Afternoon    This patient admitted for Sepsis. Known COPD and recent hospitalized for RSV and Pneumonia.   The patient noted with persistent hypoxia. The patient is not on home o2. If possible, please document in the progress notes and discharge summary if you are evaluating and/or treating any of the following: ]  ]      The medical record reflects the following:  Risk Factors: Known COPD, Recent hospitalization for PNA, RSV,  Clinical Indicators: Presented with noted resp rate 26-33, o2 sats on 3 liters 92-94%. \"Persistently hypoxic documented by Dr. Charlette Clark in H&P  Pt is not on home o2 at home  Treatment: Ordered for evaluation of home o2., ox supplement (currently on 3 liters)- o2 sat monitoring,    Thank you  Cindy Herman, CPHQ, CCDS ,SMART  Options provided:  -- Acute respiratory failure with hypoxia  -- Acute on chronic hypoxemia only  -- Other - I will add my own diagnosis  -- Disagree - Not applicable / Not valid  -- Disagree - Clinically unable to determine / Unknown  -- Refer to Clinical Documentation Reviewer    PROVIDER RESPONSE TEXT:    Provider disagreed with this query.     Query created by: Yari Rubi on 11/8/2022 11:16 AM      Electronically signed by:  Kimberly Mullen MD 11/8/2022 5:45 PM

## 2022-11-09 ENCOUNTER — PATIENT OUTREACH (OUTPATIENT)
Dept: CASE MANAGEMENT | Age: 82
End: 2022-11-09

## 2022-11-09 NOTE — PROGRESS NOTES
Transition of care outreach postponed for 7 days due to patient's discharge to SNF.   Wellington Weller RN, CPN - Care Transition Nurse- [phone]

## 2022-11-09 NOTE — PROGRESS NOTES
Report given to [facility] - TAMMIE PATRICK nurse from the SNF. Pt exiting now via wc. No c/o's.

## 2022-11-14 ENCOUNTER — TELEPHONE (OUTPATIENT)
Dept: FAMILY MEDICINE CLINIC | Age: 82
End: 2022-11-14

## 2022-11-16 ENCOUNTER — PATIENT OUTREACH (OUTPATIENT)
Dept: CASE MANAGEMENT | Age: 82
End: 2022-11-16

## 2022-11-22 ENCOUNTER — PATIENT OUTREACH (OUTPATIENT)
Dept: CASE MANAGEMENT | Age: 82
End: 2022-11-22

## 2022-11-22 NOTE — PROGRESS NOTES
Transition of care outreach postponed for 7 days due to patient's discharge to SNF. Ctn spoke with daughter and tentative dc on 11/29/22. Ctn will follow up on 11/30/22.   Eleonora Leonard RN, CPN - Care Transition Nurse- [phone]

## 2022-11-23 ENCOUNTER — PATIENT OUTREACH (OUTPATIENT)
Dept: CASE MANAGEMENT | Age: 82
End: 2022-11-23

## 2022-11-24 NOTE — PROGRESS NOTES
Post Acute Facility Update     *The information contained in this note was received during a weekly care coordination call with the post acute facility*    Current Facility: [facility] ([facility])  Anticipated Discharge Date: 11/29/22  Facility Nursing Update: no changes nursing LCD 11/28  Facility Social Work Update:  Plan to return home with son on 11/29. Had a family meeting with family and pt is set ot discharge. Bundle Program Status: none  Upper Extremity Assistance: Stand by Assist - Presence and Cueing  Lower Extremity Assistance: Contact Guard Assist - hands on patient for balance   Bed Mobility: Independent  Transfers: Stand by Assist - Presence and Cueing  Ambulation: Stand by Assist - Presence and Cueing  How far (in feet) is the patient ambulating?  250  Device Used: walker  Barriers to Discharge: none  Other: working on high level balance with pt    Moises READ, RN  [place]

## 2022-11-24 NOTE — PROGRESS NOTES
Post Acute Facility Update     *The information contained in this note was received during a weekly care coordination call with the post acute facility*    Current Facility: [address] (SNF)  Anticipated Discharge Date:     Facility Nursing Update: no meds for DM ( allergic to metformin, no skin issues, not longer experiencing nausea or diarrhea  Facility Social Work Update: no current updates  Bundle Program Status: none  Upper Extremity Assistance: Independent  Lower Extremity Assistance: Stand by Assist - Presence and Cueing  Bed Mobility: Stand by Assist - Presence and Cueing  Transfers: Stand by Assist - Presence and Cueing  Ambulation: Stand by Assist - Presence and Cueing  How far (in feet) is the patient ambulating?  300  Device Used: walker  Barriers to Discharge: NICKI Johnson President   BSN, RN  PAC- Team

## 2022-11-30 ENCOUNTER — PATIENT OUTREACH (OUTPATIENT)
Dept: CASE MANAGEMENT | Age: 82
End: 2022-11-30

## 2022-11-30 NOTE — PROGRESS NOTES
[address] [place] [address] Dr Discharge Note    *The information contained in this note was received during a weekly care coordination call with the post acute facility*      Patient was discharged from [address] [facility] of [address] [place] [address] ([facility]) on 11/27/22 to Home    PCP: MD BATSHEVA Fuller appointment:   Future Appointments   Date Time Provider Yari Morrow   12/2/2022  8:45 AM Víctor Whitten MD [facility] BS AMB   1/16/2023  8:40 AM Shireen Dakin,  S [facility] BS AMB       Home Health/Outpatient orders at discharge: home health care  [address] [place] [address] Way: TBA-  pt left earlier than expected discharge date of 11/29  has left messages with family to get [place] set up, her calls have not been returned. Durable Medical Equipment ordered at discharge:  none  [address] received prior to discharge: N/A    CTN managing patient: EMETERIO Huynh    PAC-UM Team will sign off at this time. Medications were not reconciled and general patient assessment was not completed during this skilled nursing facility outreach.      Krista WHATLEYN, RN  PAC-UM Team

## 2022-11-30 NOTE — PROGRESS NOTES
Care Transitions Initial Call    Call within 2 business days of discharge: No     Patient: Karoline Jolley Patient : 1940 MRN: [id number]    Last Discharge [address]       Date Complaint Diagnosis Description Type Department Provider    22 Vomiting; Diarrhea Acute colitis . .. ED to Hosp-Admission (Discharged) (ADMIT) [id number] Ricardo Rodriguez MD; Dionna Neumann. Shanda Red Shanda Red Was this an external facility discharge? No Discharge Facility: [facility]    Challenges to be reviewed by the provider   Additional needs identified to be addressed with provider: yes  home health care- Patient discharged from snf on 22. Daughter feels mother may not need home health pt and ot. Has follow up on 22         Method of communication with provider : chart routing    Discussed COVID-19 related testing which was available at this time. Test results were negative. Patient informed of results, if available? Known before admission to snf     Advance Care Planning:   Does patient have an Advance Directive: yes, reviewed and current. Inpatient Readmission Risk score: Unplanned Readmit Risk Score: 16.5    Was this a readmission? no   Patient stated reason for the admission: nausea, vomiting diarrhea    Patients top risk factors for readmission: medication management and polypharmacy   Interventions to address risk factors: Scheduled appointment with PCP-22 and Obtained and reviewed discharge summary and/or continuity of care documents    Care Transition Nurse (CTN) contacted the family by telephone to perform post hospital discharge assessment. Verified name and  with family as identifiers. Provided introduction to self, and explanation of the CTN role. CTN reviewed discharge instructions, medical action plan and red flags with family who verbalized understanding. Were discharge instructions available to patient? yes.  Reviewed appropriate site of care based on symptoms and resources available to patient including: PCP and Home Health. Family given an opportunity to ask questions and does not have any further questions or concerns at this time. The family agrees to contact the PCP office for questions related to their healthcare. Medication reconciliation was performed with family, who verbalizes understanding of administration of home medications. Advised obtaining a 90-day supply of all daily and as-needed medications. Referral to Pharm D needed: no     Home Health/Outpatient orders at discharge: PT and [address]: tbd, daughter to determine if home health needed      Durable Medical Equipment ordered at discharge:  patient family has a tub chair, walker, raised toilet seat      Was patient discharged with a pulse oximeter? no    Discussed follow-up appointments. If no appointment was previously scheduled, appointment scheduling offered: no. Is follow up appointment scheduled within 7 days of discharge? no.   [facility] follow up appointment(s):   Future Appointments   Date Time Provider Yari Morrow   12/2/2022  8:45 AM Anita Coates MD [facility] BS AMB   1/16/2023  8:40 AM Tequila Stiles  S [facility] BS AMB     Non-[facility] follow up appointment(s): none at this time    Plan for follow-up call in 5-7 days based on severity of symptoms and risk factors. Plan for next call: follow up appointment-12/2/22 and if home health was needed. CTN provided contact information for future needs. Goals Addressed                   This Visit's Progress     Prevent complications post hospitalization. 11/30/22    Patient is using walker to ensure safety. Patient to attend pcp appt on 12/2/22  Patient to take needed rests during the day until strength builds up and daughter given number to case management at [facility] to contact if home health is needed. CTN also discussed that pcp could arrange home health pt and ot if felt they were needed during appt on 12/2/22  Ctn to follow up in one week. Chely Fortune RN

## 2022-12-02 ENCOUNTER — VIRTUAL VISIT (OUTPATIENT)
Dept: FAMILY MEDICINE CLINIC | Age: 82
End: 2022-12-02
Payer: MEDICARE

## 2022-12-02 DIAGNOSIS — I10 ESSENTIAL HYPERTENSION: ICD-10-CM

## 2022-12-02 DIAGNOSIS — Z09 HOSPITAL DISCHARGE FOLLOW-UP: Primary | ICD-10-CM

## 2022-12-02 DIAGNOSIS — N17.9 AKI (ACUTE KIDNEY INJURY) (HCC): ICD-10-CM

## 2022-12-02 DIAGNOSIS — N18.30 STAGE 3 CHRONIC KIDNEY DISEASE, UNSPECIFIED WHETHER STAGE 3A OR 3B CKD (HCC): ICD-10-CM

## 2022-12-02 DIAGNOSIS — E11.9 TYPE 2 DIABETES MELLITUS WITHOUT COMPLICATION, WITHOUT LONG-TERM CURRENT USE OF INSULIN (HCC): ICD-10-CM

## 2022-12-02 DIAGNOSIS — J44.1 CHRONIC OBSTRUCTIVE PULMONARY DISEASE WITH ACUTE EXACERBATION (HCC): ICD-10-CM

## 2022-12-02 DIAGNOSIS — A41.9 SEPSIS WITH ACUTE RENAL FAILURE WITHOUT SEPTIC SHOCK, DUE TO UNSPECIFIED ORGANISM, UNSPECIFIED ACUTE RENAL FAILURE TYPE (HCC): ICD-10-CM

## 2022-12-02 DIAGNOSIS — N17.9 SEPSIS WITH ACUTE RENAL FAILURE WITHOUT SEPTIC SHOCK, DUE TO UNSPECIFIED ORGANISM, UNSPECIFIED ACUTE RENAL FAILURE TYPE (HCC): ICD-10-CM

## 2022-12-02 DIAGNOSIS — R65.20 SEPSIS WITH ACUTE RENAL FAILURE WITHOUT SEPTIC SHOCK, DUE TO UNSPECIFIED ORGANISM, UNSPECIFIED ACUTE RENAL FAILURE TYPE (HCC): ICD-10-CM

## 2022-12-02 DIAGNOSIS — E87.8 ELECTROLYTE ABNORMALITY: ICD-10-CM

## 2022-12-02 PROCEDURE — 1111F DSCHRG MED/CURRENT MED MERGE: CPT | Performed by: FAMILY MEDICINE

## 2022-12-02 PROCEDURE — 99443 PR PHYS/QHP TELEPHONE EVALUATION 21-30 MIN: CPT | Performed by: FAMILY MEDICINE

## 2022-12-02 PROCEDURE — 1101F PT FALLS ASSESS-DOCD LE1/YR: CPT | Performed by: FAMILY MEDICINE

## 2022-12-02 NOTE — PROGRESS NOTES
Alexus Alonso is a [de-identified] y.o. female evaluated via telephone on 2022. Consent:  She and/or health care decision maker is aware that she may receive a bill for this telephone service, depending on her insurance coverage, and has provided verbal consent to proceed: Yes      Documentation:  I communicated with the patient and/or health care decision maker about her hospital admission. Details of this discussion including any medical advice provided:     Virtual Audio Transitional Care Management Progress Note    Patient: Alexus Alonso  : 1940  PCP: Clare Rayo MD    Date of office visit: 2022   Date of admission: 22  Date of discharge: 22  She was discharged from rehab 27  Hospital: [facility]    Call initiated w/i 2 business dates of discharge: No   Date of the most recent call to the patient: 2022  3:02 PM      Assessment/Plan:   Diagnoses and all orders for this visit:    1. Hospital discharge follow-up  -     [place] DISCHARGE MEDS RECONCILED W/ CURRENT OUTPATIENT MED LIST    2. Sepsis with acute renal failure without septic shock, due to unspecified organism, unspecified acute renal failure type (HCC)    3. Electrolyte abnormality  -     PHOSPHORUS; Future  -     MAGNESIUM; Future  -     METABOLIC PANEL, COMPREHENSIVE; Future    4. LESLIE (acute kidney injury) ([address]    5. Stage 3 chronic kidney disease, unspecified whether stage 3a or 3b CKD ([address]    6. Essential hypertension    7. Type 2 diabetes mellitus without complication, without long-term current use of insulin ([address]    8. Chronic obstructive pulmonary disease with acute exacerbation ([address]      Schedule with Pulmonary  Labs per orders. Continue current plans. Follow-up and Dispositions    Return in about 3 months (around 3/2/2023) for blood pressure. Subjective:   Alexus Alonso is a [de-identified] y.o. female presenting today for follow-up after hospital discharge.   This encounter and supporting documentation was reviewed if available. Medication reconciliation was performed today. The main problem requiring admission was sepsis. Complications during admission: none. Her daughter is with her today. Hospital Problems:    Sepsis with intractable n/v/d 2/2 gastritis: Resolved. Patient clinically improved. Gastritis supported by CT imaging w/o evidence of colitis suspected due to viral illness. Lactic/Amylase/lipase were unremarkable. COVID negative. Clinically improved tolerating po intake. - Further management per SNF team     LESLIE on CKD3: Resolved to baseline s/p IV fluids given likely secondary to IVVD. FeNa 4.9% indicating postobstructive cause. Ulytes collected later than bmp suspect inaccurate given no evidence of obstruction with good uop. - avoid nephrotoxic agents   - Further management per SNF team     Hypokalemia: Resolved s/p repletion. - follow up with pcp, recheck bmp   - Further management per SNF team     Hypocalcemia: Improved s/p repletion. Corrected calcium elevated to 8.6, no prior record of chronic depletion. Likely secondary to sepsis. Patient asymptomatic.   - Further management per SNF team     Thrombocytosis: Resolved. Suspect reactive due to acute illness. PS completed on last admission without significant platelet abnormalities. - Further management per SNF team     Hypertension: Chronic, stable   - continue home medications of Diovan /12.5 daily, Metoprolol XL 200mg daily, Norvasc 10mg daily  - Further management per SNF team     DMII - A1c 6.7 (9/19/2022) and at goal. Controlled with diet. Not currently on home medications. - Continue fasting blood glucose monitoring  - Further management per SNF team     COPD: Recent admission for exacerbation treated with abx and steroid burst with resolution. No evidence of further exacerbation on exam. Not currently on outpatient regimen. CXR on admission was unremarkable. Chronic tobacco use present.    - encourage cessation of vaping  - Follow up with pulmonology as outpatient  - Further management per SNF team     HLD: Chronic  - Continue home Crestor 10mg daily   - Further management per SNF team     GERD: Chronic  - continue Omeprazole 40mg daily.   - Further management per SNF team      Interval history/Current status: she is better but still a little weak. At this point, she does not have a Pulmonary follow up scheduled yet. Home health will be coming out. Admitting symptoms have: improved      Medications marked \"taking\" at this time:  Prior to Admission medications    Medication Sig Start Date End Date Taking? Authorizing Provider   valsartan-hydroCHLOROthiazide (Diovan HCT) 320-12.5 mg per tablet Take 1 Tablet by mouth daily. 9/19/22  Yes Michelle Marks MD   omeprazole (PRILOSEC) 40 mg capsule TAKE ONE CAPSULE BY MOUTH DAILY 8/8/22  Yes Michelle Marks MD   amLODIPine (NORVASC) 10 mg tablet Take 1 Tablet by mouth daily. 7/8/22  Yes Michelle Marks MD   metoprolol succinate (TOPROL-XL) 200 mg XL tablet Take 1 Tablet by mouth daily. 7/8/22  Yes Michelle Marks MD   rosuvastatin (CRESTOR) 20 mg tablet TAKE ONE TABLET BY MOUTH EVERY EVENING  Patient taking differently: 10 mg. TAKE ONE TABLET BY MOUTH EVERY EVENING 8/14/21  Yes Michelle Marks MD   omega-3 fatty acids-vitamin e 1,000 mg cap Take 2 Caps by mouth daily (after breakfast). Yes Provider, Historical   calcium-vitamin D (OS-MANDI +D3) 500 mg-200 unit per tablet Take 1 Tab by mouth daily (after breakfast). Yes Provider, Historical   multivitamin (ONE A DAY) tablet Take 1 Tab by mouth daily (after breakfast). Yes Provider, Historical   ondansetron (ZOFRAN ODT) 4 mg disintegrating tablet Take 1 Tablet by mouth every eight (8) hours as needed for Nausea or Vomiting.   Patient not taking: Reported on 12/2/2022 10/30/22 12/2/22  Erika Connell MD   glucose blood VI test strips (Accu-Chek Ifrah Plus test strp) strip USE TO TEST BLOOD SUGAR DAILY, E11.9 7/21/21   Alessia Angulo MD        Review of Systems   Constitutional:  Negative for chills and fever. Respiratory:  Positive for shortness of breath. Gastrointestinal:  Negative for nausea and vomiting. Neurological:  Positive for dizziness. Objective:     Patient-Reported Vitals 7/16/2020   Patient-Reported Weight 166lb   Patient-Reported Height 5f2i   Patient-Reported Pulse 79   Patient-Reported Temperature 99   Patient-Reported Systolic  131   Patient-Reported Diastolic 65        Physical Exam   Deferred, audio visit only    We discussed the expected course, resolution and complications of the diagnosis(es) in detail. Medication risks, benefits, costs, interactions, and alternatives were discussed as indicated. I advised her to contact the office if her condition worsens, changes or fails to improve as anticipated. She expressed understanding with the diagnosis(es) and plan. I affirm this is a Patient Initiated Episode with an Established Patient who has not had a related appointment within my department in the past 7 days or scheduled within the next 24 hours.     Total Time: minutes: 21-30 minutes    Note: not billable if this call serves to triage the patient into an appointment for the relevant concern      Phani Campoverde MD

## 2022-12-02 NOTE — PROGRESS NOTES
Subjective:   Ashley Wild was seen for Hospital Follow Up (Dehydration, vomiting at [address], had RSV and bacterial pneumonia 11/2022)      HPI        ROS      Objective:         Assessment & Plan:   Diagnoses and all orders for this visit:    1. Hospital discharge follow-up  -     [place] DISCHARGE MEDS RECONCILED W/ CURRENT OUTPATIENT MED LIST        ***    {Assessment and Plan:35195}      Reviewed plan of care. Patient has provided input and agrees with goals. I {AFFIRM/DO NOT AFFIRM:41988::\"affirm\"} this is a Patient Initiated Episode with an Established Patient who has not had a related appointment within my department in the past 7 days or scheduled within the next 24 hours.     Total Time: {minutes:82347::\"5-10 minutes\"}    Note: not billable if this call serves to triage the patient into an appointment for the relevant concern      Lux Strickland MD

## 2022-12-02 NOTE — PROGRESS NOTES
Kaitlin Menjivar is a [de-identified] y.o. female    Chief Complaint   Patient presents with    Hospital Follow Up     Dehydration, vomiting at [facility], had RSV and bacterial pneumonia 11/2022       There were no vitals taken for this visit. 3 most recent PHQ Screens 11/6/2022   Little interest or pleasure in doing things Not at all   Feeling down, depressed, irritable, or hopeless Not at all   Total Score PHQ 2 0   Trouble falling or staying asleep, or sleeping too much -   Feeling tired or having little energy -   Poor appetite, weight loss, or overeating -   Feeling bad about yourself - or that you are a failure or have let yourself or your family down -   Trouble concentrating on things such as school, work, reading, or watching TV -   Moving or speaking so slowly that other people could have noticed; or the opposite being so fidgety that others notice -   Thoughts of being better off dead, or hurting yourself in some way -   PHQ 9 Score -   How difficult have these problems made it for you to do your work, take care of your home and get along with others -       Fall Risk Assessment, last 12 mths 4/25/2022   Able to walk? Yes   Fall in past 12 months? 0   Do you feel unsteady? -   Are you worried about falling -   Number of falls in past 12 months -   Fall with injury? -       Abuse Screening Questionnaire 4/24/2022   Do you ever feel afraid of your partner? N   Are you in a relationship with someone who physically or mentally threatens you? N   Is it safe for you to go home? Y       1. Have you been to the ER, urgent care clinic since your last visit? Hospitalized since your last visit? Yes for dehydration 11/2022    2. Have you seen or consulted any other health care providers outside of the [address] [place] [address] since your last visit? Include any pap smears or colon screening.  No

## 2022-12-04 RX ORDER — BLOOD SUGAR DIAGNOSTIC
STRIP MISCELLANEOUS
Qty: 100 STRIP | Refills: 4 | Status: SHIPPED | OUTPATIENT
Start: 2022-12-04

## 2022-12-04 RX ORDER — LANCETS
EACH MISCELLANEOUS
Qty: 100 LANCET | Refills: 4 | Status: SHIPPED | OUTPATIENT
Start: 2022-12-04

## 2022-12-04 RX ORDER — VALSARTAN AND HYDROCHLOROTHIAZIDE 320; 12.5 MG/1; MG/1
TABLET, FILM COATED ORAL
Qty: 90 TABLET | Refills: 3 | Status: SHIPPED | OUTPATIENT
Start: 2022-12-04

## 2022-12-06 RX ORDER — BLOOD SUGAR DIAGNOSTIC
STRIP MISCELLANEOUS
Qty: 100 STRIP | Refills: 4 | Status: SHIPPED | OUTPATIENT
Start: 2022-12-06

## 2022-12-06 RX ORDER — LANCETS
EACH MISCELLANEOUS
Qty: 100 LANCET | Refills: 4 | Status: SHIPPED | OUTPATIENT
Start: 2022-12-06

## 2022-12-06 NOTE — TELEPHONE ENCOUNTER
Pharmacist, Hilda Scott called stating pt's prescriptions sent by Dr. Tena Villa yesterday are missing ICD 10 diagnosis codes and can't be filled without them. Please resend prescriptions for glucometer kit and lancets with ICD 10 codes.  Heidi

## 2022-12-07 ENCOUNTER — TELEPHONE (OUTPATIENT)
Dept: FAMILY MEDICINE CLINIC | Age: 82
End: 2022-12-07

## 2022-12-07 NOTE — TELEPHONE ENCOUNTER
Please call patient and her daughter and let them know her kidney function has dropped considerably and her magnesium is low. She needs to schedule an appointment about this. Vilma Parada

## 2022-12-08 NOTE — TELEPHONE ENCOUNTER
Attempted to contact patient or her daughter at number on file, no answer, left message for her daughter to return call to office to obtain message from Dr. Karina Kendrick.

## 2022-12-13 ENCOUNTER — TELEPHONE (OUTPATIENT)
Dept: FAMILY MEDICINE CLINIC | Age: 82
End: 2022-12-13

## 2022-12-13 ENCOUNTER — VIRTUAL VISIT (OUTPATIENT)
Dept: FAMILY MEDICINE CLINIC | Age: 82
End: 2022-12-13
Payer: MEDICARE

## 2022-12-13 DIAGNOSIS — E83.42 HYPOMAGNESEMIA: ICD-10-CM

## 2022-12-13 DIAGNOSIS — R94.4 DECREASED GFR: Primary | ICD-10-CM

## 2022-12-13 DIAGNOSIS — N18.4 CKD (CHRONIC KIDNEY DISEASE) STAGE 4, GFR 15-29 ML/MIN (HCC): ICD-10-CM

## 2022-12-13 DIAGNOSIS — J44.1 CHRONIC OBSTRUCTIVE PULMONARY DISEASE WITH ACUTE EXACERBATION (HCC): ICD-10-CM

## 2022-12-13 PROCEDURE — G8417 CALC BMI ABV UP PARAM F/U: HCPCS | Performed by: FAMILY MEDICINE

## 2022-12-13 PROCEDURE — 1101F PT FALLS ASSESS-DOCD LE1/YR: CPT | Performed by: FAMILY MEDICINE

## 2022-12-13 PROCEDURE — G8399 PT W/DXA RESULTS DOCUMENT: HCPCS | Performed by: FAMILY MEDICINE

## 2022-12-13 PROCEDURE — 99214 OFFICE O/P EST MOD 30 MIN: CPT | Performed by: FAMILY MEDICINE

## 2022-12-13 PROCEDURE — 1123F ACP DISCUSS/DSCN MKR DOCD: CPT | Performed by: FAMILY MEDICINE

## 2022-12-13 PROCEDURE — 1090F PRES/ABSN URINE INCON ASSESS: CPT | Performed by: FAMILY MEDICINE

## 2022-12-13 PROCEDURE — G8756 NO BP MEASURE DOC: HCPCS | Performed by: FAMILY MEDICINE

## 2022-12-13 PROCEDURE — G8427 DOCREV CUR MEDS BY ELIG CLIN: HCPCS | Performed by: FAMILY MEDICINE

## 2022-12-13 PROCEDURE — G8510 SCR DEP NEG, NO PLAN REQD: HCPCS | Performed by: FAMILY MEDICINE

## 2022-12-13 PROCEDURE — G8536 NO DOC ELDER MAL SCRN: HCPCS | Performed by: FAMILY MEDICINE

## 2022-12-13 RX ORDER — LANOLIN ALCOHOL/MO/W.PET/CERES
400 CREAM (GRAM) TOPICAL DAILY
COMMUNITY
Start: 2022-12-13

## 2022-12-13 RX ORDER — BLOOD SUGAR DIAGNOSTIC
STRIP MISCELLANEOUS
Qty: 100 STRIP | Refills: 4 | Status: SHIPPED | OUTPATIENT
Start: 2022-12-13 | End: 2022-12-15 | Stop reason: SDUPTHER

## 2022-12-13 NOTE — PROGRESS NOTES
Chief Complaint   Patient presents with    Labs     Review.  this morning. Patient's daughter also states that patient saw her pulmonologist and would like to discuss that visit as well. 1. Have you been to the ER, urgent care clinic since your last visit? Hospitalized since your last visit? No    2. Have you seen or consulted any other health care providers outside of the [address] [place] [address] since your last visit? Include any pap smears or colon screening.  No

## 2022-12-13 NOTE — PROGRESS NOTES
Cleo Martin is a [de-identified] y.o. female who was seen by synchronous (real-time) audio-video technology on 12/13/2022. Assessment & Plan:   Diagnoses and all orders for this visit:    1. Decreased GFR  -     METABOLIC PANEL, BASIC; Future    2. CKD (chronic kidney disease) stage 4, GFR 15-29 ml/min ([facility])    3. Hypomagnesemia    4. Chronic obstructive pulmonary disease with acute exacerbation ([facility])    Other orders  -     glucose blood VI test strips (Accu-Chek Ifrah Plus test strp) strip; USE TO TEST BLOOD SUGAR DAILY      Precipitous drop in GFR, possible lab error  Labs per orders. Will need to stop Diovan Hct, possibly the Crestor and monitor her magnesium carefully is her GFR is still that low  Continue to follow up with Pulmonary    Follow-up and Dispositions    Return for diabetes, blood pressure. Reviewed plan of care. Patient has provided input and agrees with goals. CPT Codes 84676-41946 for Established Patients may apply to this Telehealth Visit      Subjective:   Cleo Martin was seen for Labs (Review.  this morning. Patient's daughter also states that patient saw her pulmonologist and would like to discuss that visit as well.)      Patient presents with:  Labs: Review.  this morning. Patient's daughter also states that patient saw her pulmonologist and would like to discuss that visit as well. Her GFR has dropped from 50 to 29 over the past month and her magnesium was mildly decreased. She is taking Diovan Hct. No NSAIDs. She is seeing Pulmonary and may possibly need O2 at night. Her daughter is with her. Review of Systems   Respiratory:  Positive for shortness of breath. Negative for cough and wheezing. Objective:   /72, P 109    Physical Exam  Constitutional:       General: She is not in acute distress. Appearance: Normal appearance.    Pulmonary:      Effort: Pulmonary effort is normal.   Neurological:      Mental Status: She is alert and oriented to person, place, and time. Due to this being a TeleHealth evaluation, many elements of the physical examination are unable to be assessed. We discussed the expected course, resolution and complications of the diagnosis(es) in detail. Medication risks, benefits, costs, interactions, and alternatives were discussed as indicated. I advised her to contact the office if her condition worsens, changes or fails to improve as anticipated. She expressed understanding with the diagnosis(es) and plan. Pursuant to the emergency declaration under the [facility] waiver authority and the [employer] Act, this Virtual  Visit was conducted, with patient's consent, to reduce the patient's risk of exposure to COVID-19 and provide continuity of care for an established patient. Services were provided through a video synchronous discussion virtually to substitute for in-person clinic visit.     Earline Pardo MD

## 2022-12-14 ENCOUNTER — PATIENT OUTREACH (OUTPATIENT)
Dept: CASE MANAGEMENT | Age: 82
End: 2022-12-14

## 2022-12-14 NOTE — PROGRESS NOTES
Patient has graduated from the Transitions of Care Coordination  program on 12/14/22. Patient/family has the ability to self-manage at this time Care management goals have been completed. Patient was not referred to the [facility] team for further management. Goals Addressed                   This Visit's Progress     COMPLETED: Prevent complications post hospitalization. On track     11/30/22    Patient is using walker to ensure safety. Patient to attend pcp appt on 12/2/22  Patient to take needed rests during the day until strength builds up and daughter given number to case management at [facility] to contact if home health is needed. CTN also discussed that pcp could arrange home health pt and ot if felt they were needed during appt on 12/2/22  Ctn to follow up in one week. Chava Mishra RN    12/14/22  Patient has had no readmissions for 30 days. [facility]  Patient attended pcp appt on 12/2/22  Chava Mishra RN              Patient has Care Transition Nurse's contact information for any further questions, concerns, or needs.   Patients upcoming visits:    Future Appointments   Date Time Provider Yari Morrow   1/16/2023  8:40 AM Reyna Pang  [address] BS AMB   2/1/2023  9:00 AM Rl Fernandez MD [facility] BS AMB

## 2022-12-14 NOTE — ACP (ADVANCE CARE PLANNING)
Advance Care Planning     General Advance Care Planning (ACP) Conversation      Date of Conversation: 12/14/2022  Conducted with: Patient with Decision Making Capacity and Healthcare Decision Maker: Named in Advance Directive or Healthcare Power of [address]:     Primary Decision Maker: Ponciano Heimlich - [phone]    Secondary Decision Maker: Sergio Bee - Daughter - [phone]  Click here to complete [address] including selection of the Healthcare Decision Maker Relationship (ie \"Primary\")        Content/Action Overview:    Has ACP document(s) on file - reflects the patient's care preferences  Reviewed DNR/DNI and patient confirms current DNR status - completed forms on file (place new order if needed)         Length of Voluntary ACP Conversation in minutes:  <16 minutes (Non-Billable)    Britta Ramos RN

## 2022-12-15 ENCOUNTER — TELEPHONE (OUTPATIENT)
Dept: FAMILY MEDICINE CLINIC | Age: 82
End: 2022-12-15

## 2022-12-15 DIAGNOSIS — N18.4 CKD (CHRONIC KIDNEY DISEASE) STAGE 4, GFR 15-29 ML/MIN (HCC): Primary | ICD-10-CM

## 2022-12-15 RX ORDER — BLOOD SUGAR DIAGNOSTIC
STRIP MISCELLANEOUS
Qty: 100 STRIP | Refills: 4 | Status: SHIPPED | OUTPATIENT
Start: 2022-12-15

## 2022-12-15 RX ORDER — LANCETS
EACH MISCELLANEOUS
Qty: 100 LANCET | Refills: 4 | Status: SHIPPED | OUTPATIENT
Start: 2022-12-15

## 2022-12-15 NOTE — TELEPHONE ENCOUNTER
Called  and spoke with pt's daughter. She will call Dr. Cherelle Hare office and schedule pt an appointment. Records have been faxed.

## 2022-12-15 NOTE — TELEPHONE ENCOUNTER
Please call patient and her daughter and let them know her kidney function has not improved and I would like for them to see renal.  They should call tomorrow. In the mean time, please FAX over her records. A referral request has been printed.

## 2022-12-15 NOTE — TELEPHONE ENCOUNTER
Patient's daughter called in. .    States Amalia's lab results came back worst than the last. Would like to know what the next steps are.  Whether it be changes in medications or going to a specialist.    Gisela Para [phone]

## 2023-02-01 ENCOUNTER — OFFICE VISIT (OUTPATIENT)
Dept: FAMILY MEDICINE CLINIC | Age: 83
End: 2023-02-01
Payer: MEDICARE

## 2023-02-01 VITALS
SYSTOLIC BLOOD PRESSURE: 148 MMHG | RESPIRATION RATE: 20 BRPM | WEIGHT: 146 LBS | TEMPERATURE: 98 F | BODY MASS INDEX: 26.87 KG/M2 | HEART RATE: 88 BPM | DIASTOLIC BLOOD PRESSURE: 55 MMHG | HEIGHT: 62 IN | OXYGEN SATURATION: 98 %

## 2023-02-01 DIAGNOSIS — I10 ESSENTIAL HYPERTENSION: ICD-10-CM

## 2023-02-01 DIAGNOSIS — E11.9 TYPE 2 DIABETES MELLITUS WITHOUT COMPLICATION, WITHOUT LONG-TERM CURRENT USE OF INSULIN (HCC): Primary | ICD-10-CM

## 2023-02-01 PROCEDURE — G8536 NO DOC ELDER MAL SCRN: HCPCS | Performed by: FAMILY MEDICINE

## 2023-02-01 PROCEDURE — 1090F PRES/ABSN URINE INCON ASSESS: CPT | Performed by: FAMILY MEDICINE

## 2023-02-01 PROCEDURE — G8510 SCR DEP NEG, NO PLAN REQD: HCPCS | Performed by: FAMILY MEDICINE

## 2023-02-01 PROCEDURE — G8427 DOCREV CUR MEDS BY ELIG CLIN: HCPCS | Performed by: FAMILY MEDICINE

## 2023-02-01 PROCEDURE — G8399 PT W/DXA RESULTS DOCUMENT: HCPCS | Performed by: FAMILY MEDICINE

## 2023-02-01 PROCEDURE — 3078F DIAST BP <80 MM HG: CPT | Performed by: FAMILY MEDICINE

## 2023-02-01 PROCEDURE — 1101F PT FALLS ASSESS-DOCD LE1/YR: CPT | Performed by: FAMILY MEDICINE

## 2023-02-01 PROCEDURE — 1123F ACP DISCUSS/DSCN MKR DOCD: CPT | Performed by: FAMILY MEDICINE

## 2023-02-01 PROCEDURE — G8417 CALC BMI ABV UP PARAM F/U: HCPCS | Performed by: FAMILY MEDICINE

## 2023-02-01 PROCEDURE — 99214 OFFICE O/P EST MOD 30 MIN: CPT | Performed by: FAMILY MEDICINE

## 2023-02-01 PROCEDURE — 3077F SYST BP >= 140 MM HG: CPT | Performed by: FAMILY MEDICINE

## 2023-02-01 RX ORDER — METOPROLOL SUCCINATE 200 MG/1
200 TABLET, EXTENDED RELEASE ORAL DAILY
Qty: 90 TABLET | Refills: 4 | Status: SHIPPED | OUTPATIENT
Start: 2023-02-01

## 2023-02-01 RX ORDER — VALSARTAN AND HYDROCHLOROTHIAZIDE 320; 12.5 MG/1; MG/1
1 TABLET, FILM COATED ORAL DAILY
Qty: 90 TABLET | Refills: 4 | Status: SHIPPED | OUTPATIENT
Start: 2023-02-01

## 2023-02-01 RX ORDER — AMLODIPINE BESYLATE 10 MG/1
10 TABLET ORAL DAILY
Qty: 90 TABLET | Refills: 4 | Status: SHIPPED | OUTPATIENT
Start: 2023-02-01

## 2023-02-01 NOTE — PROGRESS NOTES
HISTORY OF PRESENT ILLNESS  Malou Domínguez is a [de-identified] y.o. female. Patient presents with:  Hypertension: Follow up   Diabetes: Follow up           Review of Systems   Eyes:  Negative for blurred vision. Respiratory:  Negative for shortness of breath. Cardiovascular:  Negative for chest pain. Genitourinary:         No polyuria   Neurological:  Negative for dizziness, sensory change, speech change, focal weakness and headaches. Endo/Heme/Allergies:  Negative for polydipsia. Visit Vitals  BP (!) 148/55   Pulse 88   Temp 98 °F (36.7 °C) (Temporal)   Resp 20   Ht 5' 2\" (1.575 m)   Wt 146 lb (66.2 kg)   SpO2 98%   BMI 26.70 kg/m²     Physical Exam  Vitals and nursing note reviewed. Constitutional:       General: She is not in acute distress. Appearance: She is well-developed. She is not diaphoretic. Cardiovascular:      Rate and Rhythm: Normal rate and regular rhythm. Heart sounds: Normal heart sounds. No murmur heard. No friction rub. No gallop. Pulmonary:      Effort: Pulmonary effort is normal. No respiratory distress. Breath sounds: Normal breath sounds. No wheezing or rales. Skin:     General: Skin is warm and dry. Neurological:      Mental Status: She is alert and oriented to person, place, and time. ASSESSMENT and PLAN    ICD-10-CM ICD-9-CM    1. Type 2 diabetes mellitus without complication, without long-term current use of insulin (HCC)  E11.9 250.00 HEMOGLOBIN A1C WITH EAG      2. Essential hypertension  I10 401.9 valsartan-hydroCHLOROthiazide (DIOVAN-HCT) 320-12.5 mg per tablet      amLODIPine (NORVASC) 10 mg tablet      metoprolol succinate (TOPROL-XL) 200 mg XL tablet          SBP elevated DBP low, unable to adjust medications  Labs per orders. Continue current plans. Refills per orders  Low dose CT for lung cancer screening discussed - she declines    Follow-up and Dispositions    Return in about 4 months (around 6/1/2023) for diabetes, blood pressure. Reviewed plan of care. Patient has provided input and agrees with goals.

## 2023-06-19 ENCOUNTER — OFFICE VISIT (OUTPATIENT)
Facility: CLINIC | Age: 83
End: 2023-06-19
Payer: MEDICARE

## 2023-06-19 VITALS
HEART RATE: 86 BPM | TEMPERATURE: 97.5 F | WEIGHT: 172 LBS | HEIGHT: 62 IN | DIASTOLIC BLOOD PRESSURE: 56 MMHG | RESPIRATION RATE: 20 BRPM | SYSTOLIC BLOOD PRESSURE: 141 MMHG | BODY MASS INDEX: 31.65 KG/M2 | OXYGEN SATURATION: 100 %

## 2023-06-19 DIAGNOSIS — R25.2 CRAMPS OF LOWER EXTREMITY: ICD-10-CM

## 2023-06-19 DIAGNOSIS — E11.9 TYPE 2 DIABETES MELLITUS WITHOUT COMPLICATION, WITHOUT LONG-TERM CURRENT USE OF INSULIN (HCC): ICD-10-CM

## 2023-06-19 DIAGNOSIS — E11.9 TYPE 2 DIABETES MELLITUS WITHOUT COMPLICATION, WITHOUT LONG-TERM CURRENT USE OF INSULIN (HCC): Primary | ICD-10-CM

## 2023-06-19 DIAGNOSIS — I10 ESSENTIAL HYPERTENSION: ICD-10-CM

## 2023-06-19 DIAGNOSIS — M19.042 PRIMARY OSTEOARTHRITIS OF BOTH HANDS: ICD-10-CM

## 2023-06-19 DIAGNOSIS — M19.041 PRIMARY OSTEOARTHRITIS OF BOTH HANDS: ICD-10-CM

## 2023-06-19 PROCEDURE — G8399 PT W/DXA RESULTS DOCUMENT: HCPCS | Performed by: FAMILY MEDICINE

## 2023-06-19 PROCEDURE — G8427 DOCREV CUR MEDS BY ELIG CLIN: HCPCS | Performed by: FAMILY MEDICINE

## 2023-06-19 PROCEDURE — 3077F SYST BP >= 140 MM HG: CPT | Performed by: FAMILY MEDICINE

## 2023-06-19 PROCEDURE — 1090F PRES/ABSN URINE INCON ASSESS: CPT | Performed by: FAMILY MEDICINE

## 2023-06-19 PROCEDURE — 4004F PT TOBACCO SCREEN RCVD TLK: CPT | Performed by: FAMILY MEDICINE

## 2023-06-19 PROCEDURE — 3044F HG A1C LEVEL LT 7.0%: CPT | Performed by: FAMILY MEDICINE

## 2023-06-19 PROCEDURE — 99214 OFFICE O/P EST MOD 30 MIN: CPT | Performed by: FAMILY MEDICINE

## 2023-06-19 PROCEDURE — G8417 CALC BMI ABV UP PARAM F/U: HCPCS | Performed by: FAMILY MEDICINE

## 2023-06-19 PROCEDURE — 3078F DIAST BP <80 MM HG: CPT | Performed by: FAMILY MEDICINE

## 2023-06-19 PROCEDURE — 1123F ACP DISCUSS/DSCN MKR DOCD: CPT | Performed by: FAMILY MEDICINE

## 2023-06-19 RX ORDER — VALSARTAN 320 MG/1
320 TABLET ORAL DAILY
Qty: 90 TABLET | Refills: 0 | Status: SHIPPED | OUTPATIENT
Start: 2023-06-19 | End: 2024-06-18

## 2023-06-19 ASSESSMENT — PATIENT HEALTH QUESTIONNAIRE - PHQ9
SUM OF ALL RESPONSES TO PHQ QUESTIONS 1-9: 0
2. FEELING DOWN, DEPRESSED OR HOPELESS: 0
SUM OF ALL RESPONSES TO PHQ QUESTIONS 1-9: 0
1. LITTLE INTEREST OR PLEASURE IN DOING THINGS: 0
SUM OF ALL RESPONSES TO PHQ QUESTIONS 1-9: 0
SUM OF ALL RESPONSES TO PHQ QUESTIONS 1-9: 0
SUM OF ALL RESPONSES TO PHQ9 QUESTIONS 1 & 2: 0

## 2023-06-19 ASSESSMENT — ENCOUNTER SYMPTOMS: SHORTNESS OF BREATH: 1

## 2023-06-19 NOTE — PROGRESS NOTES
Subjective:      Patient ID: Elena Rodriges is a 82 y.o. female. Patient presents with:  Diabetes: Follow up   Hypertension: Follow up   Leg Pain: Cramping, at night, in both legs     The osteoarthritis in her hands is worse. Tylenol works partially. She has been walking more lately. Her daughter is with her. Review of Systems   Eyes:  Negative for visual disturbance. Respiratory:  Positive for shortness of breath. She has chronic dyspnea which has improved   Cardiovascular:  Negative for chest pain. Endocrine: Negative for polydipsia and polyuria. Neurological:  Negative for dizziness, speech difficulty, weakness, numbness and headaches. Objective:   Physical Exam  Constitutional:       General: She is not in acute distress. Appearance: Normal appearance. Cardiovascular:      Rate and Rhythm: Normal rate and regular rhythm. Heart sounds: Normal heart sounds. Pulmonary:      Effort: Pulmonary effort is normal.      Breath sounds: Normal breath sounds. Neurological:      Mental Status: She is alert and oriented to person, place, and time. BP (!) 141/56 (Site: Left Upper Arm, Position: Sitting, Cuff Size: Large Adult)   Pulse 86   Temp 97.5 °F (36.4 °C) (Temporal)   Resp 20   Ht 5' 2\" (1.575 m)   Wt 172 lb (78 kg)   SpO2 100%   BMI 31.46 kg/m²     Assessment:       Diagnosis Orders   1. Type 2 diabetes mellitus without complication, without long-term current use of insulin ([facility])  Hemoglobin T7R    Basic Metabolic Panel      2. Essential hypertension  Basic Metabolic Panel      3. Cramps of lower extremity        4. Primary osteoarthritis of both hands                Plan:      Stop Diovan Hct  Start Diovan  Labs per orders. Calf stretches  Parrafen baths for hands    Return in about 6 weeks (around 7/31/2023) for HTN, tremor. Reviewed plan of care. Patient has provided input and agrees with goals.             Renan Johnson MD

## 2023-06-19 NOTE — PROGRESS NOTES
Chief Complaint   Patient presents with    Diabetes     Follow up     Hypertension     Follow up     Leg Pain     Cramping, at night, in both legs

## 2023-06-20 LAB
ANION GAP SERPL CALC-SCNC: 7 MMOL/L (ref 5–15)
BUN SERPL-MCNC: 39 MG/DL (ref 6–20)
BUN/CREAT SERPL: 25 (ref 12–20)
CALCIUM SERPL-MCNC: 9.7 MG/DL (ref 8.5–10.1)
CHLORIDE SERPL-SCNC: 106 MMOL/L (ref 97–108)
CO2 SERPL-SCNC: 25 MMOL/L (ref 21–32)
CREAT SERPL-MCNC: 1.55 MG/DL (ref 0.55–1.02)
EST. AVERAGE GLUCOSE BLD GHB EST-MCNC: 126 MG/DL
GLUCOSE SERPL-MCNC: 133 MG/DL (ref 65–100)
HBA1C MFR BLD: 6 % (ref 4–5.6)
POTASSIUM SERPL-SCNC: 5.2 MMOL/L (ref 3.5–5.1)
SODIUM SERPL-SCNC: 138 MMOL/L (ref 136–145)

## 2023-08-08 RX ORDER — OMEPRAZOLE 40 MG/1
CAPSULE, DELAYED RELEASE ORAL
Qty: 90 CAPSULE | Refills: 3 | Status: SHIPPED | OUTPATIENT
Start: 2023-08-08

## 2023-08-09 ENCOUNTER — OFFICE VISIT (OUTPATIENT)
Facility: CLINIC | Age: 83
End: 2023-08-09
Payer: MEDICARE

## 2023-08-09 VITALS
OXYGEN SATURATION: 100 % | HEIGHT: 62 IN | WEIGHT: 155.6 LBS | SYSTOLIC BLOOD PRESSURE: 136 MMHG | DIASTOLIC BLOOD PRESSURE: 68 MMHG | RESPIRATION RATE: 20 BRPM | BODY MASS INDEX: 28.63 KG/M2 | HEART RATE: 85 BPM | TEMPERATURE: 98.4 F

## 2023-08-09 DIAGNOSIS — R25.1 TREMOR: ICD-10-CM

## 2023-08-09 DIAGNOSIS — R25.2 BILATERAL LEG CRAMPS: ICD-10-CM

## 2023-08-09 DIAGNOSIS — I10 ESSENTIAL HYPERTENSION: Primary | ICD-10-CM

## 2023-08-09 DIAGNOSIS — Z72.0 VAPES NICOTINE CONTAINING SUBSTANCE: ICD-10-CM

## 2023-08-09 PROCEDURE — 99214 OFFICE O/P EST MOD 30 MIN: CPT | Performed by: FAMILY MEDICINE

## 2023-08-09 PROCEDURE — 3078F DIAST BP <80 MM HG: CPT | Performed by: FAMILY MEDICINE

## 2023-08-09 PROCEDURE — G8399 PT W/DXA RESULTS DOCUMENT: HCPCS | Performed by: FAMILY MEDICINE

## 2023-08-09 PROCEDURE — 3075F SYST BP GE 130 - 139MM HG: CPT | Performed by: FAMILY MEDICINE

## 2023-08-09 PROCEDURE — G8417 CALC BMI ABV UP PARAM F/U: HCPCS | Performed by: FAMILY MEDICINE

## 2023-08-09 PROCEDURE — 1123F ACP DISCUSS/DSCN MKR DOCD: CPT | Performed by: FAMILY MEDICINE

## 2023-08-09 PROCEDURE — 4004F PT TOBACCO SCREEN RCVD TLK: CPT | Performed by: FAMILY MEDICINE

## 2023-08-09 PROCEDURE — G8427 DOCREV CUR MEDS BY ELIG CLIN: HCPCS | Performed by: FAMILY MEDICINE

## 2023-08-09 PROCEDURE — 1090F PRES/ABSN URINE INCON ASSESS: CPT | Performed by: FAMILY MEDICINE

## 2023-08-09 RX ORDER — AMLODIPINE BESYLATE 10 MG/1
10 TABLET ORAL DAILY
Qty: 90 TABLET | Refills: 4 | Status: SHIPPED | OUTPATIENT
Start: 2023-08-09

## 2023-08-09 RX ORDER — VALSARTAN 320 MG/1
320 TABLET ORAL DAILY
Qty: 90 TABLET | Refills: 4 | Status: SHIPPED | OUTPATIENT
Start: 2023-08-09

## 2023-08-09 RX ORDER — METOPROLOL SUCCINATE 200 MG/1
200 TABLET, EXTENDED RELEASE ORAL DAILY
Qty: 90 TABLET | Refills: 4 | Status: SHIPPED | OUTPATIENT
Start: 2023-08-09

## 2023-08-09 ASSESSMENT — ENCOUNTER SYMPTOMS: SHORTNESS OF BREATH: 1

## 2023-10-18 ENCOUNTER — TELEPHONE (OUTPATIENT)
Age: 83
End: 2023-10-18

## 2023-10-18 NOTE — TELEPHONE ENCOUNTER
Called pt. No answer left a message on VM stating the appt scheduled with Dr. Jade Has for 10/26/23 at 8:00AM will be canceled and rescheduled. Dr. Jade Has will not be in the office that morning.

## 2023-11-13 ENCOUNTER — TELEPHONE (OUTPATIENT)
Facility: CLINIC | Age: 83
End: 2023-11-13

## 2024-01-09 ENCOUNTER — TELEPHONE (OUTPATIENT)
Facility: CLINIC | Age: 84
End: 2024-01-09

## 2024-01-09 NOTE — TELEPHONE ENCOUNTER
Please advise her to stay off cholesterol medicine for now but please have them contact us back if this does not improve her symptoms, it sounds quite severe and she may need to be evaluated sooner, certainly [facility] but if this is worsening and certainly if there are stroke signs or if this seems to be limiting her ability to tolerate oral intake she should be seen at the emergency room

## 2024-01-09 NOTE — TELEPHONE ENCOUNTER
Pt's daughter Aubrie Lyles called to see if pt can be seen for urgent visit due to possible medication reaction, stating pt stopped taking her cholesterol medication due to severe cramping her hands, arms, leg, and sometimes even has to stop eating because her jaw seems to lock up.    Pt's daughter concerned about her not being on medication and wants her evaluated.  Pt not scheduled to see Dr. Cheema until July and is having more pain than usual due to arthritis.    Advised Ms. Lyles pt will be placed on cancellation list, but may need to be evaluated by [facility] or if symptoms of facial droop, severe headache, numbness or weakness of extremities needs to go directly to ER.    Ms. Lyles agrees to plan and wants call back at [phone] to advise if pt should remain off her cholesterol medication or hold for now. Nereida

## 2024-01-10 NOTE — TELEPHONE ENCOUNTER
Called pt, and left a voice message, advising I was returning her call and to call the office back when able.

## 2024-01-10 NOTE — TELEPHONE ENCOUNTER
Called pt's daughter (Aubrie Lyles) after cancellation became available, advised of Dr. Crum's instructions, and scheduled pt for Tuesday 1/16/24 at 8 am with Dr. Cheema with understanding pt is to go directly to ER if stroke symptoms occur. Nereida

## 2024-01-15 PROBLEM — N18.4 CHRONIC KIDNEY DISEASE, STAGE 4 (SEVERE) (HCC): Status: ACTIVE | Noted: 2024-01-15

## 2024-01-16 ASSESSMENT — ENCOUNTER SYMPTOMS
NAUSEA: 0
CHEST TIGHTNESS: 0
COUGH: 0
DIARRHEA: 0
CONSTIPATION: 0
ABDOMINAL PAIN: 0
SINUS PAIN: 0
BLOOD IN STOOL: 0
SHORTNESS OF BREATH: 0

## 2024-01-17 ENCOUNTER — OFFICE VISIT (OUTPATIENT)
Facility: CLINIC | Age: 84
End: 2024-01-17
Payer: MEDICARE

## 2024-01-17 VITALS
TEMPERATURE: 97.7 F | WEIGHT: 154 LBS | OXYGEN SATURATION: 96 % | SYSTOLIC BLOOD PRESSURE: 138 MMHG | RESPIRATION RATE: 16 BRPM | HEIGHT: 62 IN | DIASTOLIC BLOOD PRESSURE: 82 MMHG | BODY MASS INDEX: 28.34 KG/M2 | HEART RATE: 100 BPM

## 2024-01-17 DIAGNOSIS — E11.9 TYPE 2 DIABETES MELLITUS WITHOUT COMPLICATION, WITHOUT LONG-TERM CURRENT USE OF INSULIN (HCC): ICD-10-CM

## 2024-01-17 DIAGNOSIS — N18.4 CHRONIC KIDNEY DISEASE, STAGE 4 (SEVERE) (HCC): ICD-10-CM

## 2024-01-17 DIAGNOSIS — M79.10 MYALGIA: Primary | ICD-10-CM

## 2024-01-17 DIAGNOSIS — M79.10 MYALGIA: ICD-10-CM

## 2024-01-17 DIAGNOSIS — J44.1 CHRONIC OBSTRUCTIVE PULMONARY DISEASE WITH (ACUTE) EXACERBATION (HCC): ICD-10-CM

## 2024-01-17 LAB
ALBUMIN SERPL-MCNC: 3.3 G/DL (ref 3.5–5)
ALBUMIN/GLOB SERPL: 0.8 (ref 1.1–2.2)
ALP SERPL-CCNC: 39 U/L (ref 45–117)
ALT SERPL-CCNC: 19 U/L (ref 12–78)
ANION GAP SERPL CALC-SCNC: 8 MMOL/L (ref 5–15)
AST SERPL-CCNC: 26 U/L (ref 15–37)
BASOPHILS # BLD: 0.1 K/UL (ref 0–0.1)
BASOPHILS NFR BLD: 1 % (ref 0–1)
BILIRUB DIRECT SERPL-MCNC: 0.1 MG/DL (ref 0–0.2)
BILIRUB SERPL-MCNC: 0.3 MG/DL (ref 0.2–1)
BUN SERPL-MCNC: 23 MG/DL (ref 6–20)
BUN/CREAT SERPL: 16 (ref 12–20)
CALCIUM SERPL-MCNC: 9.1 MG/DL (ref 8.5–10.1)
CHLORIDE SERPL-SCNC: 105 MMOL/L (ref 97–108)
CHOLEST SERPL-MCNC: 272 MG/DL
CO2 SERPL-SCNC: 28 MMOL/L (ref 21–32)
CREAT SERPL-MCNC: 1.43 MG/DL (ref 0.55–1.02)
CRP SERPL-MCNC: 2.08 MG/DL (ref 0–0.6)
DIFFERENTIAL METHOD BLD: ABNORMAL
EOSINOPHIL # BLD: 0.2 K/UL (ref 0–0.4)
EOSINOPHIL NFR BLD: 1 % (ref 0–7)
ERYTHROCYTE [DISTWIDTH] IN BLOOD BY AUTOMATED COUNT: 14.6 % (ref 11.5–14.5)
GLOBULIN SER CALC-MCNC: 4.3 G/DL (ref 2–4)
GLUCOSE SERPL-MCNC: 134 MG/DL (ref 65–100)
HCT VFR BLD AUTO: 37.2 % (ref 35–47)
HDLC SERPL-MCNC: 46 MG/DL
HDLC SERPL: 5.9 (ref 0–5)
HGB BLD-MCNC: 11.6 G/DL (ref 11.5–16)
IMM GRANULOCYTES # BLD AUTO: 0.1 K/UL (ref 0–0.04)
IMM GRANULOCYTES NFR BLD AUTO: 0 % (ref 0–0.5)
LDLC SERPL CALC-MCNC: 184.2 MG/DL (ref 0–100)
LYMPHOCYTES # BLD: 3.5 K/UL (ref 0.8–3.5)
LYMPHOCYTES NFR BLD: 31 % (ref 12–49)
MAGNESIUM SERPL-MCNC: 1.2 MG/DL (ref 1.6–2.4)
MCH RBC QN AUTO: 27.4 PG (ref 26–34)
MCHC RBC AUTO-ENTMCNC: 31.2 G/DL (ref 30–36.5)
MCV RBC AUTO: 87.9 FL (ref 80–99)
MONOCYTES # BLD: 0.9 K/UL (ref 0–1)
MONOCYTES NFR BLD: 8 % (ref 5–13)
NEUTS SEG # BLD: 6.8 K/UL (ref 1.8–8)
NEUTS SEG NFR BLD: 59 % (ref 32–75)
NRBC # BLD: 0 K/UL (ref 0–0.01)
NRBC BLD-RTO: 0 PER 100 WBC
PLATELET # BLD AUTO: 492 K/UL (ref 150–400)
PMV BLD AUTO: 9.2 FL (ref 8.9–12.9)
POTASSIUM SERPL-SCNC: 5.1 MMOL/L (ref 3.5–5.1)
PROT SERPL-MCNC: 7.6 G/DL (ref 6.4–8.2)
RBC # BLD AUTO: 4.23 M/UL (ref 3.8–5.2)
SODIUM SERPL-SCNC: 141 MMOL/L (ref 136–145)
TRIGL SERPL-MCNC: 209 MG/DL
VLDLC SERPL CALC-MCNC: 41.8 MG/DL
WBC # BLD AUTO: 11.4 K/UL (ref 3.6–11)

## 2024-01-17 PROCEDURE — 1090F PRES/ABSN URINE INCON ASSESS: CPT | Performed by: FAMILY MEDICINE

## 2024-01-17 PROCEDURE — 4004F PT TOBACCO SCREEN RCVD TLK: CPT | Performed by: FAMILY MEDICINE

## 2024-01-17 PROCEDURE — 3075F SYST BP GE 130 - 139MM HG: CPT | Performed by: FAMILY MEDICINE

## 2024-01-17 PROCEDURE — G8484 FLU IMMUNIZE NO ADMIN: HCPCS | Performed by: FAMILY MEDICINE

## 2024-01-17 PROCEDURE — 3079F DIAST BP 80-89 MM HG: CPT | Performed by: FAMILY MEDICINE

## 2024-01-17 PROCEDURE — G8427 DOCREV CUR MEDS BY ELIG CLIN: HCPCS | Performed by: FAMILY MEDICINE

## 2024-01-17 PROCEDURE — G8399 PT W/DXA RESULTS DOCUMENT: HCPCS | Performed by: FAMILY MEDICINE

## 2024-01-17 PROCEDURE — 99213 OFFICE O/P EST LOW 20 MIN: CPT | Performed by: FAMILY MEDICINE

## 2024-01-17 PROCEDURE — 1123F ACP DISCUSS/DSCN MKR DOCD: CPT | Performed by: FAMILY MEDICINE

## 2024-01-17 PROCEDURE — 3023F SPIROM DOC REV: CPT | Performed by: FAMILY MEDICINE

## 2024-01-17 PROCEDURE — G8417 CALC BMI ABV UP PARAM F/U: HCPCS | Performed by: FAMILY MEDICINE

## 2024-01-17 RX ORDER — VALSARTAN AND HYDROCHLOROTHIAZIDE 320; 12.5 MG/1; MG/1
1 TABLET, FILM COATED ORAL DAILY
COMMUNITY
Start: 2022-11-09 | End: 2024-01-17 | Stop reason: ALTCHOICE

## 2024-01-17 SDOH — ECONOMIC STABILITY: INCOME INSECURITY: HOW HARD IS IT FOR YOU TO PAY FOR THE VERY BASICS LIKE FOOD, HOUSING, MEDICAL CARE, AND HEATING?: NOT HARD AT ALL

## 2024-01-17 SDOH — ECONOMIC STABILITY: FOOD INSECURITY: WITHIN THE PAST 12 MONTHS, THE FOOD YOU BOUGHT JUST DIDN'T LAST AND YOU DIDN'T HAVE MONEY TO GET MORE.: NEVER TRUE

## 2024-01-17 SDOH — ECONOMIC STABILITY: FOOD INSECURITY: WITHIN THE PAST 12 MONTHS, YOU WORRIED THAT YOUR FOOD WOULD RUN OUT BEFORE YOU GOT MONEY TO BUY MORE.: NEVER TRUE

## 2024-01-17 SDOH — ECONOMIC STABILITY: HOUSING INSECURITY
IN THE LAST 12 MONTHS, WAS THERE A TIME WHEN YOU DID NOT HAVE A STEADY PLACE TO SLEEP OR SLEPT IN A SHELTER (INCLUDING NOW)?: NO

## 2024-01-17 ASSESSMENT — PATIENT HEALTH QUESTIONNAIRE - PHQ9
2. FEELING DOWN, DEPRESSED OR HOPELESS: 0
SUM OF ALL RESPONSES TO PHQ9 QUESTIONS 1 & 2: 0
SUM OF ALL RESPONSES TO PHQ QUESTIONS 1-9: 0
1. LITTLE INTEREST OR PLEASURE IN DOING THINGS: 0
SUM OF ALL RESPONSES TO PHQ QUESTIONS 1-9: 0

## 2024-01-17 NOTE — PROGRESS NOTES
Patient's identity has been verified with 2 identifiers (Name and )    No chief complaint on file.       Health Maintenance Due   Topic    DTaP/Tdap/Td vaccine (1 - Tdap)    Shingles vaccine (1 of 2)    Respiratory Syncytial Virus (RSV) Pregnant or age 60 yrs+ (1 - 1-dose 60+ series)    Diabetic foot exam     Flu vaccine (1)    COVID-19 Vaccine ( season)    Lipids     Annual Wellness Visit (Medicare)     A1C test (Diabetic or Prediabetic)        Wt Readings from Last 3 Encounters:   23 70.6 kg (155 lb 9.6 oz)   23 78 kg (172 lb)   23 66.2 kg (146 lb)     Temp Readings from Last 3 Encounters:   23 98.4 °F (36.9 °C)   23 97.5 °F (36.4 °C) (Temporal)     BP Readings from Last 3 Encounters:   23 136/68   23 (!) 141/56   23 (!) 148/55     Pulse Readings from Last 3 Encounters:   23 85   23 86   23 88           Coordination of Care Questionnaire:  :   1. \"Have you been to the ER, urgent care clinic since your last visit?  Hospitalized since your last visit?\" [facility]23 for COVID    2. \"Have you seen or consulted any other health care providers outside of the [facility] since your last visit?\" no     3. For patients aged 45-75: Has the patient had a colonoscopy / FIT/ Cologuard? N/a    If the patient is female:    1. For patients aged 40-74: Has the patient had a mammogram within the past 2 years? N/a    2. For patients aged 21-65: Has the patient had a pap smear? N/a     Do you have an Advance Directive on file? no  Are you interested in receiving information about Advance Directives? no    Patient is accompanied by daughter. I have received verbal consent from Gladys Burnett to discuss any/all medical information while they are present in the room.

## 2024-01-17 NOTE — PATIENT INSTRUCTIONS
Today we will check labs and I have added some inflammatory markers to double check.    In the meantime, please take tylenol arthritis 650 mg daily at bedtime. You may also try diclofenac gel (brand name: Voltaren gel) for inflammation especially in the hands.

## 2024-01-17 NOTE — PROGRESS NOTES
Assessment/Plan:     Diagnoses and all orders for this visit:    1. Myalgia  Discussed arthritis versus inflammatory arthritis. Will check labs as patient's cramping and pain is diffuse in nature with one episode of full body hives. Discussed use of OTC tylenol arthritis and diclofenac gel. Already taking magnesium. Encouraged to return to clinic if symptoms persist or worsen.  - Magnesium; Future  - NOAH by IFA w/Reflex; Future  - C-Reactive Protein; Future  - Sedimentation Rate; Future    2. Chronic obstructive pulmonary disease with (acute) exacerbation (HCC)  No recent exacerbations  - Basic Metabolic Panel; Future  - CBC with Auto Differential; Future    3. Chronic kidney disease, stage 4 (severe) (HCC)  Due for labs  - Basic Metabolic Panel; Future  - CBC with Auto Differential; Future    4. Type 2 diabetes mellitus without complication, without long-term current use of insulin (HCC)  Has been well controlled, due for labs  - Hepatic Function Panel; Future  - CBC with Auto Differential; Future  - Hemoglobin A1C; Future  - Microalbumin / Creatinine Urine Ratio; Future  - Lipid Panel; Future        Return in about 4 months (around 5/17/2024) for Hypertension, Diabetes.       Discussed expected course/resolution/complications of diagnosis in detail with patient.    Medication risks/benefits/costs/interactions/alternatives discussed with patient.    Pt expressed understanding with the diagnosis and plan    Portions of this note may have been populated using smart dictation software and may have \"sounds-like\" errors present.     MATTHEW Marcum - CNP  [address]  [address] [place] [address]  Tel: [phone]  Fax: [phone]    Subjective:      CC  Gladys Burnett is a 83 y.o. female who presents for had concerns including Joint Pain (Gladys Burnett is a 83 y.o. female who presents for medical evaluation related to multiple joint pain and cramps. Patient

## 2024-01-18 LAB
CREAT UR-MCNC: 220 MG/DL
ERYTHROCYTE [SEDIMENTATION RATE] IN BLOOD: 90 MM/HR (ref 0–30)
EST. AVERAGE GLUCOSE BLD GHB EST-MCNC: 137 MG/DL
HBA1C MFR BLD: 6.4 % (ref 4–5.6)
MICROALBUMIN UR-MCNC: 109 MG/DL
MICROALBUMIN/CREAT UR-RTO: 495 MG/G (ref 0–30)

## 2024-01-19 LAB
ANA TITR SER IF: NEGATIVE
LABORATORY COMMENT REPORT: NORMAL

## 2024-01-22 ENCOUNTER — TELEPHONE (OUTPATIENT)
Facility: CLINIC | Age: 84
End: 2024-01-22

## 2024-01-22 DIAGNOSIS — M79.10 MYALGIA: Primary | ICD-10-CM

## 2024-01-22 DIAGNOSIS — Z87.39 HISTORY OF MYOSITIS: ICD-10-CM

## 2024-01-22 RX ORDER — ROSUVASTATIN CALCIUM 20 MG/1
20 TABLET, COATED ORAL NIGHTLY
Qty: 90 TABLET | Refills: 1 | Status: SHIPPED | OUTPATIENT
Start: 2024-01-22 | End: 2024-07-20

## 2024-01-22 NOTE — TELEPHONE ENCOUNTER
Pts daughter Aubrie Lyles is calling stating that she missed calls from Friday and wanted to see if she could get a call back, she thinks it was for the blood work results    Aubrie    [phone]

## 2024-05-21 ENCOUNTER — OFFICE VISIT (OUTPATIENT)
Facility: CLINIC | Age: 84
End: 2024-05-21
Payer: MEDICARE

## 2024-05-21 VITALS
SYSTOLIC BLOOD PRESSURE: 150 MMHG | BODY MASS INDEX: 28.85 KG/M2 | WEIGHT: 156.8 LBS | OXYGEN SATURATION: 97 % | DIASTOLIC BLOOD PRESSURE: 70 MMHG | TEMPERATURE: 97.5 F | HEIGHT: 62 IN | HEART RATE: 81 BPM | RESPIRATION RATE: 16 BRPM

## 2024-05-21 DIAGNOSIS — E11.9 TYPE 2 DIABETES MELLITUS WITHOUT COMPLICATION, WITHOUT LONG-TERM CURRENT USE OF INSULIN (HCC): Primary | ICD-10-CM

## 2024-05-21 DIAGNOSIS — N18.4 CHRONIC KIDNEY DISEASE, STAGE 4 (SEVERE) (HCC): ICD-10-CM

## 2024-05-21 DIAGNOSIS — E78.2 MIXED HYPERLIPIDEMIA: ICD-10-CM

## 2024-05-21 DIAGNOSIS — M79.10 MYALGIA: ICD-10-CM

## 2024-05-21 DIAGNOSIS — E87.5 SERUM POTASSIUM ELEVATED: Primary | ICD-10-CM

## 2024-05-21 DIAGNOSIS — I10 ESSENTIAL HYPERTENSION: ICD-10-CM

## 2024-05-21 DIAGNOSIS — E11.9 TYPE 2 DIABETES MELLITUS WITHOUT COMPLICATION, WITHOUT LONG-TERM CURRENT USE OF INSULIN (HCC): ICD-10-CM

## 2024-05-21 LAB
ALBUMIN SERPL-MCNC: 3.5 G/DL (ref 3.5–5)
ALBUMIN/GLOB SERPL: 0.9 (ref 1.1–2.2)
ALP SERPL-CCNC: 34 U/L (ref 45–117)
ALT SERPL-CCNC: 20 U/L (ref 12–78)
ANION GAP SERPL CALC-SCNC: 5 MMOL/L (ref 5–15)
AST SERPL-CCNC: 24 U/L (ref 15–37)
BASOPHILS # BLD: 0.1 K/UL (ref 0–0.1)
BASOPHILS NFR BLD: 1 % (ref 0–1)
BILIRUB SERPL-MCNC: 0.3 MG/DL (ref 0.2–1)
BUN SERPL-MCNC: 40 MG/DL (ref 6–20)
BUN/CREAT SERPL: 26 (ref 12–20)
CALCIUM SERPL-MCNC: 10.2 MG/DL (ref 8.5–10.1)
CHLORIDE SERPL-SCNC: 109 MMOL/L (ref 97–108)
CHOLEST SERPL-MCNC: 152 MG/DL
CO2 SERPL-SCNC: 26 MMOL/L (ref 21–32)
CREAT SERPL-MCNC: 1.55 MG/DL (ref 0.55–1.02)
CREAT UR-MCNC: 66.9 MG/DL
DIFFERENTIAL METHOD BLD: ABNORMAL
EOSINOPHIL # BLD: 0.1 K/UL (ref 0–0.4)
EOSINOPHIL NFR BLD: 1 % (ref 0–7)
ERYTHROCYTE [DISTWIDTH] IN BLOOD BY AUTOMATED COUNT: 14 % (ref 11.5–14.5)
EST. AVERAGE GLUCOSE BLD GHB EST-MCNC: 137 MG/DL
GLOBULIN SER CALC-MCNC: 3.8 G/DL (ref 2–4)
GLUCOSE SERPL-MCNC: 111 MG/DL (ref 65–100)
HBA1C MFR BLD: 6.4 % (ref 4–5.6)
HCT VFR BLD AUTO: 37 % (ref 35–47)
HDLC SERPL-MCNC: 51 MG/DL
HDLC SERPL: 3 (ref 0–5)
HGB BLD-MCNC: 11.6 G/DL (ref 11.5–16)
IMM GRANULOCYTES # BLD AUTO: 0.1 K/UL (ref 0–0.04)
IMM GRANULOCYTES NFR BLD AUTO: 1 % (ref 0–0.5)
LDLC SERPL CALC-MCNC: 67.4 MG/DL (ref 0–100)
LYMPHOCYTES # BLD: 3.4 K/UL (ref 0.8–3.5)
LYMPHOCYTES NFR BLD: 30 % (ref 12–49)
MCH RBC QN AUTO: 28.4 PG (ref 26–34)
MCHC RBC AUTO-ENTMCNC: 31.4 G/DL (ref 30–36.5)
MCV RBC AUTO: 90.7 FL (ref 80–99)
MICROALBUMIN UR-MCNC: 24.3 MG/DL
MICROALBUMIN/CREAT UR-RTO: 363 MG/G (ref 0–30)
MONOCYTES # BLD: 0.9 K/UL (ref 0–1)
MONOCYTES NFR BLD: 8 % (ref 5–13)
NEUTS SEG # BLD: 6.9 K/UL (ref 1.8–8)
NEUTS SEG NFR BLD: 59 % (ref 32–75)
NRBC # BLD: 0 K/UL (ref 0–0.01)
NRBC BLD-RTO: 0 PER 100 WBC
PLATELET # BLD AUTO: 345 K/UL (ref 150–400)
PMV BLD AUTO: 9.5 FL (ref 8.9–12.9)
POTASSIUM SERPL-SCNC: 5.5 MMOL/L (ref 3.5–5.1)
PROT SERPL-MCNC: 7.3 G/DL (ref 6.4–8.2)
RBC # BLD AUTO: 4.08 M/UL (ref 3.8–5.2)
SODIUM SERPL-SCNC: 140 MMOL/L (ref 136–145)
TRIGL SERPL-MCNC: 168 MG/DL
VLDLC SERPL CALC-MCNC: 33.6 MG/DL
WBC # BLD AUTO: 11.5 K/UL (ref 3.6–11)

## 2024-05-21 PROCEDURE — 99214 OFFICE O/P EST MOD 30 MIN: CPT | Performed by: FAMILY MEDICINE

## 2024-05-21 PROCEDURE — 3077F SYST BP >= 140 MM HG: CPT | Performed by: FAMILY MEDICINE

## 2024-05-21 PROCEDURE — 4004F PT TOBACCO SCREEN RCVD TLK: CPT | Performed by: FAMILY MEDICINE

## 2024-05-21 PROCEDURE — G8399 PT W/DXA RESULTS DOCUMENT: HCPCS | Performed by: FAMILY MEDICINE

## 2024-05-21 PROCEDURE — 1090F PRES/ABSN URINE INCON ASSESS: CPT | Performed by: FAMILY MEDICINE

## 2024-05-21 PROCEDURE — 1123F ACP DISCUSS/DSCN MKR DOCD: CPT | Performed by: FAMILY MEDICINE

## 2024-05-21 PROCEDURE — G8417 CALC BMI ABV UP PARAM F/U: HCPCS | Performed by: FAMILY MEDICINE

## 2024-05-21 PROCEDURE — G8427 DOCREV CUR MEDS BY ELIG CLIN: HCPCS | Performed by: FAMILY MEDICINE

## 2024-05-21 PROCEDURE — 3044F HG A1C LEVEL LT 7.0%: CPT | Performed by: FAMILY MEDICINE

## 2024-05-21 PROCEDURE — 3078F DIAST BP <80 MM HG: CPT | Performed by: FAMILY MEDICINE

## 2024-05-21 RX ORDER — GABAPENTIN 100 MG/1
CAPSULE ORAL
Qty: 90 CAPSULE | Refills: 1 | Status: SHIPPED | OUTPATIENT
Start: 2024-05-21 | End: 2024-08-01

## 2024-05-21 RX ORDER — OMEGA-3 FATTY ACIDS/FISH OIL 300-1000MG
2 CAPSULE ORAL DAILY
COMMUNITY

## 2024-05-21 ASSESSMENT — PATIENT HEALTH QUESTIONNAIRE - PHQ9
2. FEELING DOWN, DEPRESSED OR HOPELESS: NOT AT ALL
SUM OF ALL RESPONSES TO PHQ QUESTIONS 1-9: 0
1. LITTLE INTEREST OR PLEASURE IN DOING THINGS: NOT AT ALL
SUM OF ALL RESPONSES TO PHQ QUESTIONS 1-9: 0
SUM OF ALL RESPONSES TO PHQ9 QUESTIONS 1 & 2: 0
SUM OF ALL RESPONSES TO PHQ QUESTIONS 1-9: 0
SUM OF ALL RESPONSES TO PHQ QUESTIONS 1-9: 0

## 2024-05-21 NOTE — PROGRESS NOTES
Chief Complaint   Patient presents with    Hypertension     Gladys Burnett is a 84 y.o. female who presents for a follow up on HTN and myalgia.      Patient has appt with rheumatologist in September.     \"Have you been to the ER, urgent care clinic since your last visit?  Hospitalized since your last visit?\"    NO    “Have you seen or consulted any other health care providers outside of [facility] since your last visit?”    NO             
person, place, and time.      Gait: Gait normal.   Psychiatric:         Mood and Affect: Mood normal.         Behavior: Behavior normal.   Diabetic Foot Exam:  Protective sensation is intact bilaterally.  Pedal pulses are 2+ and normal bilaterally.  L foot skin inspection:  skin and soft tissue appear normal with no significant edema or evidence of acute injury or foot ulcer   R foot skin inspection:  skin and soft tissue appear normal with no significant edema or evidence of acute injury or foot ulcer   Monofilament Testing: Normal bilaterally     Results  Laboratory Studies  Highest blood sugar was 105.    I have discussed the diagnosis with the patient and the intended plan as seen in the above orders.  The patient understands and agrees with the plan. The patient has received an after-visit summary and questions were answered concerning future plans.     Medication Side Effects and Warnings were discussed with patient  Patient Labs were reviewed and or requested:  Patient Past Records were reviewed and or requested    Please note that this dictation was completed with [employer] the [employer] voice recognition software.  Quite often unanticipated grammatical, syntax, homophones, and other interpretive errors are inadvertently transcribed by the computer software.  Please disregard these errors.  Please excuse any errors that have escaped final proofreading.  Thank you.     The patient (or guardian, if applicable) and other individuals in attendance with the patient were advised that Artificial Intelligence will be utilized during this visit to record and process the conversation to generate a clinical note. The patient (or guardian, if applicable) and other individuals in attendance at the appointment consented to the use of AI, including the recording.      Tonya Avila, MATTHEW - CNP  [facility]  [phone]

## 2024-05-21 NOTE — RESULT ENCOUNTER NOTE
Can you please corwin patient and let them know her potassium is a bit high. Any new salt substitutes? Supplements? High potassium foods (spinach, potatoes, bananas?). I'd have them check the diet/labels to see what could be contributing, then I'd like her to recheck this in one week. If it remains elevated, I would recommend we pull back on the valsartan which can sometimes increase potassium, and will likely have to add an additional blood pressure medicine like lasix or hydrochlorothiazide. I will enter the lab order now for Tuesday. Thank you!

## 2024-05-31 DIAGNOSIS — E87.5 SERUM POTASSIUM ELEVATED: ICD-10-CM

## 2024-05-31 LAB
ANION GAP SERPL CALC-SCNC: 4 MMOL/L (ref 5–15)
BUN SERPL-MCNC: 26 MG/DL (ref 6–20)
BUN/CREAT SERPL: 19 (ref 12–20)
CALCIUM SERPL-MCNC: 9.9 MG/DL (ref 8.5–10.1)
CHLORIDE SERPL-SCNC: 107 MMOL/L (ref 97–108)
CO2 SERPL-SCNC: 27 MMOL/L (ref 21–32)
CREAT SERPL-MCNC: 1.4 MG/DL (ref 0.55–1.02)
GLUCOSE SERPL-MCNC: 138 MG/DL (ref 65–100)
POTASSIUM SERPL-SCNC: 4.9 MMOL/L (ref 3.5–5.1)
SODIUM SERPL-SCNC: 138 MMOL/L (ref 136–145)

## 2024-06-09 NOTE — PROGRESS NOTES
Gladys Burnett, was evaluated through a synchronous (real-time) audio-video encounter. The patient (or guardian if applicable) is aware that this is a billable service, which includes applicable co-pays. This Virtual Visit was conducted with patient's (and/or legal guardian's) consent. Patient identification was verified, and a caregiver was present when appropriate.   The patient was located at Home: [id number] [place] [address]   [facility] [address]  Provider was located at Facility (Appt Dept): [id number] [facility]  [address]  [place] [address]  Confirm you are appropriately licensed, registered, or certified to deliver care in the state where the patient is located as indicated above. If you are not or unsure, please re-schedule the visit: Yes, I confirm.     Gladys Burnett (: 1940) is a Established patient, presenting virtually for evaluation of the following:    ASSESSMENT & PLAN:  Below is the assessment and plan developed based on review of pertinent history, physical exam, labs, studies, and medications.  Assessment & Plan    1. Essential hypertension  - Patient's blood pressures are well-controlled. Recommend continue with same medication(s). Reviewed low sodium and heart healthy diet recommendations.     2. Chronic kidney disease, stage 4 (severe) (formerly [facility])  - Potassium normalized, kidney numbers stable    3. Myalgia  - No changes yet with 1 gabapentin nightly. Awaiting Rheum appt. The patient was advised to increase her gabapentin dosage to two tablets daily. Will stop if vision changes worsen,  however unsure this is related. Pt awaiting eye appt.     Return for Keep follow up in Nov.    Patient Instructions   Having high blood pressure is very hard on your heart over time. It means the force of blood flowing through your vessels is too high, and it can damage your heart leading to things like \"afib\" or an irregular rhythm, a heart attack, or a stroke. Most people have NO symptoms, so

## 2024-06-11 ENCOUNTER — TELEMEDICINE (OUTPATIENT)
Facility: CLINIC | Age: 84
End: 2024-06-11
Payer: MEDICARE

## 2024-06-11 DIAGNOSIS — N18.4 CHRONIC KIDNEY DISEASE, STAGE 4 (SEVERE) (HCC): ICD-10-CM

## 2024-06-11 DIAGNOSIS — I10 ESSENTIAL HYPERTENSION: Primary | ICD-10-CM

## 2024-06-11 DIAGNOSIS — M79.10 MYALGIA: ICD-10-CM

## 2024-06-11 PROBLEM — J96.01 ACUTE RESPIRATORY FAILURE WITH HYPOXIA (HCC): Status: ACTIVE | Noted: 2024-06-11

## 2024-06-11 PROBLEM — J96.01 ACUTE RESPIRATORY FAILURE WITH HYPOXIA (HCC): Status: RESOLVED | Noted: 2024-06-11 | Resolved: 2024-06-11

## 2024-06-11 PROCEDURE — G8417 CALC BMI ABV UP PARAM F/U: HCPCS | Performed by: FAMILY MEDICINE

## 2024-06-11 PROCEDURE — 1090F PRES/ABSN URINE INCON ASSESS: CPT | Performed by: FAMILY MEDICINE

## 2024-06-11 PROCEDURE — G8399 PT W/DXA RESULTS DOCUMENT: HCPCS | Performed by: FAMILY MEDICINE

## 2024-06-11 PROCEDURE — 99213 OFFICE O/P EST LOW 20 MIN: CPT | Performed by: FAMILY MEDICINE

## 2024-06-11 PROCEDURE — 1123F ACP DISCUSS/DSCN MKR DOCD: CPT | Performed by: FAMILY MEDICINE

## 2024-06-11 PROCEDURE — G8427 DOCREV CUR MEDS BY ELIG CLIN: HCPCS | Performed by: FAMILY MEDICINE

## 2024-06-11 PROCEDURE — 4004F PT TOBACCO SCREEN RCVD TLK: CPT | Performed by: FAMILY MEDICINE

## 2024-06-11 NOTE — PROGRESS NOTES
Chief Complaint   Patient presents with    Hypertension     Gladys Burnett is a 84 y.o. female who presents for a follow up on HTN. She has been monitoring her BP at home and reports readings around 120/70-80's mmHg.      \"Have you been to the ER, urgent care clinic since your last visit?  Hospitalized since your last visit?\"    NO    “Have you seen or consulted any other health care providers outside of [facility] since your last visit?”    NO

## 2024-06-16 NOTE — PROGRESS NOTES
Identified pt with two pt identifiers(name and ). Reviewed record in preparation for visit and have obtained necessary documentation. Chief Complaint   Patient presents with    Follow-up    Diabetes        Vitals:    11/15/21 0916 11/15/21 0928   BP: (!) 151/62 (!) 144/63   Pulse: 66    Temp: 97.7 °F (36.5 °C)    TempSrc: Temporal    SpO2: 99%    Weight: 151 lb (68.5 kg)    Height: 5' 2\" (1.575 m)    PainSc:   0 - No pain        Health Maintenance Due   Topic    Shingrix Vaccine Age 50> (1 of 2)    Low dose CT lung screening        Coordination of Care Questionnaire:  :   1) Have you been to an emergency room, urgent care, or hospitalized since your last visit? If yes, where when, and reason for visit? No      2. Have seen or consulted any other health care provider since your last visit? If yes, where when, and reason for visit?   No 99

## 2024-08-26 DIAGNOSIS — I10 ESSENTIAL HYPERTENSION: ICD-10-CM

## 2024-08-26 RX ORDER — OMEPRAZOLE 40 MG/1
40 CAPSULE, DELAYED RELEASE ORAL DAILY
Qty: 90 CAPSULE | Refills: 1 | Status: SHIPPED | OUTPATIENT
Start: 2024-08-26

## 2024-08-26 RX ORDER — VALSARTAN 320 MG/1
320 TABLET ORAL DAILY
Qty: 90 TABLET | Refills: 1 | Status: SHIPPED | OUTPATIENT
Start: 2024-08-26

## 2024-08-26 RX ORDER — AMLODIPINE BESYLATE 10 MG/1
10 TABLET ORAL DAILY
Qty: 90 TABLET | Refills: 1 | Status: SHIPPED | OUTPATIENT
Start: 2024-08-26

## 2024-09-30 ENCOUNTER — TELEPHONE (OUTPATIENT)
Facility: CLINIC | Age: 84
End: 2024-09-30

## 2024-09-30 DIAGNOSIS — Z78.0 POSTMENOPAUSAL: Primary | ICD-10-CM

## 2024-09-30 NOTE — TELEPHONE ENCOUNTER
Pt's daughter Aubrie Lyles called to advise pt's rheumatologist is asking for PCP to order DEXA scan for pt, noting it's been 10 years since her last one.    Please advise Aubrie at [phone] when order is placed. Ldm

## 2024-10-08 ENCOUNTER — HOSPITAL ENCOUNTER (OUTPATIENT)
Facility: HOSPITAL | Age: 84
Discharge: HOME | End: 2024-10-11
Payer: MEDICARE

## 2024-10-08 ENCOUNTER — TELEPHONE (OUTPATIENT)
Facility: CLINIC | Age: 84
End: 2024-10-08

## 2024-10-08 DIAGNOSIS — Z78.0 POSTMENOPAUSAL: ICD-10-CM

## 2024-10-08 PROCEDURE — 77080 DXA BONE DENSITY AXIAL: CPT

## 2024-10-10 NOTE — RESULT ENCOUNTER NOTE
Your bone density shows that you are OSTEOPENIC, meaning you have low bone density that weakens bones. This is very common in women, especially over age 50. The next stage is called osteoporosis, which is when bones are so weak they break very easily. Lifestyle measures to help keep osteoporosis from developing include adequate intake of calcium and vitamin D, exercise, smoking cessation, and avoidance of heavy alcohol use and drugs that increase bone loss (eg, glucocorticoids, prednisone). Postmenopausal women should engage in weightbearing exercise for at least 30 minutes on most days of the week and incorporate muscle-strengthening and posture exercises two to three days a week.  Many thanks, Tonya Avila [facility]

## 2024-11-19 ENCOUNTER — TELEPHONE (OUTPATIENT)
Facility: CLINIC | Age: 84
End: 2024-11-19

## 2024-11-19 NOTE — TELEPHONE ENCOUNTER
----- Message from Brittaney WANG sent at 11/19/2024  8:08 AM EST -----  Regarding: ECC Appointment Request  E[facility] Appointment Request    Patient needs appointment for ECC Appointment Type: Annual Visit.    Patient Requested Dates(s): Around December except on December 6 and 16, 2024  Patient Requested Time: morning appointment or anytime   Provider Name: Tonya Avila APRN - CNP    Note: On behalf of the patient her daughter Aubrie called in to reschedule her Mom's appointment that supposedly on November 21, 2024 at 8:20 am annual well visit. You can make an arrangement with the patient after you called the provided number.     Reason for Appointment Request: Established Patient - No appointments available during search  --------------------------------------------------------------------------------------------------------------------------    Relationship to Patient: Other Daughter Aubrie      Call Back Information: OK to leave message on voicemail  Preferred Call Back Number: Phone [phone]

## 2024-12-10 SDOH — HEALTH STABILITY: PHYSICAL HEALTH: ON AVERAGE, HOW MANY DAYS PER WEEK DO YOU ENGAGE IN MODERATE TO STRENUOUS EXERCISE (LIKE A BRISK WALK)?: 0 DAYS

## 2024-12-10 SDOH — HEALTH STABILITY: PHYSICAL HEALTH: ON AVERAGE, HOW MANY MINUTES DO YOU ENGAGE IN EXERCISE AT THIS LEVEL?: 0 MIN

## 2024-12-10 ASSESSMENT — PATIENT HEALTH QUESTIONNAIRE - PHQ9
SUM OF ALL RESPONSES TO PHQ9 QUESTIONS 1 & 2: 1
1. LITTLE INTEREST OR PLEASURE IN DOING THINGS: SEVERAL DAYS
2. FEELING DOWN, DEPRESSED OR HOPELESS: NOT AT ALL
SUM OF ALL RESPONSES TO PHQ QUESTIONS 1-9: 1

## 2024-12-10 ASSESSMENT — LIFESTYLE VARIABLES
HOW OFTEN DURING THE LAST YEAR HAVE YOU BEEN UNABLE TO REMEMBER WHAT HAPPENED THE NIGHT BEFORE BECAUSE YOU HAD BEEN DRINKING: NEVER
HOW OFTEN DURING THE LAST YEAR HAVE YOU BEEN UNABLE TO REMEMBER WHAT HAPPENED THE NIGHT BEFORE BECAUSE YOU HAD BEEN DRINKING: NEVER
HOW MANY STANDARD DRINKS CONTAINING ALCOHOL DO YOU HAVE ON A TYPICAL DAY: 1
HOW OFTEN DURING THE LAST YEAR HAVE YOU FOUND THAT YOU WERE NOT ABLE TO STOP DRINKING ONCE YOU HAD STARTED: NEVER
HOW OFTEN DURING THE LAST YEAR HAVE YOU FAILED TO DO WHAT WAS NORMALLY EXPECTED FROM YOU BECAUSE OF DRINKING: NEVER
HOW OFTEN DURING THE LAST YEAR HAVE YOU HAD A FEELING OF GUILT OR REMORSE AFTER DRINKING: NEVER
HAS A RELATIVE, FRIEND, DOCTOR, OR ANOTHER HEALTH PROFESSIONAL EXPRESSED CONCERN ABOUT YOUR DRINKING OR SUGGESTED YOU CUT DOWN: NO
HOW OFTEN DO YOU HAVE A DRINK CONTAINING ALCOHOL: 2
HAVE YOU OR SOMEONE ELSE BEEN INJURED AS A RESULT OF YOUR DRINKING: NO
HOW OFTEN DURING THE LAST YEAR HAVE YOU FAILED TO DO WHAT WAS NORMALLY EXPECTED FROM YOU BECAUSE OF DRINKING: NEVER
HOW MANY STANDARD DRINKS CONTAINING ALCOHOL DO YOU HAVE ON A TYPICAL DAY: 1 OR 2
HOW OFTEN DURING THE LAST YEAR HAVE YOU HAD A FEELING OF GUILT OR REMORSE AFTER DRINKING: NEVER
HOW OFTEN DO YOU HAVE A DRINK CONTAINING ALCOHOL: MONTHLY OR LESS
HAVE YOU OR SOMEONE ELSE BEEN INJURED AS A RESULT OF YOUR DRINKING: NO
HOW OFTEN DURING THE LAST YEAR HAVE YOU NEEDED AN ALCOHOLIC DRINK FIRST THING IN THE MORNING TO GET YOURSELF GOING AFTER A NIGHT OF HEAVY DRINKING: NEVER
HOW OFTEN DO YOU HAVE SIX OR MORE DRINKS ON ONE OCCASION: 2
HAS A RELATIVE, FRIEND, DOCTOR, OR ANOTHER HEALTH PROFESSIONAL EXPRESSED CONCERN ABOUT YOUR DRINKING OR SUGGESTED YOU CUT DOWN: NO
HOW OFTEN DURING THE LAST YEAR HAVE YOU NEEDED AN ALCOHOLIC DRINK FIRST THING IN THE MORNING TO GET YOURSELF GOING AFTER A NIGHT OF HEAVY DRINKING: NEVER
HOW OFTEN DURING THE LAST YEAR HAVE YOU FOUND THAT YOU WERE NOT ABLE TO STOP DRINKING ONCE YOU HAD STARTED: NEVER

## 2024-12-11 ENCOUNTER — OFFICE VISIT (OUTPATIENT)
Facility: CLINIC | Age: 84
End: 2024-12-11

## 2024-12-11 VITALS
DIASTOLIC BLOOD PRESSURE: 68 MMHG | TEMPERATURE: 97.6 F | OXYGEN SATURATION: 95 % | WEIGHT: 154.3 LBS | BODY MASS INDEX: 28.39 KG/M2 | SYSTOLIC BLOOD PRESSURE: 113 MMHG | HEIGHT: 62 IN | RESPIRATION RATE: 16 BRPM | HEART RATE: 83 BPM

## 2024-12-11 DIAGNOSIS — M06.9 RHEUMATOID ARTHRITIS, INVOLVING UNSPECIFIED SITE, UNSPECIFIED WHETHER RHEUMATOID FACTOR PRESENT (HCC): ICD-10-CM

## 2024-12-11 DIAGNOSIS — I10 ESSENTIAL HYPERTENSION: ICD-10-CM

## 2024-12-11 DIAGNOSIS — Z23 NEED FOR DIPHTHERIA-TETANUS-PERTUSSIS (TDAP) VACCINE: ICD-10-CM

## 2024-12-11 DIAGNOSIS — Z00.00 MEDICARE ANNUAL WELLNESS VISIT, SUBSEQUENT: Primary | ICD-10-CM

## 2024-12-11 DIAGNOSIS — N18.4 CHRONIC KIDNEY DISEASE, STAGE 4 (SEVERE) (HCC): ICD-10-CM

## 2024-12-11 DIAGNOSIS — E11.9 TYPE 2 DIABETES MELLITUS WITHOUT COMPLICATION, WITHOUT LONG-TERM CURRENT USE OF INSULIN (HCC): ICD-10-CM

## 2024-12-11 DIAGNOSIS — J44.1 CHRONIC OBSTRUCTIVE PULMONARY DISEASE WITH (ACUTE) EXACERBATION (HCC): ICD-10-CM

## 2024-12-11 RX ORDER — PREDNISONE 5 MG/1
1 TABLET ORAL
COMMUNITY

## 2024-12-11 NOTE — PROGRESS NOTES
Chief Complaint   Patient presents with    Medicare AWV     Gladys Burnett is a 84 y.o. female who presents for his/her AWV. Their most recent medicare wellness examination was done over 1 yr ago.      \"Have you been to the ER, urgent care clinic since your last visit?  Hospitalized since your last visit?\"    NO    “Have you seen or consulted any other health care providers outside of [facility] since your last visit?”    YES - When: approximately 1 months ago.  Where and Why: Rheumatologist (Meg Schultz).            Click Here for Release of Records Request

## 2024-12-11 NOTE — ASSESSMENT & PLAN NOTE
I will review records from Dr. Sheehan prior to ordering additional blood work, as patient recently had several labs performed.  She denies any new symptoms at this time.  She is not following with nephrology as her kidney numbers have been stable.

## 2024-12-11 NOTE — ASSESSMENT & PLAN NOTE
Her last blood sugar test was conducted in May 2024. She reports that her blood sugar levels remain high despite taking lower doses of medication.

## 2024-12-11 NOTE — PROGRESS NOTES
Medicare Annual Wellness Visit    Gladys Burnett is here for Medicare AWV (Gladys Burnett is a 84 y.o. female who presents for his/her AWV. Their most recent medicare wellness examination was done over 1 yr ago. )    Assessment & Plan      Assessment & Plan  Medicare annual wellness visit, subsequent    She will receive her Tdap vaccine today due to potential exposure to whooping cough from grandchildren.         Rheumatoid arthritis, involving unspecified site, unspecified whether rheumatoid factor present (HCC)    New diagnosis by Dr. Sheehan with Rheum. She reports significant improvement in pain management with prednisone, which she takes as needed. She has been advised to continue prednisone as needed for pain relief, especially during the busy holiday season. She may consider reducing the dosage after the holidays. A request for her medical records from Dr. Sheehan has been initiated.          Chronic kidney disease, stage 4 (severe) (HCC)    I will review records from Dr. Sheehan prior to ordering additional blood work, as patient recently had several labs performed.  She denies any new symptoms at this time.  She is not following with nephrology as her kidney numbers have been stable.         Essential hypertension   Chronic, at goal (stable), continue current treatment plan, medication adherence emphasized, and lifestyle modifications recommended         Type 2 diabetes mellitus without complication, without long-term current use of insulin (HCC)    Her last blood sugar test was conducted in May 2024. She reports that her blood sugar levels remain high despite taking lower doses of medication.         Chronic obstructive pulmonary disease with (acute) exacerbation (HCC)   Monitored by specialist- no acute findings meriting change in the plan           Results      Recommendations for Preventive Services Due: see orders and patient instructions/AVS.  Recommended screening schedule for the next 5-10 years is

## 2025-02-24 DIAGNOSIS — I10 ESSENTIAL HYPERTENSION: ICD-10-CM

## 2025-02-24 NOTE — TELEPHONE ENCOUNTER
[facility]:    Valsartan 32 mg tablet  Sig: Take 1 tablet by mouth daily  Quantity: 90    Omeprazole DR 40 mg capsule  Sig: Take 1 capsule by mouth daily  Quantity: 90    Amlodipine Besylate 10 mg tablet  Sig: Take 1 tablet by mouth daily  Quantity: 90    Ldm

## 2025-02-25 RX ORDER — AMLODIPINE BESYLATE 10 MG/1
10 TABLET ORAL DAILY
Qty: 90 TABLET | Refills: 1 | Status: SHIPPED | OUTPATIENT
Start: 2025-02-25

## 2025-02-25 RX ORDER — VALSARTAN 320 MG/1
320 TABLET ORAL DAILY
Qty: 90 TABLET | Refills: 1 | Status: SHIPPED | OUTPATIENT
Start: 2025-02-25

## 2025-02-25 RX ORDER — OMEPRAZOLE 40 MG/1
40 CAPSULE, DELAYED RELEASE ORAL DAILY
Qty: 90 CAPSULE | Refills: 1 | Status: SHIPPED | OUTPATIENT
Start: 2025-02-25

## 2025-06-06 RX ORDER — BLOOD SUGAR DIAGNOSTIC
1 STRIP MISCELLANEOUS DAILY
Qty: 100 EACH | Refills: 1 | Status: SHIPPED | OUTPATIENT
Start: 2025-06-06

## 2025-06-06 NOTE — TELEPHONE ENCOUNTER
[facility] [id number] [facility] faxed refill request:    Accu-check Linda plus test strip  Sig: Use to check blood sugar once daily   Quantity: 100    Ldm

## 2025-06-10 SDOH — ECONOMIC STABILITY: INCOME INSECURITY: IN THE LAST 12 MONTHS, WAS THERE A TIME WHEN YOU WERE NOT ABLE TO PAY THE MORTGAGE OR RENT ON TIME?: NO

## 2025-06-10 SDOH — ECONOMIC STABILITY: TRANSPORTATION INSECURITY
IN THE PAST 12 MONTHS, HAS THE LACK OF TRANSPORTATION KEPT YOU FROM MEDICAL APPOINTMENTS OR FROM GETTING MEDICATIONS?: NO

## 2025-06-10 SDOH — ECONOMIC STABILITY: FOOD INSECURITY: WITHIN THE PAST 12 MONTHS, YOU WORRIED THAT YOUR FOOD WOULD RUN OUT BEFORE YOU GOT MONEY TO BUY MORE.: NEVER TRUE

## 2025-06-10 SDOH — ECONOMIC STABILITY: FOOD INSECURITY: WITHIN THE PAST 12 MONTHS, THE FOOD YOU BOUGHT JUST DIDN'T LAST AND YOU DIDN'T HAVE MONEY TO GET MORE.: NEVER TRUE

## 2025-06-10 SDOH — ECONOMIC STABILITY: TRANSPORTATION INSECURITY
IN THE PAST 12 MONTHS, HAS LACK OF TRANSPORTATION KEPT YOU FROM MEETINGS, WORK, OR FROM GETTING THINGS NEEDED FOR DAILY LIVING?: NO

## 2025-06-11 ENCOUNTER — OFFICE VISIT (OUTPATIENT)
Facility: CLINIC | Age: 85
End: 2025-06-11
Payer: MEDICARE

## 2025-06-11 VITALS
BODY MASS INDEX: 28.82 KG/M2 | DIASTOLIC BLOOD PRESSURE: 79 MMHG | WEIGHT: 156.6 LBS | OXYGEN SATURATION: 99 % | SYSTOLIC BLOOD PRESSURE: 145 MMHG | HEIGHT: 62 IN | RESPIRATION RATE: 16 BRPM | HEART RATE: 90 BPM | TEMPERATURE: 98.1 F

## 2025-06-11 DIAGNOSIS — E11.9 TYPE 2 DIABETES MELLITUS WITHOUT COMPLICATION, WITHOUT LONG-TERM CURRENT USE OF INSULIN (HCC): ICD-10-CM

## 2025-06-11 DIAGNOSIS — M06.9 RHEUMATOID ARTHRITIS, INVOLVING UNSPECIFIED SITE, UNSPECIFIED WHETHER RHEUMATOID FACTOR PRESENT (HCC): ICD-10-CM

## 2025-06-11 DIAGNOSIS — N18.4 CHRONIC KIDNEY DISEASE, STAGE 4 (SEVERE) (HCC): ICD-10-CM

## 2025-06-11 DIAGNOSIS — E11.9 TYPE 2 DIABETES MELLITUS WITHOUT COMPLICATION, WITHOUT LONG-TERM CURRENT USE OF INSULIN (HCC): Primary | ICD-10-CM

## 2025-06-11 DIAGNOSIS — I10 ESSENTIAL HYPERTENSION: ICD-10-CM

## 2025-06-11 DIAGNOSIS — R22.43 LOCALIZED SWELLING OF BOTH LOWER LEGS: ICD-10-CM

## 2025-06-11 DIAGNOSIS — J44.1 CHRONIC OBSTRUCTIVE PULMONARY DISEASE WITH (ACUTE) EXACERBATION (HCC): ICD-10-CM

## 2025-06-11 DIAGNOSIS — K13.0 LIP LESION: ICD-10-CM

## 2025-06-11 LAB
ALBUMIN SERPL-MCNC: 3.3 G/DL (ref 3.5–5)
ALBUMIN/GLOB SERPL: 0.9 (ref 1.1–2.2)
ALP SERPL-CCNC: 31 U/L (ref 45–117)
ALT SERPL-CCNC: 21 U/L (ref 12–78)
ANION GAP SERPL CALC-SCNC: 9 MMOL/L (ref 2–12)
AST SERPL-CCNC: 22 U/L (ref 15–37)
BASOPHILS # BLD: 0.09 K/UL (ref 0–0.1)
BASOPHILS NFR BLD: 0.5 % (ref 0–1)
BILIRUB DIRECT SERPL-MCNC: 0.1 MG/DL (ref 0–0.2)
BILIRUB SERPL-MCNC: 0.3 MG/DL (ref 0.2–1)
BUN SERPL-MCNC: 36 MG/DL (ref 6–20)
BUN/CREAT SERPL: 19 (ref 12–20)
CALCIUM SERPL-MCNC: 9.9 MG/DL (ref 8.5–10.1)
CHLORIDE SERPL-SCNC: 103 MMOL/L (ref 97–108)
CHOLEST SERPL-MCNC: 260 MG/DL
CO2 SERPL-SCNC: 25 MMOL/L (ref 21–32)
CREAT SERPL-MCNC: 1.87 MG/DL (ref 0.55–1.02)
CREAT UR-MCNC: 119 MG/DL
DIFFERENTIAL METHOD BLD: ABNORMAL
EOSINOPHIL # BLD: 0.13 K/UL (ref 0–0.4)
EOSINOPHIL NFR BLD: 0.7 % (ref 0–7)
ERYTHROCYTE [DISTWIDTH] IN BLOOD BY AUTOMATED COUNT: 13.5 % (ref 11.5–14.5)
EST. AVERAGE GLUCOSE BLD GHB EST-MCNC: 160 MG/DL
GLOBULIN SER CALC-MCNC: 3.7 G/DL (ref 2–4)
GLUCOSE SERPL-MCNC: 111 MG/DL (ref 65–100)
HBA1C MFR BLD: 7.2 % (ref 4–5.6)
HCT VFR BLD AUTO: 39.5 % (ref 35–47)
HDLC SERPL-MCNC: 56 MG/DL
HDLC SERPL: 4.6 (ref 0–5)
HGB BLD-MCNC: 11.8 G/DL (ref 11.5–16)
IMM GRANULOCYTES # BLD AUTO: 0.17 K/UL (ref 0–0.04)
IMM GRANULOCYTES NFR BLD AUTO: 0.9 % (ref 0–0.5)
LDLC SERPL CALC-MCNC: 152.6 MG/DL (ref 0–100)
LYMPHOCYTES # BLD: 2.9 K/UL (ref 0.8–3.5)
LYMPHOCYTES NFR BLD: 15.8 % (ref 12–49)
MCH RBC QN AUTO: 28.6 PG (ref 26–34)
MCHC RBC AUTO-ENTMCNC: 29.9 G/DL (ref 30–36.5)
MCV RBC AUTO: 95.9 FL (ref 80–99)
MICROALBUMIN UR-MCNC: 15.7 MG/DL
MICROALBUMIN/CREAT UR-RTO: 132 MG/G (ref 0–30)
MONOCYTES # BLD: 1.09 K/UL (ref 0–1)
MONOCYTES NFR BLD: 5.9 % (ref 5–13)
NEUTS SEG # BLD: 13.96 K/UL (ref 1.8–8)
NEUTS SEG NFR BLD: 76.2 % (ref 32–75)
NRBC # BLD: 0 K/UL (ref 0–0.01)
NRBC BLD-RTO: 0 PER 100 WBC
PLATELET # BLD AUTO: 420 K/UL (ref 150–400)
PMV BLD AUTO: 9.4 FL (ref 8.9–12.9)
POTASSIUM SERPL-SCNC: 4.9 MMOL/L (ref 3.5–5.1)
PROT SERPL-MCNC: 7 G/DL (ref 6.4–8.2)
RBC # BLD AUTO: 4.12 M/UL (ref 3.8–5.2)
SODIUM SERPL-SCNC: 137 MMOL/L (ref 136–145)
TRIGL SERPL-MCNC: 257 MG/DL
VLDLC SERPL CALC-MCNC: 51.4 MG/DL
WBC # BLD AUTO: 18.3 K/UL (ref 3.6–11)

## 2025-06-11 PROCEDURE — G8399 PT W/DXA RESULTS DOCUMENT: HCPCS | Performed by: FAMILY MEDICINE

## 2025-06-11 PROCEDURE — 1090F PRES/ABSN URINE INCON ASSESS: CPT | Performed by: FAMILY MEDICINE

## 2025-06-11 PROCEDURE — G8417 CALC BMI ABV UP PARAM F/U: HCPCS | Performed by: FAMILY MEDICINE

## 2025-06-11 PROCEDURE — 4004F PT TOBACCO SCREEN RCVD TLK: CPT | Performed by: FAMILY MEDICINE

## 2025-06-11 PROCEDURE — 1160F RVW MEDS BY RX/DR IN RCRD: CPT | Performed by: FAMILY MEDICINE

## 2025-06-11 PROCEDURE — 3077F SYST BP >= 140 MM HG: CPT | Performed by: FAMILY MEDICINE

## 2025-06-11 PROCEDURE — 1125F AMNT PAIN NOTED PAIN PRSNT: CPT | Performed by: FAMILY MEDICINE

## 2025-06-11 PROCEDURE — 3078F DIAST BP <80 MM HG: CPT | Performed by: FAMILY MEDICINE

## 2025-06-11 PROCEDURE — G8427 DOCREV CUR MEDS BY ELIG CLIN: HCPCS | Performed by: FAMILY MEDICINE

## 2025-06-11 PROCEDURE — 1159F MED LIST DOCD IN RCRD: CPT | Performed by: FAMILY MEDICINE

## 2025-06-11 PROCEDURE — 99214 OFFICE O/P EST MOD 30 MIN: CPT | Performed by: FAMILY MEDICINE

## 2025-06-11 PROCEDURE — 1123F ACP DISCUSS/DSCN MKR DOCD: CPT | Performed by: FAMILY MEDICINE

## 2025-06-11 PROCEDURE — 3023F SPIROM DOC REV: CPT | Performed by: FAMILY MEDICINE

## 2025-06-11 RX ORDER — BLOOD SUGAR DIAGNOSTIC
1 STRIP MISCELLANEOUS DAILY
Qty: 100 EACH | Refills: 1 | Status: SHIPPED | OUTPATIENT
Start: 2025-06-11

## 2025-06-11 ASSESSMENT — PATIENT HEALTH QUESTIONNAIRE - PHQ9
1. LITTLE INTEREST OR PLEASURE IN DOING THINGS: NOT AT ALL
SUM OF ALL RESPONSES TO PHQ QUESTIONS 1-9: 0
2. FEELING DOWN, DEPRESSED OR HOPELESS: NOT AT ALL
SUM OF ALL RESPONSES TO PHQ QUESTIONS 1-9: 0

## 2025-06-11 NOTE — PROGRESS NOTES
Chief Complaint   Patient presents with    Hypertension     Gladys Burnett is a 85 y.o. female who presents for a follow up on HTN.    She has not had any blood work done recently.     \"Have you been to the ER, urgent care clinic since your last visit?  Hospitalized since your last visit?\"    NO    “Have you seen or consulted any other health care providers outside of [facility] since your last visit?”    NO            Click Here for Release of Records Request    
to lower lip  Heart: Normal rate; no murmurs  Respiratory: Clear bilaterally x 4, no wheezing or rales  Extremities:  No edema, normal cap refill, no cyanosis.  Musculoskeletal: No clubbing, no deformities  Neuro: A&Ox3, speech clear, gait stable, cooperative, no focal neurologic deficits  Psychiatric: Mood and behavior normal    Please note that this dictation was completed with Imagry, the computer voice recognition software.  Quite often unanticipated grammatical, syntax, homophones, and other interpretive errors are inadvertently transcribed by the computer software.  Please disregard these errors.  Please excuse any errors that have escaped final proofreading.  Thank you.   The patient (or guardian, if applicable) and other individuals in attendance with the patient were advised that Artificial Intelligence will be utilized during this visit to record and process the conversation to generate a clinical note. The patient (or guardian, if applicable) and other individuals in attendance at the appointment consented to the use of AI, including the recording.      --------------------MATTHEW Saunders - SHAHEEN

## 2025-06-11 NOTE — ASSESSMENT & PLAN NOTE
Has been controlled without medication  - due for recheck  - reviewed diet recs    Orders:    Hemoglobin A1C [LAB90]; Future    Lipid Panel; Future    Albumin/Creatinine Ratio, Urine; Future    blood glucose test strips (EXACTECH TEST) strip; 1 each by In Vitro route daily As needed.

## 2025-06-11 NOTE — ASSESSMENT & PLAN NOTE
Not at goal today  - Advised home BP monitoring and reporting significant changes.  - Reports normal home readings

## 2025-06-11 NOTE — ASSESSMENT & PLAN NOTE
Has been stable  - Ordered comprehensive blood work for cholesterol, kidney function, and CBC to rule out anemia.    Orders:    Basic Metabolic Panel; Future    CBC with Auto Differential; Future    Hepatic Function Panel; Future

## 2025-06-11 NOTE — PATIENT INSTRUCTIONS
Today we discussed:  Ordering labs today  Derm referral to [facility]  Please keep an eye on BP at home.     Thank you and great to see you today!   Please reach out on MyChart with any questions.   MATTHEW Saunders - CNP

## 2025-06-12 ENCOUNTER — PATIENT MESSAGE (OUTPATIENT)
Facility: CLINIC | Age: 85
End: 2025-06-12

## 2025-06-12 ENCOUNTER — RESULTS FOLLOW-UP (OUTPATIENT)
Facility: CLINIC | Age: 85
End: 2025-06-12

## 2025-06-12 ENCOUNTER — HOSPITAL ENCOUNTER (OUTPATIENT)
Facility: HOSPITAL | Age: 85
Discharge: HOME | End: 2025-06-15
Payer: MEDICARE

## 2025-06-12 ENCOUNTER — CLINICAL SUPPORT (OUTPATIENT)
Facility: CLINIC | Age: 85
End: 2025-06-12
Payer: MEDICARE

## 2025-06-12 DIAGNOSIS — D72.829 LEUKOCYTOSIS, UNSPECIFIED TYPE: Primary | ICD-10-CM

## 2025-06-12 DIAGNOSIS — D72.829 LEUKOCYTOSIS, UNSPECIFIED TYPE: ICD-10-CM

## 2025-06-12 LAB
BILIRUBIN, URINE, POC: NEGATIVE
BLOOD URINE, POC: NEGATIVE
GLUCOSE URINE, POC: NEGATIVE
KETONES, URINE, POC: ABNORMAL
LEUKOCYTE ESTERASE, URINE, POC: NEGATIVE
NITRITE, URINE, POC: NEGATIVE
PH, URINE, POC: 5 (ref 4.6–8)
PROTEIN,URINE, POC: ABNORMAL
SPECIFIC GRAVITY, URINE, POC: 1.02 (ref 1–1.03)
URINALYSIS CLARITY, POC: CLEAR
URINALYSIS COLOR, POC: YELLOW
UROBILINOGEN, POC: ABNORMAL MG/DL

## 2025-06-12 PROCEDURE — 81001 URINALYSIS AUTO W/SCOPE: CPT | Performed by: FAMILY MEDICINE

## 2025-06-12 PROCEDURE — 71046 X-RAY EXAM CHEST 2 VIEWS: CPT

## 2025-06-12 NOTE — PROGRESS NOTES
Chief Complaint   Patient presents with    Urinalysis      Patient presents for a urine dipstick due to leukocytosis shown in most recent lab results.

## 2025-06-12 NOTE — TELEPHONE ENCOUNTER
Patient's daughter will bring patient in a few minutes to leave a urine sample    She will take pt to imaging after leaving urine     [place]

## 2025-06-12 NOTE — TELEPHONE ENCOUNTER
Pt's daughter, Aubrie Lyles called to confirm Tonya's nurse received message advising of pt's negative covid test and to confirm if pt needs to come in for urine sample today.      Please advise at [phone]. Ldm

## 2025-06-16 ENCOUNTER — RESULTS FOLLOW-UP (OUTPATIENT)
Facility: CLINIC | Age: 85
End: 2025-06-16

## 2025-06-16 DIAGNOSIS — N18.4 CHRONIC KIDNEY DISEASE, STAGE 4 (SEVERE) (HCC): ICD-10-CM

## 2025-06-16 DIAGNOSIS — D72.829 LEUKOCYTOSIS, UNSPECIFIED TYPE: Primary | ICD-10-CM

## 2025-07-11 ENCOUNTER — PATIENT MESSAGE (OUTPATIENT)
Facility: CLINIC | Age: 85
End: 2025-07-11

## 2025-07-11 DIAGNOSIS — D72.829 LEUKOCYTOSIS, UNSPECIFIED TYPE: Primary | ICD-10-CM

## 2025-08-20 DIAGNOSIS — I10 ESSENTIAL HYPERTENSION: ICD-10-CM

## 2025-08-20 RX ORDER — VALSARTAN 320 MG/1
320 TABLET ORAL DAILY
Qty: 90 TABLET | Refills: 1 | Status: SHIPPED | OUTPATIENT
Start: 2025-08-20

## 2025-08-20 RX ORDER — AMLODIPINE BESYLATE 10 MG/1
10 TABLET ORAL DAILY
Qty: 90 TABLET | Refills: 1 | Status: SHIPPED | OUTPATIENT
Start: 2025-08-20

## 2025-08-20 RX ORDER — OMEPRAZOLE 40 MG/1
40 CAPSULE, DELAYED RELEASE ORAL DAILY
Qty: 90 CAPSULE | Refills: 1 | Status: SHIPPED | OUTPATIENT
Start: 2025-08-20